# Patient Record
Sex: MALE | Employment: OTHER | URBAN - METROPOLITAN AREA
[De-identification: names, ages, dates, MRNs, and addresses within clinical notes are randomized per-mention and may not be internally consistent; named-entity substitution may affect disease eponyms.]

---

## 2017-01-04 DIAGNOSIS — R31.29 MICROSCOPIC HEMATURIA: ICD-10-CM

## 2017-01-04 DIAGNOSIS — C64.2 RENAL CELL CARCINOMA, LEFT (H): ICD-10-CM

## 2017-01-04 DIAGNOSIS — R53.81 MALAISE: Primary | ICD-10-CM

## 2017-01-04 DIAGNOSIS — Z00.00 ROUTINE GENERAL MEDICAL EXAMINATION AT A HEALTH CARE FACILITY: ICD-10-CM

## 2017-01-04 DIAGNOSIS — M54.50 CHRONIC LOW BACK PAIN WITHOUT SCIATICA, UNSPECIFIED BACK PAIN LATERALITY: ICD-10-CM

## 2017-01-04 DIAGNOSIS — M54.50 CHRONIC LOW BACK PAIN WITHOUT SCIATICA, UNSPECIFIED BACK PAIN LATERALITY: Primary | ICD-10-CM

## 2017-01-04 DIAGNOSIS — G89.29 CHRONIC LOW BACK PAIN WITHOUT SCIATICA, UNSPECIFIED BACK PAIN LATERALITY: ICD-10-CM

## 2017-01-04 DIAGNOSIS — G89.29 CHRONIC LOW BACK PAIN WITHOUT SCIATICA, UNSPECIFIED BACK PAIN LATERALITY: Primary | ICD-10-CM

## 2017-01-04 RX ORDER — HYDROCODONE BITARTRATE AND ACETAMINOPHEN 5; 325 MG/1; MG/1
1 TABLET ORAL EVERY 6 HOURS PRN
Qty: 30 TABLET | Refills: 0 | Status: SHIPPED | OUTPATIENT
Start: 2017-01-04 | End: 2017-02-03

## 2017-01-04 NOTE — TELEPHONE ENCOUNTER
Acetaminophen      Last Written Prescription Date:  12/23/15  Last Fill Quantity: 84,   # refills: 5  Last Office Visit with [facility], [facility] or [facility] prescribing provider: 09/08/16  Future Office visit:       Routing refill request to provider for review/approval because:  Drug not on the [facility], [facility] or [facility] refill protocol or controlled substance      Augmentin      Last Written Prescription Date:  11/23/16  Last Fill Quantity: 40,   # refills: 0  Last Office Visit with [facility], [facility] or [facility] prescribing provider: 09/08/16  Future Office visit:       Routing refill request to provider for review/approval because:  Drug not on the [facility], [facility] or [facility] refill protocol or controlled substance    Multi-Vitamin      Last Written Prescription Date: 12/11/15  Last Fill Quantity: 100,  # refills: 3   Last Office Visit with [facility], [facility] or [facility] prescribing provider: 09/08/16                                               Thank you!  Rosalie Gibbons PhT  [facility]  On behalf of [facility]

## 2017-01-04 NOTE — TELEPHONE ENCOUNTER
Williamco      Last Written Prescription Date:  12/06/16  Last Fill Quantity: 30,   # refills: 0  Last Office Visit with [facility], [facility] or [facility] prescribing provider: 09/08/16  Future Office visit:       Routing refill request to provider for review/approval because:  Drug not on the [facility], [facility] or [facility] refill protocol or controlled substance      Thank you!  Rosalie Gibbons ROSARIO  [facility]  On behalf of [facility]

## 2017-01-05 RX ORDER — AMOXICILLIN AND CLAVULANATE POTASSIUM 500; 125 MG/1; MG/1
1 TABLET, FILM COATED ORAL 2 TIMES DAILY
Qty: 40 TABLET | Refills: 0 | Status: SHIPPED | OUTPATIENT
Start: 2017-01-05 | End: 2017-02-08

## 2017-01-05 RX ORDER — MULTIPLE VITAMINS W/ MINERALS TAB 9MG-400MCG
1 TAB ORAL DAILY
Qty: 100 TABLET | Refills: 3 | Status: SHIPPED | OUTPATIENT
Start: 2017-01-05 | End: 2018-01-15

## 2017-01-05 RX ORDER — ACETAMINOPHEN 500 MG
1000 TABLET ORAL EVERY 6 HOURS PRN
Qty: 84 TABLET | Refills: 5 | Status: SHIPPED | OUTPATIENT
Start: 2017-01-05 | End: 2019-03-04

## 2017-01-05 NOTE — TELEPHONE ENCOUNTER
Augmentin not PSO.  Sent to provider.  Tia Mcconnell RN      Multi-vit and Tylenol sent PSO.  Tia Mcconnell RN

## 2017-01-09 ENCOUNTER — CARE COORDINATION (OUTPATIENT)
Dept: GERIATRIC MEDICINE | Facility: CLINIC | Age: 82
End: 2017-01-09

## 2017-01-09 NOTE — PROGRESS NOTES
[facility] Six-Month Telephone Assessment    6 month telephone assessment completed on 1/7/17.    ER visits: No  Hospitalizations: No  TCU stays: No  Significant health status changes: none  Falls/Injuries: No  ADL/IADL changes: No  Changes in services: No    Caregiver Assessment follow up:  Declined by son    Goals: See POC in chart for goal progress documentation.      Will see client in 6 months for an annual health risk assessment. Encouraged client to call CM with any questions or concerns in the meantime.     MORELIA Blum  [facility]   [phone]

## 2017-02-03 DIAGNOSIS — G89.29 CHRONIC LOW BACK PAIN WITHOUT SCIATICA, UNSPECIFIED BACK PAIN LATERALITY: Primary | ICD-10-CM

## 2017-02-03 DIAGNOSIS — M54.50 CHRONIC LOW BACK PAIN WITHOUT SCIATICA, UNSPECIFIED BACK PAIN LATERALITY: Primary | ICD-10-CM

## 2017-02-03 DIAGNOSIS — C64.2 RENAL CELL CARCINOMA, LEFT (H): ICD-10-CM

## 2017-02-03 NOTE — TELEPHONE ENCOUNTER
NOrco      Last Written Prescription Date: 1-4-17  Last Fill Quantity: 30,  # refills: 0   Last Office Visit with [facility], [facility] or [facility] prescribing provider: 9-8-16    Anjel [facility]  Pharmacy [facility]  On Behalf of [facility]

## 2017-02-06 RX ORDER — HYDROCODONE BITARTRATE AND ACETAMINOPHEN 5; 325 MG/1; MG/1
1 TABLET ORAL EVERY 6 HOURS PRN
Qty: 30 TABLET | Refills: 0 | Status: SHIPPED | OUTPATIENT
Start: 2017-02-06 | End: 2017-03-03

## 2017-02-08 DIAGNOSIS — R53.81 MALAISE: ICD-10-CM

## 2017-02-08 DIAGNOSIS — G89.29 CHRONIC LOW BACK PAIN WITHOUT SCIATICA, UNSPECIFIED BACK PAIN LATERALITY: ICD-10-CM

## 2017-02-08 DIAGNOSIS — M54.50 CHRONIC LOW BACK PAIN WITHOUT SCIATICA, UNSPECIFIED BACK PAIN LATERALITY: ICD-10-CM

## 2017-02-08 DIAGNOSIS — R31.29 MICROSCOPIC HEMATURIA: Primary | ICD-10-CM

## 2017-02-08 DIAGNOSIS — C64.2 RENAL CELL CARCINOMA, LEFT (H): ICD-10-CM

## 2017-02-08 NOTE — TELEPHONE ENCOUNTER
Last Written Prescription Date: 1/5/17  Last Fill Quantity: 40,  # refills: 0   Last Office Visit with G, P or [facility] prescribing provider: 9/8/16    Jen Garcia, Pharmacy [facility]

## 2017-02-09 RX ORDER — AMOXICILLIN AND CLAVULANATE POTASSIUM 500; 125 MG/1; MG/1
1 TABLET, FILM COATED ORAL 2 TIMES DAILY
Qty: 40 TABLET | Refills: 0 | Status: SHIPPED | OUTPATIENT
Start: 2017-02-09 | End: 2017-03-03

## 2017-03-03 DIAGNOSIS — G89.29 CHRONIC MIDLINE LOW BACK PAIN WITHOUT SCIATICA: ICD-10-CM

## 2017-03-03 DIAGNOSIS — M54.50 CHRONIC LOW BACK PAIN WITHOUT SCIATICA, UNSPECIFIED BACK PAIN LATERALITY: ICD-10-CM

## 2017-03-03 DIAGNOSIS — M54.50 CHRONIC MIDLINE LOW BACK PAIN WITHOUT SCIATICA: ICD-10-CM

## 2017-03-03 DIAGNOSIS — C64.2 RENAL CELL CARCINOMA, LEFT (H): ICD-10-CM

## 2017-03-03 DIAGNOSIS — G89.29 CHRONIC LOW BACK PAIN WITHOUT SCIATICA, UNSPECIFIED BACK PAIN LATERALITY: ICD-10-CM

## 2017-03-03 RX ORDER — PREGABALIN 150 MG/1
150 CAPSULE ORAL 2 TIMES DAILY
Qty: 60 CAPSULE | Refills: 5 | Status: ON HOLD | OUTPATIENT
Start: 2017-03-03 | End: 2017-04-30

## 2017-03-03 RX ORDER — HYDROCODONE BITARTRATE AND ACETAMINOPHEN 5; 325 MG/1; MG/1
1 TABLET ORAL EVERY 6 HOURS PRN
Qty: 30 TABLET | Refills: 0 | Status: SHIPPED | OUTPATIENT
Start: 2017-03-03 | End: 2017-04-10

## 2017-03-03 NOTE — TELEPHONE ENCOUNTER
Walked 2 prescriptions (Lyrica & [place]) to the [facility] pharmacy.    Jocelyn Navarro/

## 2017-03-03 NOTE — TELEPHONE ENCOUNTER
Controlled Substance Refill Request for Norco, Lyrica  Problem List Complete:  No     PROVIDER TO CONSIDER COMPLETION OF PROBLEM LIST AND OVERVIEW/CONTROLLED SUBSTANCE AGREEMENT    Last Written Prescription Date:  Norco=2/6/17, Lyrica-7/9/16  Last Fill Quantity: Norco=30, Lyrica=60   # refills: Norco=0, Lyrica=5    Last Office Visit with [facility] primary care provider: 9/8/16    Future Office visit:     Controlled substance agreement on file: No.     Processing:  Staff will hand deliver Rx to on-site pharmacy   checked in past 6 months?  No, route to JESSICA Garcia, Pharmacy [facility]

## 2017-04-10 DIAGNOSIS — M54.50 CHRONIC LOW BACK PAIN WITHOUT SCIATICA, UNSPECIFIED BACK PAIN LATERALITY: ICD-10-CM

## 2017-04-10 DIAGNOSIS — G89.29 CHRONIC LOW BACK PAIN WITHOUT SCIATICA, UNSPECIFIED BACK PAIN LATERALITY: ICD-10-CM

## 2017-04-10 DIAGNOSIS — C64.2 RENAL CELL CARCINOMA, LEFT (H): ICD-10-CM

## 2017-04-10 NOTE — TELEPHONE ENCOUNTER
Controlled Substance Refill Request for Norco  Problem List Complete:  No     PROVIDER TO CONSIDER COMPLETION OF PROBLEM LIST AND OVERVIEW/CONTROLLED SUBSTANCE AGREEMENT    Last Written Prescription Date:  3/3/17  Last Fill Quantity: 30,   # refills: 0    Last Office Visit with [facility] primary care provider: 9/8/16    Future Office visit:     Controlled substance agreement on file: No.     Processing:  Staff will hand deliver Rx to on-site pharmacy   checked in past 6 months?  Yes 3/3/17     Jen Garcia, Pharmacy [facility]

## 2017-04-11 RX ORDER — HYDROCODONE BITARTRATE AND ACETAMINOPHEN 5; 325 MG/1; MG/1
1 TABLET ORAL EVERY 6 HOURS PRN
Qty: 30 TABLET | Refills: 0 | Status: SHIPPED | OUTPATIENT
Start: 2017-04-11 | End: 2017-05-08

## 2017-04-26 ENCOUNTER — APPOINTMENT (OUTPATIENT)
Dept: GENERAL RADIOLOGY | Facility: CLINIC | Age: 82
DRG: 291 | End: 2017-04-26
Attending: EMERGENCY MEDICINE
Payer: COMMERCIAL

## 2017-04-26 ENCOUNTER — HOSPITAL ENCOUNTER (INPATIENT)
Facility: CLINIC | Age: 82
LOS: 4 days | Discharge: HOME | DRG: 291 | End: 2017-04-30
Attending: EMERGENCY MEDICINE | Admitting: INTERNAL MEDICINE
Payer: COMMERCIAL

## 2017-04-26 ENCOUNTER — APPOINTMENT (OUTPATIENT)
Dept: CT IMAGING | Facility: CLINIC | Age: 82
DRG: 291 | End: 2017-04-26
Attending: EMERGENCY MEDICINE
Payer: COMMERCIAL

## 2017-04-26 ENCOUNTER — APPOINTMENT (OUTPATIENT)
Dept: CARDIOLOGY | Facility: CLINIC | Age: 82
DRG: 291 | End: 2017-04-26
Attending: PHYSICIAN ASSISTANT
Payer: COMMERCIAL

## 2017-04-26 ENCOUNTER — CARE COORDINATION (OUTPATIENT)
Dept: GERIATRIC MEDICINE | Facility: CLINIC | Age: 82
End: 2017-04-26

## 2017-04-26 DIAGNOSIS — M62.81 GENERALIZED MUSCLE WEAKNESS: ICD-10-CM

## 2017-04-26 DIAGNOSIS — M54.50 CHRONIC MIDLINE LOW BACK PAIN WITHOUT SCIATICA: ICD-10-CM

## 2017-04-26 DIAGNOSIS — G89.29 CHRONIC MIDLINE LOW BACK PAIN WITHOUT SCIATICA: ICD-10-CM

## 2017-04-26 DIAGNOSIS — D72.829 LEUKOCYTOSIS, UNSPECIFIED TYPE: ICD-10-CM

## 2017-04-26 DIAGNOSIS — R09.02 HYPOXIA: ICD-10-CM

## 2017-04-26 DIAGNOSIS — N28.9 RENAL INSUFFICIENCY: ICD-10-CM

## 2017-04-26 DIAGNOSIS — J81.0 ACUTE PULMONARY EDEMA (H): ICD-10-CM

## 2017-04-26 DIAGNOSIS — K21.9 GASTROESOPHAGEAL REFLUX DISEASE WITHOUT ESOPHAGITIS: Primary | ICD-10-CM

## 2017-04-26 PROBLEM — J18.9 CAP (COMMUNITY ACQUIRED PNEUMONIA): Status: ACTIVE | Noted: 2017-04-26

## 2017-04-26 LAB
ABO + RH BLD: NORMAL
ABO + RH BLD: NORMAL
ALBUMIN SERPL-MCNC: 3.4 G/DL (ref 3.4–5)
ALBUMIN UR-MCNC: NEGATIVE MG/DL
ALP SERPL-CCNC: 89 U/L (ref 40–150)
ALT SERPL W P-5'-P-CCNC: 33 U/L (ref 0–70)
ANION GAP SERPL CALCULATED.3IONS-SCNC: 8 MMOL/L (ref 3–14)
APPEARANCE UR: ABNORMAL
AST SERPL W P-5'-P-CCNC: 28 U/L (ref 0–45)
BACTERIA #/AREA URNS HPF: ABNORMAL /HPF
BASOPHILS # BLD AUTO: 0 10E9/L (ref 0–0.2)
BASOPHILS NFR BLD AUTO: 0.2 %
BILIRUB DIRECT SERPL-MCNC: 0.2 MG/DL (ref 0–0.2)
BILIRUB SERPL-MCNC: 1.3 MG/DL (ref 0.2–1.3)
BILIRUB UR QL STRIP: NEGATIVE
BLD GP AB SCN SERPL QL: NORMAL
BLOOD BANK CMNT PATIENT-IMP: NORMAL
BUN SERPL-MCNC: 26 MG/DL (ref 7–30)
CALCIUM SERPL-MCNC: 9.2 MG/DL (ref 8.5–10.1)
CHLORIDE SERPL-SCNC: 104 MMOL/L (ref 94–109)
CO2 BLDCOV-SCNC: 28 MMOL/L (ref 21–28)
CO2 SERPL-SCNC: 28 MMOL/L (ref 20–32)
COLOR UR AUTO: ABNORMAL
CREAT SERPL-MCNC: 1.82 MG/DL (ref 0.66–1.25)
DIFFERENTIAL METHOD BLD: ABNORMAL
EOSINOPHIL # BLD AUTO: 0.2 10E9/L (ref 0–0.7)
EOSINOPHIL NFR BLD AUTO: 1 %
ERYTHROCYTE [DISTWIDTH] IN BLOOD BY AUTOMATED COUNT: 14.1 % (ref 10–15)
FLUAV+FLUBV AG SPEC QL: NEGATIVE
FLUAV+FLUBV AG SPEC QL: NORMAL
GFR SERPL CREATININE-BSD FRML MDRD: 35 ML/MIN/1.7M2
GLUCOSE SERPL-MCNC: 128 MG/DL (ref 70–99)
GLUCOSE UR STRIP-MCNC: NEGATIVE MG/DL
HCT VFR BLD AUTO: 44.3 % (ref 40–53)
HGB BLD-MCNC: 14.3 G/DL (ref 13.3–17.7)
HGB UR QL STRIP: NEGATIVE
IMM GRANULOCYTES # BLD: 0 10E9/L (ref 0–0.4)
IMM GRANULOCYTES NFR BLD: 0.3 %
INTERPRETATION ECG - MUSE: NORMAL
KETONES UR STRIP-MCNC: NEGATIVE MG/DL
LACTATE BLD-SCNC: 1.1 MMOL/L (ref 0.7–2.1)
LACTATE BLD-SCNC: 2.5 MMOL/L (ref 0.7–2.1)
LEUKOCYTE ESTERASE UR QL STRIP: NEGATIVE
LIPASE SERPL-CCNC: 470 U/L (ref 73–393)
LYMPHOCYTES # BLD AUTO: 1.6 10E9/L (ref 0.8–5.3)
LYMPHOCYTES NFR BLD AUTO: 10.5 %
MCH RBC QN AUTO: 30.3 PG (ref 26.5–33)
MCHC RBC AUTO-ENTMCNC: 32.3 G/DL (ref 31.5–36.5)
MCV RBC AUTO: 94 FL (ref 78–100)
MONOCYTES # BLD AUTO: 1.2 10E9/L (ref 0–1.3)
MONOCYTES NFR BLD AUTO: 7.9 %
MUCOUS THREADS #/AREA URNS LPF: PRESENT /LPF
NEUTROPHILS # BLD AUTO: 12.3 10E9/L (ref 1.6–8.3)
NEUTROPHILS NFR BLD AUTO: 80.1 %
NITRATE UR QL: NEGATIVE
NRBC # BLD AUTO: 0 10*3/UL
NRBC BLD AUTO-RTO: 0 /100
NT-PROBNP SERPL-MCNC: 4539 PG/ML (ref 0–1800)
PCO2 BLDV: 50 MM HG (ref 40–50)
PH BLDV: 7.35 PH (ref 7.32–7.43)
PH UR STRIP: 5 PH (ref 5–7)
PLATELET # BLD AUTO: 163 10E9/L (ref 150–450)
PO2 BLDV: 30 MM HG (ref 25–47)
POTASSIUM SERPL-SCNC: 4.4 MMOL/L (ref 3.4–5.3)
PROCALCITONIN SERPL-MCNC: 0.16 NG/ML
PROT SERPL-MCNC: 7 G/DL (ref 6.8–8.8)
RBC # BLD AUTO: 4.72 10E12/L (ref 4.4–5.9)
RBC #/AREA URNS AUTO: 2 /HPF (ref 0–2)
SAO2 % BLDV FROM PO2: 53 %
SODIUM SERPL-SCNC: 140 MMOL/L (ref 133–144)
SP GR UR STRIP: 1.02 (ref 1–1.03)
SPECIMEN EXP DATE BLD: NORMAL
SPECIMEN SOURCE: NORMAL
TROPONIN I BLD-MCNC: 0.05 UG/L (ref 0–0.1)
TROPONIN I SERPL-MCNC: 0.04 UG/L (ref 0–0.04)
TROPONIN I SERPL-MCNC: 0.04 UG/L (ref 0–0.04)
TROPONIN I SERPL-MCNC: 0.05 UG/L (ref 0–0.04)
URN SPEC COLLECT METH UR: ABNORMAL
UROBILINOGEN UR STRIP-MCNC: 0 MG/DL (ref 0–2)
WBC # BLD AUTO: 15.4 10E9/L (ref 4–11)
WBC #/AREA URNS AUTO: 2 /HPF (ref 0–2)

## 2017-04-26 PROCEDURE — 84484 ASSAY OF TROPONIN QUANT: CPT | Performed by: PHYSICIAN ASSISTANT

## 2017-04-26 PROCEDURE — 81001 URINALYSIS AUTO W/SCOPE: CPT | Performed by: EMERGENCY MEDICINE

## 2017-04-26 PROCEDURE — 99285 EMERGENCY DEPT VISIT HI MDM: CPT | Mod: 25

## 2017-04-26 PROCEDURE — 87077 CULTURE AEROBIC IDENTIFY: CPT | Performed by: EMERGENCY MEDICINE

## 2017-04-26 PROCEDURE — 93306 TTE W/DOPPLER COMPLETE: CPT | Mod: 26 | Performed by: INTERNAL MEDICINE

## 2017-04-26 PROCEDURE — 87040 BLOOD CULTURE FOR BACTERIA: CPT | Performed by: EMERGENCY MEDICINE

## 2017-04-26 PROCEDURE — 93005 ELECTROCARDIOGRAM TRACING: CPT

## 2017-04-26 PROCEDURE — 36415 COLL VENOUS BLD VENIPUNCTURE: CPT

## 2017-04-26 PROCEDURE — 36415 COLL VENOUS BLD VENIPUNCTURE: CPT | Performed by: EMERGENCY MEDICINE

## 2017-04-26 PROCEDURE — 36415 COLL VENOUS BLD VENIPUNCTURE: CPT | Performed by: PHYSICIAN ASSISTANT

## 2017-04-26 PROCEDURE — 87088 URINE BACTERIA CULTURE: CPT | Performed by: EMERGENCY MEDICINE

## 2017-04-26 PROCEDURE — 86900 BLOOD TYPING SEROLOGIC ABO: CPT | Performed by: EMERGENCY MEDICINE

## 2017-04-26 PROCEDURE — 25000128 H RX IP 250 OP 636

## 2017-04-26 PROCEDURE — 83690 ASSAY OF LIPASE: CPT | Performed by: EMERGENCY MEDICINE

## 2017-04-26 PROCEDURE — 84484 ASSAY OF TROPONIN QUANT: CPT

## 2017-04-26 PROCEDURE — 87086 URINE CULTURE/COLONY COUNT: CPT | Performed by: EMERGENCY MEDICINE

## 2017-04-26 PROCEDURE — 96365 THER/PROPH/DIAG IV INF INIT: CPT

## 2017-04-26 PROCEDURE — 83605 ASSAY OF LACTIC ACID: CPT

## 2017-04-26 PROCEDURE — 71010 XR CHEST PORT 1 VW: CPT

## 2017-04-26 PROCEDURE — 84484 ASSAY OF TROPONIN QUANT: CPT | Performed by: EMERGENCY MEDICINE

## 2017-04-26 PROCEDURE — 25000128 H RX IP 250 OP 636: Performed by: PHYSICIAN ASSISTANT

## 2017-04-26 PROCEDURE — 12000007 ZZH R&B INTERMEDIATE

## 2017-04-26 PROCEDURE — 96361 HYDRATE IV INFUSION ADD-ON: CPT

## 2017-04-26 PROCEDURE — 80048 BASIC METABOLIC PNL TOTAL CA: CPT | Performed by: EMERGENCY MEDICINE

## 2017-04-26 PROCEDURE — 86901 BLOOD TYPING SEROLOGIC RH(D): CPT | Performed by: EMERGENCY MEDICINE

## 2017-04-26 PROCEDURE — 87804 INFLUENZA ASSAY W/OPTIC: CPT | Performed by: PHYSICIAN ASSISTANT

## 2017-04-26 PROCEDURE — 86850 RBC ANTIBODY SCREEN: CPT | Performed by: EMERGENCY MEDICINE

## 2017-04-26 PROCEDURE — 40000264 ECHO COMPLETE WITH LUMASON

## 2017-04-26 PROCEDURE — 83880 ASSAY OF NATRIURETIC PEPTIDE: CPT | Performed by: EMERGENCY MEDICINE

## 2017-04-26 PROCEDURE — 74176 CT ABD & PELVIS W/O CONTRAST: CPT

## 2017-04-26 PROCEDURE — 82803 BLOOD GASES ANY COMBINATION: CPT

## 2017-04-26 PROCEDURE — 25000128 H RX IP 250 OP 636: Performed by: EMERGENCY MEDICINE

## 2017-04-26 PROCEDURE — 85025 COMPLETE CBC W/AUTO DIFF WBC: CPT | Performed by: EMERGENCY MEDICINE

## 2017-04-26 PROCEDURE — 25500064 ZZH RX 255 OP 636: Performed by: INTERNAL MEDICINE

## 2017-04-26 PROCEDURE — 80076 HEPATIC FUNCTION PANEL: CPT | Performed by: EMERGENCY MEDICINE

## 2017-04-26 PROCEDURE — 25000132 ZZH RX MED GY IP 250 OP 250 PS 637: Performed by: PHYSICIAN ASSISTANT

## 2017-04-26 PROCEDURE — 99222 1ST HOSP IP/OBS MODERATE 55: CPT | Mod: AI | Performed by: INTERNAL MEDICINE

## 2017-04-26 PROCEDURE — 83605 ASSAY OF LACTIC ACID: CPT | Performed by: PHYSICIAN ASSISTANT

## 2017-04-26 PROCEDURE — 87186 SC STD MICRODIL/AGAR DIL: CPT | Performed by: EMERGENCY MEDICINE

## 2017-04-26 PROCEDURE — 84145 PROCALCITONIN (PCT): CPT | Performed by: EMERGENCY MEDICINE

## 2017-04-26 PROCEDURE — 87800 DETECT AGNT MULT DNA DIREC: CPT | Performed by: EMERGENCY MEDICINE

## 2017-04-26 RX ORDER — CEFTRIAXONE 1 G/1
1 INJECTION, POWDER, FOR SOLUTION INTRAMUSCULAR; INTRAVENOUS ONCE
Status: COMPLETED | OUTPATIENT
Start: 2017-04-26 | End: 2017-04-26

## 2017-04-26 RX ORDER — CEFTRIAXONE 2 G/1
2 INJECTION, POWDER, FOR SOLUTION INTRAMUSCULAR; INTRAVENOUS EVERY 24 HOURS
Status: DISCONTINUED | OUTPATIENT
Start: 2017-04-27 | End: 2017-04-30

## 2017-04-26 RX ORDER — AZITHROMYCIN 250 MG/1
250 TABLET, FILM COATED ORAL EVERY 24 HOURS
Status: COMPLETED | OUTPATIENT
Start: 2017-04-27 | End: 2017-04-30

## 2017-04-26 RX ORDER — MULTIPLE VITAMINS W/ MINERALS TAB 9MG-400MCG
1 TAB ORAL DAILY
Status: DISCONTINUED | OUTPATIENT
Start: 2017-04-27 | End: 2017-04-30 | Stop reason: HOSPADM

## 2017-04-26 RX ORDER — MAGNESIUM OXIDE 400 MG/1
400 TABLET ORAL DAILY
Status: DISCONTINUED | OUTPATIENT
Start: 2017-04-27 | End: 2017-04-30 | Stop reason: HOSPADM

## 2017-04-26 RX ORDER — PREGABALIN 75 MG/1
150 CAPSULE ORAL 2 TIMES DAILY
Status: DISCONTINUED | OUTPATIENT
Start: 2017-04-26 | End: 2017-04-26

## 2017-04-26 RX ORDER — AMOXICILLIN 250 MG
1-2 CAPSULE ORAL 2 TIMES DAILY PRN
Status: DISCONTINUED | OUTPATIENT
Start: 2017-04-26 | End: 2017-04-30 | Stop reason: HOSPADM

## 2017-04-26 RX ORDER — ONDANSETRON 4 MG/1
4 TABLET, ORALLY DISINTEGRATING ORAL EVERY 6 HOURS PRN
Status: DISCONTINUED | OUTPATIENT
Start: 2017-04-26 | End: 2017-04-30 | Stop reason: HOSPADM

## 2017-04-26 RX ORDER — HYDROCODONE BITARTRATE AND ACETAMINOPHEN 5; 325 MG/1; MG/1
1 TABLET ORAL EVERY 6 HOURS PRN
Status: DISCONTINUED | OUTPATIENT
Start: 2017-04-26 | End: 2017-04-30 | Stop reason: HOSPADM

## 2017-04-26 RX ORDER — ACETAMINOPHEN 500 MG
1000 TABLET ORAL EVERY 6 HOURS PRN
Status: DISCONTINUED | OUTPATIENT
Start: 2017-04-26 | End: 2017-04-30 | Stop reason: HOSPADM

## 2017-04-26 RX ORDER — ONDANSETRON 2 MG/ML
4 INJECTION INTRAMUSCULAR; INTRAVENOUS EVERY 6 HOURS PRN
Status: DISCONTINUED | OUTPATIENT
Start: 2017-04-26 | End: 2017-04-30 | Stop reason: HOSPADM

## 2017-04-26 RX ORDER — LISINOPRIL 10 MG/1
10 TABLET ORAL DAILY
Status: DISCONTINUED | OUTPATIENT
Start: 2017-04-27 | End: 2017-04-27

## 2017-04-26 RX ORDER — CARVEDILOL 12.5 MG/1
12.5 TABLET ORAL 2 TIMES DAILY WITH MEALS
Status: DISCONTINUED | OUTPATIENT
Start: 2017-04-26 | End: 2017-04-26

## 2017-04-26 RX ORDER — CARVEDILOL 12.5 MG/1
12.5 TABLET ORAL 2 TIMES DAILY WITH MEALS
Status: DISCONTINUED | OUTPATIENT
Start: 2017-04-27 | End: 2017-04-30 | Stop reason: HOSPADM

## 2017-04-26 RX ORDER — NALOXONE HYDROCHLORIDE 0.4 MG/ML
.1-.4 INJECTION, SOLUTION INTRAMUSCULAR; INTRAVENOUS; SUBCUTANEOUS
Status: DISCONTINUED | OUTPATIENT
Start: 2017-04-26 | End: 2017-04-30 | Stop reason: HOSPADM

## 2017-04-26 RX ADMIN — SODIUM CHLORIDE 500 ML: 9 INJECTION, SOLUTION INTRAVENOUS at 10:33

## 2017-04-26 RX ADMIN — AZITHROMYCIN MONOHYDRATE 500 MG: 500 INJECTION, POWDER, LYOPHILIZED, FOR SOLUTION INTRAVENOUS at 17:45

## 2017-04-26 RX ADMIN — CEFTRIAXONE SODIUM 1 G: 1 INJECTION, POWDER, FOR SOLUTION INTRAMUSCULAR; INTRAVENOUS at 12:45

## 2017-04-26 RX ADMIN — HYDROCODONE BITARTRATE AND ACETAMINOPHEN 1 TABLET: 5; 325 TABLET ORAL at 21:26

## 2017-04-26 RX ADMIN — CEFTRIAXONE SODIUM 1 G: 1 INJECTION, POWDER, FOR SOLUTION INTRAMUSCULAR; INTRAVENOUS at 14:19

## 2017-04-26 RX ADMIN — SULFUR HEXAFLUORIDE 5 ML: KIT at 16:12

## 2017-04-26 ASSESSMENT — ENCOUNTER SYMPTOMS
COUGH: 0
FEVER: 0
ABDOMINAL PAIN: 1
VOMITING: 0
NAUSEA: 0
FATIGUE: 1
DIARRHEA: 0

## 2017-04-26 ASSESSMENT — ACTIVITIES OF DAILY LIVING (ADL)
BATHING: 0-->INDEPENDENT
RETIRED_COMMUNICATION: 0-->UNDERSTANDS/COMMUNICATES WITHOUT DIFFICULTY
RETIRED_EATING: 0-->INDEPENDENT
TOILETING: 1-->ASSISTIVE EQUIPMENT
DRESS: 0-->INDEPENDENT
WHICH_OF_THE_ABOVE_FUNCTIONAL_RISKS_HAD_A_RECENT_ONSET_OR_CHANGE?: AMBULATION;TRANSFERRING;TOILETING;BATHING;DRESSING
COGNITION: 1 - ATTENTION OR MEMORY DEFICITS
TRANSFERRING: 1-->ASSISTIVE EQUIPMENT
AMBULATION: 1-->ASSISTIVE EQUIPMENT

## 2017-04-26 NOTE — PHARMACY-ADMISSION MEDICATION HISTORY
Admission medication history interview status for this patient is complete. See [facility] admission navigator for allergy information, prior to admission medications and immunization status.     Medication history interview source(s):Family: patient's son who sets up medications for hime  Medication history resources (including written lists, pill bottles, clinic record):None  Primary pharmacy:[facility]    Changes made to PTA medication list:  Added: none  Deleted: Augmentin, Clotriamazole, Lidocaine, Triamcinolone  Changed: done    Actions taken by pharmacist (provider contacted, etc):None     Additional medication history information:None    Medication reconciliation/reorder completed by provider prior to medication history? No    For patients on insulin therapy: No     Prior to Admission medications    Medication Sig Last Dose Taking? Auth Provider   HYDROcodone-acetaminophen (NORCO) 5-325 MG per tablet Take 1 tablet by mouth every 6 hours as needed for moderate to severe pain 4/26/2017 at Unknown time Yes Yonatan Hurtado MD   pregabalin (LYRICA) 150 MG capsule Take 1 capsule (150 mg) by mouth 2 times daily 4/26/2017 at Unknown time Yes Yonatan Hurtado MD   lisinopril (PRINIVIL/ZESTRIL) 10 MG tablet Take 1 tablet (10 mg) by mouth daily 4/26/2017 at Unknown time Yes Yonatan Hurtado MD   magnesium oxide (MAGNESIUM-OXIDE) 400 (241.3 MG) MG tablet Take 1 tablet (400 mg) by mouth daily 1 tab QD 4/26/2017 at Unknown time Yes Yonatan Hurtado MD   acetaminophen (TYLENOL) 500 MG tablet Take 2 tablets (1,000 mg) by mouth every 6 hours as needed for mild pain Past Month at Unknown time Yes Yonatan Hurtado MD   multivitamin, therapeutic with minerals (MULTI-VITAMIN) TABS tablet Take 1 tablet by mouth daily 4/26/2017 at Unknown time Yes Yonatan Hurtado MD   carvedilol (COREG) 12.5 MG tablet Take 1 tablet (12.5 mg) by mouth 2 times daily (with meals) 4/26/2017 at Unknown time Yes Yonatan Hurtado MD   order for DME Equipment  being ordered: adelso with slides   Yonatan Hurtado MD

## 2017-04-26 NOTE — ED NOTES
Pt with increased generalized weakness, crackles to bases per EMS. ABC's intact, alert and oriented X3.

## 2017-04-26 NOTE — ED NOTES
Bed: ED27  Expected date: 4/26/17  Expected time: 9:57 AM  Means of arrival: Ambulance  Comments:  CIELO

## 2017-04-26 NOTE — LETTER
Transition Communication Hand-off for Care Transitions to Next Level of Care Provider    Name: Barrie Woods  MRN #: [id number]  Primary Care Provider: Yonatan Hurtado  Primary Care MD Name: Yonatan Hurtado  [facility] [id number] [address]  Primary Care Clinic Name: [facility]  Reason for Hospitalization:  Acute pulmonary edema (H) [J81.0]  Renal insufficiency [N28.9]  Hypoxia [R09.02]  Generalized muscle weakness [M62.81]  Leukocytosis, unspecified type [D72.829]  Admit Date/Time: 4/26/2017 10:05 AM  Discharge Date: 4/30/17  Payor Source: Payor: MEDICA / Plan: MEDICA [employer] [facility] / Product Type: HMO /     Readmission Assessment Measure (CRISTO) Risk Score/category: Average    Plan of Care Goals/Milestone Events:     Medical Goals   Short-term Hospital follow-up appt scheduled for  Mon 5/8/17 at 10:30 am w/Dr. Yonatan Hurtado.   Long-term: Report any symptoms that are worse  than usual to PCP.          Reason for Communication Hand-off Referral: Ongoing coordination of care    Discharge Plan:        Concern for non-adherence with plan of care:   Y/N No  Discharge Needs Assessment:      Already enrolled in Tele-monitoring program and name of program:  n/a  Follow-up specialty is recommended: Yes    Follow-up plan:  Future Appointments  Date Time Provider Department Center   5/1/2017 9:30 AM [place] [facility]   5/8/2017 10:30 AM Yonatan Hurtado MD [facility]       Any outstanding tests or procedures:  The following labs/tests are recommended: bmp, lfts, and lipase            Key Recommendations:  See medical goals above.     Cici Genao, RN, BSN, PHN, CTS  Care Transitions Specialist  [facility]      AVS/Discharge Summary is the source of truth; this is a helpful guide for improved communication of patient story

## 2017-04-26 NOTE — H&P
[facility]  History and Physical      Patient Name: Barrie Woods MRN# [id number]   Age: 87 year old YOB: 1930     Date of Admission:4/26/2017    Primary care provider: Yonatan Hurtado  Date of Service: 4/26/2017         Assessment and Plan:   Barrie Woods is a 87 year old male with a history of Diverticulosis, Chronic Back Pain, Renal Cell Carcinoma, Latent TB, GERD, BPH, Alcohol Abuse, Anemia, Atrial Fibrillation, CHF, CVA, Dementia who presents to the ED today 2/2 shortness of breath and fatigue    Community Acquired Pneumonia - 2 days of shortness of breath, fatigue and generalized weakness.  WBC 15.4, lactic acid 2.5, Oxygen saturation 90% on room air.  CXR revealed bilateral interstitial edema and small bilateral pleural effusions.  CT abd/pelvis revealed new patchy airspace disease in the RLL c/w developing pneumonia with a small right pleural effusion.    - IV Ceftriaxone and Azithromycin  - serial lactic acid levels  - follow up blood cultures      CHF - known systolic and diastolic CHF.  Last echocardiogram 6/2014 with EF 50-55% with inferior wall hypokinesis with dilated LA and mod MR.  Now with shortness of breath, BNP elevation and CXR with bilateral interstitial edema and small bilateral pleural effusions.  Mild peripheral edema on exam.  - continue home Lisinopril and Coreg  - will hold off on diuresis for now  - trend troponin levels and monitor on telemetry  - echocardiogram    Chronic Atrial Fibrillation - rate controlled on Coreg.  Not currently on anticoagulation.     CKD - creatinine 1.82 on admission down from 2.4 9/2016, but prior to that patient's baseline was ~1.1.  Does have a known hx of untreated RCC.  UA negative.    - monitor BMP daily     Renal Cell Carcinoma - known RCC history.  CT abd/pelvis on admission showed a left renal mass.  Per chart review, surgery has been declined in the past.    Chronic Back Pain - continue home Lyrica, Tylenol  and prn Norco    CODE: Full  Diet/IVF: regular  GI ppx:  omeprazole  DVT ppx: SCD    Patient discussed with Dr. Ashish Kraus MS PA-C  Physician Assistant   Hospitalist Service  Pager: [phone]           Chief Complaint:   Fatigue         HPI:   87 year old male with a history of Diverticulosis, Chronic Back Pain, Renal Cell Carcinoma, Latent TB, GERD, BPH, Alcohol Abuse, Anemia, Atrial Fibrillation, CHF, CVA, Dementia who presents to the ED today 2/2 shortness of breath and fatigue    Patient is Pashto speaking with underlying Dementia.  His son is at the bedside and interprets for him and declines a professional interpretor at this time.  Son states patient developed generalized weakness, fatigue with shortness of breath yesterday.  He has had a mild intermittent cough which is non productive and complained of left sided chest pain and body aches with abdominal pain today prompting them to bring him to the ER.  Currently, patient is oriented to person, not place or time.  He lives with his family in a single family home who provide cares for him.  Patient is currently without complaints.      ED work up revealed patient BP 90-110s/50s, HR 62, 90% on room air, Afebrile.  Laboratory work up revealed creatinine 1.82, lactic acid 2.5, lipase 470, BNP 4539, troponin 0.037, wbc 15.4 with otherwise normal CMP, CBC and UA.  CXR revealed bilateral interstitial edema and small bilateral pleural effusions.  CT abd/pelvis revealed new patchy airspace disease in the RLL c/w developing pneumonia with a small right pleural effusion, left renal mass, non obstructing left kidney stone and enlarged prostate with no acute abnormality visualized in the abdomen.   Patient received IVF and IV Ceftriaxone and admitted for further management.         Past Medical History:     Past Medical History:   Diagnosis Date     Acute, but ill-defined, cerebrovascular disease 1/04    left middle cerebral artery infarct     Alcohol  abuse      Anemia 7/14/2014     Atrial fibrillation (H)     CVA occurred off anticoagulation     Cardiomyopathy (H)     stress tests 6/04 and 10/04 negative for ischemia, EF 36-40%     CHF (congestive heart failure) (H) 12/24/2009     Chronic pain 10/12/2015     Dementia, without behavioral disturbance 6/29/2015     Depressive disorder      Diaphragmatic hernia without mention of obstruction or gangrene     left sided chest pain, hiatal hernia/gastritis     Diverticulosis of colon (without mention of hemorrhage)     sigmoid     Dysphagia 7/27/2015     Elevated blood sugar 1/25/2016     Esophageal reflux      Hearing loss      Hematuria     negative cystoscopy 3/04     Hereditary and idiopathic peripheral neuropathy 2006    EMG-peripheral polyneuropathy, B12 and VitD deficient, ? alcohol related     Hx of congestive heart failure 1/12/2016     Hypertrophy of prostate 6/17/2015     Nausea with vomiting 12/8/2014     PSA elevation 5/30/2014     Recurrent falls 7/28/2015     Renal cell carcinoma (H) 8/25/2014     Sepsis (H) 1/7/2015     TB lung, latent 5/6/2014     Urinary incontinence 7/28/2015          Past Surgical History:   History reviewed. No pertinent surgical history.       Social History:     Social History     Social History     Marital status:      Spouse name: Eliceo Wright     Number of children: 6     Years of education: N/A     Occupational History     Retired      Social History Main Topics     Smoking status: Current Every Day Smoker     Types: Dip, chew, snus or snuff     Smokeless tobacco: Current User     Types: Chew     Last attempt to quit: 1/9/2004      Comment: pt chews tobacco     Alcohol use No     Drug use: No     Sexual activity: Not Currently     Other Topics Concern     Parent/Sibling W/ Cabg, Mi Or Angioplasty Before 65f 55m? No     Social History Narrative          Family History:     Family History   Problem Relation Age of Onset     C.A.D. No family hx of      DIABETES No  family hx of           Allergies:      Allergies   Allergen Reactions     Nkda [No Known Drug Allergies]           Medications:     Prior to Admission medications    Medication Sig Last Dose Taking? Auth Provider   HYDROcodone-acetaminophen (NORCO) 5-325 MG per tablet Take 1 tablet by mouth every 6 hours as needed for moderate to severe pain   Yonatan Hurtado MD   amoxicillin-clavulanate (AUGMENTIN) 500-125 MG per tablet TAKE ONE TABLET BY MOUTH TWICE A DAY   Yonatan Hurtado MD   pregabalin (LYRICA) 150 MG capsule Take 1 capsule (150 mg) by mouth 2 times daily   Yonatan Hurtado MD   lisinopril (PRINIVIL/ZESTRIL) 10 MG tablet Take 1 tablet (10 mg) by mouth daily   Yonatan Hurtado MD   magnesium oxide (MAGNESIUM-OXIDE) 400 (241.3 MG) MG tablet Take 1 tablet (400 mg) by mouth daily 1 tab QD   Yonatan Hurtado MD   acetaminophen (TYLENOL) 500 MG tablet Take 2 tablets (1,000 mg) by mouth every 6 hours as needed for mild pain   Yonatan Hurtado MD   multivitamin, therapeutic with minerals (MULTI-VITAMIN) TABS tablet Take 1 tablet by mouth daily   Yonatan Hurtado MD   lidocaine (XYLOCAINE) 5 % ointment Apply topically 2 times daily To painful area   Yonatan Hurtado MD   carvedilol (COREG) 12.5 MG tablet Take 1 tablet (12.5 mg) by mouth 2 times daily (with meals)   Yonatan Hurtado MD   Clotrimazole POWD Apply   Yonatan Hurtado MD   triamcinolone (KENALOG) 0.1 % cream Apply topically 2 times daily   Yonatan Hurtado MD   order for DME Equipment being ordered: walker with slides   Yonatan Hurtado MD   fish oil-omega-3 fatty acids (OMEGA 3) 1000 MG capsule Take 1 capsule (1 g) by mouth daily   Keila Harrison MD          Review of Systems:   A complete ROS was performed and is negative other than what is stated in the HPI.       Physical Exam:   Blood pressure 97/51, pulse 62, temperature 98.8  F (37.1  C), temperature source Oral, resp. rate 24, weight 78 kg (172 lb), SpO2 96 %.  General: Alert, interactive, NAD, lying  in bed, pleasant, son at the bedside  HEENT: AT/NC, sclera anicteric, PERRL, EOMI  Neck: Supple, no JVD   Chest/Resp: decreased at the bases with few coarse breath sounds, no wheezing.  Heart/CV: irregular rate and rhythm, no murmur  Abdomen/GI: Soft, nontender, nondistended. +BS.  No rebound or guarding.  Extremities/MSK: Trace bilateral ankle edema  Skin: Warm and dry, no jaundice or rash  Neuro: Alert & oriented to person, not place or time.  Moves all extremities equally, no focal deficits.         Labs:   ROUTINE ICU LABS (Last four results)  CMP  Recent Labs  Lab 04/26/17  1018      POTASSIUM 4.4   CHLORIDE 104   CO2 28   ANIONGAP 8   *   BUN 26   CR 1.82*   GFRESTIMATED 35*   GFRESTBLACK 43*   JOSE 9.2   PROTTOTAL 7.0   ALBUMIN 3.4   BILITOTAL 1.3   ALKPHOS 89   AST 28   ALT 33     CBC  Recent Labs  Lab 04/26/17  1018   WBC 15.4*   RBC 4.72   HGB 14.3   HCT 44.3   MCV 94   MCH 30.3   MCHC 32.3   RDW 14.1        INRNo lab results found in last 7 days.  Arterial Blood GasNo lab results found in last 7 days.       Imaging/Procedures:     Results for orders placed or performed during the hospital encounter of 04/26/17   Chest  XR, 1 view PORTABLE    Narrative    XR CHEST PORT 1 VW 4/26/2017 10:46 AM    HISTORY: Low oxygen saturation.    COMPARISON: 7/27/2015    FINDINGS: Basilar interstitial edema and small bilateral pleural  effusions. No pneumothorax. Stable heart size.      Impression    IMPRESSION: Edema.    SHUBHAM GARCIA MD

## 2017-04-26 NOTE — PLAN OF CARE
Problem: Goal Outcome Summary  Goal: Goal Outcome Summary  Outcome: No Change  ROOM #308     Living Situation (if not independent, order SW consult): Lives with Jose, son. See facesheet for all contact information.  :Jose Woods     Activity level at baseline: Usually up by 7a.m., walks in house without walker.  Declined functional ability w/illness.  Activity level on admit: Reposition q2hr, Bedrest. May be lift patient.      Upon arrival, sleepy. Arouses to voice and talked w/son a little bit. Lungs base fine crackles, edema bilateral feet slightly pitting. Skin intact. BP soft 90's systolic. Abdomen round, firm - unable to determine when pt had last bowel movement. Tele controlled afib. IVAB started. Bolus of 500 cc given in ER. Son does not think the pt will be able to use the urinal/may be incontinent. He was straight cathed in the ER for UA. Troponin 0.051, MD hernandez paged.

## 2017-04-26 NOTE — PLAN OF CARE
"[id number]: MD paged \"Having some pauses. Longest pause at 2.4. Still very somnolent. Opens eyes to chest rub. Not verbally responding. VSS.\"      "

## 2017-04-26 NOTE — IP AVS SNAPSHOT
MRN:[id number]                      After Visit Summary   4/26/2017    Barrie Woods    MRN: [id number]           Thank you!     Thank you for choosing [facility] for your care. Our goal is always to provide you with excellent care. Hearing back from our patients is one way we can continue to improve our services. Please take a few minutes to complete the written survey that you may receive in the mail after you visit. If you would like to speak to someone directly about your visit please contact Patient Relations at [phone]. Thank you!          Patient Information     Date Of Birth          1/25/1930        Designated Caregiver       Most Recent Value    Caregiver    Will someone help with your care after discharge? yes    Name of designated caregiver Jose (Son)    Phone number of caregiver [phone]    Caregiver address Same as pt      About your hospital stay     You were admitted on:  April 26, 2017 You last received care in the:  [facility]    You were discharged on:  April 30, 2017        Reason for your hospital stay       You were hospitalized for pneumonia which is much improved following your course of antibiotics.  I suspect your abdominal pain may be due to gastritis or inflammation in the stomach and should improve slowly with a daily acid suppressant called omeprazole.  Your leg pain is likely sciatica which is pain origination from a pinched nerve in your back.  Because you were very sleepy we have decreased the dose of your Lyrica to treat this problem.                  Who to Call     For medical emergencies, please call 911.  For non-urgent questions about your medical care, please call your primary care provider or clinic, [phone]          Attending Provider     Provider Specialty    Elijah Zuñiga MD Emergency Medicine    [facility]       Primary Care Provider Office Phone # Fax #    [place]  JEIMY Hurtado [phone]       [address] [address]        After Care Instructions     Activity       Your activity upon discharge: activity as tolerated            Diet       Follow this diet upon discharge: Orders Placed This Encounter      Room Service      Combination Diet Mechanical/Dental Soft Diet; 2 gm NA Diet                  Follow-up Appointments     Follow-up and recommended labs and tests        Follow up with primary care provider, Yonatan Hurtado, within 3 to 5 days to evaluate medication change.  The following labs/tests are recommended: bmp, lfts, and lipase                  Your next 10 appointments already scheduled     May 08, 2017 10:30 AM CDT   [facility]    [address] [place] [id number] [phone]           Bring a current list of meds and any records pertaining to this visit.  For Physicals, please bring immunization records and any forms needing to be filled out.  Please arrive 10 minutes early to complete paperwork.              Pending Results     Date and Time Order Name Status Description    4/26/2017 1027 Blood culture ONE site Preliminary     4/26/2017 1026 Urine Culture Aerobic Bacterial Preliminary             Statement of Approval     Ordered          04/30/17 1249  I have reviewed and agree with all the recommendations and orders detailed in this document.  EFFECTIVE NOW     Approved and electronically signed by:  Santosh Poon MD             Admission Information     Date & Time Provider Department Dept. Phone    4/26/2017 Rahul Hinojosa MD [place] [address] Medical Surgical [phone]      Your Vitals Were     Blood Pressure Pulse Temperature Respirations Weight Pulse Oximetry    134/72 64 99.4  F (37.4  C) (Oral) 18 81.2 kg (179 lb) 95%    BMI (Body Mass Index)                   28.89 kg/m2  "          [facility] Information     [employer] lets you send messages to your doctor, view your test results, renew your prescriptions, schedule appointments and more. To sign up, go to [email or web address] . Click on \"Log in\" on the left side of the screen, which will take you to the Welcome page. Then click on \"Sign up Now\" on the right side of the page.     You will be asked to enter the access code listed below, as well as some personal information. Please follow the directions to create your username and password.     Your access code is: [email or web address]  Expires: 2017  2:51 PM     Your access code will  in 90 days. If you need help or a new code, please call your [place] clinic or [phone].        Care EveryWhere ID     This is your Care EveryWhere ID. This could be used by other organizations to access your [place] medical records  [id number]           Review of your medicines      START taking        Dose / Directions    bumetanide 1 MG tablet   Commonly known as:  BUMEX   Used for:  Acute pulmonary edema (H)        Dose:  1 mg   Take 1 tablet (1 mg) by mouth daily   Quantity:  30 tablet   Refills:  0       omeprazole 40 MG capsule   Commonly known as:  priLOSEC   Used for:  Gastroesophageal reflux disease without esophagitis        Dose:  40 mg   Take 1 capsule (40 mg) by mouth every morning (before breakfast)   Quantity:  30 capsule   Refills:  0         CONTINUE these medicines which may have CHANGED, or have new prescriptions. If we are uncertain of the size of tablets/capsules you have at home, strength may be listed as something that might have changed.        Dose / Directions    pregabalin 50 MG capsule   Commonly known as:  LYRICA   This may have changed:    - medication strength  - how much to take   Used for:  Chronic midline low back pain without sciatica        Dose:  50 mg   Take 1 capsule (50 mg) by mouth 2 times daily   Quantity:  60 capsule   Refills:  0         CONTINUE these " medicines which have NOT CHANGED        Dose / Directions    acetaminophen 500 MG tablet   Commonly known as:  TYLENOL   Used for:  Malaise        Dose:  1000 mg   Take 2 tablets (1,000 mg) by mouth every 6 hours as needed for mild pain   Quantity:  84 tablet   Refills:  5       carvedilol 12.5 MG tablet   Commonly known as:  COREG   Used for:  Chronic combined systolic and diastolic congestive heart failure (H)        Dose:  12.5 mg   Take 1 tablet (12.5 mg) by mouth 2 times daily (with meals)   Quantity:  60 tablet   Refills:  5       HYDROcodone-acetaminophen 5-325 MG per tablet   Commonly known as:  NORCO   Used for:  Chronic low back pain without sciatica, unspecified back pain laterality, Renal cell carcinoma, left (H)        Dose:  1 tablet   Take 1 tablet by mouth every 6 hours as needed for moderate to severe pain   Quantity:  30 tablet   Refills:  0       magnesium oxide 400 (241.3 MG) MG tablet   Commonly known as:  MAGNESIUM-OXIDE   Used for:  Routine general medical examination at a health care facility        Dose:  400 mg   Take 1 tablet (400 mg) by mouth daily 1 tab QD   Quantity:  30 tablet   Refills:  5       multivitamin, therapeutic with minerals Tabs tablet   Used for:  Routine general medical examination at a health care facility        Dose:  1 tablet   Take 1 tablet by mouth daily   Quantity:  100 tablet   Refills:  3       order for DME   Used for:  Recurrent falls        Equipment being ordered: walker with slides   Quantity:  1 Device   Refills:  0         STOP taking     lisinopril 10 MG tablet   Commonly known as:  PRINIVIL/ZESTRIL                Where to get your medicines      These medications were sent to [facility] [address]  [id number] [place] [address]     Phone:  [phone]     bumetanide 1 MG tablet    omeprazole 40 MG capsule         Some of these will need a paper prescription and others can be bought over the counter.  Ask your nurse if you have questions.     Bring a paper prescription for each of these medications     pregabalin 50 MG capsule                Protect others around you: Learn how to safely use, store and throw away your medicines at www.disposemymeds.org.             Medication List: This is a list of all your medications and when to take them. Check marks below indicate your daily home schedule. Keep this list as a reference.      Medications           Morning Afternoon Evening Bedtime As Needed    acetaminophen 500 MG tablet   Commonly known as:  TYLENOL   Take 2 tablets (1,000 mg) by mouth every 6 hours as needed for mild pain   Last time this was given:  1,000 mg on 4/29/2017  4:17 PM                                   bumetanide 1 MG tablet   Commonly known as:  BUMEX   Take 1 tablet (1 mg) by mouth daily   Last time this was given:  1 mg on 4/30/2017  9:04 AM                                   carvedilol 12.5 MG tablet   Commonly known as:  COREG   Take 1 tablet (12.5 mg) by mouth 2 times daily (with meals)   Last time this was given:  12.5 mg on 4/30/2017  9:04 AM                                      HYDROcodone-acetaminophen 5-325 MG per tablet   Commonly known as:  NORCO   Take 1 tablet by mouth every 6 hours as needed for moderate to severe pain   Last time this was given:  1 tablet on 4/28/2017 11:46 PM                                   magnesium oxide 400 (241.3 MG) MG tablet   Commonly known as:  MAGNESIUM-OXIDE   Take 1 tablet (400 mg) by mouth daily 1 tab QD   Last time this was given:  400 mg on 4/30/2017  9:05 AM                                   multivitamin, therapeutic with minerals Tabs tablet   Take 1 tablet by mouth daily   Last time this was given:  1 tablet on 4/30/2017  9:05 AM                                   omeprazole 40 MG capsule   Commonly known as:  priLOSEC   Take 1 capsule (40 mg) by mouth every morning (before breakfast)   Last time this was given:  20 mg on 4/30/2017  9:05 AM                                    order for DME   Equipment being ordered: walker with slides                                pregabalin 50 MG capsule   Commonly known as:  LYRICA   Take 1 capsule (50 mg) by mouth 2 times daily   Last time this was given:  50 mg on 4/30/2017  9:05 AM                                                More Information        Patient Education    Bumetanida, Tableta oral    Bumetanida, Solución para inyección  Bumetanida, Tableta oral    Qué es kana medicamento?  La BUMETANIDA es un diurético. Ayuda a incrementar el volumen de orina, lo que provoca que el cuerpo pierda sal y agua que están en exceso. Kana medicamento se utiliza para tratar la pineda presión sanguínea, edema o la hinchazón causada por enfermedades cardíacas, hepáticas o renales.  Kana medicamento puede ser utilizado para otros usos; si tiene alguna pregunta consulte con maldonado proveedor de atención médica o con maldonado farmacéutico.   Qué le mata informar a mi profesional de la fanny antes de herminia kana medicamento?  Necesita saber si usted presenta alguno de los siguientes problemas o situaciones:    nivel anormal de electrólitos en la jese    diarrea o vómito    gota    enfermedad cardiaca    enfermedad renal, bajo volumen de orina o dificultad para orinar    enfermedad hepática    flavia reacción alérgica o inusual a la bumetanida, sulfonamidas, otros medicamentos, alimentos, colorantes o conservantes    si está embarazada o buscando quedar embarazada    si está amamantando a un bebé   Cómo mata utilizar kana medicamento?  [place] kana medicamento por vía oral con un vaso de agua. Siga las instrucciones de la etiqueta del medicamento. Puede herminia kana medicamento con o sin alimentos. Si le produce malestar estomacal, tómelo con alimentos o con leche. No tome maldonado medicamento con flavia frecuencia mayor que la indicada. Recuerde que necesitará orinar con frecuencia después de herminia kana medicamento. No tome david dosis en horarios que le causen  problemas. No lo tome a la hora de acostarse.  Hable con maldonado pediatra para informarse acerca del uso de kana medicamento en niños. Puede requerir atención especial.  Sobredosis: Póngase en contacto inmediatamente con un centro toxicológico o flavia ronnie de urgencia si usted tabitha que haya tomado demasiado medicamento.  ATENCIÓN: Kana medicamento es solo para usted. No comparta kana medicamento con nadie.   Qué sucede si me olvido de flavia dosis?  Si olvida flavia dosis, tómela lo antes posible. Si es jas la hora de la próxima dosis, tome sólo kierra dosis. No tome dosis adicionales o dobles.   Qué puede interactuar con kana medicamento?    alcohol    ciertos antibióticos administrados por inyección    diuréticos    medicamentos para el corazón, ajay digoxina y dofetilida    hormonas, ajay la cortisona, fludrocortisona o hidrocortisona    litio    medicamentos para la diabetes    medicamentos para la pineda presión sanguínea    medicamentos antiinflamatorios ajay la indometacina    suplementos de venta gavino, tales ajay efedra y ginseng  Puede ser que esta lista no menciona todas las posibles interacciones. Informe a maldonado profesional de la fanny de todos los productos a base de hierbas, medicamentos de venta gavino o suplementos nutritivos que esté tomando. Si usted fuma, consume bebidas alcohólicas o si utiliza drogas ilegales, indíqueselo también a maldonado profesional de la fanny. Algunas sustancias pueden interactuar con maldonado medicamento.   A qué mata estar atento al usar kana medicamento?  Visite a maldonado médico o a maldonado profesional de la fanny para chequear maldonado evolución periódicamente. Controle maldonado presión sanguínea regularmente. Pregúnte a maldonado médico o a maldonado profesional de la fanny cuál debe ser maldonado presión sanguínea y cuándo deberá comunicarse con él/jonathan. Si es diabético, controle maldonado nivel de azúcar en la jese ajay le haya indicado.  Puede necesitar seguir flavia dieta especial mientras esté tomando kana medicamento. Consulte a maldonado médico  acerca de esto. También, pregunte a maldonado médico cuánto líquido debe beber por día. No debe deshidratarse.  Puede experimentar somnolencia o mareos. No conduzca ni utilice maquinaria, ni leydi nada que le exija permanecer en estado de alerta hasta que sepa cómo le afecta kana medicamento. No se ponga de pie ni se siente rápidamente, especialmente si es flavia persona de edad avanzada. [place] reduce el riesgo de mareos o desmayos. El alcohol puede aumentar los mareos y la somnolencia. Evite consumir bebidas alcohólicas.   Qué efectos secundarios puedo tener al utilizar kana medicamento?  Efectos secundarios que debe informar a maldonado médico o a maldonado profesional de la fanny tan pronto ajay sea posible:    reacciones alérgicas ajay erupción cutánea, picazón o urticarias, hinchazón de la michelle, labios o lengua    jese en la orina o en david heces    visión borrosa    boca seca    fiebre o escalofríos    pérdida de la audición, zumbido en los oídos    latidos cardiacos irregulares    calambre, dolor o debilidad muscular    cansancio o debilidad inusual    diarrea o vómito  Efectos secundarios que, por lo general, no requieren atención médica (debe informarlos a maldonado médico o a maldonado profesional de la fanny si persisten o si son molestos):    dolor de philip    pérdida del apetito    sangrado o magulladuras inusuales  Puede ser que esta lista no menciona todos los posibles efectos secundarios. Comuníquese a maldonado médico por asesoramiento médico sobre los efectos secundarios. Usted puede informar los efectos secundarios a la FDA por teléfono al 1-800-FDA-1088.   Dónde mata guardar mi medicina?  [place]ala fuera del alcance de los niños.  Guárdela a temperatura ambiente, entre 15 y 30 grados C (59 y 86 grados F). Deseche los medicamentos que no haya utilizado, después de la fecha de vencimiento.    [id number] [employer]. [address] [place] [address], [place] [address]. Todos los derechos reservados. Esta información no pretende sustituir  la atención médica profesional. Sólo maldonado médico puede diagnosticar y tratar un problema de fanny.                Patient Education    Omeprazol, Tableta gastrorresistente    Magnesio de Omeprazol, Tableta oral gastrorresistante    Magnesio de Omeprazol, Cápsula oral, gastrorresistante    Magnesio de Omeprazol, Suspensión oral    Omeprazol Cápsula oral, bolitas gastrorresistentes  Omeprazol Cápsula oral, bolitas gastrorresistentes   Qué es doris medicamento?  El OMEPRAZOL previene la producción de ácido en el estómago. Doris medicamento se utiliza para tratar la enfermedad del reflujo gastroesofágico (ERGE), úlceras, ciertas bacterias en el estómago, inflamación del esófago y el síndrome de Zollinger-Manriquez. También es útil en el luis de enfermedades que producen un exceso de ácido en el estómago.  Doris medicamento puede ser utilizado para otros usos; si tiene alguna pregunta consulte con maldonado proveedor de atención médica o con maldonado farmacéutico.   Qué le mata informar a mi profesional de la fanny antes de herminia doris medicamento?  Necesita saber si usted presenta alguno de los siguientes problemas o situaciones:    enfermedad hepática    niveles bajos de magnesio en la jese    flavia reacción alérgica o inusual al omeprazol, a otros medicamentos, alimentos, colorantes o conservantes    si está embarazada o buscando quedar embarazada    si está amamantando a un bebé   Cómo mata utilizar doris medicamento?  [employer] doris medicamento por vía oral con un vaso de agua. Siga las instrucciones de la etiqueta del medicamento. No triture, rompa ni mastique las cápsulas. Puede abrirlas y espolvorear maldonado contenido en puré de manzanas o yogur, se puede tomor con jugos de fruta o también puede tomarlas rápidamente con agua. Doris medicamento actúa mejor si se lo bruce con el estómago vacío entre 30 y 60 minutos antes del desayuno. [employer] david dosis a intervalos regulares. No tome maldonado medicamento con flavia frecuencia mayor a la indicada.  Hable con maldonado  pediatra para informarse acerca del uso de kana medicamento en niños. Puede requerir atención especial.  Sobredosis: Póngase en contacto inmediatamente con un centro toxicológico o flavia ronnie de urgencia si usted tabitha que haya tomado demasiado medicamento.  ATENCIÓN: Kana medicamento es solo para usted. No comparta kana medicamento con nadie.   Qué sucede si me olvido de flavia dosis?  Si olvida flavia dosis, tómela lo antes posible. Si es jas la hora de la próxima dosis, tome sólo kierra dosis. No tome dosis adicionales o dobles.   Qué puede interactuar con kana medicamento?  No tome esta medicina con ninguno de los siguientes medicamentos:    atazanavir    clopidogrel    nelfinavir  Esta medicina también puede interactuar con los siguientes medicamentos:    ampicilina    ciertos medicamentos para la ansiedad o para conciliar el sueño    ciertos medicamentos que tratan o previenen coágulos sanguíneos ajay warfarina    ciclosporina    diazepam    digoxina    disulfiram    diuréticos    sales de quincy    fenitoína    medicamentos recetados para infecciones micóticas o por levadura tales ajay itraconazol, quetoconazol, voriconazol    saquinavir    tacrolimo  Puede ser que esta lista no menciona todas las posibles interacciones. Informe a maldonado profesional de la fanny de todos los productos a base de hierbas, medicamentos de venta gavino o suplementos nutritivos que esté tomando. Si usted fuma, consume bebidas alcohólicas o si utiliza drogas ilegales, indíqueselo también a maldonado profesional de la fanny. Algunas sustancias pueden interactuar con maldonado medicamento.   A qué mata estar atento al usar kana medicamento?  Pueden transcurrir varios días antes de que mejore maldonado dolor de estómago. Consulte a maldonado médico o a maldonado profesional de la fanny si maldonado problema no comienza a mejorar o si empeora.  Puede que necesite someterse a análisis de jese mientras esté tomando kana medicamento.   Qué efectos secundarios puedo tener al utilizar kana  medicamento?  Efectos secundarios que debe informar a maldonado médico o a maldonado profesional de la fanny tan pronto ajay sea posible:    reacciones alérgicas ajay erupción cutánea, picazón o urticarias, hinchazón de la michelle, labios o lengua    dolor de hueso, músculo o articulaciones    problemas respiratorios    dolor u opresión en el pecho    orina de color amarillo oscuro o marrón    mareos    pulso cardiaco rápido, irregular    sensación de mareo o aturdimiento    fiebre o dolor de garganta    palpitaciones    enrojecimiento, formación de ampollas, descamación o distensión de la piel, inclusive dentro de la boca    convulsiones    temblores    sangrado o magulladuras inusuales    cansancio o debilidad inusual    color amarillento de los ojos o la piel  Efectos secundarios que, por lo general, no requieren atención médica (debe informarlos a maldonado médico o a maldonado profesional de la fanny si persisten o si son molestos):    estreñimiento    diarrea    boca seca    dolor de philip    nauseas  Puede ser que esta lista no menciona todos los posibles efectos secundarios. Comuníquese a maldonado médico por asesoramiento médico sobre los efectos secundarios. Usted puede informar los efectos secundarios a la FDA por teléfono al 1-649-FDA-7177.   Dónde mata guardar mi medicina?  Manténgala fuera del alcance de los niños.  Guárdela a temperatura ambiente de entre 15 y 30 grados C (59 y 86 grados F). [address] y la humedad. Deseche todo el medicamento que no haya utilizado, después de la fecha de vencimiento.    [id number] [employer]. [address] [place], [place] [id number]. Todos los derechos reservados. Esta información no pretende sustituir la atención médica profesional. Sólo maldonado médico puede diagnosticar y tratar un problema de fanny.

## 2017-04-26 NOTE — ED PROVIDER NOTES
History     Chief Complaint:  Generalized Weakness      HPI History obtained through the patient's daughter, present at bedside.     Barrie Woods is a 87 year old male with a history of CHF, HTN, HLD, TB, sepsis, and renal cell carcinoma who presents via EMS for evaluation of generalized weakness. The daughter mentions that the patient began acting below baseline yesterday with some difficulty breathing and mild fatigue. He was able to walk and get up with some difficulty at that time, however, today he is unable to get up at all, prompting his daughter to call EMS. En route, EMS noticed the patient had low oxygen saturation and placed him on oxygen. Here, the daughter mentions that her father has additionally been experiencing discomfort across his entire abdomen. Daughter denies any cough, fever, known nausea, known vomiting, diarrhea, or known black or bloody stool.     Cardiac Risk Factors:  CAD:    Pos  Hypertension:   Pos  Hyperlipidemia:  Pos  Diabetes:   Neg  Tobacco use:   Pos  Gender:   M  Age:    87  Familial Hx of CAD:  Pos     Allergies:  The patient has no known drug allergies.     Medications:    Hydrocodone-acetaminophen (norco) 5-325 mg per tablet  Amoxicillin-clavulanate (augmentin) 500-125 mg per tablet  Pregabalin (lyrica) 150 mg capsule  Lisinopril (prinivil/zestril) 10 mg tablet  Magnesium oxide (magnesium-oxide) 400 (241.3 mg) mg tablet  Acetaminophen (tylenol) 500 mg tablet  Multivitamin, therapeutic with minerals (multi-vitamin) tabs tablet  Lidocaine (xylocaine) 5 % ointment  Carvedilol (coreg) 12.5 mg tablet  Clotrimazole powd  Triamcinolone (kenalog) 0.1 % cream  Fish oil-omega-3 fatty acids (omega 3) 1000 mg capsule     Past Medical History:    Acute, but ill-defined, cerebrovascular disease   Alcohol abuse   Anemia   Atrial fibrillation (H)   Cardiomyopathy (H)   CHF (congestive heart failure) (H)   Chronic pain   Dementia, without behavioral disturbance   Depressive  disorder   Diaphragmatic hernia without mention of obstruction or gangrene   Diverticulosis of colon (without mention of hemorrhage)   Dysphagia   Elevated blood sugar   Esophageal reflux   Hearing loss   Hematuria   Hereditary and idiopathic peripheral neuropathy   HLD  HTN  Hypertrophy of prostate   PSA elevation   Recurrent falls   Renal cell carcinoma (H)   Sepsis (H)   TB lung, latent Urinary incontinence     Past Surgical History:    History reviewed.  No significant past surgical history.      Family History:    CAD  Diabetes    Social History:  Relationship status:   Tobacco use: Pos  Alcohol use: Neg  The patient presents with his daughter.       Review of Systems   Constitutional: Positive for fatigue. Negative for fever.   Respiratory: Negative for cough.         Positive for difficulty breathing.    Gastrointestinal: Positive for abdominal pain. Negative for diarrhea, nausea and vomiting.   All other systems reviewed and are negative.    Physical Exam   First Vitals:  BP: 91/48  Pulse: 62  Heart Rate: 62  Temp: 98.8  F (37.1  C)  Resp: 24  Weight: 78 kg (172 lb)  SpO2: 90 %    Physical Exam  Vital signs and nursing notes reviewed.     Constitutional: laying on gurney appears comfortable  HENT: Oropharynx is clear and dry  Eyes: Conjunctivae are normal bilaterally. Pupils equal 2 mm bilaterally  Neck: normal range of motion, no JVD  Cardiovascular: bradycardia, irregularly irregular, normal heart sounds.   Pulmonary/Chest: Effort slightly increased diminished with crackles at bases.  Abdominal: Soft. Bowel sounds are normal. Vague tenderness to palpation in abdomen but no obvious peritoneal signs. No rebound or guarding.   Musculoskeletal: No joint swelling or edema.   Neurological: altered mental state.  Follows simple commands.  No focal weakness.  Generalized weakness.  Skin: Skin is warm and dry. No rash noted.       Emergency Department Course   ECG (10:13:46):  Indication: Generalized  weakness   Rate 64 bpm. ND interval *. QRS duration 100. QT/QTc 416/429. P-R-T axes -38.   Interpretation: Atrial fibrillation, left axis deviation, nonspecific ST abnormality, abnormal ECG.  Agree with computer interpretation.   No significant change compared to EKG dated 1/22/16.   Interpreted at 1026 by Dr. Zuñiga.      Imaging:  Chest XR, per radiology:  Edema    CT Abdomen and Pelvis, without contrast, per radiology:   Pending    Radiographic findings were communicated with the patient who voiced understanding of the findings.    Laboratory:  Blood Type: O, Rh(D) pos, antibody neg.    CBC: WBC 15.4 (H), HGB 14.3,    BMP: Glucose 129 (H), Creatinine 1.82 (H), GFR 35 (L), o/w WNL   Hepatic Panel: WNL   Lipase: 470 (H)   1018: Troponin I: 0.037   1027: Troponin POCT: 0.05   BNP: 4539 (H)   Lactate: 1.1    1025: ISTAT gases lactate lesley POCT: pH 7.35, pCO2 50, pO2 30, HCO3 28, Lactate 2.5 (H)     Blood culture 1: Pending  Blood culture 2: Pending   Procalcitonin: Pending  Troponin I: Pending     Influenza A/B antigen: A:Pending    UA: Pauline, slightly coudy urine: Bacteria Few (A), Mucous Present (A), o/w WNL  Urine Culture: Pending     Interventions:   1033: Normal Saline, 500 mL, IV   1306: Rocephin, 1 g, IV    Emergency Department Course:  Nursing notes and vitals reviewed.  I performed an exam of the patient as documented above.  The above workup was undertaken.  1025: Rectal exam performed.  1220: I discussed the patient with Dr. Sahu of the hospitalist service who agrees to admit the patient to the hospital.   1230: I rechecked the patient and discussed results.    Findings and plan explained to the Patient and daughter who consents to admission. Discussed the patient with Dr. Kraus, who will admit the patient to a telemetry bed for further monitoring, evaluation, and treatment.     Impression & Plan      CMS Diagnoses:   The patient has signs of Severe Sepsis as evidenced by:    1. 2 SIRS  criteria, AND  2. Suspected infection, AND   3. Organ dysfunction: Lactic Acid >2    Time severe sepsis diagnosis confirmed = 1041 as this was the time when Lactate resulted, and the level was >2      3 Hour Severe Sepsis Bundle Completion:  1. Initial Lactic Acid Result:   Recent Labs   Lab Test  04/26/17   1025  07/10/15   0109  01/06/15   2035   LACT  2.5*  1.4  1.8     2. Blood Cultures before Antibiotics: Yes  3. Broad Spectrum Antibiotics Administered: Yes     Anti-infectives (Future)    Start     Dose/Rate Route Frequency Ordered Stop    04/27/17 0000  azithromycin (ZITHROMAX) tablet 250 mg      250 mg Oral EVERY 24 HOURS 04/26/17 1329 04/30/17 2359    04/26/17 1330  cefTRIAXone (ROCEPHIN) 2 g vial to attach to  ml bag for ADULTS or NS 50 ml bag for PEDS      2 g  over 30 Minutes Intravenous EVERY 24 HOURS 04/26/17 1329      04/26/17 1330  azithromycin (ZITHROMAX) 500 mg in NaCl 0.9 % 250 mL intermittent infusion      500 mg  over 1 Hours Intravenous EVERY 24 HOURS 04/26/17 1329 04/27/17 1329        4. N/A    SEVERE SEPSIS and SEPTIC SHOCK EARLY MANAGEMENT BUNDLE    SEVERE SEPSIS:     Barrie Woods meets criteria for severe sepsis as evidenced by:     1. 2 SIRS criteria, AND    2. Suspected infection, AND     3.  ACUTE Organ dysfunction:   Lactic Acid >2,      Time severe sepsis present = 1041      3 Hour Bundle Completion (Severe Sepsis and Septic Shock):  1. Initial Lactic Acid Result: 2.5 (H)  2. Blood Cultures before Antibiotics: Yes  3. Broad Spectrum Antibiotics Administered: Yes       Anti-infectives (Future)    Start     Dose/Rate Route Frequency Ordered Stop    04/27/17 0000  azithromycin (ZITHROMAX) tablet 250 mg      250 mg Oral EVERY 24 HOURS 04/26/17 1329 04/30/17 2359    04/26/17 1330  cefTRIAXone (ROCEPHIN) 2 g vial to attach to  ml bag for ADULTS or NS 50 ml bag for PEDS      2 g  over 30 Minutes Intravenous EVERY 24 HOURS 04/26/17 1329      04/26/17 1330  azithromycin  (ZITHROMAX) 500 mg in NaCl 0.9 % 250 mL intermittent infusion      500 mg  over 1 Hours Intravenous EVERY 24 HOURS 04/26/17 1329 04/27/17 1329        4. N/A       Medical Decision Making:  Barrie Woods is a 87 year old male who presents from home due to reported generalized weakness today. It was also noted by EMS that his oxygen level was low and he was placed on oxygen therapy. On arrival, the patient had generalized weakness appearance, but had no focal neurologic findings to suggest an acute CVA. On examination, he had some bibasilar crackles and mild increased work of breathing, but otherwise had no definable abnormal physical findings. His abdominal exam shows intermittent vague discomfort, but no localizing pain that I could assess. Lab tests were obtained which showed leukocytosis and chest xray showed findings that could represent pulmonary edema findings. I cannot rule out that there may be an associated infectious process at this time based on his elevated lactic acid, therefore he was given IV fluids and a dose of Rocephin. This very well could still be related to more of a cardiogenic cause of symptoms of pulmonary edema with associated weakness. He has renal insufficiency as well. Patient is a poor historian, so it was hard to get definitive symptomatic complains from the patient at this time. I assessed this case with the hospitalist service who agrees to accept patient and will admit him to the telemetry floor. I did also discuss with the hospitalist to about possible early sepsis and he would meet criteria for septic shock if in case he has infectious etiology. Cultures were obtained and he was transferred to the floor. CT scan was pending prior to transfer to the hospital.       Diagnosis:    ICD-10-CM   1. Hypoxia R09.02   2. Generalized muscle weakness M62.81   3. Renal insufficiency N28.9   4. Acute pulmonary edema (H) J81.0   5. Leukocytosis, unspecified type D72.829        Disposition:  Admitted to a [facility] bed under the care of Dr. Hinojosa of the hospitalist service.     I, Aaliyah Whittington, am serving as a scribe on 4/26/2017 at 10:13 AM to personally document services performed by Elijah Zuñiga MD, based on my observations and the provider's statements to me.    [facility] EMERGENCY DEPARTMENT       Elijah Zuñiga MD  04/26/17 2916

## 2017-04-26 NOTE — IP AVS SNAPSHOT
[place] [address] [facility]    [address]    [facility] [address]    Phone:  [phone]    Fax:  [phone]                                       After Visit Summary   4/26/2017    Barrie Woods    MRN: [id number]           After Visit Summary Signature Page     I have received my discharge instructions, and my questions have been answered. I have discussed any challenges I see with this plan with the nurse or doctor.    ..........................................................................................................................................  Patient/Patient Representative Signature      ..........................................................................................................................................  Patient Representative Print Name and Relationship to Patient    ..................................................               ................................................  Date                                            Time    ..........................................................................................................................................  Reviewed by Signature/Title    ...................................................              ..............................................  Date                                                            Time

## 2017-04-26 NOTE — PROGRESS NOTES
CM Notified client admitted to [facility] 4/26/17 with pneumonia.  Client is enrolled in [email or web address] and his care plan summary can be found in [facility]. Client has dementia and has been cared for at home by his family.  CM will follow for d/c planning. Trans log in chart.     MORELIA Blum  [email or web address]   [phone]

## 2017-04-27 ENCOUNTER — APPOINTMENT (OUTPATIENT)
Dept: GENERAL RADIOLOGY | Facility: CLINIC | Age: 82
DRG: 291 | End: 2017-04-27
Attending: INTERNAL MEDICINE
Payer: COMMERCIAL

## 2017-04-27 LAB
ANION GAP SERPL CALCULATED.3IONS-SCNC: 5 MMOL/L (ref 3–14)
BUN SERPL-MCNC: 36 MG/DL (ref 7–30)
CALCIUM SERPL-MCNC: 8.6 MG/DL (ref 8.5–10.1)
CHLORIDE SERPL-SCNC: 108 MMOL/L (ref 94–109)
CO2 SERPL-SCNC: 29 MMOL/L (ref 20–32)
CREAT SERPL-MCNC: 2.39 MG/DL (ref 0.66–1.25)
CREAT UR-MCNC: 116 MG/DL
ERYTHROCYTE [DISTWIDTH] IN BLOOD BY AUTOMATED COUNT: 14.4 % (ref 10–15)
GFR SERPL CREATININE-BSD FRML MDRD: 26 ML/MIN/1.7M2
GLUCOSE SERPL-MCNC: 111 MG/DL (ref 70–99)
HCT VFR BLD AUTO: 40.9 % (ref 40–53)
HGB BLD-MCNC: 13.2 G/DL (ref 13.3–17.7)
MAGNESIUM SERPL-MCNC: 2.5 MG/DL (ref 1.6–2.3)
MCH RBC QN AUTO: 30.9 PG (ref 26.5–33)
MCHC RBC AUTO-ENTMCNC: 32.3 G/DL (ref 31.5–36.5)
MCV RBC AUTO: 96 FL (ref 78–100)
PLATELET # BLD AUTO: 138 10E9/L (ref 150–450)
POTASSIUM SERPL-SCNC: 4.5 MMOL/L (ref 3.4–5.3)
RBC # BLD AUTO: 4.27 10E12/L (ref 4.4–5.9)
SODIUM SERPL-SCNC: 142 MMOL/L (ref 133–144)
SODIUM UR-SCNC: 74 MMOL/L
WBC # BLD AUTO: 10.6 10E9/L (ref 4–11)

## 2017-04-27 PROCEDURE — 99233 SBSQ HOSP IP/OBS HIGH 50: CPT | Performed by: INTERNAL MEDICINE

## 2017-04-27 PROCEDURE — 82570 ASSAY OF URINE CREATININE: CPT | Performed by: INTERNAL MEDICINE

## 2017-04-27 PROCEDURE — 71020 XR CHEST 2 VW: CPT

## 2017-04-27 PROCEDURE — 83735 ASSAY OF MAGNESIUM: CPT | Performed by: PHYSICIAN ASSISTANT

## 2017-04-27 PROCEDURE — 40000274 ZZH STATISTIC RCP CONSULT EA 30 MIN

## 2017-04-27 PROCEDURE — 40000915 ZZH STATISTIC SITTER, EVENING HOURS

## 2017-04-27 PROCEDURE — 85027 COMPLETE CBC AUTOMATED: CPT | Performed by: PHYSICIAN ASSISTANT

## 2017-04-27 PROCEDURE — 25000132 ZZH RX MED GY IP 250 OP 250 PS 637: Performed by: PHYSICIAN ASSISTANT

## 2017-04-27 PROCEDURE — 36415 COLL VENOUS BLD VENIPUNCTURE: CPT | Performed by: PHYSICIAN ASSISTANT

## 2017-04-27 PROCEDURE — 12000007 ZZH R&B INTERMEDIATE

## 2017-04-27 PROCEDURE — 25000128 H RX IP 250 OP 636: Performed by: PHYSICIAN ASSISTANT

## 2017-04-27 PROCEDURE — 84300 ASSAY OF URINE SODIUM: CPT | Performed by: INTERNAL MEDICINE

## 2017-04-27 PROCEDURE — 80048 BASIC METABOLIC PNL TOTAL CA: CPT | Performed by: PHYSICIAN ASSISTANT

## 2017-04-27 PROCEDURE — 25000132 ZZH RX MED GY IP 250 OP 250 PS 637: Performed by: INTERNAL MEDICINE

## 2017-04-27 PROCEDURE — 40000914 ZZH STATISTIC SITTER, DAY HOURS

## 2017-04-27 RX ORDER — PREGABALIN 25 MG/1
50 CAPSULE ORAL 2 TIMES DAILY
Status: DISCONTINUED | OUTPATIENT
Start: 2017-04-27 | End: 2017-04-27

## 2017-04-27 RX ORDER — IPRATROPIUM BROMIDE AND ALBUTEROL SULFATE 2.5; .5 MG/3ML; MG/3ML
3 SOLUTION RESPIRATORY (INHALATION)
Status: DISCONTINUED | OUTPATIENT
Start: 2017-04-27 | End: 2017-04-27

## 2017-04-27 RX ORDER — SODIUM CHLORIDE 9 MG/ML
INJECTION, SOLUTION INTRAVENOUS CONTINUOUS
Status: DISCONTINUED | OUTPATIENT
Start: 2017-04-27 | End: 2017-04-28

## 2017-04-27 RX ORDER — PREGABALIN 20 MG/ML
50 SOLUTION ORAL 2 TIMES DAILY
Status: DISCONTINUED | OUTPATIENT
Start: 2017-04-28 | End: 2017-04-28 | Stop reason: RX

## 2017-04-27 RX ORDER — IPRATROPIUM BROMIDE AND ALBUTEROL SULFATE 2.5; .5 MG/3ML; MG/3ML
3 SOLUTION RESPIRATORY (INHALATION) EVERY 4 HOURS PRN
Status: DISCONTINUED | OUTPATIENT
Start: 2017-04-27 | End: 2017-04-30 | Stop reason: HOSPADM

## 2017-04-27 RX ADMIN — PREGABALIN 50 MG: 25 CAPSULE ORAL at 20:18

## 2017-04-27 RX ADMIN — MAGNESIUM OXIDE TAB 400 MG (241.3 MG ELEMENTAL MG) 400 MG: 400 (241.3 MG) TAB at 08:49

## 2017-04-27 RX ADMIN — SENNOSIDES AND DOCUSATE SODIUM 2 TABLET: 8.6; 5 TABLET ORAL at 18:27

## 2017-04-27 RX ADMIN — Medication 12.5 MG: at 23:08

## 2017-04-27 RX ADMIN — AZITHROMYCIN 250 MG: 250 TABLET, FILM COATED ORAL at 08:51

## 2017-04-27 RX ADMIN — CARVEDILOL 12.5 MG: 12.5 TABLET, FILM COATED ORAL at 08:49

## 2017-04-27 RX ADMIN — CARVEDILOL 12.5 MG: 12.5 TABLET, FILM COATED ORAL at 18:27

## 2017-04-27 RX ADMIN — MULTIPLE VITAMINS W/ MINERALS TAB 1 TABLET: TAB at 08:49

## 2017-04-27 RX ADMIN — CEFTRIAXONE 2 G: 2 INJECTION, POWDER, FOR SOLUTION INTRAMUSCULAR; INTRAVENOUS at 13:17

## 2017-04-27 RX ADMIN — OMEPRAZOLE 20 MG: 20 CAPSULE, DELAYED RELEASE ORAL at 08:49

## 2017-04-27 NOTE — PLAN OF CARE
Problem: Goal Outcome Summary  Goal: Goal Outcome Summary  Outcome: No Change  Pt. Yoruba speaking only. Pt. Somnolent and non-engaged with staff at start of shift, progressively got better and more alert. Used  phone expressing needs. Denied having to void, bladder scanned for 200cc. Pt. Confused, only alert to self; unaware in hospital, looking for son. Denied pain. Nonproductive cough, sputum sample still needs collecting. Abx- Zithromax and Rocephin for PNA. Takes medications crushed. Neuros intact.  service scheduled for today.     0600- Pt. Up to side of bed, pulled tele off, gown off, O2 off, and pulled IV out. Pt. Confused. With use of  phone, pt. Stated he thinks that we have his stuff that he came here with, and he's looking for it. Also looking for son. Pt. Increasingly becoming more upset, denying pain. Pt. Was ambulated to restroom with assist of x1 and voided without difficulty, incontinent pad dry. Placed back to bed with bed alarms active.

## 2017-04-27 NOTE — PLAN OF CARE
"Problem: Goal Outcome Summary  Goal: Goal Outcome Summary  Outcome: Improving  VSS. On 1 L of O2 NC. Tele A-fib in 50's with occasional pauses. Patient very somnolent all shift. Arouses to gentle shaking. Opens eyes spontaneously at times. Does not answer questions appropriately most of the time. Unable to assess orientation. Able to answer yes or no to some questions. Grimaces in pain at times. Pt was able to tell  that he's experiencing generalized aches. Unable to rate pain. PRN Norco given X1. Unable to void all shift. Denies the urge to void. Bladder scan shows 14 ml. MD notified. No new orders. Patient has not ambulates all shift due to drowsiness. Turned and repositioned q2h.     1639: MD paged \"Having some pauses. Longest pause at 2.4. Still very somnolent. Opens eyes to chest rub. Not verbally responding. VSS.\"       2133: MD paged \"Patient hasn't voided all shift. Bladder scanned for 14 ml. Poor appetite. Likely dehydrated. Can we get IV fluids started?      2226: MD paged \"Trop is 0.042. Trending down.\"  "

## 2017-04-27 NOTE — PROGRESS NOTES
[facility]    Hospitalist Progress Note    Date of Service (when I saw the patient): 04/27/2017    Assessment & Plan   Barrie Woods is a 87 year old male with history of dementia, a.fib, CHF, renal cell cancer, CKD, Latent TB, CVA who presented to hospital with SOB, weakness, found to have pneumonia.     # Community Acquired Pneumonia - 2 days of shortness of breath, fatigue and generalized weakness. WBC 15.4, lactic acid 2.5, Oxygen saturation 90% on room air on presentation. CXR revealed bilateral interstitial edema and small bilateral pleural effusions. CT abd/pelvis revealed new patchy airspace disease in the RLL c/w developing pneumonia with a small right pleural effusion.   - IV Ceftriaxone and Azithromycin  - follow up blood cultures      # Chronic CHF - known systolic and diastolic CHF. Last echocardiogram EF low normal 50-55%. Now with shortness of breath, BNP elevation and CXR with bilateral interstitial edema and small bilateral pleural effusions. Mild peripheral edema on exam.  - continue Coreg  - ECHO repeated, no significant change from prior study   - His BNP is mildly elevated at 4500 although we do not have a previous baseline to compare to. With his worsening renal failure, and no significant hypoxia or resp distress, will hold on diuresis     # Acute on chronic kidney failure - creatinine 1.82 on admission, previously high at 2.4 in 9/2016, Does have a known hx of untreated RCC. UA negative.   -- Cr increased to 2.4 today, poor oral intake. Some urine output but not measured. FeNA is 1,1, elevated urine sodium, not typical for pre-renal etiology, will do cautious trial of IVF at 75 cc, if further worsens then we will obtain nephrology consult    # Delirium on top of underlying dementia - exacerbated by his advanced age and language barrier, he was very sleepy and tired yesterday, more agitated and confused overnight, pulled out his IV,   -- Re-direct as able to, bedside attendant  for safety   -- low dose seroquel at bed time     Positive blood culture - growing staph epi, likely contaminant     Chronic Atrial Fibrillation - rate controlled on Coreg. Not currently on anticoagulation.       Renal Cell Carcinoma - known RCC history. CT abd/pelvis on admission showed a left renal mass. Per chart review, surgery has been declined in the past.     Chronic Back Pain - continue home Lyrica at decreased dose, Tylenol and prn Norco    DVT Prophylaxis: Mechanical    Code Status: Full Code    Disposition: Expected discharge in > 3 days    Attempted to call son to update, did not answer, VM left with call back number.     [facility]    Interval History   Chart reviewed and patient seen with professional .     He is very confused, talking about his dad buying him a horse, thinks he is el-mimi, reports some back pain, no major cough, denies any chest pain or SOB, no vomiting, passing some urine, output not known.     -Data reviewed today: I reviewed all new labs and imaging results over the last 24 hours. I personally reviewed no images or EKG's today.    Physical Exam   Temp: 98.2  F (36.8  C) Temp src: Oral BP: 140/66   Heart Rate: 83 Resp: 20 SpO2: 94 % O2 Device: None (Room air) Oxygen Delivery: 2 LPM  Vitals:    04/26/17 1006 04/26/17 1332 04/27/17 0535   Weight: 78 kg (172 lb) 73.2 kg (161 lb 6.4 oz) 74.4 kg (164 lb)     Vital Signs with Ranges  Temp:  [98.1  F (36.7  C)-99.2  F (37.3  C)] 98.2  F (36.8  C)  Heart Rate:  [58-83] 83  Resp:  [16-20] 20  BP: (104-140)/(48-66) 140/66  SpO2:  [94 %-100 %] 94 %  I/O last 3 completed shifts:  In: 765 [P.O.:665; I.V.:100]  Out: 350 [Urine:350]      Constitutional: Awake today, alert, does not speak a lot, confused,   Respiratory: Coarse sounds at lung bases, no crackles or wheezing noted, symmetric chest rise bilaterally   Cardiovascular: Regular rate and rhythm, normal S1 and S2, no loud murmur   Abdomen: Bowel sounds present,  soft, non-distended, non-tender, no rebound or guarding is noted   Skin: No exanthems noted on exposed areas, no cyanosis, dry to touch   Neuro: Awake, alert, not oriented to place or time, very confused, moving all extremities   Extremities: Trace edema,  skin is warm to touch with signs of adequate peripheral perfusion    Psychiatric: Confused, agitated at times             Medications     NaCl Stopped (04/27/17 1115)       pregabalin  50 mg Oral BID     multivitamin, therapeutic with minerals  1 tablet Oral Daily     magnesium oxide  400 mg Oral Daily     cefTRIAXone  2 g Intravenous Q24H     azithromycin  250 mg Oral Q24H     omeprazole  20 mg Oral QAM AC     influenza Vac Split High-Dose  0.5 mL Intramuscular Prior to discharge     carvedilol  12.5 mg Oral BID w/meals       Data   All new lab data and imaging results from today have been reviewed       Recent Labs  Lab 04/27/17  0630 04/26/17 2005 04/26/17  1358 04/26/17  1027 04/26/17  1018   WBC 10.6  --   --   --  15.4*   HGB 13.2*  --   --   --  14.3   MCV 96  --   --   --  94   *  --   --   --  163     --   --   --  140   POTASSIUM 4.5  --   --   --  4.4   CHLORIDE 108  --   --   --  104   CO2 29  --   --   --  28   BUN 36*  --   --   --  26   CR 2.39*  --   --   --  1.82*   ANIONGAP 5  --   --   --  8   JOSE 8.6  --   --   --  9.2   *  --   --   --  128*   ALBUMIN  --   --   --   --  3.4   PROTTOTAL  --   --   --   --  7.0   BILITOTAL  --   --   --   --  1.3   ALKPHOS  --   --   --   --  89   ALT  --   --   --   --  33   AST  --   --   --   --  28   LIPASE  --   --   --   --  470*   TROPI  --  0.042 0.051*  --  0.037   TROPONIN  --   --   --  0.05  --        No results found for this or any previous visit (from the past 24 hour(s)).

## 2017-04-27 NOTE — PLAN OF CARE
Problem: Goal Outcome Summary  Goal: Goal Outcome Summary  Outcome: Improving  Alert but disoriented to all but his name -verified through interpretors. Pt removed IV line this morning by self.  Creat increased - need to encourage fluids w/IV line out. Tele controlled afib (when he wears it). Daughter in law present over lunch hour to assist - another IV inserted & rocephin infused. Uncooperative most of the shift, ambulated several times in griggs w/staff, sat in griggs in w/c. Will require 1:1 at change of shift.     Addendum: 1430 Lab called w/+ B.C. gram + cocci clusters. MD notified. Pt. Now requires 1:1 sitter for safety, non-english speaking. Family contacted x3 this shift by RN writer to see if they could come sit w/pt. but Ana could only visit for 30 minutes over her lunch hour at 1300 today.

## 2017-04-28 LAB
ANION GAP SERPL CALCULATED.3IONS-SCNC: 5 MMOL/L (ref 3–14)
BUN SERPL-MCNC: 31 MG/DL (ref 7–30)
CALCIUM SERPL-MCNC: 9.2 MG/DL (ref 8.5–10.1)
CHLORIDE SERPL-SCNC: 107 MMOL/L (ref 94–109)
CO2 SERPL-SCNC: 29 MMOL/L (ref 20–32)
CREAT SERPL-MCNC: 1.55 MG/DL (ref 0.66–1.25)
ERYTHROCYTE [DISTWIDTH] IN BLOOD BY AUTOMATED COUNT: 13.8 % (ref 10–15)
GFR SERPL CREATININE-BSD FRML MDRD: 43 ML/MIN/1.7M2
GLUCOSE SERPL-MCNC: 99 MG/DL (ref 70–99)
HCT VFR BLD AUTO: 42.4 % (ref 40–53)
HGB BLD-MCNC: 14 G/DL (ref 13.3–17.7)
MAGNESIUM SERPL-MCNC: 2.2 MG/DL (ref 1.6–2.3)
MCH RBC QN AUTO: 30.9 PG (ref 26.5–33)
MCHC RBC AUTO-ENTMCNC: 33 G/DL (ref 31.5–36.5)
MCV RBC AUTO: 94 FL (ref 78–100)
NT-PROBNP SERPL-MCNC: 3003 PG/ML (ref 0–1800)
PLATELET # BLD AUTO: 151 10E9/L (ref 150–450)
POTASSIUM SERPL-SCNC: 4.2 MMOL/L (ref 3.4–5.3)
RBC # BLD AUTO: 4.53 10E12/L (ref 4.4–5.9)
SODIUM SERPL-SCNC: 141 MMOL/L (ref 133–144)
WBC # BLD AUTO: 10.9 10E9/L (ref 4–11)

## 2017-04-28 PROCEDURE — 40000914 ZZH STATISTIC SITTER, DAY HOURS

## 2017-04-28 PROCEDURE — 40000915 ZZH STATISTIC SITTER, EVENING HOURS

## 2017-04-28 PROCEDURE — 25000128 H RX IP 250 OP 636: Performed by: PHYSICIAN ASSISTANT

## 2017-04-28 PROCEDURE — 83735 ASSAY OF MAGNESIUM: CPT | Performed by: INTERNAL MEDICINE

## 2017-04-28 PROCEDURE — 85027 COMPLETE CBC AUTOMATED: CPT | Performed by: INTERNAL MEDICINE

## 2017-04-28 PROCEDURE — 25000132 ZZH RX MED GY IP 250 OP 250 PS 637: Performed by: PHYSICIAN ASSISTANT

## 2017-04-28 PROCEDURE — 25000128 H RX IP 250 OP 636: Performed by: INTERNAL MEDICINE

## 2017-04-28 PROCEDURE — 25000132 ZZH RX MED GY IP 250 OP 250 PS 637: Performed by: INTERNAL MEDICINE

## 2017-04-28 PROCEDURE — 12000007 ZZH R&B INTERMEDIATE

## 2017-04-28 PROCEDURE — 80048 BASIC METABOLIC PNL TOTAL CA: CPT | Performed by: INTERNAL MEDICINE

## 2017-04-28 PROCEDURE — 40000916 ZZH STATISTIC SITTER, NIGHT HOURS

## 2017-04-28 PROCEDURE — 83880 ASSAY OF NATRIURETIC PEPTIDE: CPT | Performed by: INTERNAL MEDICINE

## 2017-04-28 PROCEDURE — 36415 COLL VENOUS BLD VENIPUNCTURE: CPT | Performed by: INTERNAL MEDICINE

## 2017-04-28 PROCEDURE — 99232 SBSQ HOSP IP/OBS MODERATE 35: CPT | Performed by: INTERNAL MEDICINE

## 2017-04-28 RX ORDER — PREGABALIN 25 MG/1
50 CAPSULE ORAL 2 TIMES DAILY
Status: DISCONTINUED | OUTPATIENT
Start: 2017-04-28 | End: 2017-04-30 | Stop reason: HOSPADM

## 2017-04-28 RX ORDER — BUMETANIDE 0.5 MG/1
1 TABLET ORAL DAILY
Status: DISCONTINUED | OUTPATIENT
Start: 2017-04-28 | End: 2017-04-30 | Stop reason: HOSPADM

## 2017-04-28 RX ORDER — HALOPERIDOL 5 MG/ML
2 INJECTION INTRAMUSCULAR EVERY 4 HOURS PRN
Status: DISCONTINUED | OUTPATIENT
Start: 2017-04-28 | End: 2017-04-30 | Stop reason: HOSPADM

## 2017-04-28 RX ADMIN — HALOPERIDOL LACTATE 2 MG: 5 INJECTION, SOLUTION INTRAMUSCULAR at 21:37

## 2017-04-28 RX ADMIN — CARVEDILOL 12.5 MG: 12.5 TABLET, FILM COATED ORAL at 17:11

## 2017-04-28 RX ADMIN — HYDROCODONE BITARTRATE AND ACETAMINOPHEN 1 TABLET: 5; 325 TABLET ORAL at 23:46

## 2017-04-28 RX ADMIN — OMEPRAZOLE 20 MG: 20 CAPSULE, DELAYED RELEASE ORAL at 09:50

## 2017-04-28 RX ADMIN — BUMETANIDE 1 MG: 0.5 TABLET ORAL at 13:33

## 2017-04-28 RX ADMIN — MULTIPLE VITAMINS W/ MINERALS TAB 1 TABLET: TAB at 09:51

## 2017-04-28 RX ADMIN — HYDROCODONE BITARTRATE AND ACETAMINOPHEN 1 TABLET: 5; 325 TABLET ORAL at 02:44

## 2017-04-28 RX ADMIN — MAGNESIUM OXIDE TAB 400 MG (241.3 MG ELEMENTAL MG) 400 MG: 400 (241.3 MG) TAB at 09:50

## 2017-04-28 RX ADMIN — CARVEDILOL 12.5 MG: 12.5 TABLET, FILM COATED ORAL at 09:47

## 2017-04-28 RX ADMIN — HYDROCODONE BITARTRATE AND ACETAMINOPHEN 1 TABLET: 5; 325 TABLET ORAL at 13:48

## 2017-04-28 RX ADMIN — AZITHROMYCIN 250 MG: 250 TABLET, FILM COATED ORAL at 09:51

## 2017-04-28 RX ADMIN — PREGABALIN 50 MG: 25 CAPSULE ORAL at 09:51

## 2017-04-28 RX ADMIN — CEFTRIAXONE 2 G: 2 INJECTION, POWDER, FOR SOLUTION INTRAMUSCULAR; INTRAVENOUS at 13:30

## 2017-04-28 NOTE — PROGRESS NOTES
[facility]    Hospitalist Progress Note    Date of Service (when I saw the patient): 04/28/2017    Assessment & Plan   Barrie Woods is a 87 year old male with history of dementia, a.fib, CHF, renal cell cancer, CKD, Latent TB, CVA who presented to hospital with SOB, weakness, found to have pneumonia. 2 days of shortness of breath, fatigue and generalized weakness. WBC 15.4, lactic acid 2.5, Oxygen saturation 90% on room air on presentation.     # Community Acquired Pneumonia - CXR revealed bilateral interstitial edema and small bilateral pleural effusions. CT abd/pelvis revealed new patchy airspace disease in the RLL c/w developing pneumonia with a small right pleural effusion.   - IV Ceftriaxone and Azithromycin  - follow up blood cultures      # Chronic CHF with concern for exacerbation - present on admission - known systolic and diastolic CHF. Last echocardiogram EF low normal 50-55%. Now with shortness of breath, BNP elevation and CXR with bilateral interstitial edema and small bilateral pleural effusions. Mild peripheral edema on exam. His BNP is mildly elevated at 4500 although we do not have a previous baseline to compare to.   - continue Coreg  - ECHO repeated, no significant change from prior study   -- Overall clinical presentation does suggest a component of volume overload, With his worsening renal failure, he was not diuresed at time of admission. However, his Cr is improved today, will start on PO Bumex 1 mg daily and watch closely     # Acute on chronic kidney failure - creatinine 1.82 on admission, previously high at 2.4 in 9/2016, Does have a known hx of untreated RCC. UA negative.   -- Cr increased to 2.4 yesterday, improved today without any specific intervention, FeNA is 1.1, elevated urine sodium, transient outlet obstruction? Watch closely     # Delirium on top of underlying dementia - exacerbated by his advanced age and language barrier, he was very sleepy and tired at time of  admission then became more agitated and confused.   -- Re-direct as able to, bedside attendant for safety   -- low dose seroquel at bed time  -- More cooperative today, still confused, on talking with son it sounds like he is having worsening of dementia    Positive blood culture - growing staph epi, likely contaminant     Chronic Atrial Fibrillation - rate controlled on Coreg. Not currently on anticoagulation.       Renal Cell Carcinoma - known RCC history. CT abd/pelvis on admission showed a left renal mass. Per chart review, surgery has been declined in the past. On discussion with son today, he reaffirms that they dont want any surgery. No significant change in size per CT report.      Chronic Back Pain - continue home Lyrica at decreased dose due to renal failure, Tylenol and prn Norco    DVT Prophylaxis: Mechanical    Code Status: Full Code    Disposition: Expected discharge in 2 days    Discussed with mary Garcia at bedside     [facility]    Interval History   Chart reviewed and patient seen with professional .     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     -Data reviewed today: I reviewed all new labs and imaging results over the last 24 hours. I personally reviewed no images or EKG's today.    Physical Exam   Temp: 97.8  F (36.6  C) Temp src: Oral BP: 152/79   Heart Rate: 93 Resp: 20 SpO2: 97 % O2 Device: None (Room air)    Vitals:    04/26/17 1332 04/27/17 0535 04/28/17 0523   Weight: 73.2 kg (161 lb 6.4 oz) 74.4 kg (164 lb) 81.2 kg (179 lb)     Vital Signs with Ranges  Temp:  [97.8  F (36.6  C)-98.1  F (36.7  C)] 97.8  F (36.6  C)  Heart Rate:  [80-94] 93  Resp:  [18-20] 20  BP: (136-176)/() 152/79  SpO2:  [96 %-98 %] 97 %  I/O last 3 completed shifts:  In: 1110 [P.O.:1110]  Out: -       Constitutional:  Awake , alert, does not speak a lot, confused,   Respiratory: Coarse sounds at lung bases, some crackles at left lung base, symmetric chest rise bilaterally   Cardiovascular: Regular rate and rhythm, normal S1 and S2, no loud murmur   Abdomen: Bowel sounds present, soft, non-distended, non-tender, no rebound or guarding is noted   Skin: No exanthems noted on exposed areas, no cyanosis, dry to touch   Neuro: Awake, alert, not oriented to place or time, very confused, moving all extremities   Extremities: Trace edema,  skin is warm to touch with signs of adequate peripheral perfusion    Psychiatric: Confused, not agitated today              Medications        pregabalin  50 mg Oral BID     bumetanide  1 mg Oral Daily     QUEtiapine  12.5 mg Oral At Bedtime     multivitamin, therapeutic with minerals  1 tablet Oral Daily     magnesium oxide  400 mg Oral Daily     cefTRIAXone  2 g Intravenous Q24H     azithromycin  250 mg Oral Q24H     omeprazole  20 mg Oral QAM AC     influenza Vac Split High-Dose  0.5 mL Intramuscular Prior to discharge     carvedilol  12.5 mg Oral BID w/meals       Data   All new lab data and imaging results from today have been reviewed       Recent Labs  Lab 04/28/17  0700 04/27/17  0630 04/26/17 2005 04/26/17  1358 04/26/17  1027 04/26/17  1018   WBC 10.9 10.6  --   --   --  15.4*   HGB 14.0 13.2*  --   --   --  14.3   MCV 94 96  --   --   --  94    138*  --   --   --  163    142  --   --   --  140   POTASSIUM 4.2 4.5  --   --   --  4.4   CHLORIDE 107 108  --   --   --  104   CO2 29 29  --   --   --  28   BUN 31* 36*  --   --   --  26   CR 1.55* 2.39*  --   --   --  1.82*   ANIONGAP 5 5  --   --   --  8   JOSE 9.2 8.6  --   --   --  9.2   GLC 99 111*  --   --   --  128*   ALBUMIN  --   --   --   --   --  3.4   PROTTOTAL  --   --   --   --   --  7.0   BILITOTAL  --   --   --   --   --  1.3   ALKPHOS  --   --   --   --   --  89   ALT  --   --   --   --   --  33   AST  --   --   --   --    --  28   LIPASE  --   --   --   --   --  470*   TROPI  --   --  0.042 0.051*  --  0.037   TROPONIN  --   --   --   --  0.05  --        Recent Results (from the past 24 hour(s))   XR Chest 2 Views    Narrative    XR CHEST 2 VW 4/27/2017 9:35 PM    HISTORY: Pneumonia.    COMPARISON: 4/26/2017, 7/27/2015    FINDINGS: Previously seen basilar predominant interstitial edema has  largely cleared. There is no discrete consolidation, pleural effusion  or pneumothorax. Chronic cardiomegaly. Multiple old right-sided rib  fractures.      Impression    IMPRESSION: Resolving edema.    SHUBHAM GARCIA MD

## 2017-04-28 NOTE — PROGRESS NOTES
Mid-shift PSC huddle assessment completed in collaboration w/benjamin RN & LPN determined PSC continues to be needed for pt safety. Pt has been awake, walking halls of PCU >75% of shift w/assistance of PSC, gait belt, or being wheeled around PCU in W/C. Pt is not able to easily be redirected, Has swung arms out at staff, non-English speaking, oriented to self only, & a HFR.   Michelle Cha, RN, BSN (Shift Charge RN)  Medical/Telemetry - 3

## 2017-04-28 NOTE — PLAN OF CARE
Problem: Goal Outcome Summary  Goal: Goal Outcome Summary  Outcome: No Change  Pt. Alert to self, Syriac speaking only. VSS, afebrile. Transfers with assist of x1, gait belt. Awake entire night, difficulty redirecting d/t language barrier and confusion. PRN norco given x1 for facial grimacing and guarding lower back. Neuros intact. PSC with patient throughout night, ambulating with pt. And wheeling thru halls in w/c. PSC reports pt. To have swung at her multiple times while attempting to assist him with [employer] x2 urine, x1 BM. Tele- Afib CVR.

## 2017-04-28 NOTE — PLAN OF CARE
Problem: Pneumonia (Adult)  Goal: Signs and Symptoms of Listed Potential Problems Will be Absent or Manageable (Pneumonia)  Signs and symptoms of listed potential problems will be absent or manageable by discharge/transition of care (reference Pneumonia (Adult) CPG).   Outcome: Improving  VSS, afeb., denies pain. LS are dim, 98% on RA.  Cont on IV Rocephin and po zithromax.  MD notified that pt was found to be Methicillin Resistant Staph Epidermimidus.Pt son and daughter in law were at pt bedside, POC reviewed. Pt up ambulating in room and halls, assisted by pt .  CXR done this shift, nebs ordered, and IS instructed to pt for increasinf sob. Tele is  w PAC's.

## 2017-04-28 NOTE — PROGRESS NOTES
"[facility] RCAT     Date:4/27/2017  Admission Dx:PNA  Pulmonary History:None  Home Nebulizer/MDI Use:None  Home Oxygen:None  Acuity Level (RCAT flow sheet):4  Aerosol Therapy initiated:Douneb Q4 prn      Pulmonary Hygiene initiated:NA      Volume Expansion initiated:IS      Current Oxygen Requirements:RA  Current SpO2:98%    Re-evaluation date:4/30/2017    Patient Education:Education performed with patient in regards to indications/benefits of bronchodilators. Will continue to educate with patient as needed.         See \"RT Assessments\" flow sheet for patient assessment scoring and Acuity Level Details.             "

## 2017-04-28 NOTE — PLAN OF CARE
Problem: Goal Outcome Summary  Goal: Goal Outcome Summary  Outcome: Improving     BP slightly elevated before AM meds, improved. All other VSS, 97% on RA, afebrile. Pt rec'd PRN Norco for pain x1, meds crushed in applesauce. Started on PO Bumex. UP with SBA. Good appetite, on IV Rocephin, possible d/c tomorrow.

## 2017-04-28 NOTE — PROGRESS NOTES
Care Transition Initial Assessment - RN        Met with pt's son Jose at the bedside--verified demographics as correct-pt lives w/son Jose in their [place].  Jose states pt is independent at home--can care for himself as far as ADL's--ambulates w/o assistive devices but does have a walker and w/c if ever needed.  Pt has no out of home services.  He has hx of CHF, CVA, Etoh abuse, Renal Cancer, Latent TB--has not required O2 at home.  He is admitted w/CHF and PNA--due to his language barrier, he is at times difficult to redirect and somewhat combative at times w/staff.  His PCP is Dr. Yonatan Hurtado w/[facility].  Handoff will be completed to this clinic at the time of d/c.  Jose states he will transport pt home at the time of d/c.    DATA   Active Problems:    CAP (community acquired pneumonia)       Cognitive Status: Agitated and combative at times.  Primary Care Clinic Name: [facility]  Primary Care MD Name: Yonatan Hurtado  Contact information and PCP information verified: Yes      Insurance concerns: No    PLAN    Family anticipates pt to d/c back home w/them .   Patient anticipates needs for home equipment: No  Appointments: Hospital follow-up appt scheduled for Mon 5/8/17 at 10:30 am w/Dr. Yonatan Hurtado.    Care  (CTS) will continue to follow as needed.

## 2017-04-29 LAB
ANION GAP SERPL CALCULATED.3IONS-SCNC: 6 MMOL/L (ref 3–14)
BACTERIA SPEC CULT: ABNORMAL
BUN SERPL-MCNC: 32 MG/DL (ref 7–30)
CALCIUM SERPL-MCNC: 8.6 MG/DL (ref 8.5–10.1)
CHLORIDE SERPL-SCNC: 107 MMOL/L (ref 94–109)
CO2 SERPL-SCNC: 29 MMOL/L (ref 20–32)
CREAT SERPL-MCNC: 1.43 MG/DL (ref 0.66–1.25)
GFR SERPL CREATININE-BSD FRML MDRD: 47 ML/MIN/1.7M2
GLUCOSE SERPL-MCNC: 94 MG/DL (ref 70–99)
Lab: ABNORMAL
MICRO REPORT STATUS: ABNORMAL
MICROORGANISM SPEC CULT: ABNORMAL
POTASSIUM SERPL-SCNC: 3.8 MMOL/L (ref 3.4–5.3)
SODIUM SERPL-SCNC: 142 MMOL/L (ref 133–144)
SPECIMEN SOURCE: ABNORMAL

## 2017-04-29 PROCEDURE — 80048 BASIC METABOLIC PNL TOTAL CA: CPT | Performed by: INTERNAL MEDICINE

## 2017-04-29 PROCEDURE — 25000128 H RX IP 250 OP 636: Performed by: PHYSICIAN ASSISTANT

## 2017-04-29 PROCEDURE — 36415 COLL VENOUS BLD VENIPUNCTURE: CPT | Performed by: INTERNAL MEDICINE

## 2017-04-29 PROCEDURE — 40000914 ZZH STATISTIC SITTER, DAY HOURS

## 2017-04-29 PROCEDURE — 99233 SBSQ HOSP IP/OBS HIGH 50: CPT | Performed by: INTERNAL MEDICINE

## 2017-04-29 PROCEDURE — 40000915 ZZH STATISTIC SITTER, EVENING HOURS

## 2017-04-29 PROCEDURE — 25000132 ZZH RX MED GY IP 250 OP 250 PS 637: Performed by: INTERNAL MEDICINE

## 2017-04-29 PROCEDURE — 12000007 ZZH R&B INTERMEDIATE

## 2017-04-29 PROCEDURE — 40000916 ZZH STATISTIC SITTER, NIGHT HOURS

## 2017-04-29 PROCEDURE — 25000132 ZZH RX MED GY IP 250 OP 250 PS 637: Performed by: PHYSICIAN ASSISTANT

## 2017-04-29 RX ORDER — POLYETHYLENE GLYCOL 3350 17 G/17G
17 POWDER, FOR SOLUTION ORAL DAILY
Status: DISCONTINUED | OUTPATIENT
Start: 2017-04-29 | End: 2017-04-30 | Stop reason: HOSPADM

## 2017-04-29 RX ORDER — POLYETHYLENE GLYCOL 3350 17 G/17G
17 POWDER, FOR SOLUTION ORAL DAILY PRN
Status: DISCONTINUED | OUTPATIENT
Start: 2017-04-29 | End: 2017-04-29

## 2017-04-29 RX ADMIN — AZITHROMYCIN 250 MG: 250 TABLET, FILM COATED ORAL at 10:02

## 2017-04-29 RX ADMIN — BUMETANIDE 1 MG: 0.5 TABLET ORAL at 09:57

## 2017-04-29 RX ADMIN — SALINE NASAL SPRAY 2 SPRAY: 1.5 SOLUTION NASAL at 16:21

## 2017-04-29 RX ADMIN — ACETAMINOPHEN 1000 MG: 500 TABLET, FILM COATED ORAL at 16:17

## 2017-04-29 RX ADMIN — PREGABALIN 50 MG: 25 CAPSULE ORAL at 23:52

## 2017-04-29 RX ADMIN — Medication 12.5 MG: at 23:49

## 2017-04-29 RX ADMIN — MAGNESIUM OXIDE TAB 400 MG (241.3 MG ELEMENTAL MG) 400 MG: 400 (241.3 MG) TAB at 09:56

## 2017-04-29 RX ADMIN — MULTIPLE VITAMINS W/ MINERALS TAB 1 TABLET: TAB at 09:57

## 2017-04-29 RX ADMIN — OMEPRAZOLE 20 MG: 20 CAPSULE, DELAYED RELEASE ORAL at 09:56

## 2017-04-29 RX ADMIN — CARVEDILOL 12.5 MG: 12.5 TABLET, FILM COATED ORAL at 09:56

## 2017-04-29 RX ADMIN — CEFTRIAXONE 2 G: 2 INJECTION, POWDER, FOR SOLUTION INTRAMUSCULAR; INTRAVENOUS at 12:42

## 2017-04-29 RX ADMIN — PREGABALIN 50 MG: 25 CAPSULE ORAL at 09:56

## 2017-04-29 RX ADMIN — POLYETHYLENE GLYCOL 3350 17 G: 17 POWDER, FOR SOLUTION ORAL at 12:42

## 2017-04-29 RX ADMIN — CARVEDILOL 12.5 MG: 12.5 TABLET, FILM COATED ORAL at 17:38

## 2017-04-29 ASSESSMENT — ACTIVITIES OF DAILY LIVING (ADL)
FALL_HISTORY_WITHIN_LAST_SIX_MONTHS: NO
SWALLOWING: 0-->SWALLOWS FOODS/LIQUIDS WITHOUT DIFFICULTY

## 2017-04-29 NOTE — PLAN OF CARE
Problem: Goal Outcome Summary  Goal: Goal Outcome Summary  Outcome: No Change  Tele A-fib CVR. BP elevated with systolic at 162. Refusing to take oral meds. Disioriented to time, place, and situation. Very anxious all shift. Became combative for short period of time. Urinated on the floor. Easily redirectable. Refuses to take oral meds. IV Haldol given X1 Lung sounds diminished. 1+ edema to bilateral LE's. Ambulates with standby assist. Incontinent of urine at times.

## 2017-04-29 NOTE — PROGRESS NOTES
[facility]  Hospitalist Progress Note  Santosh Poon MD 04/29/17    Reason for Stay (Diagnosis): pneumonia          Assessment and Plan:      Summary of Stay:  Barrie Woods is a 87 year old male with history of dementia, a.fib, CHF, renal cell cancer, CKD, Latent TB, CVA who admitted on 4/26/17 with sob and fatigue due to pneumonia. WBC 15.4, lactic acid 2.5, Oxygen saturation 90% on room air on presentation.  Xray with likely infiltrate and pulmonary edema.  Symptoms improving with ceftriaxone and azithromycin.  Also on bumex with resolution of opacities on repeat chest xray.  Some intermittent confusion and agitation likely delirium in setting of dementia and infection.    Problem List/Assessment and Plan:   Community Acquired Pneumonia - CXR revealed bilateral interstitial edema and small bilateral pleural effusions. CT abd/pelvis revealed new patchy airspace disease in the RLL c/w developing pneumonia with a small right pleural effusion.  Also with leukocytosis.  Does not appear septic.  - IV Ceftriaxone and Azithromycin, switch to orals tomorrow to complete 5 day course      Likely acute on chronic systolic and diastolic CHF - known systolic and diastolic CHF. Last echocardiogram EF low normal 50-55%. Presenting with shortness of breath, BNP elevation and CXR with bilateral interstitial edema and small bilateral pleural effusions.  Trace to 1+ peripheral edema on exam. His BNP is mildly elevated at 4500 although we do not have a previous baseline to compare to.   - continue Coreg  - ECHO repeated, no significant change from prior study   - improving with 1mg daily po bumex     MALGORZATA on CKD - creatinine 1.82 on admission, previously as high as 2.4 in 9/2016, Does have a known hx of untreated RCC. UA negative.  Possibly due to CAP vs CHF.  -continue to monitor with diuresis, currently improving     Delirium on top of underlying dementia - confusion due to possible infectious encephalopathy.  Has  required some IV haldol.  Better today, but sleepy.  -- Re-direct as able to, bedside attendant for safety   -- low dose seroquel at bed time  -- prn haldol for severe agitation     Positive blood culture - growing staph epi, suspect contaminant      Chronic Atrial Fibrillation - rate controlled on Coreg. Not currently on anticoagulation.       Renal Cell Carcinoma - known RCC history. CT abd/pelvis on admission showed a left renal mass. Per chart review, surgery has been declined in the past. On discussion with son, he reaffirms that they dont want any surgery. No significant change in size per CT report.       Chronic Back Pain - continue home Lyrica at decreased dose due to renal failure, Tylenol.  Also has Rx for nocro    Abdominal pain: per patient this is chronic.  He does not want to eat due to the pain.  Differential broad.  Lipase was a little elevated on admission.  -repeat cmp and lipase tomorrow  -continue ppi  -try GI cocktail    DVT Prophylaxis: Pneumatic Compression Devices  Code Status: Full Code  FEN: regular diet  Discharge Dispo: home  Estimated Disch Date / # of Days until Disch: likely discharge tomorrow if confusion better and no fevers        Interval History (Subjective):      Agitated overnight requiring haldol.  Slept very little and was somnolent this morning.  More alert this afternoon.  Will answer some questions through .  Does endorse cough.  No fevers or chills.  Has abdominal pain, but says this is chronic.      Entire encounter completed with assistance of professional live .    Updated son Jose via phone                  Physical Exam:      Last Vital Signs:  /76  Pulse 64  Temp 97.7  F (36.5  C) (Oral)  Resp 18  Wt 81.2 kg (179 lb)  SpO2 96%  BMI 28.89 kg/m2      Intake/Output Summary (Last 24 hours) at 04/29/17 1517  Last data filed at 04/29/17 1302   Gross per 24 hour   Intake              720 ml   Output                0 ml   Net               720 ml       Constitutional: Awake, NAD   Eyes: sclera white   HEENT: atraumatic, MMM  Respiratory: no respiratory distress, lungs cta bilaterally, no crackles or wheeze  Cardiovascular: irregularly, irregular rhythm.  Regular rate.    GI: soft, mild epigastric tenderness, BS present  Skin: no rash   Musculoskeletal/extremities: trace to 1+ bilateral edema  Neurologic: Alert later in day.  Moves all extremities  Psychiatric: calm this morning, cooperative         Medications:      All current medications were reviewed with changes reflected in problem list.         Data:      All new lab and imaging data was reviewed.   Labs:    Recent Labs  Lab 04/29/17  0555 04/28/17  0700 04/27/17  0630    141 142   POTASSIUM 3.8 4.2 4.5   CHLORIDE 107 107 108   CO2 29 29 29   ANIONGAP 6 5 5   GLC 94 99 111*   BUN 32* 31* 36*   CR 1.43* 1.55* 2.39*   GFRESTIMATED 47* 43* 26*   GFRESTBLACK 57* 52* 31*   JOSE 8.6 9.2 8.6      Imaging:   None today      Santosh Poon MD

## 2017-04-29 NOTE — PLAN OF CARE
Problem: Goal Outcome Summary  Goal: Goal Outcome Summary  Outcome: No Change  Pt did not get much sleep overnight. BP hypertensive. Disoriented to time, place, and situation. Easy to redirect.  Attempted to use  phone with no success. Pt received one norco for pain, crushed in applesauce. Pt incontinent. Transfers with ax1. Tele A-fib CVR. Has sitter at the bedside. Will continue to monitor.

## 2017-04-29 NOTE — PLAN OF CARE
Problem: Goal Outcome Summary  Goal: Goal Outcome Summary  Outcome: No Change  Mexican speaking. Disoriented x4. Communication through  and Italian speaking staff.  phone with no success. Initially combative this AM and swinging at staff. Rested in bed for a little bit and more pleasant and communicative. PSC at bedside for safety. On zithromax and rocephin.

## 2017-04-30 VITALS
HEART RATE: 64 BPM | BODY MASS INDEX: 28.89 KG/M2 | OXYGEN SATURATION: 95 % | TEMPERATURE: 99.4 F | SYSTOLIC BLOOD PRESSURE: 134 MMHG | WEIGHT: 179 LBS | RESPIRATION RATE: 18 BRPM | DIASTOLIC BLOOD PRESSURE: 72 MMHG

## 2017-04-30 PROCEDURE — 25000128 H RX IP 250 OP 636: Performed by: INTERNAL MEDICINE

## 2017-04-30 PROCEDURE — 25000132 ZZH RX MED GY IP 250 OP 250 PS 637: Performed by: INTERNAL MEDICINE

## 2017-04-30 PROCEDURE — 25000132 ZZH RX MED GY IP 250 OP 250 PS 637: Performed by: PHYSICIAN ASSISTANT

## 2017-04-30 PROCEDURE — 99239 HOSP IP/OBS DSCHRG MGMT >30: CPT | Performed by: INTERNAL MEDICINE

## 2017-04-30 PROCEDURE — 40000914 ZZH STATISTIC SITTER, DAY HOURS

## 2017-04-30 RX ORDER — OMEPRAZOLE 40 MG/1
40 CAPSULE, DELAYED RELEASE ORAL
Qty: 30 CAPSULE | Refills: 0 | Status: SHIPPED | OUTPATIENT
Start: 2017-04-30 | End: 2017-06-01

## 2017-04-30 RX ORDER — CEFTRIAXONE 2 G/1
2 INJECTION, POWDER, FOR SOLUTION INTRAMUSCULAR; INTRAVENOUS EVERY 24 HOURS
Status: COMPLETED | OUTPATIENT
Start: 2017-04-30 | End: 2017-04-30

## 2017-04-30 RX ORDER — BUMETANIDE 1 MG/1
1 TABLET ORAL DAILY
Qty: 30 TABLET | Refills: 0 | Status: SHIPPED | OUTPATIENT
Start: 2017-04-30 | End: 2017-06-01

## 2017-04-30 RX ORDER — PREGABALIN 50 MG/1
50 CAPSULE ORAL 2 TIMES DAILY
Qty: 60 CAPSULE | Refills: 0 | Status: SHIPPED | OUTPATIENT
Start: 2017-04-30 | End: 2017-06-01

## 2017-04-30 RX ADMIN — AZITHROMYCIN 250 MG: 250 TABLET, FILM COATED ORAL at 09:06

## 2017-04-30 RX ADMIN — MAGNESIUM OXIDE TAB 400 MG (241.3 MG ELEMENTAL MG) 400 MG: 400 (241.3 MG) TAB at 09:05

## 2017-04-30 RX ADMIN — CEFTRIAXONE 2 G: 2 INJECTION, POWDER, FOR SOLUTION INTRAMUSCULAR; INTRAVENOUS at 11:33

## 2017-04-30 RX ADMIN — SALINE NASAL SPRAY 2 SPRAY: 1.5 SOLUTION NASAL at 09:25

## 2017-04-30 RX ADMIN — DEXTRAN 70, AND HYPROMELLOSE 2910 2 DROP: 1; 3 SOLUTION/ DROPS OPHTHALMIC at 11:36

## 2017-04-30 RX ADMIN — BUMETANIDE 1 MG: 0.5 TABLET ORAL at 09:04

## 2017-04-30 RX ADMIN — OMEPRAZOLE 20 MG: 20 CAPSULE, DELAYED RELEASE ORAL at 09:05

## 2017-04-30 RX ADMIN — CARVEDILOL 12.5 MG: 12.5 TABLET, FILM COATED ORAL at 09:04

## 2017-04-30 RX ADMIN — PREGABALIN 50 MG: 25 CAPSULE ORAL at 09:05

## 2017-04-30 RX ADMIN — MULTIPLE VITAMINS W/ MINERALS TAB 1 TABLET: TAB at 09:05

## 2017-04-30 RX ADMIN — POLYETHYLENE GLYCOL 3350 17 G: 17 POWDER, FOR SOLUTION ORAL at 09:04

## 2017-04-30 NOTE — PROGRESS NOTES
All DC instructions reviewed with pt and Son, Jose, and questions answered, verbalized understanding. Script for new dose of lyrica sent with and reviewed. All belongings sent with pt at DC.

## 2017-04-30 NOTE — DISCHARGE SUMMARY
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  He was also somnolent and confused.  Initial presentation included WBC 15.4, lactic acid 2.5, Oxygen saturation 90% on room air on presentation. Xray with likely infiltrate and pulmonary edema. Dyspnea and malaise improved with ceftriaxone and azithromycin and he completed a 5 day course of abx. Also started on bumex with resolution of opacities on repeat chest xray. Some intermittent confusion and agitation likely delirium in setting of dementia and infection, but this has improved.  His lyrica dose was decreased and there was less somnolence.  Ambulating on room air.  Ongoing mild abdominal pain, suspicious for gastritis vs gerd vs PUD.  Have initiated PPI.  Holding lisinopril on d/c due to MALGORZATA.  Discussed plan of care with son Jose.  Patient seen discharge day with assistance of live .     Problem List/Assessment and Plan:   Community Acquired Pneumonia - CXR revealed bilateral interstitial edema and small bilateral pleural effusions. CT abd/pelvis revealed new patchy airspace disease in the RLL c/w developing pneumonia with a small right pleural effusion. Also with leukocytosis. Does not appear septic.  Completed 5 day course of Ceftriaxone and  Azithromycin.      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 Last echocardiogram EF low normal 50-55%. Presenting with shortness of breath, BNP elevation and CXR with bilateral interstitial edema and small bilateral pleural effusions. Trace to 1+ peripheral edema on exam. His BNP is mildly elevated at 4500 although we do not have a previous baseline to compare to.  ECHO repeated, no significant change from prior study. Continued Coreg.  Lisinopril held for MALGORZATA and hold on d/c.  Continue 1mg daily po bumex on discharge.      MALGORZATA on CKD - creatinine 1.82 on admission, previously as high as 2.4 in 9/2016, Does have a known hx of untreated RCC. UA negative. Possibly due to CAP vs CHF.  Improved with diuretic actually and down to 1.4 on discharge.  F/u bmp later this week with pcp.     Delirium on top of underlying dementia - confusion due to possible infectious encephalopathy. Did require some IV haldol. Better today, but sleepy.  Pta lyrica dose decreased due to initial somnolence.      Positive blood culture - growing staph epi, suspect contaminant       Chronic Atrial Fibrillation - rate controlled on Coreg. Not currently on anticoagulation, nor plans for this in the future per family.       Renal Cell Carcinoma - known RCC history. CT abd/pelvis on admission showed a left renal mass. Per chart review, surgery has been declined in the past. On discussion with son, he reaffirms that they dont want any surgery. No significant change in size per CT report.       Chronic Back Pain with sciatica - continue home Lyrica at decreased dose due to renal failure and somnolence.  Continue tylenol and pta norco.  Filled lower dose 50mg bid lyrica on discharge, but may be able to increase in the future if MALGORZATA better and ongoing sciatica pain.    Abdominal pain: per patient this is chronic. Appears to be epigastric.  Son confirms this is not new.  Differential broad. Lipase was a little  elevated on admission, but would not allow labs later.  His abdominal exam is benign.  His CT scan this admisison did not show gallstones, pancreatitis or alternative etiology.  Suspicious for PUD vs gerd vs gastritis.  Would not be a very good candidated for EGD right now given his dementia and agitation.  Recommend daily PPI initiation and monitoring for improvement which was prescribed.  Repeat cmp and lipase in outpatient setting.       Discharge Disposition:  Discharged to home     Allergies:  Allergies   Allergen Reactions     Nkda [No Known Drug Allergies]         Discharge Medications:   Current Discharge Medication List      START taking these medications    Details   bumetanide (BUMEX) 1 MG tablet Take 1 tablet (1 mg) by mouth daily  Qty: 30 tablet, Refills: 0    Associated Diagnoses: Acute pulmonary edema (H)      omeprazole (PRILOSEC) 40 MG capsule Take 1 capsule (40 mg) by mouth every morning (before breakfast)  Qty: 30 capsule, Refills: 0    Associated Diagnoses: Gastroesophageal reflux disease without esophagitis         CONTINUE these medications which have CHANGED    Details   pregabalin (LYRICA) 50 MG capsule Take 1 capsule (50 mg) by mouth 2 times daily  Qty: 60 capsule, Refills: 0    Associated Diagnoses: Chronic midline low back pain without sciatica         CONTINUE these medications which have NOT CHANGED    Details   HYDROcodone-acetaminophen (NORCO) 5-325 MG per tablet Take 1 tablet by mouth every 6 hours as needed for moderate to severe pain  Qty: 30 tablet, Refills: 0    Comments: Please walk signed prescription to pharmacy.  Thanks.  Associated Diagnoses: Chronic low back pain without sciatica, unspecified back pain laterality; Renal cell carcinoma, left (H)      magnesium oxide (MAGNESIUM-OXIDE) 400 (241.3 MG) MG tablet Take 1 tablet (400 mg) by mouth daily 1 tab QD  Qty: 30 tablet, Refills: 5    Associated Diagnoses: Routine general medical examination at a health care facility       acetaminophen (TYLENOL) 500 MG tablet Take 2 tablets (1,000 mg) by mouth every 6 hours as needed for mild pain  Qty: 84 tablet, Refills: 5    Associated Diagnoses: Malaise      multivitamin, therapeutic with minerals (MULTI-VITAMIN) TABS tablet Take 1 tablet by mouth daily  Qty: 100 tablet, Refills: 3    Associated Diagnoses: Routine general medical examination at a health care facility      carvedilol (COREG) 12.5 MG tablet Take 1 tablet (12.5 mg) by mouth 2 times daily (with meals)  Qty: 60 tablet, Refills: 5    Associated Diagnoses: Chronic combined systolic and diastolic congestive heart failure (H)      order for DME Equipment being ordered: walker with slides  Qty: 1 Device, Refills: 0    Associated Diagnoses: Recurrent falls         STOP taking these medications       lisinopril (PRINIVIL/ZESTRIL) 10 MG tablet Comments:   Reason for Stopping:                Condition on Discharge:  Discharge condition: Stable   Discharge vitals: Blood pressure 134/72, pulse 64, temperature 99.4  F (37.4  C), temperature source Oral, resp. rate 18, weight 81.2 kg (179 lb), SpO2 95 %.   Code status on discharge: Full Code     History of Illness:  See detailed admission note for full details.    Physical Exam:  Blood pressure 134/72, pulse 64, temperature 99.4  F (37.4  C), temperature source Oral, resp. rate 18, weight 81.2 kg (179 lb), SpO2 95 %.  Wt Readings from Last 1 Encounters:   04/28/17 81.2 kg (179 lb)     Constitutional: Awake, NAD  Eyes: sclera white   HEENT: atraumatic, MMM  Respiratory: no respiratory distress, lungs cta bilaterally, no crackles or wheeze  Cardiovascular: irregularly irregular rhythm, regular rate.  No murmur   GI: soft, mild epigastric tenderness to deep palpation without guarding, not distended, bowel sounds present  Skin: no rash or lesions, acyanotic  Musculoskeletal/extremities: trace peripheral edema  Neurologic: Alert, not oriented to place or time.  Psychiatric: calm     Procedures other  than Imaging:  none     Imaging:  Results for orders placed or performed during the hospital encounter of 04/26/17   Chest  XR, 1 view PORTABLE    Narrative    XR CHEST PORT 1 VW 4/26/2017 10:46 AM    HISTORY: Low oxygen saturation.    COMPARISON: 7/27/2015    FINDINGS: Basilar interstitial edema and small bilateral pleural  effusions. No pneumothorax. Stable heart size.      Impression    IMPRESSION: Edema.    SHUBHAM GARCIA MD   CT Abdomen Pelvis w/o Contrast    Narrative    CT ABDOMEN AND PELVIS WITHOUT CONTRAST   4/26/2017 12:34 PM     HISTORY: Abdominal pain, history of right renal mass.    TECHNIQUE: Noncontrast CT abdomen and pelvis was performed. Radiation  dose for this scan was reduced using automated exposure control,  adjustment of the mA and/or kV according to patient size, or iterative  reconstruction technique.    COMPARISON: CT abdomen and pelvis 9/9/2016.    FINDINGS: Patchy new opacities at the right lower lobe base around  series 2 image 6. Trace right pleural fluid. Normal appendix. No bowel  obstruction, or acute inflammatory change of the bowel identified.  Distal colonic diverticula. Enlarged prostate that is asymmetric  towards the right posteriorly is stable in size measuring 5.5 cm  series 2 image 90. Prostate calcifications appear stable. The liver,  gallbladder, adrenals, spleen, pancreas, and right kidney show no  acute abnormalities. There is masslike enlargement of the left kidney  again noted that is difficult to measure. It is approximately 5.4 x  4.1 cm, previously of similar size when remeasuring the prior exam at  a similar level image 36. There is a nonobstructing stone that is  stable at the posterior left kidney measuring 0.4 cm image 36. There  is no hydronephrosis identified on either side. Vascular  calcifications. No enlarged lymph nodes. Calcifications of the  pancreas again noted appearing slightly increased at the pancreatic  head.      Impression    IMPRESSION:  1. New  patchy airspace disease at the right lower lobe could represent  a developing pneumonia. Small right pleural effusion.  2. No acute abnormality is seen within the abdomen or pelvis.  3. Left-sided indeterminate renal mass is not convincingly changed.  This could represent a neoplasm and further workup is suggested.  4. Stable nonobstructing stone at the left kidney.  5. Enlarged prostate is stable. Asymmetric enlargement of the right  prostate is again noted and is indeterminate.    BECKY DEL ANGEL MD   XR Chest 2 Views    Narrative    XR CHEST 2 VW 4/27/2017 9:35 PM    HISTORY: Pneumonia.    COMPARISON: 4/26/2017, 7/27/2015    FINDINGS: Previously seen basilar predominant interstitial edema has  largely cleared. There is no discrete consolidation, pleural effusion  or pneumothorax. Chronic cardiomegaly. Multiple old right-sided rib  fractures.      Impression    IMPRESSION: Resolving edema.    SHUBHAM GARCIA MD        Consultations:  No consultations were requested during this admission.       Recent Lab Results:    Recent Labs  Lab 04/28/17  0700 04/27/17  0630 04/26/17  1018   WBC 10.9 10.6 15.4*   HGB 14.0 13.2* 14.3   HCT 42.4 40.9 44.3   MCV 94 96 94    138* 163       Recent Labs  Lab 04/26/17  1255 04/26/17  1050 04/26/17  1018   CULT <10,000 colonies/mL Coagulase negative Staphylococcus* No growth after 4 days Cultured on the 1st day of incubation: Staphylococcus epidermidis This isolate DOES NOT demonstrate inducible clindamycin resistance in vitro. Clindamycin is susceptible and could be used when indicated, however, erythromycin is resistant and should not be used.Critical Value/Significant Value, preliminary result only, called to and read back by Crys Pina RN at 1435 on 4/27/17 by TISH.(Note)POSITIVE for STAPHYLOCOCCUS EPIDERMIDIS and POSITIVE for the mecAgene (resistant to methicillin) by One Block Off the Grid (1BOG) multiplex nucleic acidtest. Final identification and antimicrobial susceptibility testingwill be  verified by standard methods.Specimen tested with Verigene multiplex, gram-positive blood culturenucleic acid test for the following targets: Staph aureus, Staphepidermidis, Staph lugdunensis, other Staph species, Enterococcusfaecalis, Enterococcus faecium, Streptococcus species, S. agalactiae,S. anginosus grp., S. pneumoniae, S. pyogenes, Listeria sp., mecA(methi cillin resistance) and Melvin/B (vancomycin resistance).Critical Value/Significant Value called to and read back by Ramón casper @1742 4/27/17. ct*       Recent Labs  Lab 04/29/17  0555 04/28/17  0700 04/27/17  0630 04/26/17  1018    141 142 140   POTASSIUM 3.8 4.2 4.5 4.4   CHLORIDE 107 107 108 104   CO2 29 29 29 28   ANIONGAP 6 5 5 8   GLC 94 99 111* 128*   BUN 32* 31* 36* 26   CR 1.43* 1.55* 2.39* 1.82*   GFRESTIMATED 47* 43* 26* 35*   GFRESTBLACK 57* 52* 31* 43*   JOSE 8.6 9.2 8.6 9.2   MAG  --  2.2 2.5*  --    PROTTOTAL  --   --   --  7.0   ALBUMIN  --   --   --  3.4   BILITOTAL  --   --   --  1.3   ALKPHOS  --   --   --  89   AST  --   --   --  28   ALT  --   --   --  33       Recent Labs  Lab 04/26/17  1358 04/26/17  1025   LACT 1.1 2.5*       Recent Labs  Lab 04/26/17 2005 04/26/17  1358 04/26/17  1027 04/26/17  1018   TROPONIN  --   --  0.05  --    TROPI 0.042 0.051*  --  0.037       Recent Labs  Lab 04/26/17  1255   COLOR Pauline   APPEARANCE Slightly Cloudy   URINEGLC Negative   URINEBILI Negative   URINEKETONE Negative   SG 1.023   UBLD Negative   URINEPH 5.0   PROTEIN Negative   NITRITE Negative   LEUKEST Negative   RBCU 2   WBCU 2          Pending Results:    Unresulted Labs Ordered in the Past 30 Days of this Admission     Date and Time Order Name Status Description    4/26/2017 1027 Blood culture ONE site Preliminary     4/26/2017 1026 Urine Culture Aerobic Bacterial Preliminary          These results will be followed up by patient's primary care provider.    Discharge Instructions and Follow-Up:     Discharge Procedure  Orders  Follow-up and recommended labs and tests    Order Comments: Follow up with primary care provider, Yonatan Hurtado, within 3 to 5 days to evaluate medication change.  The following labs/tests are recommended: bmp, lfts, and lipase     Reason for your hospital stay   Order Comments: You were hospitalized for pneumonia which is much improved following your course of antibiotics.  I suspect your abdominal pain may be due to gastritis or inflammation in the stomach and should improve slowly with a daily acid suppressant called omeprazole.  Your leg pain is likely sciatica which is pain origination from a pinched nerve in your back.  Because you were very sleepy we have decreased the dose of your Lyrica to treat this problem.     Activity   Order Comments: Your activity upon discharge: activity as tolerated   Order Specific Question Answer Comments   Is discharge order? Yes      Full Code     Diet   Order Comments: Follow this diet upon discharge: Orders Placed This Encounter     Room Service     Combination Diet Mechanical/Dental Soft Diet; 2 gm NA Diet   Order Specific Question Answer Comments   Is discharge order? Yes          Recommendations:  -daily 1mg bumex  -stop lisinopril for now for MALGORZATA  -decreased lyrica to 50mg bid due to initial somnolence and MALGORZATA  -f/u with pcp later this week.  Repeat cmp at that time    ISantosh, personally saw the patient today and spent greater than 30 minutes discharging this patient.    Santosh Poon MD

## 2017-04-30 NOTE — PROGRESS NOTES
Transition Communication Hand-off for Care Transitions to Next Level of Care Provider    Name: Barrie Woods  MRN #: [id number]  Primary Care Provider: Yonatan Hurtado  Primary Care MD Name: Yonatan Hurtado  [facility] [address] [address]  Primary Care Clinic Name: [facility]  Reason for Hospitalization:  Acute pulmonary edema (H) [J81.0]  Renal insufficiency [N28.9]  Hypoxia [R09.02]  Generalized muscle weakness [M62.81]  Leukocytosis, unspecified type [D72.829]  Admit Date/Time: 4/26/2017 10:05 AM  Discharge Date: 4/30/17  Payor Source: Payor: MEDICA / Plan: [employer] [facility] / Product Type: HMO /     Readmission Assessment Measure (CRISTO) Risk Score/category: Average    Plan of Care Goals/Milestone Events:     Medical Goals   Short-term Hospital follow-up appt scheduled for  Mon 5/8/17 at 10:30 am w/Dr. Yonatan Hurtado.   Long-term: Report any symptoms that are worse  than usual to PCP.          Reason for Communication Hand-off Referral: Ongoing coordination of care    Discharge Plan:        Concern for non-adherence with plan of care:   Y/N No  Discharge Needs Assessment:      Already enrolled in Tele-monitoring program and name of program:  n/a  Follow-up specialty is recommended: Yes    Follow-up plan:  Future Appointments  Date Time Provider Department Center   5/1/2017 9:30 AM [place] [facility]   5/8/2017 10:30 AM Yonatan Hurtado MD [facility]       Any outstanding tests or procedures:  The following labs/tests are recommended: bmp, lfts, and lipase            Key Recommendations:  See medical goals above.     Cici Genao, RN, BSN, PHN, CTS  Care Transitions Specialist  [facility]      AVS/Discharge Summary is the source of truth; this is a helpful guide for improved communication of patient story

## 2017-04-30 NOTE — PLAN OF CARE
Problem: Goal Outcome Summary  Goal: Goal Outcome Summary  Outcome: Improving  Pt refusing vital signs, however temporal temp 99.1. Restless in beginning of shift, cooperative and communicative. Following pm medications slept throughout night. LS clear. Up SBA and walker. Possible d/c today. Cont with POC.

## 2017-05-01 ENCOUNTER — TELEPHONE (OUTPATIENT)
Dept: FAMILY MEDICINE | Facility: CLINIC | Age: 82
End: 2017-05-01

## 2017-05-01 ENCOUNTER — CARE COORDINATION (OUTPATIENT)
Dept: GERIATRIC MEDICINE | Facility: CLINIC | Age: 82
End: 2017-05-01

## 2017-05-01 LAB
BACTERIA SPEC CULT: ABNORMAL
Lab: ABNORMAL
MICRO REPORT STATUS: ABNORMAL
MICROORGANISM SPEC CULT: ABNORMAL
SPECIMEN SOURCE: ABNORMAL

## 2017-05-01 NOTE — PROGRESS NOTES
Notified client d/c home on 4/30/17.  CM called and spoke with clients son, Jose, to follow up after d/c.  Jose states client is doing well at home and declines need for additional help at this time. Client has hospital f/u appt with PCP arranged for 5/8/17 - Jose will try to reschedule for Friday this week instead due to his work schedule.    Shelly Alanis, MORELIA   [facility]   [phone]

## 2017-05-01 NOTE — TELEPHONE ENCOUNTER
Please call patient for Inpatient Discharge f/u 04/30/17. Gastroesophageal Reflux Disease Without Esophagitis, Hypoxia. Ruth Behrens.

## 2017-05-02 LAB
BACTERIA SPEC CULT: NO GROWTH
Lab: NORMAL
MICRO REPORT STATUS: NORMAL
SPECIMEN SOURCE: NORMAL

## 2017-05-08 ENCOUNTER — OFFICE VISIT (OUTPATIENT)
Dept: FAMILY MEDICINE | Facility: CLINIC | Age: 82
End: 2017-05-08
Payer: COMMERCIAL

## 2017-05-08 VITALS
WEIGHT: 176.4 LBS | OXYGEN SATURATION: 100 % | RESPIRATION RATE: 14 BRPM | SYSTOLIC BLOOD PRESSURE: 176 MMHG | HEART RATE: 84 BPM | TEMPERATURE: 98.1 F | BODY MASS INDEX: 28.35 KG/M2 | DIASTOLIC BLOOD PRESSURE: 66 MMHG | HEIGHT: 66 IN

## 2017-05-08 DIAGNOSIS — C64.2 RENAL CELL CARCINOMA, LEFT (H): ICD-10-CM

## 2017-05-08 DIAGNOSIS — R53.81 MALAISE: ICD-10-CM

## 2017-05-08 DIAGNOSIS — J18.9 CAP (COMMUNITY ACQUIRED PNEUMONIA): Primary | ICD-10-CM

## 2017-05-08 DIAGNOSIS — F03.90 DEMENTIA WITHOUT BEHAVIORAL DISTURBANCE, UNSPECIFIED DEMENTIA TYPE: ICD-10-CM

## 2017-05-08 DIAGNOSIS — G89.29 CHRONIC LOW BACK PAIN WITHOUT SCIATICA, UNSPECIFIED BACK PAIN LATERALITY: ICD-10-CM

## 2017-05-08 DIAGNOSIS — N17.9 AKI (ACUTE KIDNEY INJURY) (H): ICD-10-CM

## 2017-05-08 DIAGNOSIS — M54.50 CHRONIC LOW BACK PAIN WITHOUT SCIATICA, UNSPECIFIED BACK PAIN LATERALITY: ICD-10-CM

## 2017-05-08 DIAGNOSIS — Z00.00 ROUTINE GENERAL MEDICAL EXAMINATION AT A HEALTH CARE FACILITY: ICD-10-CM

## 2017-05-08 PROCEDURE — 99495 TRANSJ CARE MGMT MOD F2F 14D: CPT | Performed by: FAMILY MEDICINE

## 2017-05-08 PROCEDURE — 36415 COLL VENOUS BLD VENIPUNCTURE: CPT | Performed by: FAMILY MEDICINE

## 2017-05-08 PROCEDURE — 80048 BASIC METABOLIC PNL TOTAL CA: CPT | Performed by: FAMILY MEDICINE

## 2017-05-08 RX ORDER — AMOXICILLIN AND CLAVULANATE POTASSIUM 500; 125 MG/1; MG/1
1 TABLET, FILM COATED ORAL 2 TIMES DAILY
Qty: 40 TABLET | Refills: 0 | Status: SHIPPED | OUTPATIENT
Start: 2017-05-08 | End: 2017-05-22

## 2017-05-08 RX ORDER — HYDROCODONE BITARTRATE AND ACETAMINOPHEN 5; 325 MG/1; MG/1
1 TABLET ORAL EVERY 6 HOURS PRN
Qty: 30 TABLET | Refills: 0 | Status: SHIPPED | OUTPATIENT
Start: 2017-05-08 | End: 2017-05-22

## 2017-05-08 NOTE — MR AVS SNAPSHOT
After Visit Summary   5/8/2017    Barrie Woods    MRN: [id number]           Patient Information     Date Of Birth          1/25/1930        Visit Information        Provider Department      5/8/2017 6:30 PM Yonatan Hurtado MD; [employer] [facility]        Today's Diagnoses     CAP (community acquired pneumonia)    -  1    MALGORZATA (acute kidney injury) (H)        Renal cell carcinoma, left (H)        Chronic low back pain without sciatica, unspecified back pain laterality        Routine general medical examination at a health care facility        Malaise        Dementia without behavioral disturbance, unspecified dementia type           Follow-ups after your visit        Your next 10 appointments already scheduled     Jun 05, 2017  6:30 PM CDT   SHORT with Yonatan Hurtado MD   [facility] ([facility])    [address] [place] [id number] [phone]              Who to contact     If you have questions or need follow up information about today's clinic visit or your schedule please contact [facility] directly at [phone].  Normal or non-critical lab and imaging results will be communicated to you by [facility], letter or phone within 4 business days after the clinic has received the results. If you do not hear from us within 7 days, please contact the clinic through [facility] or phone. If you have a critical or abnormal lab result, we will notify you by phone as soon as possible.  Submit refill requests through [employer] or call your pharmacy and they will forward the refill request to us. Please allow 3 business days for your refill to be completed.          Additional Information About Your Visit        MyChart Information     [employer] lets you send messages to your doctor, view your test results, renew your prescriptions, schedule appointments and more. To sign up, go to  [email or web address] . Click on \"Log in\" on the left side of the screen, which will take you to the Welcome page. Then click on \"Sign up Now\" on the right side of the page.     You will be asked to enter the access code listed below, as well as some personal information. Please follow the directions to create your username and password.     Your access code is: [email or web address]  Expires: 2017  2:51 PM     Your access code will  in 90 days. If you need help or a new code, please call your [place] clinic or [phone].        Care EveryWhere ID     This is your Care EveryWhere ID. This could be used by other organizations to access your [place] medical records  [id number]        Your Vitals Were     Pulse Temperature Respirations Height Pulse Oximetry BMI (Body Mass Index)    84 98.1  F (36.7  C) (Oral) 14 5' 6\" (1.676 m) 100% 28.47 kg/m2       Blood Pressure from Last 3 Encounters:   17 176/66   17 134/72   16 (!) 90/36    Weight from Last 3 Encounters:   17 176 lb 6.4 oz (80 kg)   17 179 lb (81.2 kg)   16 175 lb (79.4 kg)              We Performed the Following     Basic metabolic panel  (Ca, Cl, CO2, Creat, Gluc, K, Na, BUN)          Today's Medication Changes          These changes are accurate as of: 17 11:59 PM.  If you have any questions, ask your nurse or doctor.               Start taking these medicines.        Dose/Directions    amoxicillin-clavulanate 500-125 MG per tablet   Commonly known as:  AUGMENTIN   Used for:  Malaise   Started by:  Yonatan Hurtado MD        Dose:  1 tablet   Take 1 tablet by mouth 2 times daily   Quantity:  40 tablet   Refills:  0            Where to get your medicines      These medications were sent to [facility] [address] [id number] [facility] [address]     Phone:  [phone]     amoxicillin-clavulanate 500-125 MG per tablet         Some of these will need a paper " prescription and others can be bought over the counter.  Ask your nurse if you have questions.     Bring a paper prescription for each of these medications     HYDROcodone-acetaminophen 5-325 MG per tablet                Primary Care Provider Office Phone # Fax #    Yonatan Hurtado [phone]       [facility] [address]  [facility] [address]        Thank you!     Thank you for choosing [facility]  for your care. Our goal is always to provide you with excellent care. Hearing back from our patients is one way we can continue to improve our services. Please take a few minutes to complete the written survey that you may receive in the mail after your visit with us. Thank you!             Your Updated Medication List - Protect others around you: Learn how to safely use, store and throw away your medicines at www.disposemymeds.org.          This list is accurate as of: 5/8/17 11:59 PM.  Always use your most recent med list.                   Brand Name Dispense Instructions for use    acetaminophen 500 MG tablet    TYLENOL    84 tablet    Take 2 tablets (1,000 mg) by mouth every 6 hours as needed for mild pain       amoxicillin-clavulanate 500-125 MG per tablet    AUGMENTIN    40 tablet    Take 1 tablet by mouth 2 times daily       bumetanide 1 MG tablet    BUMEX    30 tablet    Take 1 tablet (1 mg) by mouth daily       carvedilol 12.5 MG tablet    COREG    60 tablet    Take 1 tablet (12.5 mg) by mouth 2 times daily (with meals)       HYDROcodone-acetaminophen 5-325 MG per tablet    NORCO    30 tablet    Take 1 tablet by mouth every 6 hours as needed for moderate to severe pain       magnesium oxide 400 (241.3 MG) MG tablet    MAGNESIUM-OXIDE    30 tablet    Take 1 tablet (400 mg) by mouth daily 1 tab QD       multivitamin, therapeutic with minerals Tabs tablet     100 tablet    Take 1 tablet by mouth daily       omeprazole 40 MG capsule    priLOSEC    30  capsule    Take 1 capsule (40 mg) by mouth every morning (before breakfast)       order for DME     1 Device    Equipment being ordered: walker with slides       pregabalin 50 MG capsule    LYRICA    60 capsule    Take 1 capsule (50 mg) by mouth 2 times daily

## 2017-05-08 NOTE — PROGRESS NOTES
SUBJECTIVE:                                                    Barrie Woods is a 87 year old male who presents to clinic today for the following health issues:    Hospital Follow-up Visit:    Hospital/Nursing Home/IP Rehab Facility: [facility]  Date of Admission: 4/26/17  Date of Discharge: 4/30/17  Reason(s) for Admission: Fatigue, SOB             Problems taking medications regularly:  None       Medication changes since discharge: None       Problems adhering to non-medication therapy:  None    Summary of hospitalization:  [facility] discharge summary reviewed    Diagnostic Tests/Treatments reviewed.  Follow up needed: bmp  Other Healthcare Providers Involved in Patient s Care:         None  Update since discharge: stable.     Post Discharge Medication Reconciliation:  completed  Plan of care communicated with patient and family     Coding guidelines for this visit:  Type of Medical   Decision Making Face-to-Face Visit       within 7 Days of discharge Face-to-Face Visit        within 14 days of discharge   Moderate Complexity [id number]   High Complexity 27695 [id number]              CAP (community acquired pneumonia)  (primary encounter diagnosis) by CT only  MALGORZATA (acute kidney injury) (H)   GFR Estimate   Date Value Ref Range Status   04/29/2017 47 (L) >60 mL/min/1.7m2 Final     Comment:     Non  GFR Calc   04/28/2017 43 (L) >60 mL/min/1.7m2 Final     Comment:     Non  GFR Calc   04/27/2017 26 (L) >60 mL/min/1.7m2 Final     Comment:     Non  GFR Calc     GFR Estimate If Black   Date Value Ref Range Status   04/29/2017 57 (L) >60 mL/min/1.7m2 Final     Comment:      GFR Calc   04/28/2017 52 (L) >60 mL/min/1.7m2 Final     Comment:      GFR Calc   04/27/2017 31 (L) >60 mL/min/1.7m2 Final     Comment:      GFR Calc       Renal cell carcinoma, left (H) stable in size. No interventions desired  Chronic  low back pain without sciatica, unspecified back pain laterality stable    Malaise pt/family request Augmentin Rx (used for non medical indication)  Dementia without behavioral disturbance, unspecified dementia type pt cared for by family    Past Medical History:   Diagnosis Date     Acute, but ill-defined, cerebrovascular disease 1/04    left middle cerebral artery infarct     Alcohol abuse      Anemia 7/14/2014     Atrial fibrillation (H)     CVA occurred off anticoagulation     Cardiomyopathy (H)     stress tests 6/04 and 10/04 negative for ischemia, EF 36-40%     CHF (congestive heart failure) (H) 12/24/2009     Chronic pain 10/12/2015     Dementia without behavioral disturbance, unspecified dementia type 5/9/2017     Dementia, without behavioral disturbance 6/29/2015     Depressive disorder      Diaphragmatic hernia without mention of obstruction or gangrene     left sided chest pain, hiatal hernia/gastritis     Diverticulosis of colon (without mention of hemorrhage)     sigmoid     Dysphagia 7/27/2015     Elevated blood sugar 1/25/2016     Esophageal reflux      Hearing loss      Hematuria     negative cystoscopy 3/04     Hereditary and idiopathic peripheral neuropathy 2006    EMG-peripheral polyneuropathy, B12 and VitD deficient, ? alcohol related     Hx of congestive heart failure 1/12/2016     Hypertrophy of prostate 6/17/2015     Nausea with vomiting 12/8/2014     PSA elevation 5/30/2014     Recurrent falls 7/28/2015     Renal cell carcinoma (H) 8/25/2014     Sepsis (H) 1/7/2015     TB lung, latent 5/6/2014     Urinary incontinence 7/28/2015       History reviewed. No pertinent surgical history.    Family History   Problem Relation Age of Onset     C.A.D. No family hx of      DIABETES No family hx of        Social History   Substance Use Topics     Smoking status: Current Every Day Smoker     Types: Dip, chew, snus or snuff     Smokeless tobacco: Current User     Types: Chew     Last attempt to quit:  "1/9/2004      Comment: pt chews tobacco     Alcohol use No         ROS: The patient notes that his SOB has not improved. No CP fevers    This document serves as a record of the services and decisions personally performed and made by Bryant Tam MD. It was created on his behalf by Leonardo Longo a trained medical scribe. The creation of this document is based the provider's statements to the medical scribe. Leonardo Longo May 8, 2017 6:52 PM  OBJECTIVE:                                                    /66 (BP Location: Left arm, Patient Position: Chair, Cuff Size: Adult Regular)  Pulse 84  Temp 98.1  F (36.7  C) (Oral)  Resp 14  Ht 5' 6\" (1.676 m)  Wt 176 lb 6.4 oz (80 kg)  SpO2 100%  BMI 28.47 kg/m2  Body mass index is 28.47 kg/(m^2).  GEN: Affect flat  CHEST: Clear to auscultation, respirations unlabored  HEART: S1S2 RRR no murmur nor apical displacement  MS: no edema  ANtalgic gait     ASSESSMENT/PLAN:                                                    ASSESSMENT / PLAN:  (J18.9) CAP (community acquired pneumonia)  (primary encounter diagnosis)  Comment: S/p Rx  Plan: family was under impression that this was a UTI    (N17.9) MALGORZATA (acute kidney injury) (H)  Comment: rapidly reversed  Plan: Basic metabolic panel  (Ca, Cl, CO2, Creat,         Gluc, K, Na, BUN)        ACE held. If improved, resume ACE to Rx HTN    (C64.2) Renal cell carcinoma, left (H)  Comment: *no Rx  Plan: no further study    (M54.5,  G89.29) Chronic low back pain without sciatica, unspecified back pain laterality  Comment: refill  Plan: HYDROcodone-acetaminophen (NORCO) 5-325 MG per         tablet            (Z00.00) Routine general medical examination at a health care facility  Comment: vitamins discussed  Plan:     (R53.81) Malaise  Comment: Rx as previously  Plan: amoxicillin-clavulanate (AUGMENTIN) 500-125 MG         per tablet            (F03.90) Dementia without behavioral disturbance, unspecified dementia type  Comment: " well cared for  Plan:       RTC 2- 6 weeks, depending. CXR 6 weeks    Dyllan Morrow MD              RCK depending on lab results, otherwise 6 weeks for chest x-ray    The information in this document, created by a scribe for me, accurately reflects the services I personally performed and the decisions made by me. I have reviewed and approved this document for accuracy. 6:57 PM May 8, 2017    DYLLAN MORROW MD  [facility]

## 2017-05-08 NOTE — NURSING NOTE
"Chief Complaint   Patient presents with     Hospital F/U     4/26/17-4/30/17        Initial /66 (BP Location: Left arm, Patient Position: Chair, Cuff Size: Adult Regular)  Pulse 84  Temp 98.1  F (36.7  C) (Oral)  Resp 14  Ht 5' 6\" (1.676 m)  Wt 176 lb 6.4 oz (80 kg)  SpO2 100%  BMI 28.47 kg/m2 Estimated body mass index is 28.47 kg/(m^2) as calculated from the following:    Height as of this encounter: 5' 6\" (1.676 m).    Weight as of this encounter: 176 lb 6.4 oz (80 kg).  Medication Reconciliation: complete left arm Deidre Duvall MA      "

## 2017-05-09 ENCOUNTER — TELEPHONE (OUTPATIENT)
Dept: FAMILY MEDICINE | Facility: CLINIC | Age: 82
End: 2017-05-09

## 2017-05-09 PROBLEM — F03.90 DEMENTIA WITHOUT BEHAVIORAL DISTURBANCE, UNSPECIFIED DEMENTIA TYPE: Status: ACTIVE | Noted: 2017-05-09

## 2017-05-09 LAB
ANION GAP SERPL CALCULATED.3IONS-SCNC: 10 MMOL/L (ref 3–14)
BUN SERPL-MCNC: 24 MG/DL (ref 7–30)
CALCIUM SERPL-MCNC: 9.4 MG/DL (ref 8.5–10.1)
CHLORIDE SERPL-SCNC: 104 MMOL/L (ref 94–109)
CO2 SERPL-SCNC: 27 MMOL/L (ref 20–32)
CREAT SERPL-MCNC: 1.43 MG/DL (ref 0.66–1.25)
GFR SERPL CREATININE-BSD FRML MDRD: 47 ML/MIN/1.7M2
GLUCOSE SERPL-MCNC: 102 MG/DL (ref 70–99)
POTASSIUM SERPL-SCNC: 4.8 MMOL/L (ref 3.4–5.3)
SODIUM SERPL-SCNC: 141 MMOL/L (ref 133–144)

## 2017-05-22 ENCOUNTER — OFFICE VISIT (OUTPATIENT)
Dept: FAMILY MEDICINE | Facility: CLINIC | Age: 82
End: 2017-05-22
Payer: COMMERCIAL

## 2017-05-22 VITALS
HEIGHT: 66 IN | DIASTOLIC BLOOD PRESSURE: 52 MMHG | OXYGEN SATURATION: 96 % | WEIGHT: 172.8 LBS | TEMPERATURE: 0.3 F | HEART RATE: 66 BPM | SYSTOLIC BLOOD PRESSURE: 111 MMHG | BODY MASS INDEX: 27.77 KG/M2 | RESPIRATION RATE: 14 BRPM

## 2017-05-22 DIAGNOSIS — I50.42 CHRONIC COMBINED SYSTOLIC AND DIASTOLIC CONGESTIVE HEART FAILURE (H): ICD-10-CM

## 2017-05-22 DIAGNOSIS — I10 HTN, GOAL BELOW 140/90: ICD-10-CM

## 2017-05-22 DIAGNOSIS — M54.50 MIDLINE LOW BACK PAIN WITHOUT SCIATICA, UNSPECIFIED CHRONICITY: Primary | ICD-10-CM

## 2017-05-22 DIAGNOSIS — J18.9 CAP (COMMUNITY ACQUIRED PNEUMONIA): ICD-10-CM

## 2017-05-22 PROCEDURE — 80048 BASIC METABOLIC PNL TOTAL CA: CPT | Performed by: FAMILY MEDICINE

## 2017-05-22 PROCEDURE — 36415 COLL VENOUS BLD VENIPUNCTURE: CPT | Performed by: FAMILY MEDICINE

## 2017-05-22 PROCEDURE — 99214 OFFICE O/P EST MOD 30 MIN: CPT | Performed by: FAMILY MEDICINE

## 2017-05-22 RX ORDER — HYDROCODONE BITARTRATE AND ACETAMINOPHEN 5; 325 MG/1; MG/1
1 TABLET ORAL EVERY 6 HOURS PRN
Qty: 30 TABLET | Refills: 0 | Status: SHIPPED | OUTPATIENT
Start: 2017-05-22 | End: 2017-06-30

## 2017-05-22 RX ORDER — VENLAFAXINE HYDROCHLORIDE 37.5 MG/1
37.5 CAPSULE, EXTENDED RELEASE ORAL DAILY
Qty: 30 CAPSULE | Refills: 1 | Status: SHIPPED | OUTPATIENT
Start: 2017-05-22 | End: 2017-06-19

## 2017-05-22 RX ORDER — LISINOPRIL 10 MG/1
10 TABLET ORAL DAILY
Qty: 30 TABLET | Refills: 5 | Status: SHIPPED | OUTPATIENT
Start: 2017-05-22 | End: 2017-11-06

## 2017-05-22 RX ORDER — DULOXETIN HYDROCHLORIDE 20 MG/1
20 CAPSULE, DELAYED RELEASE ORAL DAILY
Qty: 30 CAPSULE | Refills: 1 | Status: SHIPPED | OUTPATIENT
Start: 2017-05-22 | End: 2017-05-22

## 2017-05-22 NOTE — NURSING NOTE
"Chief Complaint   Patient presents with     RECHECK     Pneumonia        Initial There were no vitals taken for this visit. Estimated body mass index is 28.47 kg/(m^2) as calculated from the following:    Height as of 5/8/17: 5' 6\" (1.676 m).    Weight as of 5/8/17: 176 lb 6.4 oz (80 kg).  Medication Reconciliation: complete rt arm Diedre Duvall MA      "

## 2017-05-22 NOTE — PROGRESS NOTES
SUBJECTIVE:                                                    Barrie Woods is a 87 year old male who presents to clinic today for the following health issues:      Follow up from Pneumonia         Problem list and histories reviewed & adjusted, as indicated.  Additional history: none        Reviewed and updated as needed this visit by clinical staff  Tobacco  Allergies  Meds  Med Hx  Surg Hx  Fam Hx  Soc Hx      Reviewed and updated as needed this visit by Provider         Midline low back pain without sciatica, unspecified chronicity  (primary encounter diagnosis) patient complains of pain from head to toe  Chronic combined systolic and diastolic congestive heart failure (H) ACE has been resumed.  CAP (community acquired pneumonia) by CT only. No follow-up radiology will be needed  HTN, goal below 140/90   BP Readings from Last 1 Encounters:   05/22/17 111/52     Past Medical History:   Diagnosis Date     Acute, but ill-defined, cerebrovascular disease 1/04    left middle cerebral artery infarct     Alcohol abuse      Anemia 7/14/2014     Atrial fibrillation (H)     CVA occurred off anticoagulation     Cardiomyopathy (H)     stress tests 6/04 and 10/04 negative for ischemia, EF 36-40%     CHF (congestive heart failure) (H) 12/24/2009     Chronic pain 10/12/2015     Dementia without behavioral disturbance, unspecified dementia type 5/9/2017     Dementia, without behavioral disturbance 6/29/2015     Depressive disorder      Diaphragmatic hernia without mention of obstruction or gangrene     left sided chest pain, hiatal hernia/gastritis     Diverticulosis of colon (without mention of hemorrhage)     sigmoid     Dysphagia 7/27/2015     Elevated blood sugar 1/25/2016     Esophageal reflux      Hearing loss      Hematuria     negative cystoscopy 3/04     Hereditary and idiopathic peripheral neuropathy 2006    EMG-peripheral polyneuropathy, B12 and VitD deficient, ? alcohol related     Hx of congestive heart  "failure 1/12/2016     Hypertrophy of prostate 6/17/2015     Nausea with vomiting 12/8/2014     PSA elevation 5/30/2014     Recurrent falls 7/28/2015     Renal cell carcinoma (H) 8/25/2014     Sepsis (H) 1/7/2015     TB lung, latent 5/6/2014     Urinary incontinence 7/28/2015       History reviewed. No pertinent surgical history.    Family History   Problem Relation Age of Onset     C.A.D. No family hx of      DIABETES No family hx of        Social History   Substance Use Topics     Smoking status: Current Every Day Smoker     Types: Dip, chew, snus or snuff     Smokeless tobacco: Current User     Types: Chew     Last attempt to quit: 1/9/2004      Comment: pt chews tobacco     Alcohol use No     ROS no cough no fever Patient questions \" how many days until I die?\"     /52 (BP Location: Right arm, Patient Position: Chair, Cuff Size: Adult Regular)  Pulse 66  Temp (!) 0.3  F (-17.6  C) (Oral)  Resp 14  Ht 5' 6\" (1.676 m)  Wt 172 lb 12.8 oz (78.4 kg)  SpO2 96%  BMI 27.89 kg/m2  CHEST: Clear to auscultation, respirations unlabored  PHQ- 9does not reflect his complaints    No edema    ASSESSMENT / PLAN:  (M54.5) Midline low back pain without sciatica, unspecified chronicity  (primary encounter diagnosis)  Comment: He is on a number of agents. Duloxetine made him dizzy in the past  Plan: HYDROcodone-acetaminophen (NORCO) 5-325 MG per         tablet, venlafaxine (EFFEXOR-XR) 37.5 MG 24 hr         capsule, DISCONTINUED:     (I50.42) Chronic combined systolic and diastolic congestive heart failure (H)  Comment: Son reports this is been resumed  Plan: lisinopril (PRINIVIL/ZESTRIL) 10 MG tablet        BMP    (J18.9) CAP (community acquired pneumonia)  Comment: Per patient request  Plan: penicillin G benzathine (BICILLIN L-A) [id number]         UNIT/2ML injection       This is not for medical indications    (I10) HTN, goal below 140/90  Comment: Tolerating Ace  Plan: Basic metabolic panel  (Ca, Cl, CO2, Creat,         " Gluc, K, Na, BUN)              RTC one month    Yonatan Hurtado MD

## 2017-05-22 NOTE — LETTER
[facility]  [address] [place], [address]  [phone]      May 23, 2017    Barrie Woods  [address] [facility] [address]          Dear Barrie,    The results of your recent tests are back, Mas agua is necessario. Dorina!  Enclosed is a copy of the results.  It was a pleasure to see you at your last appointment.    Results for orders placed or performed in visit on 05/22/17   Basic metabolic panel  (Ca, Cl, CO2, Creat, Gluc, K, Na, BUN)   Result Value Ref Range    Sodium 141 133 - 144 mmol/L    Potassium 4.6 3.4 - 5.3 mmol/L    Chloride 105 94 - 109 mmol/L    Carbon Dioxide 27 20 - 32 mmol/L    Anion Gap 9 3 - 14 mmol/L    Glucose 111 (H) 70 - 99 mg/dL    Urea Nitrogen 39 (H) 7 - 30 mg/dL    Creatinine 2.02 (H) 0.66 - 1.25 mg/dL    GFR Estimate 31 (L) >60 mL/min/1.7m2    GFR Estimate If Black 38 (L) >60 mL/min/1.7m2    Calcium 9.2 8.5 - 10.1 mg/dL         If you have any questions or concerns, please call myself or my nurse at [phone].          Sincerely,    Yonatan Hurtado MD/brittany

## 2017-05-22 NOTE — MR AVS SNAPSHOT
"              After Visit Summary   5/22/2017    Barrie Woods    MRN: [id number]           Patient Information     Date Of Birth          1/25/1930        Visit Information        Provider Department      5/22/2017 5:30 PM Yonatan Hurtado MD; [employer] [facility] [facility]        Today's Diagnoses     Midline low back pain without sciatica, unspecified chronicity    -  1    CAP (community acquired pneumonia)        HTN, goal below 140/90        Chronic low back pain without sciatica, unspecified back pain laterality           Follow-ups after your visit        Your next 10 appointments already scheduled     Jun 05, 2017  6:30 PM CDT   SHORT with Yonatan Hurtado MD   [facility]    [address] [place] [address] [phone]              Who to contact     If you have questions or need follow up information about today's clinic visit or your schedule please contact [facility] directly at [phone].  Normal or non-critical lab and imaging results will be communicated to you by RedMicahart, letter or phone within 4 business days after the clinic has received the results. If you do not hear from us within 7 days, please contact the clinic through RedMicahart or phone. If you have a critical or abnormal lab result, we will notify you by phone as soon as possible.  Submit refill requests through gamigo or call your pharmacy and they will forward the refill request to us. Please allow 3 business days for your refill to be completed.          Additional Information About Your Visit        RedMicahart Information     gamigo lets you send messages to your doctor, view your test results, renew your prescriptions, schedule appointments and more. To sign up, go to [email or web address] . Click on \"Log in\" on the left side of the screen, which will take you to the Welcome page. Then click on \"Sign up Now\" on the " "right side of the page.     You will be asked to enter the access code listed below, as well as some personal information. Please follow the directions to create your username and password.     Your access code is: [email or web address]  Expires: 2017  2:51 PM     Your access code will  in 90 days. If you need help or a new code, please call your [facility] clinic or [phone].        Care EveryWhere ID     This is your Care EveryWhere ID. This could be used by other organizations to access your [facility] medical records  [id number]        Your Vitals Were     Pulse Temperature Respirations Height Pulse Oximetry BMI (Body Mass Index)    66 0.3  F (-17.6  C) (Oral) 14 5' 6\" (1.676 m) 96% 27.89 kg/m2       Blood Pressure from Last 3 Encounters:   17 111/52   17 176/66   17 134/72    Weight from Last 3 Encounters:   17 172 lb 12.8 oz (78.4 kg)   17 176 lb 6.4 oz (80 kg)   17 179 lb (81.2 kg)              We Performed the Following     Basic metabolic panel  (Ca, Cl, CO2, Creat, Gluc, K, Na, BUN)          Today's Medication Changes          These changes are accurate as of: 17  6:04 PM.  If you have any questions, ask your nurse or doctor.               Start taking these medicines.        Dose/Directions    DULoxetine 20 MG EC capsule   Commonly known as:  CYMBALTA   Used for:  Midline low back pain without sciatica, unspecified chronicity   Started by:  Yonatan Hurtado MD        Dose:  20 mg   Take 1 capsule (20 mg) by mouth daily   Quantity:  30 capsule   Refills:  1         Stop taking these medicines if you haven't already. Please contact your care team if you have questions.     amoxicillin-clavulanate 500-125 MG per tablet   Commonly known as:  AUGMENTIN   Stopped by:  Yonatan Hurtado MD                Where to get your medicines      These medications were sent to [facility] [address]  [address] [facility] [address] "     Phone:  [phone]     DULoxetine 20 MG EC capsule         Some of these will need a paper prescription and others can be bought over the counter.  Ask your nurse if you have questions.     Bring a paper prescription for each of these medications     HYDROcodone-acetaminophen 5-325 MG per tablet                Primary Care Provider Office Phone # Fax #    Yonatan Hurtado [phone]       [address] [address]        Thank you!     Thank you for choosing [facility]  for your care. Our goal is always to provide you with excellent care. Hearing back from our patients is one way we can continue to improve our services. Please take a few minutes to complete the written survey that you may receive in the mail after your visit with us. Thank you!             Your Updated Medication List - Protect others around you: Learn how to safely use, store and throw away your medicines at www.disposemymeds.org.          This list is accurate as of: 5/22/17  6:04 PM.  Always use your most recent med list.                   Brand Name Dispense Instructions for use    acetaminophen 500 MG tablet    TYLENOL    84 tablet    Take 2 tablets (1,000 mg) by mouth every 6 hours as needed for mild pain       bumetanide 1 MG tablet    BUMEX    30 tablet    Take 1 tablet (1 mg) by mouth daily       carvedilol 12.5 MG tablet    COREG    60 tablet    Take 1 tablet (12.5 mg) by mouth 2 times daily (with meals)       DULoxetine 20 MG EC capsule    CYMBALTA    30 capsule    Take 1 capsule (20 mg) by mouth daily       HYDROcodone-acetaminophen 5-325 MG per tablet    NORCO    30 tablet    Take 1 tablet by mouth every 6 hours as needed for moderate to severe pain       magnesium oxide 400 (241.3 MG) MG tablet    MAGNESIUM-OXIDE    30 tablet    Take 1 tablet (400 mg) by mouth daily 1 tab QD       multivitamin, therapeutic with minerals Tabs tablet     100 tablet     Take 1 tablet by mouth daily       omeprazole 40 MG capsule    priLOSEC    30 capsule    Take 1 capsule (40 mg) by mouth every morning (before breakfast)       order for DME     1 Device    Equipment being ordered: walker with slides       pregabalin 50 MG capsule    LYRICA    60 capsule    Take 1 capsule (50 mg) by mouth 2 times daily

## 2017-05-23 LAB
ANION GAP SERPL CALCULATED.3IONS-SCNC: 9 MMOL/L (ref 3–14)
BUN SERPL-MCNC: 39 MG/DL (ref 7–30)
CALCIUM SERPL-MCNC: 9.2 MG/DL (ref 8.5–10.1)
CHLORIDE SERPL-SCNC: 105 MMOL/L (ref 94–109)
CO2 SERPL-SCNC: 27 MMOL/L (ref 20–32)
CREAT SERPL-MCNC: 2.02 MG/DL (ref 0.66–1.25)
GFR SERPL CREATININE-BSD FRML MDRD: 31 ML/MIN/1.7M2
GLUCOSE SERPL-MCNC: 111 MG/DL (ref 70–99)
POTASSIUM SERPL-SCNC: 4.6 MMOL/L (ref 3.4–5.3)
SODIUM SERPL-SCNC: 141 MMOL/L (ref 133–144)

## 2017-05-23 ASSESSMENT — ANXIETY QUESTIONNAIRES
3. WORRYING TOO MUCH ABOUT DIFFERENT THINGS: NOT AT ALL
1. FEELING NERVOUS, ANXIOUS, OR ON EDGE: NOT AT ALL
5. BEING SO RESTLESS THAT IT IS HARD TO SIT STILL: NOT AT ALL
2. NOT BEING ABLE TO STOP OR CONTROL WORRYING: NOT AT ALL
7. FEELING AFRAID AS IF SOMETHING AWFUL MIGHT HAPPEN: NOT AT ALL
GAD7 TOTAL SCORE: 1
6. BECOMING EASILY ANNOYED OR IRRITABLE: SEVERAL DAYS

## 2017-05-23 ASSESSMENT — PATIENT HEALTH QUESTIONNAIRE - PHQ9: 5. POOR APPETITE OR OVEREATING: NOT AT ALL

## 2017-05-24 ASSESSMENT — PATIENT HEALTH QUESTIONNAIRE - PHQ9: SUM OF ALL RESPONSES TO PHQ QUESTIONS 1-9: 1

## 2017-05-24 ASSESSMENT — ANXIETY QUESTIONNAIRES: GAD7 TOTAL SCORE: 1

## 2017-06-01 DIAGNOSIS — G89.4 CHRONIC PAIN SYNDROME: ICD-10-CM

## 2017-06-01 DIAGNOSIS — G89.29 CHRONIC MIDLINE LOW BACK PAIN WITHOUT SCIATICA: ICD-10-CM

## 2017-06-01 DIAGNOSIS — K21.9 GASTROESOPHAGEAL REFLUX DISEASE WITHOUT ESOPHAGITIS: ICD-10-CM

## 2017-06-01 DIAGNOSIS — M54.50 CHRONIC MIDLINE LOW BACK PAIN WITHOUT SCIATICA: ICD-10-CM

## 2017-06-01 DIAGNOSIS — J81.0 ACUTE PULMONARY EDEMA (H): ICD-10-CM

## 2017-06-01 RX ORDER — OMEPRAZOLE 40 MG/1
40 CAPSULE, DELAYED RELEASE ORAL
Qty: 30 CAPSULE | Refills: 11 | Status: SHIPPED | OUTPATIENT
Start: 2017-06-01 | End: 2017-09-25

## 2017-06-01 RX ORDER — PREGABALIN 50 MG/1
50 CAPSULE ORAL 2 TIMES DAILY
Qty: 60 CAPSULE | Refills: 5 | Status: SHIPPED | OUTPATIENT
Start: 2017-06-01 | End: 2017-11-06

## 2017-06-01 RX ORDER — BUMETANIDE 1 MG/1
1 TABLET ORAL DAILY
Qty: 30 TABLET | Refills: 3 | Status: SHIPPED | OUTPATIENT
Start: 2017-06-01 | End: 2017-09-25

## 2017-06-01 NOTE — TELEPHONE ENCOUNTER
bumentanide 1mg      Last Written Prescription Date: 04/30/2017  Last Fill Quantity: 30, # refills: 0  Last Office Visit with [facility], [facility] or [facility] prescribing provider: 5/22/2017  Next 5 appointments (look out 90 days)     Jun 19, 2017  6:15 PM CDT   SHORT with Yonatan Hurtado MD   [facility]    [address] [place] [address] [phone]                   Potassium   Date Value Ref Range Status   05/22/2017 4.6 3.4 - 5.3 mmol/L Final     Creatinine   Date Value Ref Range Status   05/22/2017 2.02 (H) 0.66 - 1.25 mg/dL Final     BP Readings from Last 3 Encounters:   05/22/17 111/52   05/08/17 176/66   04/30/17 134/72     Last filled by hospitalist.    Thanks,  Jennie Nicole, Marsha  [facility]  [phone]

## 2017-06-01 NOTE — TELEPHONE ENCOUNTER
Controlled Substance Refill Request for lyrica 50mg  Problem List Complete:  No     PROVIDER TO CONSIDER COMPLETION OF PROBLEM LIST AND OVERVIEW/CONTROLLED SUBSTANCE AGREEMENT    Last Written Prescription Date:  04/30/2017  Last Fill Quantity: 60,   # refills: 0    Last Office Visit with [facility] primary care provider: 5/22/2017    Future Office visit:   Next 5 appointments (look out 90 days)     Jun 19, 2017  6:15 PM CDT   SHORT with Yonatan Hurtado MD   [facility] ([facility]    [id number] [place] [address] [phone]                  Controlled substance agreement on file: No.     Processing:  Staff will hand deliver Rx to on-site pharmacy   checked in past 6 months?  No, route to RN - could not find in chart    Thanks,  Jennie Nicole CPhT  [facility]  [phone]

## 2017-06-01 NOTE — TELEPHONE ENCOUNTER
Omeprazole 40mg      Last Written Prescription Date: 04/30/2017  Last Fill Quantity: 30,  # refills: 0   Last Office Visit with [facility], [facility] or [facility] prescribing provider: 5/22/2017                                         Next 5 appointments (look out 90 days)     Jun 19, 2017  6:15 PM CDT   SHORT with Yonatan Hurtado MD   [facility]    [address] [place] [address] [phone]                  Thanks,  Jennie Nicole CPhT  [facility]  [phone]

## 2017-06-16 DIAGNOSIS — R53.81 MALAISE: ICD-10-CM

## 2017-06-17 NOTE — TELEPHONE ENCOUNTER
amoxicillin-clavulanate (AUGMENTIN) 500-125 MG per tablet--NOT ON MED LIST  Last Written Prescription Date:   Last Fill Quantity: 40,5/8/2017  # refills:    Last Office Visit with [facility], P or [facility] prescribing provider: 05/22/2017-Dr Hurtado                                         Next 5 appointments (look out 90 days)     Jun 19, 2017  6:00 PM CDT   SHORT with Yonatan Hurtado MD, [employer]   [facility]    [address] [place] [address] [phone]

## 2017-06-19 ENCOUNTER — OFFICE VISIT (OUTPATIENT)
Dept: FAMILY MEDICINE | Facility: CLINIC | Age: 82
End: 2017-06-19
Payer: COMMERCIAL

## 2017-06-19 VITALS
TEMPERATURE: 98.8 F | OXYGEN SATURATION: 98 % | HEIGHT: 66 IN | HEART RATE: 73 BPM | RESPIRATION RATE: 16 BRPM | DIASTOLIC BLOOD PRESSURE: 52 MMHG | SYSTOLIC BLOOD PRESSURE: 128 MMHG | WEIGHT: 172.4 LBS | BODY MASS INDEX: 27.71 KG/M2

## 2017-06-19 DIAGNOSIS — Z86.79 HX OF CONGESTIVE HEART FAILURE: ICD-10-CM

## 2017-06-19 DIAGNOSIS — C64.9 RENAL CELL CARCINOMA, UNSPECIFIED LATERALITY (H): ICD-10-CM

## 2017-06-19 DIAGNOSIS — I10 HTN, GOAL BELOW 140/90: Primary | ICD-10-CM

## 2017-06-19 DIAGNOSIS — J18.9 CAP (COMMUNITY ACQUIRED PNEUMONIA): ICD-10-CM

## 2017-06-19 DIAGNOSIS — M54.50 MIDLINE LOW BACK PAIN WITHOUT SCIATICA, UNSPECIFIED CHRONICITY: ICD-10-CM

## 2017-06-19 PROCEDURE — 96372 THER/PROPH/DIAG INJ SC/IM: CPT | Performed by: FAMILY MEDICINE

## 2017-06-19 PROCEDURE — 99214 OFFICE O/P EST MOD 30 MIN: CPT | Mod: 25 | Performed by: FAMILY MEDICINE

## 2017-06-19 RX ORDER — AMOXICILLIN AND CLAVULANATE POTASSIUM 500; 125 MG/1; MG/1
TABLET, FILM COATED ORAL
Qty: 40 TABLET | Refills: 0 | Status: SHIPPED | OUTPATIENT
Start: 2017-06-19 | End: 2017-06-30

## 2017-06-19 RX ORDER — VENLAFAXINE HYDROCHLORIDE 75 MG/1
75 CAPSULE, EXTENDED RELEASE ORAL DAILY
Qty: 30 CAPSULE | Refills: 1 | Status: SHIPPED | OUTPATIENT
Start: 2017-06-19 | End: 2017-07-17

## 2017-06-19 NOTE — TELEPHONE ENCOUNTER
Not PSO med.  Last filled at 5/8/17.  5/22/17 visit advised 1 month recheck.  Has appointment today at 6 pm.  Sent to provider.  Tia Mcconnell RN    Associated Diagnoses      Malaise [R53.81]         5/22/17  ASSESSMENT / PLAN:  (M54.5) Midline low back pain without sciatica, unspecified chronicity  (primary encounter diagnosis)  Comment: He is on a number of agents. Duloxetine made him dizzy in the past  Plan: HYDROcodone-acetaminophen (NORCO) 5-325 MG per         tablet, venlafaxine (EFFEXOR-XR) 37.5 MG 24 hr         capsule, DISCONTINUED:      (I50.42) Chronic combined systolic and diastolic congestive heart failure (H)  Comment: Son reports this is been resumed  Plan: lisinopril (PRINIVIL/ZESTRIL) 10 MG tablet        BMP     (J18.9) CAP (community acquired pneumonia)  Comment: Per patient request  Plan: penicillin G benzathine (BICILLIN L-A) [id number]         UNIT/2ML injection       This is not for medical indications     (I10) HTN, goal below 140/90  Comment: Tolerating Ace  Plan: Basic metabolic panel  (Ca, Cl, CO2, Creat,         Gluc, K, Na, BUN)            RTC one month     Yonatan Hurtado MD           5/8/17  ASSESSMENT/PLAN:      ASSESSMENT / PLAN:  (J18.9) CAP (community acquired pneumonia)  (primary encounter diagnosis)  Comment: S/p Rx  Plan: family was under impression that this was a UTI     (N17.9) MALGORZATA (acute kidney injury) (H)  Comment: rapidly reversed  Plan: Basic metabolic panel  (Ca, Cl, CO2, Creat,         Gluc, K, Na, BUN)        ACE held. If improved, resume ACE to Rx HTN     (C64.2) Renal cell carcinoma, left (H)  Comment: *no Rx  Plan: no further study     (M54.5,  G89.29) Chronic low back pain without sciatica, unspecified back pain laterality  Comment: refill  Plan: HYDROcodone-acetaminophen (NORCO) 5-325 MG per         tablet         (Z00.00) Routine general medical examination at a health care facility  Comment: vitamins discussed  Plan:      (R53.81) Malaise  Comment: Rx as  previously  Plan: amoxicillin-clavulanate (AUGMENTIN) 500-125 MG         per tablet         (F03.90) Dementia without behavioral disturbance, unspecified dementia type  Comment: well cared for  Plan:         RTC 2- 6 weeks, depending. CXR 6 weeks     Yonatan Hurtado MD

## 2017-06-19 NOTE — MR AVS SNAPSHOT
After Visit Summary   6/19/2017    Barrie Woods    MRN: [id number]           Patient Information     Date Of Birth          1/25/1930        Visit Information        Provider Department      6/19/2017 6:00 PM Yonatan Hurtado MD; [employer] [facility]        Today's Diagnoses     HTN, goal below 140/90    -  1    Midline low back pain without sciatica, unspecified chronicity        Renal cell carcinoma, unspecified laterality (H)        Hx of congestive heart failure        CAP (community acquired pneumonia)           Follow-ups after your visit        Your next 10 appointments already scheduled     Jul 17, 2017  6:00 PM CDT   Office Visit with Yonatan Hurtado MD   [facility] ([facility]    [address] [place] [address] [phone]           Bring a current list of meds and any records pertaining to this visit.  For Physicals, please bring immunization records and any forms needing to be filled out.  Please arrive 10 minutes early to complete paperwork.              Who to contact     If you have questions or need follow up information about today's clinic visit or your schedule please contact [facility] directly at [phone].  Normal or non-critical lab and imaging results will be communicated to you by MyChart, letter or phone within 4 business days after the clinic has received the results. If you do not hear from us within 7 days, please contact the clinic through BrightContexthart or phone. If you have a critical or abnormal lab result, we will notify you by phone as soon as possible.  Submit refill requests through Novi or call your pharmacy and they will forward the refill request to us. Please allow 3 business days for your refill to be completed.          Additional Information About Your Visit        BrightContexthart Information     Novi lets you send messages to your doctor, view  "your test results, renew your prescriptions, schedule appointments and more. To sign up, go to [email or web address] . Click on \"Log in\" on the left side of the screen, which will take you to the Welcome page. Then click on \"Sign up Now\" on the right side of the page.     You will be asked to enter the access code listed below, as well as some personal information. Please follow the directions to create your username and password.     Your access code is: [email or web address]  Expires: 2017  2:51 PM     Your access code will  in 90 days. If you need help or a new code, please call your [facility] clinic or [phone].        Care EveryWhere ID     This is your Care EveryWhere ID. This could be used by other organizations to access your [facility] medical records  [id number]        Your Vitals Were     Pulse Temperature Respirations Height Pulse Oximetry BMI (Body Mass Index)    73 98.8  F (37.1  C) (Oral) 16 5' 6\" (1.676 m) 98% 27.83 kg/m2       Blood Pressure from Last 3 Encounters:   17 128/52   17 111/52   17 176/66    Weight from Last 3 Encounters:   17 172 lb 6.4 oz (78.2 kg)   17 172 lb 12.8 oz (78.4 kg)   17 176 lb 6.4 oz (80 kg)              Today, you had the following     No orders found for display         Today's Medication Changes          These changes are accurate as of: 17 11:59 PM.  If you have any questions, ask your nurse or doctor.               Start taking these medicines.        Dose/Directions    penicillin G benzathine [id number] UNIT/2ML injection   Commonly known as:  BICILLIN L-A   Used for:  CAP (community acquired pneumonia)   Started by:  Yonatan Hurtado MD        Dose:  2 mL   Inject 2 mLs (1.2 Million Units) into the muscle once for 1 dose   Quantity:  2 mL   Refills:  0         These medicines have changed or have updated prescriptions.        Dose/Directions    venlafaxine 75 MG 24 hr capsule   Commonly known as:  EFFEXOR-XR   This may " have changed:    - medication strength  - how much to take   Used for:  Midline low back pain without sciatica, unspecified chronicity   Changed by:  Yonatan Hurtado MD        Dose:  75 mg   Take 1 capsule (75 mg) by mouth daily   Quantity:  30 capsule   Refills:  1            Where to get your medicines      These medications were sent to [facility] [id number] [place] [address]  [address] [place] [address]     Phone:  [phone]     penicillin G benzathine [id number] UNIT/2ML injection    venlafaxine 75 MG 24 hr capsule                Primary Care Provider Office Phone # Fax #    Yonatan Hurtado [phone]       [facility] [address] [place] [address]        Thank you!     Thank you for choosing [facility]  for your care. Our goal is always to provide you with excellent care. Hearing back from our patients is one way we can continue to improve our services. Please take a few minutes to complete the written survey that you may receive in the mail after your visit with us. Thank you!             Your Updated Medication List - Protect others around you: Learn how to safely use, store and throw away your medicines at www.disposemymeds.org.          This list is accurate as of: 6/19/17 11:59 PM.  Always use your most recent med list.                   Brand Name Dispense Instructions for use    acetaminophen 500 MG tablet    TYLENOL    84 tablet    Take 2 tablets (1,000 mg) by mouth every 6 hours as needed for mild pain       amoxicillin-clavulanate 500-125 MG per tablet    AUGMENTIN    40 tablet    TAKE ONE TABLET BY MOUTH TWICE A DAY       bumetanide 1 MG tablet    BUMEX    30 tablet    Take 1 tablet (1 mg) by mouth daily       carvedilol 12.5 MG tablet    COREG    60 tablet    Take 1 tablet (12.5 mg) by mouth 2 times daily (with meals)       HYDROcodone-acetaminophen 5-325 MG per tablet    NORCO    30 tablet    Take  1 tablet by mouth every 6 hours as needed for moderate to severe pain       lisinopril 10 MG tablet    PRINIVIL/ZESTRIL    30 tablet    Take 1 tablet (10 mg) by mouth daily       magnesium oxide 400 (241.3 MG) MG tablet    MAGNESIUM-OXIDE    30 tablet    Take 1 tablet (400 mg) by mouth daily 1 tab QD       multivitamin, therapeutic with minerals Tabs tablet     100 tablet    Take 1 tablet by mouth daily       omeprazole 40 MG capsule    priLOSEC    30 capsule    Take 1 capsule (40 mg) by mouth every morning (before breakfast)       order for DME     1 Device    Equipment being ordered: walker with slides       penicillin G benzathine [id number] UNIT/2ML injection    BICILLIN L-A    2 mL    Inject 2 mLs (1.2 Million Units) into the muscle once for 1 dose       pregabalin 50 MG capsule    LYRICA    60 capsule    Take 1 capsule (50 mg) by mouth 2 times daily       venlafaxine 75 MG 24 hr capsule    EFFEXOR-XR    30 capsule    Take 1 capsule (75 mg) by mouth daily

## 2017-06-19 NOTE — PROGRESS NOTES
"  SUBJECTIVE:                                                    Barrie Woods is a 87 year old male who presents to clinic today for the following health issues:    HTN, goal below 140/90  (primary encounter diagnosis)   BP Readings from Last 1 Encounters:   06/19/17 128/52   on ACE again    Midline low back pain without sciatica, unspecified chronicity chronic  Renal cell carcinoma, unspecified laterality (H) untreated, growing at last assessment. No therapy desired  Hx of congestive heart failure no edemaon ACE again  CAP (community acquired pneumonia)  by CT only. No follow-up radiology will be needed       Recheck on Pneumonia- SOB and fatigue, patients son states his breathing and fatigue is \"normal- there is no complications.\" He feels better.     HTN, goal below 140/90  (primary encounter diagnosis):  BP Readings from Last 6 Encounters:   06/19/17 128/52   05/22/17 111/52   05/08/17 176/66   04/30/17 134/72   09/08/16 (!) 90/36   08/12/16 112/52       Problem list and histories reviewed & adjusted, as indicated.  Additional history: as documented    Patient Active Problem List   Diagnosis     Acute, but ill-defined, cerebrovascular disease     Tobacco use disorder     Cardiomyopathy in other diseases classified elsewhere     Esophageal reflux     Advanced directives, counseling/discussion     Health Care Home     HTN, goal below 140/90     Hyperlipidemia LDL goal <100     Microscopic hematuria     Low back pain     Dysuria     TB lung, latent     PSA elevation     Anemia     Renal cell carcinoma (H)     Nausea with vomiting     Dizziness     Atypical chest pain     Hypertrophy of prostate     Right rib fracture     Dysphagia     Urinary incontinence     Recurrent falls     Chronic pain     Hx of congestive heart failure     Atrial fibrillation, unspecified     Elevated blood sugar     CAP (community acquired pneumonia)     Dementia without behavioral disturbance, unspecified dementia type     History " reviewed. No pertinent surgical history.    Social History   Substance Use Topics     Smoking status: Current Every Day Smoker     Types: Dip, chew, snus or snuff     Smokeless tobacco: Current User     Types: Chew     Last attempt to quit: 1/9/2004      Comment: pt chews tobacco     Alcohol use No     Family History   Problem Relation Age of Onset     C.A.D. No family hx of      DIABETES No family hx of          Current Outpatient Prescriptions   Medication Sig Dispense Refill     amoxicillin-clavulanate (AUGMENTIN) 500-125 MG per tablet TAKE ONE TABLET BY MOUTH TWICE A DAY 40 tablet 0     pregabalin (LYRICA) 50 MG capsule Take 1 capsule (50 mg) by mouth 2 times daily 60 capsule 5     bumetanide (BUMEX) 1 MG tablet Take 1 tablet (1 mg) by mouth daily 30 tablet 3     omeprazole (PRILOSEC) 40 MG capsule Take 1 capsule (40 mg) by mouth every morning (before breakfast) 30 capsule 11     HYDROcodone-acetaminophen (NORCO) 5-325 MG per tablet Take 1 tablet by mouth every 6 hours as needed for moderate to severe pain 30 tablet 0     lisinopril (PRINIVIL/ZESTRIL) 10 MG tablet Take 1 tablet (10 mg) by mouth daily 30 tablet 5     venlafaxine (EFFEXOR-XR) 37.5 MG 24 hr capsule Take 1 capsule (37.5 mg) by mouth daily 30 capsule 1     magnesium oxide (MAGNESIUM-OXIDE) 400 (241.3 MG) MG tablet Take 1 tablet (400 mg) by mouth daily 1 tab QD 30 tablet 5     acetaminophen (TYLENOL) 500 MG tablet Take 2 tablets (1,000 mg) by mouth every 6 hours as needed for mild pain 84 tablet 5     multivitamin, therapeutic with minerals (MULTI-VITAMIN) TABS tablet Take 1 tablet by mouth daily 100 tablet 3     carvedilol (COREG) 12.5 MG tablet Take 1 tablet (12.5 mg) by mouth 2 times daily (with meals) 60 tablet 5     order for DME Equipment being ordered: walker with slides 1 Device 0     Allergies   Allergen Reactions     Nkda [No Known Drug Allergies]      Recent Labs   Lab Test  05/22/17   1820  05/08/17   1856   04/26/17   1018   09/08/16    1710  07/09/16   1210   03/31/15   0522   02/21/14   0944  04/29/13   0803   11/22/10   1127   A1C   --    --    --    --    --    --   5.7   --    --    --    --    --    --    --    LDL   --    --    --    --    --    --    --    --   42   --   66  54   < >   --    HDL   --    --    --    --    --    --    --    --   35*   --   34*  38*   < >   --    TRIG   --    --    --    --    --    --    --    --   238*   --   259*  154*   < >   --    ALT   --    --    --   33   --   75*  44   < >   --    < >  39   --    < >  17   CR  2.02*  1.43*   < >  1.82*   --   2.40*  1.02   < >   --    < >  1.04  0.95   < >  0.90   GFRESTIMATED  31*  47*   < >  35*   < >  26*  69   < >   --    < >  68  76   < >  81   GFRESTBLACK  38*  57*   < >  43*   < >  31*  84   < >   --    < >  82  >90   < >  >90   POTASSIUM  4.6  4.8   < >  4.4   --   4.8  4.5   < >   --    < >  4.0  4.2   < >  4.0   TSH   --    --    --    --    --    --   1.02   --    --    --    --    --    --   1.29    < > = values in this interval not displayed.      BP Readings from Last 3 Encounters:   06/19/17 128/52   05/22/17 111/52   05/08/17 176/66    Wt Readings from Last 3 Encounters:   06/19/17 78.2 kg (172 lb 6.4 oz)   05/22/17 78.4 kg (172 lb 12.8 oz)   05/08/17 80 kg (176 lb 6.4 oz)            Current Outpatient Prescriptions   Medication     penicillin G benzathine (BICILLIN L-A) [id number] UNIT/2ML injection     amoxicillin-clavulanate (AUGMENTIN) 500-125 MG per tablet     venlafaxine (EFFEXOR-XR) 75 MG 24 hr capsule     pregabalin (LYRICA) 50 MG capsule     bumetanide (BUMEX) 1 MG tablet     omeprazole (PRILOSEC) 40 MG capsule     HYDROcodone-acetaminophen (NORCO) 5-325 MG per tablet     lisinopril (PRINIVIL/ZESTRIL) 10 MG tablet     magnesium oxide (MAGNESIUM-OXIDE) 400 (241.3 MG) MG tablet     acetaminophen (TYLENOL) 500 MG tablet     multivitamin, therapeutic with minerals (MULTI-VITAMIN) TABS tablet     carvedilol (COREG) 12.5 MG tablet     order for DME  "    No current facility-administered medications for this visit.              Labs reviewed in EPIC    Reviewed and updated as needed this visit by clinical staff  Tobacco  Allergies  Med Hx  Surg Hx  Fam Hx  Soc Hx      Reviewed and updated as needed this visit by Provider       ROS: no cough, global pain    EXAM: /52 (BP Location: Left arm, Patient Position: Chair, Cuff Size: Adult Regular)  Pulse 73  Temp 98.8  F (37.1  C) (Oral)  Resp 16  Ht 1.676 m (5' 6\")  Wt 78.2 kg (172 lb 6.4 oz)  SpO2 98%  BMI 27.83 kg/m2  Exam:  CHEST: Clear to auscultation, respirations unlabored  No edema    ASSESSMENT / PLAN:  (I10) HTN, goal below 140/90  (primary encounter diagnosis)  Comment:  controlled  BP Readings from Last 6 Encounters:   06/19/17 128/52   05/22/17 111/52   05/08/17 176/66   04/30/17 134/72   09/08/16 (!) 90/36   08/12/16 112/52     Plan: no change    (M54.5) Midline low back pain without sciatica, unspecified chronicity  Comment: Increase dose from 37.5 MG to 75 MG 24 h cap  Plan: venlafaxine (EFFEXOR-XR) 75 MG 24 hr capsule     Tolerated at low dose       (C64.9) Renal cell carcinoma, unspecified laterality (H)  Comment: slow growing  Plan: no further study     (Z86.79) Hx of congestive heart failure  Comment: ACE Carvedilol Lasix  Plan:     (J18.9) CAP (community acquired pneumonia)  Comment: per patient request   Plan: penicillin G benzathine (BICILLIN L-A) [id number]         UNIT/2ML injection       This is not for medical indications      RTC 1 mo    The information in this document, created by the medical scribe for me, accurately reflects the services I personally performed and the decisions made by me. I have reviewed and approved this document for accuracy prior to leaving the patient care area.  Yonatan Hurtado MD  6:40 PM, 06/19/17    Yonatan Hurtado MD        "

## 2017-06-19 NOTE — NURSING NOTE
"Chief Complaint   Patient presents with     Md Request F/U     for recheck on SOB and fatigue       Initial /52 (BP Location: Left arm, Patient Position: Chair, Cuff Size: Adult Regular)  Pulse 73  Temp 98.8  F (37.1  C) (Oral)  Resp 16  Ht 5' 6\" (1.676 m)  Wt 172 lb 6.4 oz (78.2 kg)  SpO2 98%  BMI 27.83 kg/m2 Estimated body mass index is 27.83 kg/(m^2) as calculated from the following:    Height as of this encounter: 5' 6\" (1.676 m).    Weight as of this encounter: 172 lb 6.4 oz (78.2 kg).  Medication Reconciliation: complete     Leatha Robin CMA      "

## 2017-06-30 DIAGNOSIS — M54.50 MIDLINE LOW BACK PAIN WITHOUT SCIATICA, UNSPECIFIED CHRONICITY: ICD-10-CM

## 2017-06-30 DIAGNOSIS — R53.81 MALAISE: ICD-10-CM

## 2017-06-30 NOTE — TELEPHONE ENCOUNTER
Controlled Substance Refill Request for [place] 5/325  Problem List Complete:  Yes    Patient is followed by Yonatan Hurtado MD for ongoing prescription of pain medication.  All refills should only be approved by this provider, or covering partner.    Medication(s): lyrica 60/ month.  Hydrocodone 30 prn  Maximum quantity per month:   Clinic visit frequency required: Q 6  months     Controlled substance agreement:  Encounter-Level CSA:     There are no encounter-level csa.        Pain Clinic evaluation in the past: no  Pt is frail elderly, palliative care, demented  DIRE Total Score(s):  No flowsheet data found.    Last [facility] website verification:     [email or web address]     checked in past 6 months?  No, route to RN - could not find documentation in chart     Please walk signed prescription to pharmacy.  Thanks!  Jennie Nicole CPhT  [facility]  [phone]

## 2017-07-01 NOTE — TELEPHONE ENCOUNTER
amoxicillin-clavulanate (AUGMENTIN) 500-125 MG per tablet    Last Written Prescription Date: 06/19/2017  Last Fill Quantity: 40,06/19/2017  # refills: 0   Last Office Visit with G, P or [facility] prescribing provider: 06/19/2017-Dr Hurtado                                         Next 5 appointments (look out 90 days)     Jul 17, 2017  6:00 PM CDT   Office Visit with Yonatan H[facility]ado MD   [facility]    [address] [place] [id number] [phone]

## 2017-07-05 NOTE — TELEPHONE ENCOUNTER
Not PSO med.  Last filled 6/19/17 for malaise.  Recheck cough scheduled for 7/17/17.  Please advise.  iTa Mcconnell RN

## 2017-07-05 NOTE — TELEPHONE ENCOUNTER
Not PSO med.   printed and to Dr. Hurtado.  Sent to provider.  No CSA on file.  Tia Mcconnell RN

## 2017-07-06 RX ORDER — HYDROCODONE BITARTRATE AND ACETAMINOPHEN 5; 325 MG/1; MG/1
1 TABLET ORAL EVERY 6 HOURS PRN
Qty: 30 TABLET | Refills: 0 | Status: SHIPPED | OUTPATIENT
Start: 2017-07-06 | End: 2017-07-31

## 2017-07-06 RX ORDER — AMOXICILLIN AND CLAVULANATE POTASSIUM 500; 125 MG/1; MG/1
TABLET, FILM COATED ORAL
Qty: 40 TABLET | Refills: 0 | Status: SHIPPED | OUTPATIENT
Start: 2017-07-06 | End: 2017-09-07

## 2017-07-10 ENCOUNTER — CARE COORDINATION (OUTPATIENT)
Dept: GERIATRIC MEDICINE | Facility: CLINIC | Age: 82
End: 2017-07-10

## 2017-07-10 NOTE — PROGRESS NOTES
KELLY called and left message for clients son, Jose, to schedule annual home visit. Asked Jose to call me to schedule.    MORELIA Blum   [facility]   [phone]

## 2017-07-12 NOTE — PROGRESS NOTES
Left message for clients son, Jose, to schedule annual home visit.  CM asked him to call me back please.    Shelly Alanis, MORELIA   [facility]   [phone]

## 2017-07-17 ENCOUNTER — CARE COORDINATION (OUTPATIENT)
Dept: GERIATRIC MEDICINE | Facility: CLINIC | Age: 82
End: 2017-07-17

## 2017-07-17 ENCOUNTER — OFFICE VISIT (OUTPATIENT)
Dept: FAMILY MEDICINE | Facility: CLINIC | Age: 82
End: 2017-07-17
Payer: COMMERCIAL

## 2017-07-17 VITALS
BODY MASS INDEX: 28.13 KG/M2 | RESPIRATION RATE: 12 BRPM | WEIGHT: 174.3 LBS | OXYGEN SATURATION: 95 % | HEART RATE: 72 BPM | TEMPERATURE: 97.9 F | SYSTOLIC BLOOD PRESSURE: 110 MMHG | DIASTOLIC BLOOD PRESSURE: 60 MMHG

## 2017-07-17 DIAGNOSIS — I10 HTN, GOAL BELOW 140/90: ICD-10-CM

## 2017-07-17 DIAGNOSIS — F03.90 DEMENTIA WITHOUT BEHAVIORAL DISTURBANCE, UNSPECIFIED DEMENTIA TYPE: ICD-10-CM

## 2017-07-17 DIAGNOSIS — R53.81 MALAISE: ICD-10-CM

## 2017-07-17 DIAGNOSIS — M54.50 MIDLINE LOW BACK PAIN WITHOUT SCIATICA, UNSPECIFIED CHRONICITY: Primary | ICD-10-CM

## 2017-07-17 DIAGNOSIS — H91.13 PRESBYCUSIS OF BOTH EARS: ICD-10-CM

## 2017-07-17 PROCEDURE — 99214 OFFICE O/P EST MOD 30 MIN: CPT | Performed by: FAMILY MEDICINE

## 2017-07-17 RX ORDER — VENLAFAXINE HYDROCHLORIDE 75 MG/1
75 CAPSULE, EXTENDED RELEASE ORAL DAILY
Qty: 30 CAPSULE | Refills: 5 | Status: SHIPPED | OUTPATIENT
Start: 2017-07-17 | End: 2017-11-06

## 2017-07-17 NOTE — PROGRESS NOTES
Left another message for son, Jose.  Will ask CMS to send [facility] letter.    MORELIA Blum   [facility]   [phone]

## 2017-07-17 NOTE — PROGRESS NOTES
SUBJECTIVE:                                                    Barrie Woods is a 87 year old male who presents to clinic today for the following health issues:      Medication Followup of All Meds    Taking Medication as prescribed: yes    Side Effects:  None    Medication Helping Symptoms:  yes   2 sons present    Presbycusis of both ears  (primary encounter diagnosis): Patient very [place], 1 son  wondering about getting an audiology referral so he could get some hearing aids. Other reports previous eval was inaccurate due to inability to interpret instructions, does not believe this will be fruitful    Midline low back pain without sciatica, unspecified chronicity: The patient's sons say he has complained about his back while moving around and working in the garage. He also seems, in general, improved on higher effexor dose    Dementia without behavioral disturbance, unspecified dementia type: There was concern about him not understanding what is going on if he were to get hearing aids.   He would like a penicillin injection    Problem list and histories reviewed & adjusted, as indicated.  Additional history: as documented        Reviewed and updated as needed this visit by clinical staff  Tobacco  Allergies  Med Hx  Surg Hx  Fam Hx  Soc Hx       Past Medical History:   Diagnosis Date     Acute, but ill-defined, cerebrovascular disease 1/04    left middle cerebral artery infarct     Alcohol abuse      Anemia 7/14/2014     Atrial fibrillation (H)     CVA occurred off anticoagulation     Cardiomyopathy (H)     stress tests 6/04 and 10/04 negative for ischemia, EF 36-40%     CHF (congestive heart failure) (H) 12/24/2009     Chronic pain 10/12/2015     Dementia without behavioral disturbance, unspecified dementia type 5/9/2017     Dementia, without behavioral disturbance 6/29/2015     Depressive disorder      Diaphragmatic hernia without mention of obstruction or gangrene     left sided chest pain, hiatal  hernia/gastritis     Diverticulosis of colon (without mention of hemorrhage)     sigmoid     Dysphagia 7/27/2015     Elevated blood sugar 1/25/2016     Esophageal reflux      Hearing loss      Hematuria     negative cystoscopy 3/04     Hereditary and idiopathic peripheral neuropathy 2006    EMG-peripheral polyneuropathy, B12 and VitD deficient, ? alcohol related     Hx of congestive heart failure 1/12/2016     Hypertrophy of prostate 6/17/2015     Nausea with vomiting 12/8/2014     PSA elevation 5/30/2014     Recurrent falls 7/28/2015     Renal cell carcinoma (H) 8/25/2014     Sepsis (H) 1/7/2015     TB lung, latent 5/6/2014     Urinary incontinence 7/28/2015       History reviewed. No pertinent surgical history.    Family History   Problem Relation Age of Onset     C.A.D. No family hx of      DIABETES No family hx of        Social History   Substance Use Topics     Smoking status: Current Every Day Smoker     Types: Dip, chew, snus or snuff     Smokeless tobacco: Current User     Types: Chew     Last attempt to quit: 1/9/2004      Comment: pt chews tobacco     Alcohol use No       Reviewed and updated as needed this visit by Provider         ROS: Sleeping well. Mood seems improved    This document serves as a record of the services and decisions personally performed and made by Yonatan Hurtado MD. It was created on his behalf by Mikki Sullivan, a trained medical scribe.  The creation of this document is based on the scribe's personal observations and the provider's statements to the medical scribe.  Mikki Sullivan, July 17, 2017 6:20 PM      OBJECTIVE:     /60 (BP Location: Right arm, Patient Position: Chair, Cuff Size: Adult Regular)  Pulse 72  Temp 97.9  F (36.6  C) (Oral)  Resp 12  Wt 79.1 kg (174 lb 4.8 oz)  SpO2 95%  BMI 28.13 kg/m2  Body mass index is 28.13 kg/(m^2).    Pt exhibits more enlivened affect than last visit, is dressed up as seen previously  Ears free of cerumen  ASSESSMENT/PLAN:      ASSESSMENT / PLAN:  (H91.13) Presbycusis of both ears  (primary encounter diagnosis)  Comment: Sons will discuss options. Audiology can be referred to by phone      (M54.5) Midline low back pain without sciatica, unspecified chronicity  Comment: after discussion, continue at this dose a few months and reassess  Plan: venlafaxine (EFFEXOR-XR) 75 MG 24 hr capsule            (F03.90) Dementia without behavioral disturbance, unspecified dementia type  Comment: Ongoing    RTC:Sept    Penicillin IM for malaise, as previously for cultural indications    The information in this document, created by the medical scribe for me, accurately reflects the services I personally performed and the decisions made by me. I have reviewed and approved this document for accuracy prior to leaving the patient care area.  Yonatan Hurtado MD July 17, 2017 6:20 PM    Yonatan Hurtado MD  [facility]

## 2017-07-17 NOTE — NURSING NOTE
"Chief Complaint   Patient presents with     Recheck Medication       Initial /60 (BP Location: Right arm, Patient Position: Chair, Cuff Size: Adult Regular)  Pulse 72  Temp 97.9  F (36.6  C) (Oral)  Resp 12  Wt 174 lb 4.8 oz (79.1 kg)  SpO2 95%  BMI 28.13 kg/m2 Estimated body mass index is 28.13 kg/(m^2) as calculated from the following:    Height as of 6/19/17: 5' 6\" (1.676 m).    Weight as of this encounter: 174 lb 4.8 oz (79.1 kg).  Medication Reconciliation: complete   SMA Marco A      "

## 2017-07-17 NOTE — MR AVS SNAPSHOT
"              After Visit Summary   7/17/2017    Barrie Woods    MRN: [id number]           Patient Information     Date Of Birth          1/25/1930        Visit Information        Provider Department      7/17/2017 6:00 PM Yonatan Hurtado MD; [employer] [facility]        Today's Diagnoses     Midline low back pain without sciatica, unspecified chronicity    -  1    Presbycusis of both ears        Dementia without behavioral disturbance, unspecified dementia type        HTN, goal below 140/90        Malaise           Follow-ups after your visit        Your next 10 appointments already scheduled     Sep 25, 2017  6:00 PM CDT   SHORT with Yonatan Hurtado MD   [facility] ([facility]    [address] [place] [id number] [phone]              Who to contact     If you have questions or need follow up information about today's clinic visit or your schedule please contact [facility] directly at [phone].  Normal or non-critical lab and imaging results will be communicated to you by [employer], letter or phone within 4 business days after the clinic has received the results. If you do not hear from us within 7 days, please contact the clinic through [employer] or phone. If you have a critical or abnormal lab result, we will notify you by phone as soon as possible.  Submit refill requests through [employer] or call your pharmacy and they will forward the refill request to us. Please allow 3 business days for your refill to be completed.          Additional Information About Your Visit        [employer] Information     [employer] lets you send messages to your doctor, view your test results, renew your prescriptions, schedule appointments and more. To sign up, go to [email or web address] . Click on \"Log in\" on the left side of the screen, which will take you to the Welcome page. Then click on \"Sign up Now\" on the " right side of the page.     You will be asked to enter the access code listed below, as well as some personal information. Please follow the directions to create your username and password.     Your access code is: [email or web address]  Expires: 2017  2:51 PM     Your access code will  in 90 days. If you need help or a new code, please call your [place] clinic or [phone].        Care EveryWhere ID     This is your Care EveryWhere ID. This could be used by other organizations to access your [place] medical records  [id number]        Your Vitals Were     Pulse Temperature Respirations Pulse Oximetry BMI (Body Mass Index)       72 97.9  F (36.6  C) (Oral) 12 95% 28.13 kg/m2        Blood Pressure from Last 3 Encounters:   17 110/60   17 128/52   17 111/52    Weight from Last 3 Encounters:   17 174 lb 4.8 oz (79.1 kg)   17 172 lb 6.4 oz (78.2 kg)   17 172 lb 12.8 oz (78.4 kg)              Today, you had the following     No orders found for display         Today's Medication Changes          These changes are accurate as of: 17 11:59 PM.  If you have any questions, ask your nurse or doctor.               Start taking these medicines.        Dose/Directions    penicillin G benzathine [id number] UNIT/2ML injection   Commonly known as:  BICILLIN L-A   Used for:  Malaise   Started by:  Yonatan Hurtado MD        Dose:  2 mL   Inject 2 mLs (1.2 Million Units) into the muscle once for 1 dose   Quantity:  2 mL   Refills:  0            Where to get your medicines      These medications were sent to [facility] [id number] [place] [address]     Phone:  [phone]     penicillin G benzathine [id number] UNIT/2ML injection    venlafaxine 75 MG 24 hr capsule                Primary Care Provider Office Phone # Fax #    Yonatan Hurtado [phone]       [address]  [facility] [address]        Equal Access to Services     [facility] HospitalJULIANNA : Hadii aad ku hadbiancachery Skyali, waaxda luqadaha, qaybta kaalmada danny, asia srinivasan. So [facility] [phone].    ATENCIÓN: Si habla español, tiene a maldonado disposición servicios gratuitos de asistencia lingüística. Gavino al [phone].    We comply with applicable federal civil rights laws and [place] laws. We do not discriminate on the basis of race, color, national origin, age, disability sex, sexual orientation or gender identity.            Thank you!     Thank you for choosing [facility]  for your care. Our goal is always to provide you with excellent care. Hearing back from our patients is one way we can continue to improve our services. Please take a few minutes to complete the written survey that you may receive in the mail after your visit with us. Thank you!             Your Updated Medication List - Protect others around you: Learn how to safely use, store and throw away your medicines at www.disposemymeds.org.          This list is accurate as of: 7/17/17 11:59 PM.  Always use your most recent med list.                   Brand Name Dispense Instructions for use Diagnosis    acetaminophen 500 MG tablet    TYLENOL    84 tablet    Take 2 tablets (1,000 mg) by mouth every 6 hours as needed for mild pain    Malaise       amoxicillin-clavulanate 500-125 MG per tablet    AUGMENTIN    40 tablet    TAKE ONE TABLET BY MOUTH TWICE A DAY    Malaise       bumetanide 1 MG tablet    BUMEX    30 tablet    Take 1 tablet (1 mg) by mouth daily    Acute pulmonary edema (H)       carvedilol 12.5 MG tablet    COREG    60 tablet    Take 1 tablet (12.5 mg) by mouth 2 times daily (with meals)    Chronic combined systolic and diastolic congestive heart failure (H)       HYDROcodone-acetaminophen 5-325 MG per tablet    NORCO    30 tablet    Take 1 tablet by mouth every 6 hours as needed for moderate to severe pain     Midline low back pain without sciatica, unspecified chronicity       lisinopril 10 MG tablet    PRINIVIL/ZESTRIL    30 tablet    Take 1 tablet (10 mg) by mouth daily    Chronic combined systolic and diastolic congestive heart failure (H)       magnesium oxide 400 (241.3 MG) MG tablet    MAGNESIUM-OXIDE    30 tablet    Take 1 tablet (400 mg) by mouth daily 1 tab QD    Routine general medical examination at a health care facility       multivitamin, therapeutic with minerals Tabs tablet     100 tablet    Take 1 tablet by mouth daily    Routine general medical examination at a health care facility       omeprazole 40 MG capsule    priLOSEC    30 capsule    Take 1 capsule (40 mg) by mouth every morning (before breakfast)    Gastroesophageal reflux disease without esophagitis       order for DME     1 Device    Equipment being ordered: walker with slides    Recurrent falls       penicillin G benzathine [id number] UNIT/2ML injection    BICILLIN L-A    2 mL    Inject 2 mLs (1.2 Million Units) into the muscle once for 1 dose    Malaise       pregabalin 50 MG capsule    LYRICA    60 capsule    Take 1 capsule (50 mg) by mouth 2 times daily    Chronic midline low back pain without sciatica       venlafaxine 75 MG 24 hr capsule    EFFEXOR-XR    30 capsule    Take 1 capsule (75 mg) by mouth daily    Midline low back pain without sciatica, unspecified chronicity

## 2017-07-17 NOTE — PROGRESS NOTES
"Per CC, mailed client an \"Unable to Contact\" letter.      Aditi Arreola  Case Management Specialist  [facility]   [phone]    "

## 2017-07-31 ENCOUNTER — CARE COORDINATION (OUTPATIENT)
Dept: GERIATRIC MEDICINE | Facility: CLINIC | Age: 82
End: 2017-07-31

## 2017-07-31 DIAGNOSIS — Z76.89 HEALTH CARE HOME: ICD-10-CM

## 2017-07-31 DIAGNOSIS — M54.50 MIDLINE LOW BACK PAIN WITHOUT SCIATICA, UNSPECIFIED CHRONICITY: ICD-10-CM

## 2017-07-31 RX ORDER — HYDROCODONE BITARTRATE AND ACETAMINOPHEN 5; 325 MG/1; MG/1
1 TABLET ORAL EVERY 6 HOURS PRN
Qty: 30 TABLET | Refills: 0 | Status: SHIPPED | OUTPATIENT
Start: 2017-07-31 | End: 2017-08-14

## 2017-07-31 NOTE — TELEPHONE ENCOUNTER
Controlled Substance Refill Request for Norco  Problem List Complete:  Yes  Patient is followed by Yonatan Hurtado MD for ongoing prescription of pain medication.  All refills should only be approved by this provider, or covering partner.    Medication(s): lyrica 60/ month.  Hydrocodone 30 prn  Maximum quantity per month:   Clinic visit frequency required: Q 6  months Last Office Visit: 7/17/17    Controlled substance agreement:  Encounter-Level CSA:     There are no encounter-level csa.        Pain Clinic evaluation in the past: no  Pt is frail elderly, palliative care, demented  DIRE Total Score(s):  No flowsheet data found.    Last [facility] website verification:  7/5/17   [email or web address]       checked in past 6 months?  Yes 7/5/17     Last : 7/15/17    Jen Garcia, Pharmacy [facility]

## 2017-07-31 NOTE — PROGRESS NOTES
CM has not heard back from client/family.  CM closed clients EW in MMIS effective 7/31/17. Per Eldon at [facility], client is getting incontinence and nutritional supplies but nothing under EW.    MORELIA Blum  [facility]   [phone]

## 2017-08-12 DIAGNOSIS — Z00.00 ROUTINE GENERAL MEDICAL EXAMINATION AT A HEALTH CARE FACILITY: ICD-10-CM

## 2017-08-14 DIAGNOSIS — M54.50 MIDLINE LOW BACK PAIN WITHOUT SCIATICA, UNSPECIFIED CHRONICITY: ICD-10-CM

## 2017-08-14 RX ORDER — HYDROCODONE BITARTRATE AND ACETAMINOPHEN 5; 325 MG/1; MG/1
1 TABLET ORAL EVERY 6 HOURS PRN
Qty: 30 TABLET | Refills: 0 | Status: SHIPPED | OUTPATIENT
Start: 2017-08-14 | End: 2017-09-07

## 2017-08-14 NOTE — TELEPHONE ENCOUNTER
MAGNESIUM OXIDE 400MG TABS    Last Written Prescription Date: 02-  Last Fill Quantity: 07-,  #30 refills: 5   Last Office Visit with [facility], [facility] or [facility] prescribing provider: 07- Dr. Hurtado.                                         Next 5 appointments (look out 90 days)     Sep 25, 2017  6:00 PM CDT   [facility]ado [facility]    [address] [place] [id number] [phone]

## 2017-08-14 NOTE — TELEPHONE ENCOUNTER
Controlled Substance Refill Request for Norco  Problem List Complete:  Yes  Patient is followed by Yonatan Hurtado MD for ongoing prescription of pain medication.  All refills should only be approved by this provider, or covering partner.    Medication(s): lyrica 60/ month.  Hydrocodone 30 prn  Maximum quantity per month:   Clinic visit frequency required: Q 6  months     Controlled substance agreement:  Encounter-Level CSA:     There are no encounter-level csa.        Pain Clinic evaluation in the past: no  Pt is frail elderly, palliative care, demented  DIRE Total Score(s):  No flowsheet data found.    Last [facility] website verification:  7/5/17   [email or web address]       checked in past 6 months?  Yes 7/5/17     Jen Garcia, Pharmacy [facility]

## 2017-08-16 NOTE — TELEPHONE ENCOUNTER
Prescription approved per [facility] Refill Protocol.  Keila Dasilva RN

## 2017-08-18 ENCOUNTER — TELEPHONE (OUTPATIENT)
Dept: FAMILY MEDICINE | Facility: CLINIC | Age: 82
End: 2017-08-18

## 2017-08-18 NOTE — TELEPHONE ENCOUNTER
2 page fax received from [facility] for Dr Hurtado signature.  Forms are in Dr Hurtado in-basket for review/signature.  Please fax forms back to [phone].    Jocelyn Navarro/

## 2017-09-07 DIAGNOSIS — R53.81 MALAISE: ICD-10-CM

## 2017-09-07 DIAGNOSIS — M54.50 MIDLINE LOW BACK PAIN WITHOUT SCIATICA, UNSPECIFIED CHRONICITY: ICD-10-CM

## 2017-09-07 RX ORDER — AMOXICILLIN AND CLAVULANATE POTASSIUM 500; 125 MG/1; MG/1
TABLET, FILM COATED ORAL
Qty: 40 TABLET | Refills: 0 | Status: SHIPPED | OUTPATIENT
Start: 2017-09-07 | End: 2017-09-25

## 2017-09-07 RX ORDER — HYDROCODONE BITARTRATE AND ACETAMINOPHEN 5; 325 MG/1; MG/1
1 TABLET ORAL EVERY 6 HOURS PRN
Qty: 30 TABLET | Refills: 0 | Status: SHIPPED | OUTPATIENT
Start: 2017-09-07 | End: 2017-09-25

## 2017-09-07 NOTE — TELEPHONE ENCOUNTER
Controlled Substance Refill Request for Norco 5/325      Problem List Complete:  Yes  Patient is followed by Yonatan Hurtado MD for ongoing prescription of pain medication.  All refills should only be approved by this provider, or covering partner.    Medication(s): lyrica 60/ month.  Hydrocodone 30 prn  Maximum quantity per month:   Clinic visit frequency required: Q 6  months     Controlled substance agreement:  Encounter-Level CSA:     There are no encounter-level csa.        Pain Clinic evaluation in the past: no  Pt is frail elderly, palliative care, demented  DIRE Total Score(s):  No flowsheet data found.    Last [facility] website verification:  7/5/17   [email or web address]     checked in past 6 months?  Yes 7/05/2017     Last picked up 8/15 for 7 days supply.    Please walk signed prescription to pharmacy.  Thanks.  Jennie Nicole, Sonu  [facility]  [phone]

## 2017-09-07 NOTE — TELEPHONE ENCOUNTER
amoxicillin-clavulanate (AUGMENTIN) 500-125 MG per tablet    Last Written Prescription Date: 07/06/2017  Last Fill Quantity: 40,07/06/2017  # refills: 0   Last Office Visit with [facility], P or [facility] prescribing provider: 07/17/2017-Dr Hurtado                                         Next 5 appointments (look out 90 days)     Sep 25, 2017  6:00 PM CDT   SHORT with Yonatan Hurtado MD   [facility]    [address] [place] [id number] [phone]

## 2017-09-07 NOTE — TELEPHONE ENCOUNTER
Dr. Law-Due for visit in September.  Has appt scheduled for 9/25/17.  Gets this regularly for malaise.  Got IM PCN at 7/17/17 for malaise, as previously for cultural indications.  Please advise.  Tia Mcconnell RN      Last filled 7/6/17     Associated Diagnoses      Malaise [R53.81]             7/17/17  ASSESSMENT/PLAN:      ASSESSMENT / PLAN:  (H91.13) Presbycusis of both ears  (primary encounter diagnosis)  Comment: Sons will discuss options. Audiology can be referred to by phone        (Z30.5) Midline low back pain without sciatica, unspecified chronicity  Comment: after discussion, continue at this dose a few months and reassess  Plan: venlafaxine (EFFEXOR-XR) 75 MG 24 hr capsule         (F03.90) Dementia without behavioral disturbance, unspecified dementia type  Comment: Ongoing     RTC:Sept     Penicillin IM for malaise, as previously for cultural indications     The information in this document, created by the medical scribe for me, accurately reflects the services I personally performed and the decisions made by me. I have reviewed and approved this document for accuracy prior to leaving the patient care area.  Yonatan Hurtado MD July 17, 2017 6:20 PM     Yonatan Hurtado MD  [facility]

## 2017-09-25 ENCOUNTER — OFFICE VISIT (OUTPATIENT)
Dept: FAMILY MEDICINE | Facility: CLINIC | Age: 82
End: 2017-09-25
Payer: COMMERCIAL

## 2017-09-25 VITALS
TEMPERATURE: 99 F | WEIGHT: 170 LBS | SYSTOLIC BLOOD PRESSURE: 146 MMHG | RESPIRATION RATE: 12 BRPM | DIASTOLIC BLOOD PRESSURE: 69 MMHG | HEART RATE: 73 BPM | HEIGHT: 66 IN | BODY MASS INDEX: 27.32 KG/M2

## 2017-09-25 DIAGNOSIS — G89.4 CHRONIC PAIN SYNDROME: ICD-10-CM

## 2017-09-25 DIAGNOSIS — I10 HTN, GOAL BELOW 140/90: ICD-10-CM

## 2017-09-25 DIAGNOSIS — Z23 NEED FOR PNEUMOCOCCAL VACCINE: ICD-10-CM

## 2017-09-25 DIAGNOSIS — K21.9 GASTROESOPHAGEAL REFLUX DISEASE WITHOUT ESOPHAGITIS: ICD-10-CM

## 2017-09-25 DIAGNOSIS — M54.50 MIDLINE LOW BACK PAIN WITHOUT SCIATICA, UNSPECIFIED CHRONICITY: ICD-10-CM

## 2017-09-25 DIAGNOSIS — B37.42 CANDIDAL BALANITIS: Primary | ICD-10-CM

## 2017-09-25 DIAGNOSIS — R53.81 PHYSICAL DECONDITIONING: ICD-10-CM

## 2017-09-25 DIAGNOSIS — Z23 NEED FOR PROPHYLACTIC VACCINATION AND INOCULATION AGAINST INFLUENZA: ICD-10-CM

## 2017-09-25 DIAGNOSIS — R42 DIZZINESS: ICD-10-CM

## 2017-09-25 DIAGNOSIS — E78.5 HYPERLIPIDEMIA LDL GOAL <100: ICD-10-CM

## 2017-09-25 DIAGNOSIS — Z86.79 HX OF CONGESTIVE HEART FAILURE: ICD-10-CM

## 2017-09-25 PROCEDURE — G0008 ADMIN INFLUENZA VIRUS VAC: HCPCS | Performed by: FAMILY MEDICINE

## 2017-09-25 PROCEDURE — G0009 ADMIN PNEUMOCOCCAL VACCINE: HCPCS | Performed by: FAMILY MEDICINE

## 2017-09-25 PROCEDURE — 99214 OFFICE O/P EST MOD 30 MIN: CPT | Mod: 25 | Performed by: FAMILY MEDICINE

## 2017-09-25 PROCEDURE — 80048 BASIC METABOLIC PNL TOTAL CA: CPT | Performed by: FAMILY MEDICINE

## 2017-09-25 PROCEDURE — 36415 COLL VENOUS BLD VENIPUNCTURE: CPT | Performed by: FAMILY MEDICINE

## 2017-09-25 PROCEDURE — 99000 SPECIMEN HANDLING OFFICE-LAB: CPT | Performed by: FAMILY MEDICINE

## 2017-09-25 PROCEDURE — 80061 LIPID PANEL: CPT | Performed by: FAMILY MEDICINE

## 2017-09-25 PROCEDURE — 80307 DRUG TEST PRSMV CHEM ANLYZR: CPT | Mod: 90 | Performed by: FAMILY MEDICINE

## 2017-09-25 PROCEDURE — 90670 PCV13 VACCINE IM: CPT | Performed by: FAMILY MEDICINE

## 2017-09-25 PROCEDURE — 90662 IIV NO PRSV INCREASED AG IM: CPT | Performed by: FAMILY MEDICINE

## 2017-09-25 RX ORDER — FLUCONAZOLE 150 MG/1
150 TABLET ORAL DAILY
Qty: 3 TABLET | Refills: 0 | Status: SHIPPED | OUTPATIENT
Start: 2017-09-25 | End: 2017-11-06

## 2017-09-25 RX ORDER — HYDROCODONE BITARTRATE AND ACETAMINOPHEN 5; 325 MG/1; MG/1
1 TABLET ORAL EVERY 6 HOURS PRN
Qty: 30 TABLET | Refills: 0 | Status: SHIPPED | OUTPATIENT
Start: 2017-09-25 | End: 2017-10-06

## 2017-09-25 NOTE — PROGRESS NOTES
Injectable Influenza Immunization Documentation    1.  Is the person to be vaccinated sick today?   No    2. Does the person to be vaccinated have an allergy to a component   of the vaccine?   No    3. Has the person to be vaccinated ever had a serious reaction   to influenza vaccine in the past?   No    4. Has the person to be vaccinated ever had Guillain-Barré syndrome?   No    Form completed by Myrna Bustos

## 2017-09-25 NOTE — LETTER
October 3, 2017      Barrie Woods  [address] [place] [address]        Dear ,    We are writing to inform you of your test results.    Tests are all good. Kidneys are much stronger!    Resulted Orders   Drug  Screen Comprehensive, Urine w/o Reported Meds (Pain Care Package)   Result Value Ref Range    Comprehen Drug Analysis UR FINAL       Comment:      (Note)  ====================================================================  COMPREHENSIVE DRUG ANALYSIS,UR  ====================================================================  Test                             Result       Flag       Units        Drug Present   Hydrocodone                    546                     ng/mg creat   Hydromorphone                  95                      ng/mg creat   Dihydrocodeine                 31                      ng/mg creat   Norhydrocodone                 456                     ng/mg creat    Sources of hydrocodone include scheduled prescription    medications. Hydromorphone, dihydrocodeine and norhydrocodone are    expected metabolites of hydrocodone. Hydromorphone and    dihydrocodeine are also available as scheduled prescription    medications.   Pregabalin                     PRESENT                               Venlafaxine                    PRESENT                               Desmethylvenlafaxine           PRESENT                                  Desmethylvenlafaxine is an expected metabolite of venlafaxine.   Acetaminophen                  PRESENT                              ====================================================================  Test                      Result    Flag   Units      Ref Range        Creatinine              166              mg/dL      >=20            ====================================================================  For clinical consultation, please call [phone].  ====================================================================  Analysis  performed by [facility] [place] [address]     Basic metabolic panel  (Ca, Cl, CO2, Creat, Gluc, K, Na, BUN)   Result Value Ref Range    Sodium 141 133 - 144 mmol/L    Potassium 4.4 3.4 - 5.3 mmol/L    Chloride 106 94 - 109 mmol/L    Carbon Dioxide 30 20 - 32 mmol/L    Anion Gap 5 3 - 14 mmol/L    Glucose 115 (H) 70 - 99 mg/dL      Comment:      Non Fasting    Urea Nitrogen 19 7 - 30 mg/dL    Creatinine 1.22 0.66 - 1.25 mg/dL    GFR Estimate 56 (L) >60 mL/min/1.7m2      Comment:      Non  GFR Calc    GFR Estimate If Black 68 >60 mL/min/1.7m2      Comment:       GFR Calc    Calcium 9.4 8.5 - 10.1 mg/dL   Lipid panel reflex to direct LDL   Result Value Ref Range    Cholesterol 156 <200 mg/dL    Triglycerides 162 (H) <150 mg/dL      Comment:      Borderline high:  150-199 mg/dl  High:             200-499 mg/dl  Very high:       >499 mg/dl  Non Fasting      HDL Cholesterol 31 (L) >39 mg/dL    LDL Cholesterol Calculated 93 <100 mg/dL      Comment:      Desirable:       <100 mg/dl    Non HDL Cholesterol 125 <130 mg/dL       If you have any questions or concerns, please call the clinic at the number listed above.       Sincerely,        Yonatan Hurtado MD/lf

## 2017-09-25 NOTE — NURSING NOTE
"Chief Complaint   Patient presents with     RECHECK     BACK PAIN       Initial /69 (BP Location: Left arm, Patient Position: Chair, Cuff Size: Adult Regular)  Pulse 73  Temp 99  F (37.2  C) (Oral)  Resp 12  Ht 5' 6\" (1.676 m)  Wt 170 lb (77.1 kg)  BMI 27.44 kg/m2 Estimated body mass index is 27.44 kg/(m^2) as calculated from the following:    Height as of this encounter: 5' 6\" (1.676 m).    Weight as of this encounter: 170 lb (77.1 kg).  Medication Reconciliation: complete RT ARM Deidre Duvall MA      "

## 2017-09-25 NOTE — MR AVS SNAPSHOT
"              After Visit Summary   9/25/2017    Barrie Woods    MRN: [id number]           Patient Information     Date Of Birth          1/25/1930        Visit Information        Provider Department      9/25/2017 6:00 PM Yonatan Hurtado MD; [facility] [employer] [facility]        Today's Diagnoses     Candidal balanitis    -  1    HTN, goal below 140/90        Midline low back pain without sciatica, unspecified chronicity        Physical deconditioning        Dizziness        Gastroesophageal reflux disease without esophagitis        Hx of congestive heart failure        Hyperlipidemia LDL goal <100        Chronic pain syndrome          Care Instructions    DIFLUCAN     2 DIFFERENT ANTIFUNGALS (monistat derm, tinactin, lotrimin)          Follow-ups after your visit        Who to contact     If you have questions or need follow up information about today's clinic visit or your schedule please contact [facility] directly at [phone].  Normal or non-critical lab and imaging results will be communicated to you by MyChart, letter or phone within 4 business days after the clinic has received the results. If you do not hear from us within 7 days, please contact the clinic through MyChart or phone. If you have a critical or abnormal lab result, we will notify you by phone as soon as possible.  Submit refill requests through [employer] or call your pharmacy and they will forward the refill request to us. Please allow 3 business days for your refill to be completed.          Additional Information About Your Visit        MyChart Information     [employer] lets you send messages to your doctor, view your test results, renew your prescriptions, schedule appointments and more. To sign up, go to [email or web address] . Click on \"Log in\" on the left side of the screen, which will take you to the Welcome page. Then click on \"Sign up Now\" on the right side of the page.     You will " "be asked to enter the access code listed below, as well as some personal information. Please follow the directions to create your username and password.     Your access code is: [email or web address]  Expires: 2017  6:37 PM     Your access code will  in 90 days. If you need help or a new code, please call your [facility] clinic or [phone].        Care EveryWhere ID     This is your Care EveryWhere ID. This could be used by other organizations to access your [facility] medical records  [id number]        Your Vitals Were     Pulse Temperature Respirations Height BMI (Body Mass Index)       73 99  F (37.2  C) (Oral) 12 5' 6\" (1.676 m) 27.44 kg/m2        Blood Pressure from Last 3 Encounters:   17 146/69   17 110/60   17 128/52    Weight from Last 3 Encounters:   17 170 lb (77.1 kg)   17 174 lb 4.8 oz (79.1 kg)   17 172 lb 6.4 oz (78.2 kg)              We Performed the Following     Basic metabolic panel  (Ca, Cl, CO2, Creat, Gluc, K, Na, BUN)     Drug  Screen Comprehensive, Urine w/o Reported Meds (Pain Care Package)     Lipid panel reflex to direct LDL          Today's Medication Changes          These changes are accurate as of: 17  6:38 PM.  If you have any questions, ask your nurse or doctor.               Start taking these medicines.        Dose/Directions    fluconazole 150 MG tablet   Commonly known as:  DIFLUCAN   Used for:  Candidal balanitis   Started by:  Yonatan Hurtado MD        Dose:  150 mg   Take 1 tablet (150 mg) by mouth daily   Quantity:  3 tablet   Refills:  0         Stop taking these medicines if you haven't already. Please contact your care team if you have questions.     amoxicillin-clavulanate 500-125 MG per tablet   Commonly known as:  AUGMENTIN   Stopped by:  Yonatan Hurtado MD           bumetanide 1 MG tablet   Commonly known as:  BUMEX   Stopped by:  Yonatan Hurtado MD           omeprazole 40 MG capsule   Commonly known as:  priLOSEC   Stopped " by:  Yonatan Hurtado MD                Where to get your medicines      These medications were sent to [facility], [place] - [address]  [address] [facility] [address]     Phone:  [phone]     fluconazole 150 MG tablet         Some of these will need a paper prescription and others can be bought over the counter.  Ask your nurse if you have questions.     Bring a paper prescription for each of these medications     HYDROcodone-acetaminophen 5-325 MG per tablet                Primary Care Provider Office Phone # Fax #    Yonatan Hurtado [phone] [address] [facility] [address]        Equal Access to Services     [facility]: Hadii liborio cruz hadasho Sorodger, waaxda luqadaha, qaybta kaalmada adeegyada, asia jeffers . So [facility] [phone].    ATENCIÓN: Si habla español, tiene a maldonado disposición servicios gratuitos de asistencia lingüística. Llame al [phone].    We comply with applicable federal civil rights laws and [place] laws. We do not discriminate on the basis of race, color, national origin, age, disability sex, sexual orientation or gender identity.            Thank you!     Thank you for choosing [facility]  for your care. Our goal is always to provide you with excellent care. Hearing back from our patients is one way we can continue to improve our services. Please take a few minutes to complete the written survey that you may receive in the mail after your visit with us. Thank you!             Your Updated Medication List - Protect others around you: Learn how to safely use, store and throw away your medicines at www.disposemymeds.org.          This list is accurate as of: 9/25/17  6:38 PM.  Always use your most recent med list.                   Brand Name Dispense Instructions for use Diagnosis    acetaminophen 500 MG tablet    TYLENOL    84 tablet    Take 2 tablets (1,000 mg) by  mouth every 6 hours as needed for mild pain    Malaise       carvedilol 12.5 MG tablet    COREG    60 tablet    Take 1 tablet (12.5 mg) by mouth 2 times daily (with meals)    Chronic combined systolic and diastolic congestive heart failure (H)       fluconazole 150 MG tablet    DIFLUCAN    3 tablet    Take 1 tablet (150 mg) by mouth daily    Candidal balanitis       HYDROcodone-acetaminophen 5-325 MG per tablet    NORCO    30 tablet    Take 1 tablet by mouth every 6 hours as needed for moderate to severe pain    Midline low back pain without sciatica, unspecified chronicity       lisinopril 10 MG tablet    PRINIVIL/ZESTRIL    30 tablet    Take 1 tablet (10 mg) by mouth daily    Chronic combined systolic and diastolic congestive heart failure (H)       magnesium oxide 400 (241.3 MG) MG tablet    MAG-OX    30 tablet    TAKE 1 TABLET BY MOUTH ONCE DAILY    Routine general medical examination at a health care facility       multivitamin, therapeutic with minerals Tabs tablet     100 tablet    Take 1 tablet by mouth daily    Routine general medical examination at a health care facility       order for DME     1 Device    Equipment being ordered: walker with slides    Recurrent falls       pregabalin 50 MG capsule    LYRICA    60 capsule    Take 1 capsule (50 mg) by mouth 2 times daily    Chronic midline low back pain without sciatica       venlafaxine 75 MG 24 hr capsule    EFFEXOR-XR    30 capsule    Take 1 capsule (75 mg) by mouth daily    Midline low back pain without sciatica, unspecified chronicity

## 2017-09-25 NOTE — NURSING NOTE
"Chief Complaint   Patient presents with     RECHECK     BACK PAIN       Initial /69 (BP Location: Left arm, Patient Position: Chair, Cuff Size: Adult Regular)  Pulse 73  Temp 99  F (37.2  C) (Oral)  Resp 12  Ht 5' 6\" (1.676 m)  Wt 170 lb (77.1 kg)  BMI 27.44 kg/m2 Estimated body mass index is 27.44 kg/(m^2) as calculated from the following:    Height as of this encounter: 5' 6\" (1.676 m).    Weight as of this encounter: 170 lb (77.1 kg).  Medication Reconciliation: complete left arm Deidre Duvall MA      "

## 2017-09-25 NOTE — PROGRESS NOTES
SUBJECTIVE:   Barrie Woods is a 87 year old male who presents to clinic today for the following health issues:      Follow up on lower back pain        He says the medication for his back doesn't work    Skin around penis red for about a year. Started with lump, hasn't paid attention but now it hurts    HTN, goal below 140/90   BP Readings from Last 6 Encounters:   09/25/17 146/69   07/17/17 110/60   06/19/17 128/52   05/22/17 111/52   05/08/17 176/66   04/30/17 134/72           Dizziness son blamed domeprazole bumex stopped both, resolved. Listed on both bottles  Gastroesophageal reflux disease without esophagitis no symptoms off PPI at moment  Hx of congestive heart failure inactive at moment  Hyperlipidemia LDL goal <100 data in this age group sparse  Chronic pain syndrome pt reports unrelenting pain, son disagrees. Pt also reports penile symptoms are more important  Need for pneumococcal vaccine offered  Need for prophylactic vaccination and inoculation against influenza offered      Problem list and histories reviewed & adjusted, as indicated.  Additional history: as documented        Reviewed and updated as needed this visit by clinical staff  Tobacco  Allergies  Meds  Med Hx  Surg Hx  Fam Hx  Soc Hx       Past Medical History:   Diagnosis Date     Acute, but ill-defined, cerebrovascular disease 1/04    left middle cerebral artery infarct     Alcohol abuse      Anemia 7/14/2014     Atrial fibrillation (H)     CVA occurred off anticoagulation     Cardiomyopathy (H)     stress tests 6/04 and 10/04 negative for ischemia, EF 36-40%     CHF (congestive heart failure) (H) 12/24/2009     Chronic pain 10/12/2015     Dementia without behavioral disturbance, unspecified dementia type 5/9/2017     Dementia, without behavioral disturbance 6/29/2015     Depressive disorder      Diaphragmatic hernia without mention of obstruction or gangrene     left sided chest pain, hiatal hernia/gastritis     Diverticulosis of  "colon (without mention of hemorrhage)     sigmoid     Dysphagia 7/27/2015     Elevated blood sugar 1/25/2016     Esophageal reflux      Hearing loss      Hematuria     negative cystoscopy 3/04     Hereditary and idiopathic peripheral neuropathy 2006    EMG-peripheral polyneuropathy, B12 and VitD deficient, ? alcohol related     Hx of congestive heart failure 1/12/2016     Hypertrophy of prostate 6/17/2015     Nausea with vomiting 12/8/2014     Presbycusis of both ears 7/17/2017     PSA elevation 5/30/2014     Recurrent falls 7/28/2015     Renal cell carcinoma (H) 8/25/2014     Sepsis (H) 1/7/2015     TB lung, latent 5/6/2014     Urinary incontinence 7/28/2015       History reviewed. No pertinent surgical history.    Family History   Problem Relation Age of Onset     C.A.D. No family hx of      DIABETES No family hx of        Social History   Substance Use Topics     Smoking status: Current Every Day Smoker     Types: Dip, chew, snus or snuff     Smokeless tobacco: Current User     Types: Chew     Last attempt to quit: 1/9/2004      Comment: pt chews tobacco     Alcohol use No       Reviewed and updated as needed this visit by Provider         ROS:  No dysuria, fever Does not sleep well, some nights where he cannot sleep.    This document serves as a record of the services and decisions personally performed and made by Yonatan Hurtado MD. It was created on his behalf by Mikki Sullivan, a trained medical scribe.  The creation of this document is based on the scribe's personal observations and the provider's statements to the medical scribe.  Mikki Sullivan, September 25, 2017 6:13 PM    OBJECTIVE:     /69 (BP Location: Left arm, Patient Position: Chair, Cuff Size: Adult Regular)  Pulse 73  Temp 99  F (37.2  C) (Oral)  Resp 12  Ht 5' 6\" (1.676 m)  Wt 170 lb (77.1 kg)  BMI 27.44 kg/m2  Body mass index is 27.44 kg/(m^2).    Ponderous arising from chair, both hands needed  No edema  Uncircumcised, glans red, " copious white smegma, discrete margin    ASSESSMENT/PLAN:       ASSESSMENT / PLAN:  (B37.42) Candidal balanitis presumed (primary encounter diagnosis)  Comment: Rx  Plan: fluconazole (DIFLUCAN) 150 MG tablet        follow with OTC antifungals, local hygiene    (I10) HTN, goal below 140/90  Comment: adequate  BP Readings from Last 6 Encounters:   09/25/17 146/69   07/17/17 110/60   06/19/17 128/52   05/22/17 111/52   05/08/17 176/66   04/30/17 134/72       Plan: Basic metabolic panel  (Ca, Cl, CO2, Creat,         Gluc, K, Na, BUN)        Do not wish to engender hypotension    (M54.5) Midline low back pain without sciatica, unspecified chronicity  Comment: refill  Plan: HYDROcodone-acetaminophen (NORCO) 5-325 MG per         tablet            (R53.81) Physical deconditioning  Comment: cane, walker at home, not used  Plan:     (R42) Dizziness  Comment: unlikely due to meds  Plan: resolved at moment    (K21.9) Gastroesophageal reflux disease without esophagitis  Comment: quiescent  Plan:     (Z86.79) Hx of congestive heart failure  Comment: quiescent  Plan:     (E78.5) Hyperlipidemia LDL goal <100  Comment: measure  Plan: Lipid panel reflex to direct LDL        Not pertinent in this age group    (G89.4) Chronic pain syndrome  Comment: refill measure  Plan: Drug  Screen Comprehensive, Urine w/o Reported         Meds (Pain Care Package),           (Z23) Need for pneumococcal vaccine  Comment: PNEUMOCOCCAL CONJ         VACCINE 13 VALENT IM    Plan:     (Z23) Need for prophylactic vaccination and inoculation against influenza  Comment: Will accept    Plan: FLU VACCINE, INCREASED ANTIGEN, PRESV FREE, AGE        65+ [45943], Vaccine Administration, Initial         [04190]                  Yonatan Hurtado MD                The information in this document, created by the medical scribe for me, accurately reflects the services I personally performed and the decisions made by me. I have reviewed and approved this document for  accuracy prior to leaving the patient care area.  Yonatan Hurtado MD September 25, 2017 6:13 PM    Yonatan Hurtado MD  [facility]

## 2017-09-26 LAB
ANION GAP SERPL CALCULATED.3IONS-SCNC: 5 MMOL/L (ref 3–14)
BUN SERPL-MCNC: 19 MG/DL (ref 7–30)
CALCIUM SERPL-MCNC: 9.4 MG/DL (ref 8.5–10.1)
CHLORIDE SERPL-SCNC: 106 MMOL/L (ref 94–109)
CHOLEST SERPL-MCNC: 156 MG/DL
CO2 SERPL-SCNC: 30 MMOL/L (ref 20–32)
CREAT SERPL-MCNC: 1.22 MG/DL (ref 0.66–1.25)
GFR SERPL CREATININE-BSD FRML MDRD: 56 ML/MIN/1.7M2
GLUCOSE SERPL-MCNC: 115 MG/DL (ref 70–99)
HDLC SERPL-MCNC: 31 MG/DL
LDLC SERPL CALC-MCNC: 93 MG/DL
NONHDLC SERPL-MCNC: 125 MG/DL
POTASSIUM SERPL-SCNC: 4.4 MMOL/L (ref 3.4–5.3)
SODIUM SERPL-SCNC: 141 MMOL/L (ref 133–144)
TRIGL SERPL-MCNC: 162 MG/DL

## 2017-10-02 LAB — COMPREHEN DRUG ANALYSIS UR: NORMAL

## 2017-10-06 DIAGNOSIS — M54.50 MIDLINE LOW BACK PAIN WITHOUT SCIATICA, UNSPECIFIED CHRONICITY: ICD-10-CM

## 2017-10-06 DIAGNOSIS — R53.81 MALAISE: ICD-10-CM

## 2017-10-06 NOTE — TELEPHONE ENCOUNTER
amoxicillin-clavulanate (AUGMENTIN) 500-125 MG per tablet    Last Written Prescription Date: DISCONTINUED  Last Fill Quantity: 40,09/07/2017  # refills:    Last Office Visit with G, [facility] or [facility] prescribing provider: 09/25/2017-Dr Hurtado                                         Next 5 appointments (look out 90 days)     Oct 23, 2017  6:00 PM CDT   SHORT with Yonatan Hurtado MD   [facility] ([facility])    [address] [place] [id number] [phone]

## 2017-10-06 NOTE — TELEPHONE ENCOUNTER
Controlled Substance Refill Request for [place] 5/325  Problem List Complete:  Yes  Patient is followed by Yonatan Hurtado MD for ongoing prescription of pain medication.  All refills should only be approved by this provider, or covering partner.    Medication(s): lyrica 60/ month.  Hydrocodone 30 prn  Maximum quantity per month:   Clinic visit frequency required: Q 6  months     Controlled substance agreement:  Encounter-Level CSA:     There are no encounter-level csa.        Pain Clinic evaluation in the past: no  Pt is frail elderly, palliative care, demented  DIRE Total Score(s):  No flowsheet data found.    Last [facility] website verification:  7/5/17   [email or web address]   checked in past 6 months?  Yes 7/5/17     Please walk signed prescription to pharmacy.  Thanks.  Jennie Nicole, [facility] [place] [facility]  [phone]

## 2017-10-09 RX ORDER — HYDROCODONE BITARTRATE AND ACETAMINOPHEN 5; 325 MG/1; MG/1
1 TABLET ORAL EVERY 6 HOURS PRN
Qty: 30 TABLET | Refills: 0 | Status: SHIPPED | OUTPATIENT
Start: 2017-10-09 | End: 2017-11-06

## 2017-10-09 RX ORDER — AMOXICILLIN AND CLAVULANATE POTASSIUM 500; 125 MG/1; MG/1
TABLET, FILM COATED ORAL
Qty: 40 TABLET | Refills: 0 | Status: SHIPPED | OUTPATIENT
Start: 2017-10-09 | End: 2017-11-13

## 2017-10-09 NOTE — TELEPHONE ENCOUNTER
Routing refill request to provider for review/approval because:  Drug not on the [facility] refill protocol     Corin Prakash RN, BS  Clinical Nurse Triage.

## 2017-11-06 ENCOUNTER — OFFICE VISIT (OUTPATIENT)
Dept: FAMILY MEDICINE | Facility: CLINIC | Age: 82
End: 2017-11-06
Payer: COMMERCIAL

## 2017-11-06 VITALS
DIASTOLIC BLOOD PRESSURE: 83 MMHG | TEMPERATURE: 97.5 F | BODY MASS INDEX: 26.68 KG/M2 | SYSTOLIC BLOOD PRESSURE: 166 MMHG | WEIGHT: 166 LBS | HEIGHT: 66 IN | HEART RATE: 77 BPM | RESPIRATION RATE: 12 BRPM

## 2017-11-06 DIAGNOSIS — N48.1 BALANITIS: Primary | ICD-10-CM

## 2017-11-06 DIAGNOSIS — M54.50 MIDLINE LOW BACK PAIN WITHOUT SCIATICA, UNSPECIFIED CHRONICITY: ICD-10-CM

## 2017-11-06 DIAGNOSIS — I10 HTN, GOAL BELOW 140/90: ICD-10-CM

## 2017-11-06 DIAGNOSIS — R53.81 MALAISE: ICD-10-CM

## 2017-11-06 DIAGNOSIS — I50.42 CHRONIC COMBINED SYSTOLIC AND DIASTOLIC CONGESTIVE HEART FAILURE (H): ICD-10-CM

## 2017-11-06 PROCEDURE — 99214 OFFICE O/P EST MOD 30 MIN: CPT | Mod: 25 | Performed by: FAMILY MEDICINE

## 2017-11-06 PROCEDURE — 96372 THER/PROPH/DIAG INJ SC/IM: CPT | Performed by: FAMILY MEDICINE

## 2017-11-06 RX ORDER — LISINOPRIL 10 MG/1
10 TABLET ORAL DAILY
Qty: 30 TABLET | Refills: 5 | Status: SHIPPED | OUTPATIENT
Start: 2017-11-06 | End: 2018-05-07

## 2017-11-06 RX ORDER — PREGABALIN 100 MG/1
100 CAPSULE ORAL 2 TIMES DAILY
Qty: 60 CAPSULE | Refills: 5 | Status: SHIPPED | OUTPATIENT
Start: 2017-11-06 | End: 2018-05-07

## 2017-11-06 RX ORDER — VENLAFAXINE HYDROCHLORIDE 75 MG/1
75 CAPSULE, EXTENDED RELEASE ORAL DAILY
Qty: 30 CAPSULE | Refills: 5 | Status: SHIPPED | OUTPATIENT
Start: 2017-11-06 | End: 2018-05-07

## 2017-11-06 RX ORDER — HYDROCODONE BITARTRATE AND ACETAMINOPHEN 5; 325 MG/1; MG/1
1 TABLET ORAL EVERY 6 HOURS PRN
Qty: 30 TABLET | Refills: 0 | Status: SHIPPED | OUTPATIENT
Start: 2017-11-06 | End: 2017-12-13

## 2017-11-06 RX ORDER — CARVEDILOL 12.5 MG/1
12.5 TABLET ORAL 2 TIMES DAILY WITH MEALS
Qty: 60 TABLET | Refills: 5 | Status: SHIPPED | OUTPATIENT
Start: 2017-11-06 | End: 2018-05-07

## 2017-11-06 NOTE — MR AVS SNAPSHOT
"              After Visit Summary   11/6/2017    Barrie Woods    MRN: [id number]           Patient Information     Date Of Birth          1/25/1930        Visit Information        Provider Department      11/6/2017 6:00 PM Yonatan Hurtado MD; [employer] [facility]        Today's Diagnoses     Balanitis    -  1    Midline low back pain without sciatica, unspecified chronicity        Chronic combined systolic and diastolic congestive heart failure (H)        Malaise        HTN, goal below 140/90           Follow-ups after your visit        Your next 10 appointments already scheduled     Dec 18, 2017  6:00 PM CST   SHORT with Yonatan Hurtado MD   [facility]    [address] [place] [id number] [phone]              Who to contact     If you have questions or need follow up information about today's clinic visit or your schedule please contact [facility] directly at [phone].  Normal or non-critical lab and imaging results will be communicated to you by [employer], letter or phone within 4 business days after the clinic has received the results. If you do not hear from us within 7 days, please contact the clinic through [employer] or phone. If you have a critical or abnormal lab result, we will notify you by phone as soon as possible.  Submit refill requests through [employer] or call your pharmacy and they will forward the refill request to us. Please allow 3 business days for your refill to be completed.          Additional Information About Your Visit        [employer] Information     [employer]iona lets you send messages to your doctor, view your test results, renew your prescriptions, schedule appointments and more. To sign up, go to [email or web address] . Click on \"Log in\" on the left side of the screen, which will take you to the Welcome page. Then click on \"Sign up Now\" on the right side of " "the page.     You will be asked to enter the access code listed below, as well as some personal information. Please follow the directions to create your username and password.     Your access code is: [email or web address]  Expires: 2017  5:37 PM     Your access code will  in 90 days. If you need help or a new code, please call your [place] clinic or [phone].        Care EveryWhere ID     This is your Care EveryWhere ID. This could be used by other organizations to access your [place] medical records  [id number]        Your Vitals Were     Pulse Temperature Respirations Height BMI (Body Mass Index)       77 97.5  F (36.4  C) (Oral) 12 5' 6\" (1.676 m) 26.79 kg/m2        Blood Pressure from Last 3 Encounters:   17 166/83   17 146/69   17 110/60    Weight from Last 3 Encounters:   17 166 lb (75.3 kg)   17 170 lb (77.1 kg)   17 174 lb 4.8 oz (79.1 kg)              We Performed the Following     C INJECTION, PENICILLIN G BENZATHINE ,000 UNITS     INJECTION INTRAMUSCULAR OR SUB-Q          Today's Medication Changes          These changes are accurate as of: 17 11:59 PM.  If you have any questions, ask your nurse or doctor.               Start taking these medicines.        Dose/Directions    penicillin G benzathine [id number] UNIT/2ML injection   Commonly known as:  BICILLIN L-A   Used for:  Malaise   Started by:  Yonatan Hurtado MD        Dose:  2 mL   Inject 2 mLs (1.2 Million Units) into the muscle once for 1 dose   Quantity:  2 mL   Refills:  0         These medicines have changed or have updated prescriptions.        Dose/Directions    pregabalin 100 MG capsule   Commonly known as:  LYRICA   This may have changed:    - medication strength  - how much to take   Used for:  Midline low back pain without sciatica, unspecified chronicity   Changed by:  Yonatan Hurtado MD        Dose:  100 mg   Take 1 capsule (100 mg) by mouth 2 times daily   Quantity:  60 capsule "   Refills:  5         Stop taking these medicines if you haven't already. Please contact your care team if you have questions.     fluconazole 150 MG tablet   Commonly known as:  DIFLUCAN   Stopped by:  Yonatan Hurtado MD                Where to get your medicines      These medications were sent to [facility] [address]  [address] [facility] [address]     Phone:  [phone]     carvedilol 12.5 MG tablet    lisinopril 10 MG tablet    penicillin G benzathine [id number] UNIT/2ML injection    venlafaxine 75 MG 24 hr capsule         Some of these will need a paper prescription and others can be bought over the counter.  Ask your nurse if you have questions.     Bring a paper prescription for each of these medications     HYDROcodone-acetaminophen 5-325 MG per tablet    pregabalin 100 MG capsule                Primary Care Provider Office Phone # Fax #    Yonatan Hurtado [phone] [address] [facility] [address]        Equal Access to Services     [facility]: Hadii aad ku hadasho Soomaali, waaxda luqadaha, qaybta kaalmada adeegyada, waxay idiin hayaan henrik jeffers . So [facility] [phone].    ATENCIÓN: Si habla español, tiene a maldonado disposición servicios gratuitos de asistencia lingüística. [facility] [phone].    We comply with applicable federal civil rights laws and [place] laws. We do not discriminate on the basis of race, color, national origin, age, disability, sex, sexual orientation, or gender identity.            Thank you!     Thank you for choosing [facility]  for your care. Our goal is always to provide you with excellent care. Hearing back from our patients is one way we can continue to improve our services. Please take a few minutes to complete the written survey that you may receive in the mail after your visit with us. Thank you!             Your Updated Medication List - Protect others around  you: Learn how to safely use, store and throw away your medicines at www.disposemymeds.org.          This list is accurate as of: 11/6/17 11:59 PM.  Always use your most recent med list.                   Brand Name Dispense Instructions for use Diagnosis    acetaminophen 500 MG tablet    TYLENOL    84 tablet    Take 2 tablets (1,000 mg) by mouth every 6 hours as needed for mild pain    Malaise       amoxicillin-clavulanate 500-125 MG per tablet    AUGMENTIN    40 tablet    TAKE ONE TABLET BY MOUTH TWICE A DAY    Malaise       carvedilol 12.5 MG tablet    COREG    60 tablet    Take 1 tablet (12.5 mg) by mouth 2 times daily (with meals)    Chronic combined systolic and diastolic congestive heart failure (H), HTN, goal below 140/90       HYDROcodone-acetaminophen 5-325 MG per tablet    NORCO    30 tablet    Take 1 tablet by mouth every 6 hours as needed for moderate to severe pain    Midline low back pain without sciatica, unspecified chronicity       lisinopril 10 MG tablet    PRINIVIL/ZESTRIL    30 tablet    Take 1 tablet (10 mg) by mouth daily    Chronic combined systolic and diastolic congestive heart failure (H), HTN, goal below 140/90       magnesium oxide 400 (241.3 MG) MG tablet    MAG-OX    30 tablet    TAKE 1 TABLET BY MOUTH ONCE DAILY    Routine general medical examination at a health care facility       multivitamin, therapeutic with minerals Tabs tablet     100 tablet    Take 1 tablet by mouth daily    Routine general medical examination at a health care facility       order for DME     1 Device    Equipment being ordered: walker with slides    Recurrent falls       penicillin G benzathine [id number] UNIT/2ML injection    BICILLIN L-A    2 mL    Inject 2 mLs (1.2 Million Units) into the muscle once for 1 dose    Malaise       pregabalin 100 MG capsule    LYRICA    60 capsule    Take 1 capsule (100 mg) by mouth 2 times daily    Midline low back pain without sciatica, unspecified chronicity       venlafaxine  75 MG 24 hr capsule    EFFEXOR-XR    30 capsule    Take 1 capsule (75 mg) by mouth daily    Midline low back pain without sciatica, unspecified chronicity

## 2017-11-07 NOTE — PROGRESS NOTES
SUBJECTIVE:   Barrie Woods is a 87 year old male who presents to clinic today for the following health issues:    Balanitis  (primary encounter diagnosis) p treports resolution  Midline low back pain without sciatica, unspecified chronicity stable.Pt complains bitterly, son reports stability  Chronic combined systolic and diastolic congestive heart failure (H) no symptoms. Needs refills  Malaise requests penicillin injection, as is his wont.     Problem list and histories reviewed & adjusted, as indicated.  Additional history: none    Reviewed and updated as needed this visit by clinical staff  Tobacco  Allergies  Meds  Med Hx  Surg Hx  Fam Hx  Soc Hx       Past Medical History:   Diagnosis Date     Acute, but ill-defined, cerebrovascular disease 1/04    left middle cerebral artery infarct     Alcohol abuse      Anemia 7/14/2014     Atrial fibrillation (H)     CVA occurred off anticoagulation     Cardiomyopathy (H)     stress tests 6/04 and 10/04 negative for ischemia, EF 36-40%     CHF (congestive heart failure) (H) 12/24/2009     Chronic pain 10/12/2015     Dementia without behavioral disturbance, unspecified dementia type 5/9/2017     Dementia, without behavioral disturbance 6/29/2015     Depressive disorder      Diaphragmatic hernia without mention of obstruction or gangrene     left sided chest pain, hiatal hernia/gastritis     Diverticulosis of colon (without mention of hemorrhage)     sigmoid     Dysphagia 7/27/2015     Elevated blood sugar 1/25/2016     Esophageal reflux      Hearing loss      Hematuria     negative cystoscopy 3/04     Hereditary and idiopathic peripheral neuropathy 2006    EMG-peripheral polyneuropathy, B12 and VitD deficient, ? alcohol related     Hx of congestive heart failure 1/12/2016     Hypertrophy of prostate 6/17/2015     Nausea with vomiting 12/8/2014     Presbycusis of both ears 7/17/2017     PSA elevation 5/30/2014     Recurrent falls 7/28/2015     Renal cell carcinoma  "(H) 8/25/2014     Sepsis (H) 1/7/2015     TB lung, latent 5/6/2014     Urinary incontinence 7/28/2015       History reviewed. No pertinent surgical history.    Family History   Problem Relation Age of Onset     C.A.D. No family hx of      DIABETES No family hx of        Social History   Substance Use Topics     Smoking status: Current Every Day Smoker     Types: Dip, chew, snus or snuff     Smokeless tobacco: Current User     Types: Chew     Last attempt to quit: 1/9/2004      Comment: pt chews tobacco     Alcohol use No       Reviewed and updated as needed this visit by Provider         ROS:  Patient's son denies falls , fevers    This document serves as a record of the services and decisions personally performed and made by Yonatan Hurtado MD. It was created on his behalf by Mikki Sullivan, a trained medical scribe.  The creation of this document is based on the scribe's personal observations and the provider's statements to the medical scribe.  Mikki Sullivan, November 6, 2017 6:12 PM    OBJECTIVE:     /83 (BP Location: Right arm, Patient Position: Chair, Cuff Size: Adult Regular)  Pulse 77  Temp 97.5  F (36.4  C) (Oral)  Resp 12  Ht 1.676 m (5' 6\")  Wt 75.3 kg (166 lb)  BMI 26.79 kg/m2  Body mass index is 26.79 kg/(m^2).  Well dressed today  BP Readings from Last 6 Encounters:   11/06/17 166/83   09/25/17 146/69   07/17/17 110/60   06/19/17 128/52   05/22/17 111/52   05/08/17 176/66       No edema    ASSESSMENT/PLAN:   ASSESSMENT / PLAN:  (N48.1) Balanitis  (primary encounter diagnosis)  Comment: Improved. Discussed need for episodic care          (M54.5) Midline low back pain without sciatica, unspecified chronicity  Comment:appeasr stable ,recurrent complaoint  Plan: pregabalin (LYRICA) 100 MG capsule,         HYDROcodone-acetaminophen (NORCO) 5-325 MG per         tablet, venlafaxine (EFFEXOR-XR) 75 MG 24 hr         capsule        Discussed with son. Increase Lyrica dose    (I50.42) Chronic combined " systolic and diastolic congestive heart failure (H)  Comment: clinically quiescent  Plan: lisinopril (PRINIVIL/ZESTRIL) 10 MG tablet,         carvedilol (COREG) 12.5 MG tablet            (R53.81) Malaise  Comment: cultural acceptance  Plan: penicillin G benzathine (BICILLIN L-A) [id number]         UNIT/2ML injection, C INJECTION, PENICILLIN G         BENZATHINE ,000 UNITS, INJECTION         INTRAMUSCULAR OR SUB-Q        Not infectious indication    (I10) HTN, goal below 140/90  Comment: was lower. If elevation persistent may need med adjustment  Plan: lisinopril (PRINIVIL/ZESTRIL) 10 MG tablet,         carvedilol (COREG) 12.5 MG tablet        Cautious monitoring in this frail elderly pt          Yonatan Hurtado MD      RTC 6 weeks     The information in this document, created by the medical scribe for me, accurately reflects the services I personally performed and the decisions made by me. I have reviewed and approved this document for accuracy prior to leaving the patient care area.  Yonatan Hurtado MD November 6, 2017 6:12 PM    Yonatan Hurtado MD  [facility]

## 2017-11-07 NOTE — NURSING NOTE
"Chief Complaint   Patient presents with     RECHECK     FU on back and side pain      Recheck Medication     refills on all medications        Initial /83 (BP Location: Right arm, Patient Position: Chair, Cuff Size: Adult Regular)  Pulse 77  Temp 97.5  F (36.4  C) (Oral)  Resp 12  Ht 5' 6\" (1.676 m)  Wt 166 lb (75.3 kg)  BMI 26.79 kg/m2 Estimated body mass index is 26.79 kg/(m^2) as calculated from the following:    Height as of this encounter: 5' 6\" (1.676 m).    Weight as of this encounter: 166 lb (75.3 kg).  Medication Reconciliation: complete rt arm Deidre Duvall MA      "

## 2017-11-13 DIAGNOSIS — J81.0 ACUTE PULMONARY EDEMA (H): ICD-10-CM

## 2017-11-13 DIAGNOSIS — R53.81 MALAISE: ICD-10-CM

## 2017-11-14 RX ORDER — BUMETANIDE 1 MG/1
TABLET ORAL
Qty: 30 TABLET | Refills: 3 | Status: SHIPPED | OUTPATIENT
Start: 2017-11-14 | End: 2018-03-13

## 2017-11-14 RX ORDER — AMOXICILLIN AND CLAVULANATE POTASSIUM 500; 125 MG/1; MG/1
TABLET, FILM COATED ORAL
Qty: 40 TABLET | Refills: 0 | Status: SHIPPED | OUTPATIENT
Start: 2017-11-14 | End: 2017-12-11

## 2017-12-11 DIAGNOSIS — R53.81 MALAISE: ICD-10-CM

## 2017-12-12 RX ORDER — AMOXICILLIN AND CLAVULANATE POTASSIUM 500; 125 MG/1; MG/1
TABLET, FILM COATED ORAL
Qty: 40 TABLET | Refills: 0 | Status: SHIPPED | OUTPATIENT
Start: 2017-12-12 | End: 2018-01-15

## 2017-12-12 NOTE — TELEPHONE ENCOUNTER
Not PSO med.  Sent to provider.  Tia Mcconnell RN    Associated Diagnoses      Malaise [R53.81]

## 2017-12-13 DIAGNOSIS — M54.50 MIDLINE LOW BACK PAIN WITHOUT SCIATICA, UNSPECIFIED CHRONICITY: ICD-10-CM

## 2017-12-13 NOTE — TELEPHONE ENCOUNTER
Controlled Substance Refill Request for Norco  Problem List Complete:  Yes  Patient is followed by Yonatan Hurtado MD for ongoing prescription of pain medication.  All refills should only be approved by this provider, or covering partner.    Medication(s): lyrica 60/ month.  Hydrocodone 30 prn  Maximum quantity per month:   Clinic visit frequency required: Q 6  months     Controlled substance agreement:  Encounter-Level CSA:     There are no encounter-level csa.        Pain Clinic evaluation in the past: no  Pt is frail elderly, palliative care, demented  DIRE Total Score(s):  No flowsheet data found.    Last [facility] website verification:  7/5/17   [email or web address]       checked in past 6 months?  Yes 7/5/17     Jen Garcia, Pharmacy [facility]

## 2017-12-14 RX ORDER — HYDROCODONE BITARTRATE AND ACETAMINOPHEN 5; 325 MG/1; MG/1
1 TABLET ORAL EVERY 6 HOURS PRN
Qty: 30 TABLET | Refills: 0 | Status: SHIPPED | OUTPATIENT
Start: 2017-12-14 | End: 2018-01-15

## 2018-01-08 ENCOUNTER — TELEPHONE (OUTPATIENT)
Dept: FAMILY MEDICINE | Facility: CLINIC | Age: 83
End: 2018-01-08

## 2018-01-08 ENCOUNTER — CARE COORDINATION (OUTPATIENT)
Dept: GERIATRIC MEDICINE | Facility: CLINIC | Age: 83
End: 2018-01-08

## 2018-01-08 NOTE — TELEPHONE ENCOUNTER
Received two fax from [employer] [employer] One  3 page fax for Cachexia, and one two page fax for Ensure - Vanilla. Fax x2 to Dr. Hurtado. Ruth Behrens.

## 2018-01-08 NOTE — PROGRESS NOTES
KELLY received notification from Adalgisa at [facility] that clients son, Jose, called wanting his nutritional supplement re-ordered.  KELLY updated Adalgisa client is not open to EW so PCP would need to order and client would need to qualify through his [employer] [facility].  CM called and left message for Jose asking him to call me so we can talk about how client is doing.    Shelly Alanis, [facility]   [phone]

## 2018-01-09 PROBLEM — R64 CACHEXIA (H): Status: ACTIVE | Noted: 2018-01-09

## 2018-01-09 NOTE — TELEPHONE ENCOUNTER
Forms for food thickener NOT completed  I see no study nor recommendation, although it is reported as ordered by some professional OT? Home Care? Not at hospitalization  Yonatan Hurtado MD

## 2018-01-15 DIAGNOSIS — R53.81 MALAISE: ICD-10-CM

## 2018-01-15 DIAGNOSIS — Z00.00 ROUTINE GENERAL MEDICAL EXAMINATION AT A HEALTH CARE FACILITY: ICD-10-CM

## 2018-01-15 DIAGNOSIS — M54.50 MIDLINE LOW BACK PAIN WITHOUT SCIATICA, UNSPECIFIED CHRONICITY: ICD-10-CM

## 2018-01-15 RX ORDER — HYDROCODONE BITARTRATE AND ACETAMINOPHEN 5; 325 MG/1; MG/1
1 TABLET ORAL EVERY 6 HOURS PRN
Qty: 30 TABLET | Refills: 0 | Status: SHIPPED | OUTPATIENT
Start: 2018-01-15 | End: 2018-02-13

## 2018-01-15 NOTE — TELEPHONE ENCOUNTER
"Requested Prescriptions   Pending Prescriptions Disp Refills     Multiple Vitamins-Minerals (MULTILEX) TABS [Pharmacy Med Name: MULTILEX  TABS] 100 tablet 3     Sig: TAKE ONE TABLET BY MOUTH ONCE DAILY    Vitamin Supplements (Adult) Protocol Passed    1/15/2018 11:52 AM       Passed - High dose Vitamin D not ordered       Passed - Recent or future visit with authorizing provider's specialty    Patient had office visit in the last year or has a visit in the next 30 days with authorizing provider.  See \"Patient Info\" tab in inbasket, or \"Choose Columns\" in Meds & Orders section of the refill encounter.               amoxicillin-clavulanate (AUGMENTIN) 500-125 MG per tablet [Pharmacy Med Name: AMOXICILLIN-POT CLAVUL 500-125 TABS] 40 tablet 0     Sig: TAKE ONE TABLET BY MOUTH TWICE A DAY    There is no refill protocol information for this order        .  "

## 2018-01-15 NOTE — PROGRESS NOTES
KELLY called and left another message for Jose asking him to call me.    Shelly Alanis, [facility]   [phone]

## 2018-01-15 NOTE — TELEPHONE ENCOUNTER
Controlled Substance Refill Request for Norco  Problem List Complete:  Yes  Patient is followed by Yonatan Hurtado MD for ongoing prescription of pain medication.  All refills should only be approved by this provider, or covering partner.    Medication(s): lyrica 60/ month.  Hydrocodone 30 prn  Maximum quantity per month:   Clinic visit frequency required: Q 6  months     Controlled substance agreement:  Encounter-Level CSA:     There are no encounter-level csa.        Pain Clinic evaluation in the past: no  Pt is frail elderly, palliative care, demented  DIRE Total Score(s):  No flowsheet data found.    Last [facility] website verification:  7/5/17   [email or web address]     checked in past 6 months?  Yes 7/5/17     Jen Garcia, Pharmacy [facility]

## 2018-01-16 ENCOUNTER — CARE COORDINATION (OUTPATIENT)
Dept: GERIATRIC MEDICINE | Facility: CLINIC | Age: 83
End: 2018-01-16

## 2018-01-16 NOTE — PROGRESS NOTES
CM called and left another message for client/son to please call me. CM will have CMS send [facility] letter.    [facility]ELIA Blum   [facility]   [phone]

## 2018-01-16 NOTE — PROGRESS NOTES
"Per CC, mailed client an \"Unable to Contact\" letter.    Aditi Arreola  Case Management Specialist  [facility]   [phone]      "

## 2018-01-18 RX ORDER — AMOXICILLIN AND CLAVULANATE POTASSIUM 500; 125 MG/1; MG/1
TABLET, FILM COATED ORAL
Qty: 40 TABLET | Refills: 0 | Status: SHIPPED | OUTPATIENT
Start: 2018-01-18 | End: 2018-02-13

## 2018-01-18 RX ORDER — MULTIVIT-MIN/FERROUS SULFATE 15 MG
TABLET ORAL
Qty: 100 TABLET | Refills: 3 | Status: SHIPPED | OUTPATIENT
Start: 2018-01-18

## 2018-01-18 NOTE — TELEPHONE ENCOUNTER
No showed 12/18/17 appt.  No upcoming appt.  Augmentin not PSO med/diagnosis.  Sent to provider.  Please advise.  Multi vit sent PSO.  Tia Mcconnell RN    11/6/17  ASSESSMENT/PLAN:   ASSESSMENT / PLAN:  (N48.1) Balanitis  (primary encounter diagnosis)  Comment: Improved. Discussed need for episodic care      (M54.5) Midline low back pain without sciatica, unspecified chronicity  Comment:appeasr stable ,recurrent complaoint  Plan: pregabalin (LYRICA) 100 MG capsule,         HYDROcodone-acetaminophen (NORCO) 5-325 MG per         tablet, venlafaxine (EFFEXOR-XR) 75 MG 24 hr         capsule        Discussed with son. Increase Lyrica dose     (I50.42) Chronic combined systolic and diastolic congestive heart failure (H)  Comment: clinically quiescent  Plan: lisinopril (PRINIVIL/ZESTRIL) 10 MG tablet,         carvedilol (COREG) 12.5 MG tablet         (R53.81) Malaise  Comment: cultural acceptance  Plan: penicillin G benzathine (BICILLIN L-A) [id number]         UNIT/2ML injection, C INJECTION, PENICILLIN G         BENZATHINE ,000 UNITS, INJECTION         INTRAMUSCULAR OR SUB-Q        Not infectious indication     (I10) HTN, goal below 140/90  Comment: was lower. If elevation persistent may need med adjustment  Plan: lisinopril (PRINIVIL/ZESTRIL) 10 MG tablet,         carvedilol (COREG) 12.5 MG tablet        Cautious monitoring in this frail elderly pt     Yonatan Hurtado MD     RTC 6 weeks

## 2018-01-24 ENCOUNTER — TELEPHONE (OUTPATIENT)
Dept: FAMILY MEDICINE | Facility: CLINIC | Age: 83
End: 2018-01-24

## 2018-02-13 DIAGNOSIS — R53.81 MALAISE: ICD-10-CM

## 2018-02-13 DIAGNOSIS — Z00.00 ROUTINE GENERAL MEDICAL EXAMINATION AT A HEALTH CARE FACILITY: ICD-10-CM

## 2018-02-13 DIAGNOSIS — M54.50 MIDLINE LOW BACK PAIN WITHOUT SCIATICA, UNSPECIFIED CHRONICITY: ICD-10-CM

## 2018-02-13 RX ORDER — AMOXICILLIN AND CLAVULANATE POTASSIUM 500; 125 MG/1; MG/1
1 TABLET, FILM COATED ORAL 2 TIMES DAILY
Qty: 40 TABLET | Refills: 0 | Status: SHIPPED | OUTPATIENT
Start: 2018-02-13 | End: 2018-03-13

## 2018-02-13 RX ORDER — HYDROCODONE BITARTRATE AND ACETAMINOPHEN 5; 325 MG/1; MG/1
1 TABLET ORAL EVERY 6 HOURS PRN
Qty: 30 TABLET | Refills: 0 | Status: SHIPPED | OUTPATIENT
Start: 2018-02-13 | End: 2018-03-13

## 2018-02-13 NOTE — TELEPHONE ENCOUNTER
Also Augmentin not on protocol; forwarding to provider for refill.      Controlled Substance Refill Request for Norco  Problem List Complete:  Yes    Patient is followed by Yonatan Hurtado MD for ongoing prescription of pain medication.  All refills should only be approved by this provider, or covering partner.    Medication(s): lyrica 60/ month.  Hydrocodone 30 prn  Maximum quantity per month:   Clinic visit frequency required: Q 6  months     Controlled substance agreement:  Encounter-Level CSA:     There are no encounter-level csa.        Pain Clinic evaluation in the past: no  Pt is frail elderly, palliative care, demented  DIRE Total Score(s):  No flowsheet data found.    Last [facility] website verification:  7/5/17   [email or web address]       checked in past 6 months?  Yes 7/5/17     Jen Garcia, Pharmacy [facility]

## 2018-02-14 ENCOUNTER — TELEPHONE (OUTPATIENT)
Dept: FAMILY MEDICINE | Facility: CLINIC | Age: 83
End: 2018-02-14

## 2018-02-14 RX ORDER — AMOXICILLIN AND CLAVULANATE POTASSIUM 500; 125 MG/1; MG/1
TABLET, FILM COATED ORAL
Status: SHIPPED
Start: 2018-02-14 | End: 2018-05-07

## 2018-02-14 NOTE — TELEPHONE ENCOUNTER
Received a 2 page fax from [employer] Physician Order request for Food Thickener. Fax to Dr. Hurtado. Ruth Behrens.

## 2018-02-15 ENCOUNTER — TELEPHONE (OUTPATIENT)
Dept: FAMILY MEDICINE | Facility: CLINIC | Age: 83
End: 2018-02-15

## 2018-02-15 NOTE — TELEPHONE ENCOUNTER
METRIC FAILED: chronic pain  RECOMMENDATIONS: NEEDS CSA    FYI TO BW, please update CSA at next appointment, will need  updated at next appointment or refill   Karla Rodriguez, RN, BSN  Message handled by Nurse Triage.

## 2018-02-19 NOTE — TELEPHONE ENCOUNTER
Pt is oncology patient, palliative care  I believe that excludes him from metrics  Yonatan Hurtado

## 2018-03-13 DIAGNOSIS — R53.81 MALAISE: ICD-10-CM

## 2018-03-13 DIAGNOSIS — M54.50 MIDLINE LOW BACK PAIN WITHOUT SCIATICA, UNSPECIFIED CHRONICITY: ICD-10-CM

## 2018-03-13 DIAGNOSIS — J81.0 ACUTE PULMONARY EDEMA (H): ICD-10-CM

## 2018-03-13 RX ORDER — HYDROCODONE BITARTRATE AND ACETAMINOPHEN 5; 325 MG/1; MG/1
1 TABLET ORAL EVERY 6 HOURS PRN
Qty: 30 TABLET | Refills: 0 | Status: SHIPPED | OUTPATIENT
Start: 2018-03-13 | End: 2018-04-16

## 2018-03-13 NOTE — TELEPHONE ENCOUNTER
Controlled Substance Refill Request for norco  Problem List Complete:  Yes    Patient is followed by Yonatan Hurtado MD for ongoing prescription of pain medication.  All refills should only be approved by this provider, or covering partner.    Medication(s): lyrica 60/ month.  Hydrocodone 30 prn  Maximum quantity per month:   Clinic visit frequency required: Q 6  months     Controlled substance agreement:  Encounter-Level CSA:     There are no encounter-level csa.        Pain Clinic evaluation in the past: no  Pt is frail elderly, palliative care, demented  DIRE Total Score(s):  No flowsheet data found.    Last [facility] website verification:  7/5/17   [email or web address]   checked in past 6 months?  Yes 7/15/17     Jen Garcia, Pharmacy [facility]

## 2018-03-14 NOTE — TELEPHONE ENCOUNTER
"amoxicillin-clavulanate (AUGMENTIN) 500-125 MG per tablet      Last Written Prescription Date:  2/13/18  Last Fill Quantity: 40,   # refills: 0  Last Office Visit with [facility], [facility] or [facility] prescribing provider: 11/6/2017       Routing refill request to provider for review/approval because:  Drug not on the [facility], [facility] or [facility] refill protocol or controlled substance    ________________________________________________________________________________    Requested Prescriptions   Pending Prescriptions Disp Refills     bumetanide (BUMEX) 1 MG tablet [Pharmacy Med Name: BUMETANIDE 1MG TABS] 30 tablet 3    Last Written Prescription Date:  11/14/17  Last Fill Quantity: 30,  # refills: 3   Last Office Visit: 11/6/2017   Future Office Visit:      Sig: TAKE ONE TABLET BY MOUTH EVERY DAY    Diuretics (Including Combos) Protocol Failed    3/13/2018  4:14 PM       Failed - Blood pressure under 140/90 in past 12 months    BP Readings from Last 3 Encounters:   11/06/17 166/83   09/25/17 146/69   07/17/17 110/60                Passed - Recent (12 mo) or future (30 days) visit within the authorizing provider's specialty    Patient had office visit in the last 12 months or has a visit in the next 30 days with authorizing provider or within the authorizing provider's specialty.  See \"Patient Info\" tab in inbasket, or \"Choose Columns\" in Meds & Orders section of the refill encounter.           Passed - Patient is age 18 or older       Passed - Normal serum creatinine on file in past 12 months    Recent Labs   Lab Test  09/25/17 1847   CR  1.22             Passed - Normal serum potassium on file in past 12 months    Recent Labs   Lab Test  09/25/17 1847   POTASSIUM  4.4                   Passed - Normal serum sodium on file in past 12 months    Recent Labs   Lab Test  09/25/17 1847   NA  141           "

## 2018-03-15 RX ORDER — AMOXICILLIN AND CLAVULANATE POTASSIUM 500; 125 MG/1; MG/1
TABLET, FILM COATED ORAL
Qty: 40 TABLET | Refills: 0 | Status: SHIPPED | OUTPATIENT
Start: 2018-03-15 | End: 2018-04-14

## 2018-03-15 RX ORDER — BUMETANIDE 1 MG/1
TABLET ORAL
Qty: 30 TABLET | Refills: 5 | Status: ON HOLD | OUTPATIENT
Start: 2018-03-15 | End: 2019-02-01

## 2018-03-15 NOTE — TELEPHONE ENCOUNTER
Augmentin-last refilled 2/13/18.  Bumex-last 2 BP's elevated.  No showed recheck appt.  Sent to provider.  Please advise.  Tia Mcconnell RN    Associated Diagnoses      Malaise [R53.81]               BP Readings from Last 6 Encounters:   11/06/17 166/83   09/25/17 146/69   07/17/17 110/60   06/19/17 128/52   05/22/17 111/52   05/08/17 176/66     Component      Latest Ref Rng & Units 9/25/2017   Sodium      133 - 144 mmol/L 141   Potassium      3.4 - 5.3 mmol/L 4.4   Chloride      94 - 109 mmol/L 106   Carbon Dioxide      20 - 32 mmol/L 30   Anion Gap      3 - 14 mmol/L 5   Glucose      70 - 99 mg/dL 115 (H)   Urea Nitrogen      7 - 30 mg/dL 19   Creatinine      0.66 - 1.25 mg/dL 1.22   GFR Estimate      >60 mL/min/1.7m2 56 (L)   GFR Estimate If Black      >60 mL/min/1.7m2 68   Calcium      8.5 - 10.1 mg/dL 9.4     11/6/17  ASSESSMENT/PLAN:   ASSESSMENT / PLAN:  (N48.1) Balanitis  (primary encounter diagnosis)  Comment: Improved. Discussed need for episodic care      (M54.5) Midline low back pain without sciatica, unspecified chronicity  Comment:appeasr stable ,recurrent complaoint  Plan: pregabalin (LYRICA) 100 MG capsule,         HYDROcodone-acetaminophen (NORCO) 5-325 MG per         tablet, venlafaxine (EFFEXOR-XR) 75 MG 24 hr         capsule        Discussed with son. Increase Lyrica dose     (I50.42) Chronic combined systolic and diastolic congestive heart failure (H)  Comment: clinically quiescent  Plan: lisinopril (PRINIVIL/ZESTRIL) 10 MG tablet,         carvedilol (COREG) 12.5 MG tablet         (R53.81) Malaise  Comment: cultural acceptance  Plan: penicillin G benzathine (BICILLIN L-A) [id number]         UNIT/2ML injection, C INJECTION, PENICILLIN G         BENZATHINE ,000 UNITS, INJECTION         INTRAMUSCULAR OR SUB-Q        Not infectious indication     (I10) HTN, goal below 140/90  Comment: was lower. If elevation persistent may need med adjustment  Plan: lisinopril (PRINIVIL/ZESTRIL) 10 MG tablet,          carvedilol (COREG) 12.5 MG tablet        Cautious monitoring in this frail elderly pt        Yonatan Hurtado MD         RTC 6 weeks      The information in this document, created by the medical scribe for me, accurately reflects the services I personally performed and the decisions made by me. I have reviewed and approved this document for accuracy prior to leaving the patient care area.  Yonatan Hurtado MD November 6, 2017 6:12 PM     Yonatan Hurtado MD  [facility]

## 2018-04-14 DIAGNOSIS — R53.81 MALAISE: ICD-10-CM

## 2018-04-16 DIAGNOSIS — M54.50 MIDLINE LOW BACK PAIN WITHOUT SCIATICA, UNSPECIFIED CHRONICITY: ICD-10-CM

## 2018-04-16 RX ORDER — AMOXICILLIN AND CLAVULANATE POTASSIUM 500; 125 MG/1; MG/1
TABLET, FILM COATED ORAL
Qty: 40 TABLET | Refills: 0 | Status: SHIPPED | OUTPATIENT
Start: 2018-04-16 | End: 2018-05-07

## 2018-04-16 NOTE — TELEPHONE ENCOUNTER
Controlled Substance Refill Request for Norco  Problem List Complete:  Yes    Patient is followed by Yonatan Hurtado MD for ongoing prescription of pain medication.  All refills should only be approved by this provider, or covering partner.    Medication(s): lyrica 60/ month.  Hydrocodone 30 prn  Maximum quantity per month:   Clinic visit frequency required: Q 6  Months Last Office Visit: 11/6/17    Controlled substance agreement:  Encounter-Level CSA:     There are no encounter-level csa.        Pain Clinic evaluation in the past: no  Pt is frail elderly, palliative care, demented  DIRE Total Score(s):  No flowsheet data found.    Last [facility] website verification:  7/5/17   [email or web address]     checked in past 6 months?  Yes no     Jen Garcia, Pharmacy [facility]

## 2018-04-16 NOTE — LETTER
[facility]  [address] [place], [address]  [phone]        April 17, 2018    Barrie Woods                                                                                                                                 [address]  [facility] [address]            We recently received a call from your pharmacy requesting a refill of Hydrocodone/Acet.     A review of your chart indicates that an appointment is required with your provider for 6 month med check-due 5/6/18. Please call the clinic at [phone] to schedule your appointment.     We have authorized one refill of your medication to allow time for you to schedule your appointment.      Taking care of your health is important to us and ongoing visits with your provider are vital to your care.  We look forward to seeing you in the near future.     Sincerely,     [facility]

## 2018-04-17 RX ORDER — HYDROCODONE BITARTRATE AND ACETAMINOPHEN 5; 325 MG/1; MG/1
1 TABLET ORAL EVERY 6 HOURS PRN
Qty: 30 TABLET | Refills: 0 | Status: SHIPPED | OUTPATIENT
Start: 2018-04-17 | End: 2018-05-14

## 2018-04-17 NOTE — TELEPHONE ENCOUNTER
updated.  No concerns noted.  Last RF 3/14/18 #30.  6 month med check due 5/6/18.  Letter sent.  Not PSO med.  Sent to provider.  Tia Mcconnell RN    11/6/17      ASSESSMENT/PLAN:   ASSESSMENT / PLAN:  (N48.1) Balanitis  (primary encounter diagnosis)  Comment: Improved. Discussed need for episodic care      (M54.5) Midline low back pain without sciatica, unspecified chronicity  Comment:appeasr stable ,recurrent complaoint  Plan: pregabalin (LYRICA) 100 MG capsule,         HYDROcodone-acetaminophen (NORCO) 5-325 MG per         tablet, venlafaxine (EFFEXOR-XR) 75 MG 24 hr         capsule        Discussed with son. Increase Lyrica dose     (I50.42) Chronic combined systolic and diastolic congestive heart failure (H)  Comment: clinically quiescent  Plan: lisinopril (PRINIVIL/ZESTRIL) 10 MG tablet,         carvedilol (COREG) 12.5 MG tablet         (R53.81) Malaise  Comment: cultural acceptance  Plan: penicillin G benzathine (BICILLIN L-A) [id number]         UNIT/2ML injection, C INJECTION, PENICILLIN G         BENZATHINE ,000 UNITS, INJECTION         INTRAMUSCULAR OR SUB-Q        Not infectious indication     (I10) HTN, goal below 140/90  Comment: was lower. If elevation persistent may need med adjustment  Plan: lisinopril (PRINIVIL/ZESTRIL) 10 MG tablet,         carvedilol (COREG) 12.5 MG tablet        Cautious monitoring in this frail elderly pt      RTC 6 weeks      The information in this document, created by the medical scribe for me, accurately reflects the services I personally performed and the decisions made by me. I have reviewed and approved this document for accuracy prior to leaving the patient care area.  Yonatan Hurtado MD November 6, 2017 6:12 PM     Yonatan Hurtado MD

## 2018-05-07 ENCOUNTER — OFFICE VISIT (OUTPATIENT)
Dept: FAMILY MEDICINE | Facility: CLINIC | Age: 83
End: 2018-05-07
Payer: COMMERCIAL

## 2018-05-07 VITALS
WEIGHT: 172 LBS | HEART RATE: 81 BPM | TEMPERATURE: 98.1 F | OXYGEN SATURATION: 96 % | BODY MASS INDEX: 27.76 KG/M2 | SYSTOLIC BLOOD PRESSURE: 118 MMHG | DIASTOLIC BLOOD PRESSURE: 62 MMHG

## 2018-05-07 DIAGNOSIS — K92.1 HEMATOCHEZIA: Primary | ICD-10-CM

## 2018-05-07 DIAGNOSIS — I50.42 CHRONIC COMBINED SYSTOLIC AND DIASTOLIC CONGESTIVE HEART FAILURE (H): ICD-10-CM

## 2018-05-07 DIAGNOSIS — M25.562 LEFT KNEE PAIN, UNSPECIFIED CHRONICITY: ICD-10-CM

## 2018-05-07 DIAGNOSIS — I10 HTN, GOAL BELOW 140/90: ICD-10-CM

## 2018-05-07 DIAGNOSIS — M25.561 RIGHT KNEE PAIN, UNSPECIFIED CHRONICITY: ICD-10-CM

## 2018-05-07 DIAGNOSIS — M54.50 MIDLINE LOW BACK PAIN WITHOUT SCIATICA, UNSPECIFIED CHRONICITY: ICD-10-CM

## 2018-05-07 DIAGNOSIS — R97.20 PSA ELEVATION: ICD-10-CM

## 2018-05-07 DIAGNOSIS — C64.9 RENAL CELL CARCINOMA, UNSPECIFIED LATERALITY (H): ICD-10-CM

## 2018-05-07 LAB
BASOPHILS # BLD AUTO: 0 10E9/L (ref 0–0.2)
BASOPHILS NFR BLD AUTO: 0.3 %
DIFFERENTIAL METHOD BLD: ABNORMAL
EOSINOPHIL # BLD AUTO: 1.1 10E9/L (ref 0–0.7)
EOSINOPHIL NFR BLD AUTO: 9.4 %
ERYTHROCYTE [DISTWIDTH] IN BLOOD BY AUTOMATED COUNT: 14.2 % (ref 10–15)
HCT VFR BLD AUTO: 44 % (ref 40–53)
HGB BLD-MCNC: 14.7 G/DL (ref 13.3–17.7)
LYMPHOCYTES # BLD AUTO: 2.2 10E9/L (ref 0.8–5.3)
LYMPHOCYTES NFR BLD AUTO: 19.3 %
MCH RBC QN AUTO: 31.5 PG (ref 26.5–33)
MCHC RBC AUTO-ENTMCNC: 33.4 G/DL (ref 31.5–36.5)
MCV RBC AUTO: 94 FL (ref 78–100)
MONOCYTES # BLD AUTO: 1.2 10E9/L (ref 0–1.3)
MONOCYTES NFR BLD AUTO: 10.3 %
NEUTROPHILS # BLD AUTO: 6.8 10E9/L (ref 1.6–8.3)
NEUTROPHILS NFR BLD AUTO: 60.7 %
PLATELET # BLD AUTO: 225 10E9/L (ref 150–450)
RBC # BLD AUTO: 4.67 10E12/L (ref 4.4–5.9)
WBC # BLD AUTO: 11.2 10E9/L (ref 4–11)

## 2018-05-07 PROCEDURE — 99214 OFFICE O/P EST MOD 30 MIN: CPT | Performed by: FAMILY MEDICINE

## 2018-05-07 PROCEDURE — 80053 COMPREHEN METABOLIC PANEL: CPT | Performed by: FAMILY MEDICINE

## 2018-05-07 PROCEDURE — 36415 COLL VENOUS BLD VENIPUNCTURE: CPT | Performed by: FAMILY MEDICINE

## 2018-05-07 PROCEDURE — 85025 COMPLETE CBC W/AUTO DIFF WBC: CPT | Performed by: FAMILY MEDICINE

## 2018-05-07 PROCEDURE — 84153 ASSAY OF PSA TOTAL: CPT | Performed by: FAMILY MEDICINE

## 2018-05-07 RX ORDER — PREGABALIN 100 MG/1
100 CAPSULE ORAL 2 TIMES DAILY
Qty: 60 CAPSULE | Refills: 5 | Status: SHIPPED | OUTPATIENT
Start: 2018-05-07 | End: 2019-01-27

## 2018-05-07 RX ORDER — VENLAFAXINE HYDROCHLORIDE 75 MG/1
75 CAPSULE, EXTENDED RELEASE ORAL DAILY
Qty: 30 CAPSULE | Refills: 5 | Status: SHIPPED | OUTPATIENT
Start: 2018-05-07 | End: 2018-11-26

## 2018-05-07 RX ORDER — LISINOPRIL 10 MG/1
10 TABLET ORAL DAILY
Qty: 30 TABLET | Refills: 5 | Status: SHIPPED | OUTPATIENT
Start: 2018-05-07 | End: 2018-11-23

## 2018-05-07 RX ORDER — CARVEDILOL 12.5 MG/1
12.5 TABLET ORAL 2 TIMES DAILY WITH MEALS
Qty: 60 TABLET | Refills: 5 | Status: SHIPPED | OUTPATIENT
Start: 2018-05-07 | End: 2019-03-19

## 2018-05-07 NOTE — PROGRESS NOTES
SUBJECTIVE:   Barrie Woods is a 88 year old male who presents to clinic today for the following health issues:    Hematochezia  (primary encounter diagnosis) Diarrhea with blood, resolved when son stopped  omeprazole and noticed that the diarrhea stopped, but the hematochezia continued. Quant unclear   Midline low back pain without sciatica, unspecified chronicity. Long standing persistent. Pt requests penicillin injection, cultural Rx for illness    Rectal Problem blood in stool     Edema swelling in feet     PSA elevation, in past, no interventions  Chronic combined systolic and diastolic congestive heart failure (H), quiescent  HTN, goal below 140/90   BP Readings from Last 3 Encounters:   05/07/18 118/62   11/06/17 166/83   09/25/17 146/69   Left knee pain, unspecified chronicity, new complaint  Right knee pain, unspecified chronicity, new complaint      Problem list and histories reviewed & adjusted, as indicated.  Additional history: as documented    Past Medical History:   Diagnosis Date     Acute, but ill-defined, cerebrovascular disease 1/04    left middle cerebral artery infarct     Alcohol abuse      Anemia 7/14/2014     Atrial fibrillation (H)     CVA occurred off anticoagulation     Cachexia (H) 1/9/2018     Cardiomyopathy (H)     stress tests 6/04 and 10/04 negative for ischemia, EF 36-40%     CHF (congestive heart failure) (H) 12/24/2009     Chronic pain 10/12/2015     Dementia without behavioral disturbance, unspecified dementia type 5/9/2017     Dementia, without behavioral disturbance 6/29/2015     Depressive disorder      Diaphragmatic hernia without mention of obstruction or gangrene     left sided chest pain, hiatal hernia/gastritis     Diverticulosis of colon (without mention of hemorrhage)     sigmoid     Dysphagia 7/27/2015     Elevated blood sugar 1/25/2016     Esophageal reflux      Hearing loss      Hematuria     negative cystoscopy 3/04     Hereditary and idiopathic peripheral  neuropathy 2006    EMG-peripheral polyneuropathy, B12 and VitD deficient, ? alcohol related     Hx of congestive heart failure 1/12/2016     Hypertrophy of prostate 6/17/2015     Nausea with vomiting 12/8/2014     Presbycusis of both ears 7/17/2017     PSA elevation 5/30/2014     Recurrent falls 7/28/2015     Renal cell carcinoma (H) 8/25/2014     Sepsis (H) 1/7/2015     TB lung, latent 5/6/2014     Urinary incontinence 7/28/2015       No past surgical history on file.    Family History   Problem Relation Age of Onset     C.A.D. No family hx of      DIABETES No family hx of        Social History   Substance Use Topics     Smoking status: Current Every Day Smoker     Types: Dip, chew, snus or snuff     Smokeless tobacco: Current User     Types: Chew     Last attempt to quit: 1/9/2004      Comment: pt chews tobacco     Alcohol use No     Limited interventions    Reviewed and updated as needed this visit by clinical staff  Tobacco  Allergies  Meds       Reviewed and updated as needed this visit by Provider         ROS:  RESP: NEGATIVE for cough and SOB  ABDOMEN: POSITIVE for lower  abdh pain no HB  MS: POSITIVE for right lower back pain and bilateral shoulder pain. POSITIVE for bilateral knee pain and swelling.   GI: POSITIVE for hematochezia       This document serves as a record of the services and decisions personally performed and made by Yonatan Hurtado MD. It was created on his behalf by Kong Escalera, a trained medical scribe.  The creation of this document is based on the scribe's personal observations and the provider's statements to the medical scribe.  Kong Escalera, May 7, 2018 7:03 PM    OBJECTIVE:     /62 (BP Location: Right arm, Patient Position: Chair, Cuff Size: Adult Regular)  Pulse 81  Temp 98.1  F (36.7  C) (Oral)  Wt 78 kg (172 lb)  SpO2 96%  BMI 27.76 kg/m2  Body mass index is 27.76 kg/(m^2).    MS: knees warm, no effusion   GI: No external hemorrhoids n ofissures     ASSESSMENT/PLAN:    ASSESSMENT / PLAN:  (K92.1) Hematochezia  (primary encounter diagnosis)  Comment: discussed differential, exam available  Plan: CBC with platelets and differential 1st. Son not interested in more extensive eval, may accept CT            (M54.5) Midline low back pain without sciatica, unspecified chronicity  Comment: refill   Plan: venlafaxine (EFFEXOR-XR) 75 MG 24 hr capsule,         pregabalin (LYRICA) 100 MG capsule, add diclofenac         sodium 3 % GEL            (R97.20) PSA elevation  Comment: may cause pain, hematochezia may be related to vascular changes. Likely growing  Plan: PSA, tumor marker            (I50.42) Chronic combined systolic and diastolic congestive heart failure (H)  Comment:silent, controlled refilled   Plan: carvedilol (COREG) 12.5 MG tablet, lisinopril         (PRINIVIL/ZESTRIL) 10 MG tablet            (I10) HTN, goal below 140/90  Comment: controlled  Plan: Comprehensive metabolic panel, carvedilol         (COREG) 12.5 MG tablet, lisinopril         (PRINIVIL/ZESTRIL) 10 MG tablet            (M25.562) Left knee pain, unspecified chronicity  Comment: add  Plan: diclofenac sodium 3 % GEL. Son believes pt would not cooperate with cortisone injection            (M25.561) Right knee pain, unspecified chronicity  Comment: add  Plan: diclofenac sodium 3 % GEL. Son believes pt would not cooperate with cortisone injection                RTC 1 month     Yonatan Hurtado MD        RTC 1 month  The information in this document, created by the medical scribe for me, accurately reflects the services I personally performed and the decisions made by me. I have reviewed and approved this document for accuracy prior to leaving the patient care area.  Yonatan Hurtado MD May 7, 2018 7:02 PM

## 2018-05-07 NOTE — MR AVS SNAPSHOT
After Visit Summary   5/7/2018    Barrie Woods    MRN: [id number]           Patient Information     Date Of Birth          1/25/1930        Visit Information        Provider Department      5/7/2018 6:15 PM Yonatan Hurtado MD; [employer] [facility]        Today's Diagnoses     Hematochezia    -  1    Renal cell carcinoma, unspecified laterality (H)        Midline low back pain without sciatica, unspecified chronicity        PSA elevation        Chronic combined systolic and diastolic congestive heart failure (H)        HTN, goal below 140/90        Left knee pain, unspecified chronicity        Right knee pain, unspecified chronicity           Follow-ups after your visit        Follow-up notes from your care team     Return in about 4 weeks (around 6/4/2018).      Your next 10 appointments already scheduled     Jun 04, 2018  5:45 PM CDT   Office Visit with Yonatan Hurtado MD   [facility] ([facility]    [address] [place] [address] [phone]           Bring a current list of meds and any records pertaining to this visit. For Physicals, please bring immunization records and any forms needing to be filled out. Please arrive 10 minutes early to complete paperwork.              Who to contact     If you have questions or need follow up information about today's clinic visit or your schedule please contact [facility] directly at [phone].  Normal or non-critical lab and imaging results will be communicated to you by MyChart, letter or phone within 4 business days after the clinic has received the results. If you do not hear from us within 7 days, please contact the clinic through MyChart or phone. If you have a critical or abnormal lab result, we will notify you by phone as soon as possible.  Submit refill requests through [employer] or call your pharmacy and they will forward the  "refill request to us. Please allow 3 business days for your refill to be completed.          Additional Information About Your Visit        [employer] Information     [employer] lets you send messages to your doctor, view your test results, renew your prescriptions, schedule appointments and more. To sign up, go to [email or web address] . Click on \"Log in\" on the left side of the screen, which will take you to the Welcome page. Then click on \"Sign up Now\" on the right side of the page.     You will be asked to enter the access code listed below, as well as some personal information. Please follow the directions to create your username and password.     Your access code is: [email or web address]  Expires: 2018  7:05 PM     Your access code will  in 90 days. If you need help or a new code, please call your [facility] clinic or [phone].        Care EveryWhere ID     This is your Care EveryWhere ID. This could be used by other organizations to access your [facility] medical records  [id number]        Your Vitals Were     Pulse Temperature Pulse Oximetry BMI (Body Mass Index)          81 98.1  F (36.7  C) (Oral) 96% 27.76 kg/m2         Blood Pressure from Last 3 Encounters:   18 118/62   17 166/83   17 146/69    Weight from Last 3 Encounters:   18 172 lb (78 kg)   17 166 lb (75.3 kg)   17 170 lb (77.1 kg)              We Performed the Following     CBC with platelets and differential     Comprehensive metabolic panel     PSA, tumor marker          Today's Medication Changes          These changes are accurate as of 18 11:59 PM.  If you have any questions, ask your nurse or doctor.               Start taking these medicines.        Dose/Directions    diclofenac sodium 3 % Gel   Used for:  Midline low back pain without sciatica, unspecified chronicity, Left knee pain, unspecified chronicity, Right knee pain, unspecified chronicity   Started by:  Yonatan Hurtado MD        Dose:  4 g "   Place 4 g onto the skin 4 times daily   Quantity:  199 g   Refills:  11       penicillin G benzathine [id number] UNIT/2ML injection   Commonly known as:  BICILLIN L-A   Used for:  Renal cell carcinoma, unspecified laterality (H)   Started by:  Yonatan Hurtado MD        Dose:  2 mL   Inject 2 mLs (1.2 Million Units) into the muscle once for 1 dose   Quantity:  2 mL   Refills:  0         Stop taking these medicines if you haven't already. Please contact your care team if you have questions.     amoxicillin-clavulanate 500-125 MG per tablet   Commonly known as:  AUGMENTIN   Stopped by:  Yonatan Hurtado MD                Where to get your medicines      These medications were sent to [facility] [id number] [place] [address]  [id number] [place] [address]     Phone:  [phone]     carvedilol 12.5 MG tablet    diclofenac sodium 3 % Gel    lisinopril 10 MG tablet    penicillin G benzathine [id number] UNIT/2ML injection    venlafaxine 75 MG 24 hr capsule         Some of these will need a paper prescription and others can be bought over the counter.  Ask your nurse if you have questions.     Bring a paper prescription for each of these medications     pregabalin 100 MG capsule                Primary Care Provider Office Phone # Fax #    Yonatan Hurtado [phone] [address] [place] [address]        Equal Access to Services     [facility] CenterJULIANNA AH: Hadii liborio cruz hadasho Soomaali, waaxda luqadaha, qaybta kaalmada henrikyada, asia jeffers . So [facility] [phone].    ATENCIÓN: Si habla español, tiene a maldonado disposición servicios gratuitos de asistencia lingüística. Gavino al [phone].    We comply with applicable federal civil rights laws and [place] laws. We do not discriminate on the basis of race, color, national origin, age, disability, sex, sexual orientation, or gender identity.            Thank you!     Thank you for choosing [facility]  for your care. Our goal is always to provide you with excellent care. Hearing back from our patients is one way we can continue to improve our services. Please take a few minutes to complete the written survey that you may receive in the mail after your visit with us. Thank you!             Your Updated Medication List - Protect others around you: Learn how to safely use, store and throw away your medicines at www.disposemymeds.org.          This list is accurate as of 5/7/18 11:59 PM.  Always use your most recent med list.                   Brand Name Dispense Instructions for use Diagnosis    acetaminophen 500 MG tablet    TYLENOL    84 tablet    Take 2 tablets (1,000 mg) by mouth every 6 hours as needed for mild pain    Malaise       bumetanide 1 MG tablet    BUMEX    30 tablet    TAKE ONE TABLET BY MOUTH EVERY DAY    Acute pulmonary edema (H)       carvedilol 12.5 MG tablet    COREG    60 tablet    Take 1 tablet (12.5 mg) by mouth 2 times daily (with meals)    Chronic combined systolic and diastolic congestive heart failure (H), HTN, goal below 140/90       diclofenac sodium 3 % Gel     199 g    Place 4 g onto the skin 4 times daily    Midline low back pain without sciatica, unspecified chronicity, Left knee pain, unspecified chronicity, Right knee pain, unspecified chronicity       HYDROcodone-acetaminophen 5-325 MG per tablet    NORCO    30 tablet    Take 1 tablet by mouth every 6 hours as needed for moderate to severe pain    Midline low back pain without sciatica, unspecified chronicity       lisinopril 10 MG tablet    PRINIVIL/ZESTRIL    30 tablet    Take 1 tablet (10 mg) by mouth daily    Chronic combined systolic and diastolic congestive heart failure (H), HTN, goal below 140/90       magnesium oxide 400 (241.3 Mg) MG tablet    MAG-OX    30 tablet    TAKE 1 TABLET BY MOUTH ONCE DAILY    Routine general medical examination at a health care facility       MULTILEX Tabs     100 tablet     TAKE ONE TABLET BY MOUTH ONCE DAILY    Routine general medical examination at a health care facility       order for DME     1 Device    Equipment being ordered: walker with slides    Recurrent falls       penicillin G benzathine [id number] UNIT/2ML injection    BICILLIN L-A    2 mL    Inject 2 mLs (1.2 Million Units) into the muscle once for 1 dose    Renal cell carcinoma, unspecified laterality (H)       pregabalin 100 MG capsule    LYRICA    60 capsule    Take 1 capsule (100 mg) by mouth 2 times daily    Midline low back pain without sciatica, unspecified chronicity       venlafaxine 75 MG 24 hr capsule    EFFEXOR-XR    30 capsule    Take 1 capsule (75 mg) by mouth daily    Midline low back pain without sciatica, unspecified chronicity

## 2018-05-07 NOTE — LETTER
May 10, 2018      Barrie Woods  [address] [facility] [address]        Dear ,    We are writing to inform you of your test results.    Todo es el mismo. Brock!   YONATAN MORROW     Resulted Orders   Comprehensive metabolic panel   Result Value Ref Range    Sodium 142 133 - 144 mmol/L    Potassium 4.5 3.4 - 5.3 mmol/L    Chloride 105 94 - 109 mmol/L    Carbon Dioxide 28 20 - 32 mmol/L    Anion Gap 9 3 - 14 mmol/L    Glucose 122 (H) 70 - 99 mg/dL    Urea Nitrogen 29 7 - 30 mg/dL    Creatinine 1.44 (H) 0.66 - 1.25 mg/dL    GFR Estimate 46 (L) >60 mL/min/1.7m2      Comment:      Non  GFR Calc    GFR Estimate If Black 56 (L) >60 mL/min/1.7m2      Comment:       GFR Calc    Calcium 9.5 8.5 - 10.1 mg/dL    Bilirubin Total 0.6 0.2 - 1.3 mg/dL    Albumin 3.7 3.4 - 5.0 g/dL    Protein Total 7.5 6.8 - 8.8 g/dL    Alkaline Phosphatase 124 40 - 150 U/L    ALT 35 0 - 70 U/L    AST 25 0 - 45 U/L   CBC with platelets and differential   Result Value Ref Range    WBC 11.2 (H) 4.0 - 11.0 10e9/L    RBC Count 4.67 4.4 - 5.9 10e12/L    Hemoglobin 14.7 13.3 - 17.7 g/dL    Hematocrit 44.0 40.0 - 53.0 %    MCV 94 78 - 100 fl    MCH 31.5 26.5 - 33.0 pg    MCHC 33.4 31.5 - 36.5 g/dL    RDW 14.2 10.0 - 15.0 %    Platelet Count 225 150 - 450 10e9/L    Diff Method Automated Method     % Neutrophils 60.7 %    % Lymphocytes 19.3 %    % Monocytes 10.3 %    % Eosinophils 9.4 %    % Basophils 0.3 %    Absolute Neutrophil 6.8 1.6 - 8.3 10e9/L    Absolute Lymphocytes 2.2 0.8 - 5.3 10e9/L    Absolute Monocytes 1.2 0.0 - 1.3 10e9/L    Absolute Eosinophils 1.1 (H) 0.0 - 0.7 10e9/L    Absolute Basophils 0.0 0.0 - 0.2 10e9/L   PSA, tumor marker   Result Value Ref Range    PSA 12.80 (H) 0 - 4 ug/L      Comment:      Assay Method:  Chemiluminescence using Siemens Vista analyzer       If you have any questions or concerns, please call the clinic at the number listed above.       Sincerely,        Yonatan FUCHS  MD Bryant/MM

## 2018-05-08 ENCOUNTER — TELEPHONE (OUTPATIENT)
Dept: FAMILY MEDICINE | Facility: CLINIC | Age: 83
End: 2018-05-08

## 2018-05-08 DIAGNOSIS — M17.0 PRIMARY OSTEOARTHRITIS OF BOTH KNEES: Primary | ICD-10-CM

## 2018-05-08 LAB
ALBUMIN SERPL-MCNC: 3.7 G/DL (ref 3.4–5)
ALP SERPL-CCNC: 124 U/L (ref 40–150)
ALT SERPL W P-5'-P-CCNC: 35 U/L (ref 0–70)
ANION GAP SERPL CALCULATED.3IONS-SCNC: 9 MMOL/L (ref 3–14)
AST SERPL W P-5'-P-CCNC: 25 U/L (ref 0–45)
BILIRUB SERPL-MCNC: 0.6 MG/DL (ref 0.2–1.3)
BUN SERPL-MCNC: 29 MG/DL (ref 7–30)
CALCIUM SERPL-MCNC: 9.5 MG/DL (ref 8.5–10.1)
CHLORIDE SERPL-SCNC: 105 MMOL/L (ref 94–109)
CO2 SERPL-SCNC: 28 MMOL/L (ref 20–32)
CREAT SERPL-MCNC: 1.44 MG/DL (ref 0.66–1.25)
GFR SERPL CREATININE-BSD FRML MDRD: 46 ML/MIN/1.7M2
GLUCOSE SERPL-MCNC: 122 MG/DL (ref 70–99)
POTASSIUM SERPL-SCNC: 4.5 MMOL/L (ref 3.4–5.3)
PROT SERPL-MCNC: 7.5 G/DL (ref 6.8–8.8)
PSA SERPL-MCNC: 12.8 UG/L (ref 0–4)
SODIUM SERPL-SCNC: 142 MMOL/L (ref 133–144)

## 2018-05-08 NOTE — TELEPHONE ENCOUNTER
Pt had a new script for Diclofenac 3% gel prescribed today.  This requires a PA (1% is not covered either); for PA please provide reasoning / documentation and forward to PA team at [phone]    PA NEEDED ON: Diclofenac 3% gel  INS IS: [employer]  Bin: [id number]  PHONE # IS: [phone]  ID # IS: [id number]  Group: [id number]  Pcn: [id number]  PLEASE LET US KNOW WHEN PA IS GRANTED/DENIED.  THANK YOU!  Jen Garcia, Pharmacy [facility]

## 2018-05-09 NOTE — TELEPHONE ENCOUNTER
PA Initiation    Medication: DICLOFENAC 3% GEL  Insurance Company: [employer] - Phone [phone] Fax [phone]  Pharmacy Filling the Rx: [place], [place] - [address]  Filling Pharmacy Phone: [phone]  Filling Pharmacy Fax:    Start Date: 5/9/2018    Request has been manually faxed to insurance.

## 2018-05-10 NOTE — TELEPHONE ENCOUNTER
Insurance is requesting an ICD 10 code for osteoarthritis. Can you please provide one? I don't see one in the patients chart for osteoarthritis. Thank you!

## 2018-05-14 ENCOUNTER — TELEPHONE (OUTPATIENT)
Dept: FAMILY MEDICINE | Facility: CLINIC | Age: 83
End: 2018-05-14

## 2018-05-14 DIAGNOSIS — M54.50 MIDLINE LOW BACK PAIN WITHOUT SCIATICA, UNSPECIFIED CHRONICITY: ICD-10-CM

## 2018-05-14 DIAGNOSIS — Z00.00 ROUTINE GENERAL MEDICAL EXAMINATION AT A HEALTH CARE FACILITY: ICD-10-CM

## 2018-05-14 RX ORDER — HYDROCODONE BITARTRATE AND ACETAMINOPHEN 5; 325 MG/1; MG/1
1 TABLET ORAL EVERY 6 HOURS PRN
Qty: 30 TABLET | Refills: 0 | Status: SHIPPED | OUTPATIENT
Start: 2018-05-14 | End: 2018-07-06

## 2018-05-14 NOTE — TELEPHONE ENCOUNTER
magnesium oxide (MAG-OX) 400 (241.3 Mg) MG tablet      Last Written Prescription Date:  2/14/2018  Last Fill Quantity: 30 tablet,   # refills: 5  Last Office Visit: 5/7/2018, Bryant    Future Office visit:    Next 5 appointments (look out 90 days)     Jun 04, 2018  5:45 PM CDT   Office Visit with [facility]atan [facility]ado MD   [facility]    [address] [place] [address] [phone]                   Routing refill request to provider for review/approval because:  Drug not on the [facility], [facility] or [facility] refill protocol or controlled substance

## 2018-05-14 NOTE — TELEPHONE ENCOUNTER
PRIOR AUTHORIZATION DENIED    Medication: DICLOFENAC 3% GEL - DENIED    Denial Date: 5/11/2018    Denial Rational:     Appeal Information: PLEASE SEE ABOVE

## 2018-05-14 NOTE — TELEPHONE ENCOUNTER
Central Prior Authorization Team   Phone: [phone]      PA Initiation    Medication: PA on Voltaren 1% gel  Insurance Company: [employer] - Phone [phone] Fax [phone]  Pharmacy Filling the Rx: [facility] [address]  Filling Pharmacy Phone: [phone]  Filling Pharmacy Fax: [phone]  Start Date: 5/14/2018    Manually faxed in P/A request.

## 2018-05-14 NOTE — TELEPHONE ENCOUNTER
Changed to approved medication and Diagnosis now need to re-do PA    New Diagnosis M170 Primary osteoarthritis of both knees     PA NEEDED ON: Dicolofenac 1% gel  INS IS: [employer]  Bin: [id number]  PHONE # IS: [phone]  ID # IS: [id number]  Group: [id number]  Pcn: [id number]  PLEASE LET US KNOW WHEN PA IS GRANTED/DENIED.  THANK YOU!  Jen Garcia, Pharmacy [facility]

## 2018-05-14 NOTE — TELEPHONE ENCOUNTER
Controlled Substance Refill Request for Norco  Problem List Complete:  Yes    Patient is followed by Yonatan Hurtado MD for ongoing prescription of pain medication.  All refills should only be approved by this provider, or covering partner.    Medication(s): lyrica 60/ month.  Hydrocodone 30 prn  Maximum quantity per month:   Clinic visit frequency required: Q 6  months     Controlled substance agreement:  Encounter-Level CSA:     There are no encounter-level csa.        Pain Clinic evaluation in the past: no  Pt is frail elderly, palliative care, demented  DIRE Total Score(s):  No flowsheet data found.    Last [facility] website verification:  4/17/18   [email or web address]       checked in past 6 months?  Yes 4/17/18     Jen Garcia, Pharmacy [facility]

## 2018-05-15 NOTE — TELEPHONE ENCOUNTER
Prior Authorization Approval    Authorization Effective Date: 2/14/2018  Authorization Expiration Date: 5/15/2019  Medication: PA on Voltaren 1% gel - Approved   Approved Dose/Quantity:    Reference #:     Insurance Company: [employer] Gomez - Phone [phone] Fax [phone]  Expected CoPay:       CoPay Card Available:      Foundation Assistance Needed:    Which Pharmacy is filling the prescription (Not needed for infusion/clinic administered): [facility] [place], [place] - [address] [facility]  Pharmacy Notified: Yes  Patient Notified: Yes

## 2018-05-15 NOTE — TELEPHONE ENCOUNTER
Routing refill request to provider for review/approval because:  Drug not on the [facility] refill protocol     Aditi Kebede RN -- [facility] Work[facility]

## 2018-05-16 NOTE — TELEPHONE ENCOUNTER
Dr. Stinson- Dr. Hurtado signed Norco 5/14/18.  Unable to locate.  Please sign for patient.  Tia Mcconnell RN

## 2018-05-29 ENCOUNTER — HOSPITAL ENCOUNTER (EMERGENCY)
Facility: CLINIC | Age: 83
Discharge: HOME | End: 2018-05-29
Attending: EMERGENCY MEDICINE | Admitting: EMERGENCY MEDICINE
Payer: COMMERCIAL

## 2018-05-29 VITALS
WEIGHT: 174 LBS | TEMPERATURE: 97.6 F | SYSTOLIC BLOOD PRESSURE: 158 MMHG | OXYGEN SATURATION: 94 % | DIASTOLIC BLOOD PRESSURE: 80 MMHG | BODY MASS INDEX: 28.08 KG/M2 | HEART RATE: 90 BPM | RESPIRATION RATE: 16 BRPM

## 2018-05-29 DIAGNOSIS — R33.9 URINARY RETENTION: ICD-10-CM

## 2018-05-29 DIAGNOSIS — R31.0 GROSS HEMATURIA: ICD-10-CM

## 2018-05-29 LAB
ALBUMIN UR-MCNC: NEGATIVE MG/DL
ANION GAP SERPL CALCULATED.3IONS-SCNC: 8 MMOL/L (ref 3–14)
APPEARANCE UR: CLEAR
BACTERIA #/AREA URNS HPF: ABNORMAL /HPF
BILIRUB UR QL STRIP: NEGATIVE
BUN SERPL-MCNC: 30 MG/DL (ref 7–30)
CALCIUM SERPL-MCNC: 8.9 MG/DL (ref 8.5–10.1)
CHLORIDE SERPL-SCNC: 106 MMOL/L (ref 94–109)
CO2 SERPL-SCNC: 24 MMOL/L (ref 20–32)
COLOR UR AUTO: YELLOW
CREAT SERPL-MCNC: 1.39 MG/DL (ref 0.66–1.25)
GFR SERPL CREATININE-BSD FRML MDRD: 48 ML/MIN/1.7M2
GLUCOSE SERPL-MCNC: 145 MG/DL (ref 70–99)
GLUCOSE UR STRIP-MCNC: NEGATIVE MG/DL
HGB UR QL STRIP: ABNORMAL
HYALINE CASTS #/AREA URNS LPF: 1 /LPF (ref 0–2)
KETONES UR STRIP-MCNC: NEGATIVE MG/DL
LEUKOCYTE ESTERASE UR QL STRIP: NEGATIVE
NITRATE UR QL: NEGATIVE
PH UR STRIP: 6 PH (ref 5–7)
POTASSIUM SERPL-SCNC: 4.1 MMOL/L (ref 3.4–5.3)
RBC #/AREA URNS AUTO: 150 /HPF (ref 0–2)
SODIUM SERPL-SCNC: 138 MMOL/L (ref 133–144)
SOURCE: ABNORMAL
SP GR UR STRIP: 1.01 (ref 1–1.03)
UROBILINOGEN UR STRIP-MCNC: 0 MG/DL (ref 0–2)
WBC #/AREA URNS AUTO: 9 /HPF (ref 0–5)

## 2018-05-29 PROCEDURE — 36415 COLL VENOUS BLD VENIPUNCTURE: CPT | Performed by: EMERGENCY MEDICINE

## 2018-05-29 PROCEDURE — 51702 INSERT TEMP BLADDER CATH: CPT

## 2018-05-29 PROCEDURE — 80048 BASIC METABOLIC PNL TOTAL CA: CPT | Performed by: EMERGENCY MEDICINE

## 2018-05-29 PROCEDURE — 99284 EMERGENCY DEPT VISIT MOD MDM: CPT

## 2018-05-29 PROCEDURE — 51798 US URINE CAPACITY MEASURE: CPT

## 2018-05-29 PROCEDURE — 81001 URINALYSIS AUTO W/SCOPE: CPT | Performed by: EMERGENCY MEDICINE

## 2018-05-29 ASSESSMENT — ENCOUNTER SYMPTOMS
HEMATURIA: 1
FEVER: 0
NAUSEA: 0
VOMITING: 0
DIFFICULTY URINATING: 1

## 2018-05-29 NOTE — ED AVS SNAPSHOT
[facility]    [address] [place] [address]    Phone:  [phone]    Fax:  [phone]                                       Barrie Woods   MRN: [id number]    Department:  [facility]   Date of Visit:  5/29/2018           Patient Information     Date Of Birth          1/25/1930        Your diagnoses for this visit were:     Urinary retention     Gross hematuria        You were seen by Elijah Mera MD.      Follow-up Information     Follow up with Antonio Currie MD.    Specialty:  Urology    Contact information:    [address]  [place] [address] [phone]          Follow up with Yonatan Hurtado MD In 1 week.    Specialty:  Family Practice    Why:  as scheduled     Contact information:    [address]  [facility] [address]  [phone]          Discharge Instructions         Cuidado De La Sonda De Youssef [Youssef Catheter Care]    Alyana sonda de Youssef es un tubo de goma que se inserta en la uretra (el orificio por el que sale la orina) hasta la vejiga. Eso ayuda a eliminar la orina de la vejiga. Hay un pequeño globo en el extremo del tubo que se infla alayna vez que el tubo fue insertado. Eso impide que la sonda se salga de la vejiga.  Alayna sonda de Youssef se usa para tratar la retención urinaria (urinary retention) [cuando la persona no puede orinar]. También se la utiliza cuando hay incontinencia (incontinence) [pérdida del control de la vejiga].  Cuidados En [place]:  1. Termine de herminia el antibiótico (antibiotic) que le hayan recetado incluso aunque se sienta mejor antes de terminarlo.  2. Es importante evitar que las bacterias ingresen en la bolsa colectora. No desconecte la sonda de la bolsa colectora.  3. Emplee alayna merrill en la pierna para sujetar el tubo de vaciado, de modo varun que no tire de la sonda. Cuando la bolsa colectora esté llena, vacíela empleando la boquilla de vaciado que se  encuentra en la parte inferior de la bolsa.  4. No tire de la sonda ni intente quitársela. Se lastimará la uretra si lo hace. Tiene que quitársela un médico o flavia enfermera.  Visita De Control:  Programe flavia visita de control con maldonado médico, o según le hayan indicado, para que le justin otra prueba de orina (urine testing) y para que le cambien o le quiten la sonda.  Busque Prontamente Atención Médica  si algo de lo siguiente ocurre:    Fiebre de 100.4 F (38 C) o más pineda, o ajay le haya indicado maldonado proveedor de atención médica.    Dolor en la vejiga o sensación de que  está llena .    Hinchazón abdominal, náuseas o vómito, o dolor en la espalda.    Lior o pérdida de orina alrededor de la sonda.    Lior en la orina que sale de la sonda (si es un síntoma nuevo).    La sonda se sale.    La sonda lily de drenar jarrett 6 horas.    Debilidad, mareo o desmayo.  Date Last Reviewed: 3/10/2014    [id number] [employer]. [address] [place] [address]. Todos los derechos reservados. Esta información no pretende sustituir la atención médica profesional. Sólo maldonado médico puede diagnosticar y tratar un problema de fanny.          Discharge References/Attachments     URINARY RETENTION, MALE (Kazakh)      Your next 10 appointments already scheduled     Jun 04, 2018  5:45 PM CDT   Office Visit [facility]onatan [facility]ado MD   [facility]    [id number] [place] [address] [phone]           Bring a current list of meds and any records pertaining to this visit. For Physicals, please bring immunization records and any forms needing to be filled out. Please arrive 10 minutes early to complete paperwork.              24 Hour Appointment Hotline       To make an appointment at any [facility], call [phone] ([phone]). If you don't have a family doctor or clinic, we will help you find one. [facility] clinics are conveniently  located to serve the needs of you and your family.             Review of your medicines      Our records show that you are taking the medicines listed below. If these are incorrect, please call your family doctor or clinic.        Dose / Directions Last dose taken    acetaminophen 500 MG tablet   Commonly known as:  TYLENOL   Dose:  1000 mg   Quantity:  84 tablet        Take 2 tablets (1,000 mg) by mouth every 6 hours as needed for mild pain   Refills:  5        bumetanide 1 MG tablet   Commonly known as:  BUMEX   Quantity:  30 tablet        TAKE ONE TABLET BY MOUTH EVERY DAY   Refills:  5        carvedilol 12.5 MG tablet   Commonly known as:  COREG   Dose:  12.5 mg   Quantity:  60 tablet        Take 1 tablet (12.5 mg) by mouth 2 times daily (with meals)   Refills:  5        diclofenac 1 % Gel topical gel   Commonly known as:  VOLTAREN   Quantity:  100 g        Apply 4 gm to knees qid   Refills:  11        HYDROcodone-acetaminophen 5-325 MG per tablet   Commonly known as:  NORCO   Dose:  1 tablet   Quantity:  30 tablet        Take 1 tablet by mouth every 6 hours as needed for moderate to severe pain   Refills:  0        lisinopril 10 MG tablet   Commonly known as:  PRINIVIL/ZESTRIL   Dose:  10 mg   Quantity:  30 tablet        Take 1 tablet (10 mg) by mouth daily   Refills:  5        * magnesium oxide 400 (241.3 Mg) MG tablet   Commonly known as:  MAG-OX   Quantity:  30 tablet        TAKE 1 TABLET BY MOUTH ONCE DAILY   Refills:  5        * magnesium oxide 400 (241.3 Mg) MG tablet   Commonly known as:  MAG-OX   Quantity:  30 tablet        TAKE 1 TABLET BY MOUTH ONCE DAILY   Refills:  5        MULTILEX Tabs   Quantity:  100 tablet        TAKE ONE TABLET BY MOUTH ONCE DAILY   Refills:  3        order for DME   Quantity:  1 Device        Equipment being ordered: walker with slides   Refills:  0        pregabalin 100 MG capsule   Commonly known as:  LYRICA   Dose:  100 mg   Quantity:  60 capsule        Take 1 capsule (100  mg) by mouth 2 times daily   Refills:  5        venlafaxine 75 MG 24 hr capsule   Commonly known as:  EFFEXOR-XR   Dose:  75 mg   Quantity:  30 capsule        Take 1 capsule (75 mg) by mouth daily   Refills:  5        * Notice:  This list has 2 medication(s) that are the same as other medications prescribed for you. Read the directions carefully, and ask your doctor or other care provider to review them with you.            Procedures and tests performed during your visit     Basic metabolic panel    UA with Microscopic reflex to Culture      Orders Needing Specimen Collection     None      Pending Results     No orders found from 5/27/2018 to 5/30/2018.            Pending Culture Results     No orders found from 5/27/2018 to 5/30/2018.            Pending Results Instructions     If you had any lab results that were not finalized at the time of your Discharge, you can call the ED Lab Result RN at [phone]. You will be contacted by this team for any positive Lab results or changes in treatment. The nurses are available 7 days a week from 10A to 6:30P.  You can leave a message 24 hours per day and they will return your call.        Test Results From Your Hospital Stay        5/29/2018  6:28 PM      Component Results     Component Value Ref Range & Units Status    Color Urine Yellow  Final    Appearance Urine Clear  Final    Glucose Urine Negative NEG^Negative mg/dL Final    Bilirubin Urine Negative NEG^Negative Final    Ketones Urine Negative NEG^Negative mg/dL Final    Specific Gravity Urine 1.012 1.003 - 1.035 Final    Blood Urine Large (A) NEG^Negative Final    pH Urine 6.0 5.0 - 7.0 pH Final    Protein Albumin Urine Negative NEG^Negative mg/dL Final    Urobilinogen mg/dL 0.0 0.0 - 2.0 mg/dL Final    Nitrite Urine Negative NEG^Negative Final    Leukocyte Esterase Urine Negative NEG^Negative Final    Source Catheterized Urine  Final    WBC Urine 9 (H) 0 - 5 /HPF Final    RBC Urine 150 (H) 0 - 2 /HPF Final     Bacteria Urine Few (A) NEG^Negative /HPF Final    Hyaline Casts 1 0 - 2 /LPF Final         5/29/2018  7:03 PM      Component Results     Component Value Ref Range & Units Status    Sodium 138 133 - 144 mmol/L Final    Potassium 4.1 3.4 - 5.3 mmol/L Final    Chloride 106 94 - 109 mmol/L Final    Carbon Dioxide 24 20 - 32 mmol/L Final    Anion Gap 8 3 - 14 mmol/L Final    Glucose 145 (H) 70 - 99 mg/dL Final    Urea Nitrogen 30 7 - 30 mg/dL Final    Creatinine 1.39 (H) 0.66 - 1.25 mg/dL Final    GFR Estimate 48 (L) >60 mL/min/1.7m2 Final    Non  GFR Calc    GFR Estimate If Black 58 (L) >60 mL/min/1.7m2 Final    African American GFR Calc    Calcium 8.9 8.5 - 10.1 mg/dL Final                Clinical Quality Measure: Blood Pressure Screening     Your blood pressure was checked while you were in the emergency department today. The last reading we obtained was  BP: 158/80 . Please read the guidelines below about what these numbers mean and what you should do about them.  If your systolic blood pressure (the top number) is less than 120 and your diastolic blood pressure (the bottom number) is less than 80, then your blood pressure is normal. There is nothing more that you need to do about it.  If your systolic blood pressure (the top number) is 120-139 or your diastolic blood pressure (the bottom number) is 80-89, your blood pressure may be higher than it should be. You should have your blood pressure rechecked within a year by a primary care provider.  If your systolic blood pressure (the top number) is 140 or greater or your diastolic blood pressure (the bottom number) is 90 or greater, you may have high blood pressure. High blood pressure is treatable, but if left untreated over time it can put you at risk for heart attack, stroke, or kidney failure. You should have your blood pressure rechecked by a primary care provider within the next 4 weeks.  If your provider in the emergency department today gave you  "specific instructions to follow-up with your doctor or provider even sooner than that, you should follow that instruction and not wait for up to 4 weeks for your follow-up visit.        Thank you for choosing [facility]       Thank you for choosing [facility] for your care. Our goal is always to provide you with excellent care. Hearing back from our patients is one way we can continue to improve our services. Please take a few minutes to complete the written survey that you may receive in the mail after you visit with us. Thank you!        [email or web address] Information     [employer] lets you send messages to your doctor, view your test results, renew your prescriptions, schedule appointments and more. To sign up, go to [email or web address] . Click on \"Log in\" on the left side of the screen, which will take you to the Welcome page. Then click on \"Sign up Now\" on the right side of the page.     You will be asked to enter the access code listed below, as well as some personal information. Please follow the directions to create your username and password.     Your access code is: [email or web address]  Expires: 2018  7:05 PM     Your access code will  in 90 days. If you need help or a new code, please call your [facility] clinic or [phone].        Care EveryWhere ID     This is your Care EveryWhere ID. This could be used by other organizations to access your [facility] medical records  [id number]        Equal Access to Services     IWONA CHARLES : Hadii liborio Valdes, waaxda nirmala, qaybta rosarioalmaasia andrade . So [facility] [phone].    ATENCIÓN: Si habla español, tiene a maldonado disposición servicios gratuitos de asistencia lingüística. Llame al [phone].    We comply with applicable federal civil rights laws and [place] laws. We do not discriminate on the basis of race, color, national origin, age, disability, sex, sexual orientation, or gender identity.            After " Visit Summary       This is your record. Keep this with you and show to your community pharmacist(s) and doctor(s) at your next visit.

## 2018-05-29 NOTE — ED PROVIDER NOTES
History     Chief Complaint:  Groin Pain    The history is provided by a relative. The history is limited by a language barrier. A  was used.      Barrie Woods is a 88 year old male with a history of kidney stones and renal cell carcinoma diagnosis who presents with his son for evaluation of groin pain. The patient reports onset of pain across the base of his penis that began last night, in addition to difficulty urinating and notable hematuria when he is able to urinate that developed earlier today. Patient's son reports that the patient last urinated 1.5 to 2 hours ago, and that he has no history of similar symptoms. On presentation, the patient's bladder is checked and he has 1L of urine in his bladder. Patient denies fever, nausea, vomiting, or other complaint.     Allergies:  No known drug allergies      Medications:    Bumex  Coreg  Voltaren  Norco  Prinivil  Mg oxide  Multivitamin  Lyrica  Effexor    Past Medical History:    Cachexia  Presbycusis, bilateral  Dementia w/o behavioral disturbance  CAP  A fib  CHF  Chronic pain  Urinary incontinence  Recurrent falls  Hypertrophy of prostate  Atypical chest pain  Renal cell carcinoma  Anemia  PSA elevation  Dysuria  TB lung  HTN  HLD  HCH  ACP  GERD  CVD  Tobacco use disorder  Cardiomyopathy  Diverticulosis  Dysphagia  Hearing loss  Sepsis    Past Surgical History:    History reviewed. No pertinent surgical history.     Family History:    CAD  Diabetes    Social History:  Presents with son and daughter   Tobacco use: current every day chewing tobacco  Alcohol use: no  PCP: Yonatan Hurtado    Marital Status:       Review of Systems   Constitutional: Negative for fever.   Gastrointestinal: Negative for nausea and vomiting.   Genitourinary: Positive for difficulty urinating and hematuria.   All other systems reviewed and are negative.    Physical Exam     Patient Vitals for the past 24 hrs:   BP Temp Temp src Pulse Resp SpO2 Weight    05/29/18 1930 158/80 - - - - 94 % -   05/29/18 1915 155/85 - - - - - -   05/29/18 1900 144/77 - - - - 96 % -   05/29/18 1856 147/68 - - - - - -   05/29/18 1849 147/68 - - - - - -   05/29/18 1848 - - - - - 97 % -   05/29/18 1826 - - - - - 98 % -   05/29/18 1813 - - - - - 99 % -   05/29/18 1800 (!) 218/96 - - - - 97 % -   05/29/18 1745 (!) 197/106 - - - - 97 % -   05/29/18 1739 (!) 200/107 97.6  F (36.4  C) Oral 90 16 96 % 78.9 kg (174 lb)        Physical Exam  Nursing note and vitals reviewed.  Constitutional: Appears uncomfortable.   HENT:   Mouth/Throat: Moist mucous membranes.   Eyes: EOMI, nonicteric sclera  Cardiovascular: Normal rate, regular rhythm, no murmurs, rubs, or gallops  Pulmonary/Chest: Effort normal and breath sounds normal. No respiratory distress. No wheezes. No rales.   Abdominal: Soft. TTP suprapubic area with distension, no guarding or rigidity. BS present. After sarmiento catheter placement, pt has benign abd exam with resolution of previous findings.   Musculoskeletal: Normal range of motion.   Neurological: Alert. Moves all extremities spontaneously.   Skin: Skin is warm and dry. No rash noted.   Psychiatric: Normal mood and affect.       Emergency Department Course     Laboratory:  BMP:  (H), Creatinine 1.39 (H), GFR 48 (L) o/w WNL    UA with micro: Blood large, WBC 9 (H),  (H), Bacteria few o/w negative     Emergency Department Course:  Past medical records, nursing notes, and vitals reviewed.  1745: I performed an exam of the patient and obtained history, as documented above. A sarmiento catheter was placed at this time without difficulty.    Above interventions provided.    2002: I rechecked the patient.  Findings and plan explained to the Patient and son and daughter. Patient discharged home with instructions regarding supportive care, medications, and reasons to return. The importance of close follow-up was reviewed.    Impression & Plan      Medical Decision Making:  Barrie MCKEON  Chuck is a 88 year old male who presents for evaluation of abdominal pain and decreased urinary output.  I considered a broad differential including diverticulitis, aneurysm, urinary retention, ureterolithiasis, UTI, pyelonephritis, neurologic causes (MS, cauda equina,etc), colitis, etc.  The history and exam are consistent with acute urinary retention and this is confirmed after sarmiento catheter placement.  A urinalysis was obtained and did contain large amounts of blood. No signs of infection. Will send home with catheter and have patient followup with urology in 5-7 days. Pt has seen Rosey in the past. The cause of the acute urinary retention is very likely due to blood clot in tubing. Also considered that this may be caused by opiate medication, other medications, constipation, prostate issues, bladder or urethral tumor, ureterolithiasis, etc.  Patient is stable for discharge home.       Diagnosis:    ICD-10-CM   1. Urinary retention R33.9   2. Gross hematuria R31.0       Disposition:  Discharged to home with plan as outlined.       I, Osmin Brar, am serving as a scribe at 5:39 PM on 5/29/2018 to document services personally performed by Elijah Mera MD based on my observations and the provider's statements to me.    5/29/2018   [facility] EMERGENCY DEPARTMENT     Elijah Mera MD  05/30/18 0550

## 2018-05-29 NOTE — ED AVS SNAPSHOT
[facility]    [address]    [facility] [address]    Phone:  [phone]    Fax:  [phone]                                       Barrie Woods   MRN: [id number]    Department:  [facility]   Date of Visit:  5/29/2018           After Visit Summary Signature Page     I have received my discharge instructions, and my questions have been answered. I have discussed any challenges I see with this plan with the nurse or doctor.    ..........................................................................................................................................  Patient/Patient Representative Signature      ..........................................................................................................................................  Patient Representative Print Name and Relationship to Patient    ..................................................               ................................................  Date                                            Time    ..........................................................................................................................................  Reviewed by Signature/Title    ...................................................              ..............................................  Date                                                            Time

## 2018-05-29 NOTE — ED TRIAGE NOTES
Patient complaining of pain at the whole base of the penis since last night.  Also has history of kidney stones and has been unable to urinate  And when he does he notes a lot of blood.  Also recently diagnosed with kidney cancer.      ABCs intact.  Alert and oriented x 3.

## 2018-05-30 ENCOUNTER — PATIENT OUTREACH (OUTPATIENT)
Dept: GERIATRIC MEDICINE | Facility: CLINIC | Age: 83
End: 2018-05-30

## 2018-05-30 NOTE — DISCHARGE INSTRUCTIONS
Cuidado De La Sonda De Youssef [Youssef Catheter Care]    Flavia sonda de Youssef es un tubo de goma que se inserta en la uretra (el orificio por el que sale la orina) hasta la vejiga. Eso ayuda a eliminar la orina de la vejiga. Hay un pequeño globo en el extremo del tubo que se infla flavia vez que el tubo fue insertado. Eso impide que la sonda se salga de la vejiga.  Flavia sonda de Youssef se usa para tratar la retención urinaria (urinary retention) [cuando la persona no puede orinar]. También se la utiliza cuando hay incontinencia (incontinence) [pérdida del control de la vejiga].  Cuidados En [place]:  1. Termine de herminia el antibiótico (antibiotic) que le hayan recetado incluso aunque se sienta mejor antes de terminarlo.  2. Es importante evitar que las bacterias ingresen en la bolsa colectora. No desconecte la sonda de la bolsa colectora.  3. Emplee flavia merrill en la pierna para sujetar el tubo de vaciado, de modo varun que no tire de la sonda. Cuando la bolsa colectora esté llena, vacíela empleando la boquilla de vaciado que se encuentra en la parte inferior de la bolsa.  4. No tire de la sonda ni intente quitársela. Se lastimará la uretra si lo hace. Tiene que quitársela un médico o flavia enfermera.  Visita De Control:  Programe flavia visita de control con maldonado médico, o según le hayan indicado, para que le justin otra prueba de orina (urine testing) y para que le cambien o le quiten la sonda.  Busque Prontamente Atención Médica  si algo de lo siguiente ocurre:    Fiebre de 100.4 F (38 C) o más pineda, o ajay le haya indicado maldonado proveedor de atención médica.    Dolor en la vejiga o sensación de que  está llena .    Hinchazón abdominal, náuseas o vómito, o dolor en la espalda.    Lior o pérdida de orina alrededor de la sonda.    Lior en la orina que sale de la sonda (si es un síntoma nuevo).    La sonda se sale.    La sonda lliy de drenar jarrett 6 horas.    Debilidad, mareo o desmayo.  Date Last Reviewed: 3/10/2014    [id number]  [employer]. [address] [place] [address], [place] [address]. Todos los derechos reservados. Esta información no pretende sustituir la atención médica profesional. Sólo maldonado médico puede diagnosticar y tratar un problema de fanny.

## 2018-05-31 NOTE — PROGRESS NOTES
[facility] Care Coordination Contact  CM notified client had ED visit 5/29/18 for urinary retention. Client had catheter placed and was cleared to d/c home and f/u with urology in 5-7 days. CM called and left message for son Jose to see how client was doing and if any assistance needed with urology scheduling.  Client does have appt with PCP on 6/4/18.  Asked Jose to call me back with update.    MORELIA Blum  Replaced by [facility]   [phone]

## 2018-06-02 ENCOUNTER — HOSPITAL ENCOUNTER (EMERGENCY)
Facility: CLINIC | Age: 83
Discharge: HOME | End: 2018-06-02
Attending: EMERGENCY MEDICINE | Admitting: EMERGENCY MEDICINE
Payer: COMMERCIAL

## 2018-06-02 VITALS
RESPIRATION RATE: 18 BRPM | TEMPERATURE: 98.4 F | DIASTOLIC BLOOD PRESSURE: 71 MMHG | HEART RATE: 99 BPM | SYSTOLIC BLOOD PRESSURE: 151 MMHG | OXYGEN SATURATION: 98 %

## 2018-06-02 DIAGNOSIS — N30.01 ACUTE CYSTITIS WITH HEMATURIA: ICD-10-CM

## 2018-06-02 DIAGNOSIS — N48.1 BALANITIS: ICD-10-CM

## 2018-06-02 LAB
ALBUMIN UR-MCNC: 30 MG/DL
ANION GAP SERPL CALCULATED.3IONS-SCNC: 6 MMOL/L (ref 3–14)
APPEARANCE UR: ABNORMAL
BACTERIA #/AREA URNS HPF: ABNORMAL /HPF
BASOPHILS # BLD AUTO: 0 10E9/L (ref 0–0.2)
BASOPHILS NFR BLD AUTO: 0.2 %
BILIRUB UR QL STRIP: NEGATIVE
BUN SERPL-MCNC: 32 MG/DL (ref 7–30)
CALCIUM SERPL-MCNC: 8.8 MG/DL (ref 8.5–10.1)
CHLORIDE SERPL-SCNC: 107 MMOL/L (ref 94–109)
CO2 SERPL-SCNC: 29 MMOL/L (ref 20–32)
COLOR UR AUTO: YELLOW
CREAT SERPL-MCNC: 1.41 MG/DL (ref 0.66–1.25)
DIFFERENTIAL METHOD BLD: ABNORMAL
EOSINOPHIL # BLD AUTO: 0.4 10E9/L (ref 0–0.7)
EOSINOPHIL NFR BLD AUTO: 3 %
ERYTHROCYTE [DISTWIDTH] IN BLOOD BY AUTOMATED COUNT: 14.1 % (ref 10–15)
GFR SERPL CREATININE-BSD FRML MDRD: 47 ML/MIN/1.7M2
GLUCOSE SERPL-MCNC: 120 MG/DL (ref 70–99)
GLUCOSE UR STRIP-MCNC: NEGATIVE MG/DL
HCT VFR BLD AUTO: 38.9 % (ref 40–53)
HGB BLD-MCNC: 12.7 G/DL (ref 13.3–17.7)
HGB UR QL STRIP: ABNORMAL
IMM GRANULOCYTES # BLD: 0.1 10E9/L (ref 0–0.4)
IMM GRANULOCYTES NFR BLD: 0.5 %
KETONES UR STRIP-MCNC: NEGATIVE MG/DL
LEUKOCYTE ESTERASE UR QL STRIP: ABNORMAL
LYMPHOCYTES # BLD AUTO: 1.4 10E9/L (ref 0.8–5.3)
LYMPHOCYTES NFR BLD AUTO: 11.2 %
MCH RBC QN AUTO: 30.5 PG (ref 26.5–33)
MCHC RBC AUTO-ENTMCNC: 32.6 G/DL (ref 31.5–36.5)
MCV RBC AUTO: 94 FL (ref 78–100)
MONOCYTES # BLD AUTO: 1.1 10E9/L (ref 0–1.3)
MONOCYTES NFR BLD AUTO: 8.5 %
MUCOUS THREADS #/AREA URNS LPF: PRESENT /LPF
NEUTROPHILS # BLD AUTO: 9.9 10E9/L (ref 1.6–8.3)
NEUTROPHILS NFR BLD AUTO: 76.6 %
NITRATE UR QL: NEGATIVE
NRBC # BLD AUTO: 0 10*3/UL
NRBC BLD AUTO-RTO: 0 /100
PH UR STRIP: 6 PH (ref 5–7)
PLATELET # BLD AUTO: 214 10E9/L (ref 150–450)
POTASSIUM SERPL-SCNC: 4.5 MMOL/L (ref 3.4–5.3)
RBC # BLD AUTO: 4.16 10E12/L (ref 4.4–5.9)
RBC #/AREA URNS AUTO: 25 /HPF (ref 0–2)
SODIUM SERPL-SCNC: 142 MMOL/L (ref 133–144)
SOURCE: ABNORMAL
SP GR UR STRIP: 1.02 (ref 1–1.03)
SQUAMOUS #/AREA URNS AUTO: <1 /HPF (ref 0–1)
UROBILINOGEN UR STRIP-MCNC: 0 MG/DL (ref 0–2)
WBC # BLD AUTO: 12.9 10E9/L (ref 4–11)
WBC #/AREA URNS AUTO: 14 /HPF (ref 0–5)

## 2018-06-02 PROCEDURE — 87186 SC STD MICRODIL/AGAR DIL: CPT | Performed by: EMERGENCY MEDICINE

## 2018-06-02 PROCEDURE — 81001 URINALYSIS AUTO W/SCOPE: CPT | Performed by: EMERGENCY MEDICINE

## 2018-06-02 PROCEDURE — 87086 URINE CULTURE/COLONY COUNT: CPT | Performed by: EMERGENCY MEDICINE

## 2018-06-02 PROCEDURE — 25000132 ZZH RX MED GY IP 250 OP 250 PS 637: Performed by: EMERGENCY MEDICINE

## 2018-06-02 PROCEDURE — 80048 BASIC METABOLIC PNL TOTAL CA: CPT | Performed by: EMERGENCY MEDICINE

## 2018-06-02 PROCEDURE — 85025 COMPLETE CBC W/AUTO DIFF WBC: CPT | Performed by: EMERGENCY MEDICINE

## 2018-06-02 PROCEDURE — 87088 URINE BACTERIA CULTURE: CPT | Performed by: EMERGENCY MEDICINE

## 2018-06-02 PROCEDURE — 99283 EMERGENCY DEPT VISIT LOW MDM: CPT

## 2018-06-02 RX ORDER — IBUPROFEN 600 MG/1
600 TABLET, FILM COATED ORAL ONCE
Status: COMPLETED | OUTPATIENT
Start: 2018-06-02 | End: 2018-06-02

## 2018-06-02 RX ORDER — CEPHALEXIN 500 MG/1
500 CAPSULE ORAL ONCE
Status: COMPLETED | OUTPATIENT
Start: 2018-06-02 | End: 2018-06-02

## 2018-06-02 RX ORDER — CEPHALEXIN 500 MG/1
500 CAPSULE ORAL 2 TIMES DAILY
Qty: 14 CAPSULE | Refills: 0 | Status: SHIPPED | OUTPATIENT
Start: 2018-06-02 | End: 2019-01-30

## 2018-06-02 RX ORDER — CLOTRIMAZOLE 1 %
CREAM (GRAM) TOPICAL 2 TIMES DAILY
Qty: 45 G | Refills: 0 | Status: SHIPPED | OUTPATIENT
Start: 2018-06-02 | End: 2019-01-30

## 2018-06-02 RX ORDER — CLOTRIMAZOLE 1 %
CREAM (GRAM) TOPICAL ONCE
Status: COMPLETED | OUTPATIENT
Start: 2018-06-02 | End: 2018-06-02

## 2018-06-02 RX ADMIN — CEPHALEXIN 500 MG: 500 CAPSULE ORAL at 22:37

## 2018-06-02 RX ADMIN — CLOTRIMAZOLE: 10 CREAM TOPICAL at 22:37

## 2018-06-02 RX ADMIN — IBUPROFEN 600 MG: 600 TABLET ORAL at 22:08

## 2018-06-02 ASSESSMENT — ENCOUNTER SYMPTOMS
HEMATURIA: 0
FEVER: 0
VOMITING: 0

## 2018-06-02 NOTE — ED AVS SNAPSHOT
[facility] Emergency Department    [address]    [facility] [address]    Phone:  [phone]    Fax:  [phone]                                       Barrie Woods   MRN: [id number]    Department:  [facility] Emergency Department   Date of Visit:  6/2/2018           Patient Information     Date Of Birth          1/25/1930        Your diagnoses for this visit were:     Acute cystitis with hematuria     Balanitis        You were seen by Salomon Burns DO.      Follow-up Information     Follow up with Yonatan Hurtado MD. Call in 2 days.    Specialty:  Family Practice    Why:  As needed    Contact information:    [address]  [facility] [address]  [phone]          Follow up with [facility]. Call in 2 days.    Why:  For further follow up    Contact information:    [address]  [address]  [facility] [id number] [phone]        Follow up with [facility].    Specialty:  EMERGENCY MEDICINE    Why:  If symptoms worsen    Contact information:    [address]  [facility] [address]  [phone]        Discharge Instructions         Balanitis [Balanitis]    La Balanitis es flavia inflamación en la philip del pene (glande) Puede ocurrir a partir de flavia acumulación de gérmenes (bacterias o virus) bajo el prepucio. Con mayor frecuencia se trata de flavia complicación de la diabetes. También puede ocurrir por obesidad o hábitos higiénicos pobres de los genitales.  Si no se trata de inmediato, esto puede conducir a flavia condición llamada Fimosis. Ésta es la incapacidad de tirar hacia atrás (retraer) desde el glande el prepucio.  Los síntomas de la balanitis pueden incluir dolor del pene o sensibilidad al tacto, descarga de líquido, imposibilidad de retraer el prepucio, dificultad para orinar e impotencia.  Cuidado En Casa:  1. Si puede retraer el prepucio:  ? Para los niños,  retraiga el prepucio y lave con agua. Aplique Bacitracin crema o pomada al glande page veces al día.  ? Para los adultos, retraiga el prepucio y lave con agua. A menos que le hayan recetado otro medicamento, aplique clotrimazol crema (Lotrimin, Mycelex) (disponible sin receta) al glande page veces al día.  2. Si usted es diabético, hable con maldonado médico para lograr un buen control de maldonado diabetes.  3. Si tiene sobrepeso, hable con maldonado médico para hacer un plan para adelgazar.  Seguimiento  con maldonado médico o de acuerdo a lo indicado por nuestro personal.  Busque Prontamente Atención Médica  si algo de lo siguiente ocurre:    Incapacidad para regresar el prepucio retraído a la posición original ([place] requiere atención inmediata!)    Empeoramiento de david síntomas    Bloqueo parcial o total del flujo de orina  Date Last Reviewed: 3/10/2014    4510-7251 [email or web address]. [address] [place] [address]. Todos los derechos reservados. Esta información no pretende sustituir la atención médica profesional. Sólo maldonado médico puede diagnosticar y tratar un problema de fanny.          Qué son las infecciones de las vías urinarias (IVU)  La mayoría de las IVU son causadas por bacterias, aunque también pueden ser causadas por virus u hongos. Las bacterias del intestino son la briana más común de infección. Esta puede aparecer por cualquiera de las siguientes razones:    La actividad sexual. Ramsey las relaciones sexuales, los microbios pueden pasar desde el pene, la vagina o el recto hasta la uretra.    Los microbios que están en la piel o el recto pueden pasar al interior de la uretra. [place] es más común en las mujeres, ya que el recto y la uretra están más cerca entre sí que en los hombres. Limpiarse de adelante hacia atrás después de usar el inodoro y mantener el área limpia puede ayudar a evitar que los gérmenes lleguen a la uretra.    Bloqueo de la orina que fluye por las vías urinarias. Si la orina se queda  estancada por demasiado tiempo, los microbios pueden crecer de forma descontrolada.      Partes de las vías urinarias  La infección puede afectar cualquier parte de las vías urinarias.    Los riñones recogen y almacenan la orina.    Los uréteres transportan la orina de los riñones a la vejiga.    La vejiga almacena la orina hasta que el momento de ser expulsada.    Lauretra transporta la orina desde la vejiga hasta el exterior del cuerpo. La uretra es más corta en las mujeres, por lo que las bacterias pueden viajar en kierra michelle más fácilmente. La uretra masculina es más larga, es menos probable que flavia IVU llegue a la vejiga o los riñones de los hombres.  Date Last Reviewed: 9/8/2014 2000-2017 [employer]. [address] [place] [address]. Todos los derechos reservados. Esta información no pretende sustituir la atención médica profesional. Sólo maldonado médico puede diagnosticar y tratar un problema de fanny.          Your next 10 appointments already scheduled     Jun 04, 2018  5:45 PM CDT   [facility] Visit [facility]ado [facility]   [facility]    [address] [place] [address] [phone]           Bring a current list of meds and any records pertaining to this visit. For Physicals, please bring immunization records and any forms needing to be filled out. Please arrive 10 minutes early to complete paperwork.              24 Hour Appointment Hotline       To make an appointment at any [facility], call [phone] ([phone]). If you don't have a family doctor or clinic, we will help you find one. [facility] are conveniently located to serve the needs of you and your family.             Review of your medicines      START taking        Dose / Directions Last dose taken    cephALEXin 500 MG capsule   Commonly known as:  KEFLEX   Dose:  500 mg   Quantity:  14 capsule        Take 1 capsule (500 mg) by mouth 2  times daily for 7 days   Refills:  0        clotrimazole 1 % cream   Commonly known as:  LOTRIMIN   Quantity:  45 g        Apply topically 2 times daily for 15 days (apply to the head of the penis)   Refills:  0          Our records show that you are taking the medicines listed below. If these are incorrect, please call your family doctor or clinic.        Dose / Directions Last dose taken    acetaminophen 500 MG tablet   Commonly known as:  TYLENOL   Dose:  1000 mg   Quantity:  84 tablet        Take 2 tablets (1,000 mg) by mouth every 6 hours as needed for mild pain   Refills:  5        bumetanide 1 MG tablet   Commonly known as:  BUMEX   Quantity:  30 tablet        TAKE ONE TABLET BY MOUTH EVERY DAY   Refills:  5        carvedilol 12.5 MG tablet   Commonly known as:  COREG   Dose:  12.5 mg   Quantity:  60 tablet        Take 1 tablet (12.5 mg) by mouth 2 times daily (with meals)   Refills:  5        diclofenac 1 % Gel topical gel   Commonly known as:  VOLTAREN   Quantity:  100 g        Apply 4 gm to knees qid   Refills:  11        HYDROcodone-acetaminophen 5-325 MG per tablet   Commonly known as:  NORCO   Dose:  1 tablet   Quantity:  30 tablet        Take 1 tablet by mouth every 6 hours as needed for moderate to severe pain   Refills:  0        lisinopril 10 MG tablet   Commonly known as:  PRINIVIL/ZESTRIL   Dose:  10 mg   Quantity:  30 tablet        Take 1 tablet (10 mg) by mouth daily   Refills:  5        * magnesium oxide 400 (241.3 Mg) MG tablet   Commonly known as:  MAG-OX   Quantity:  30 tablet        TAKE 1 TABLET BY MOUTH ONCE DAILY   Refills:  5        * magnesium oxide 400 (241.3 Mg) MG tablet   Commonly known as:  MAG-OX   Quantity:  30 tablet        TAKE 1 TABLET BY MOUTH ONCE DAILY   Refills:  5        MULTILEX Tabs   Quantity:  100 tablet        TAKE ONE TABLET BY MOUTH ONCE DAILY   Refills:  3        order for DME   Quantity:  1 Device        Equipment being ordered: walker with slides   Refills:  0         pregabalin 100 MG capsule   Commonly known as:  LYRICA   Dose:  100 mg   Quantity:  60 capsule        Take 1 capsule (100 mg) by mouth 2 times daily   Refills:  5        venlafaxine 75 MG 24 hr capsule   Commonly known as:  EFFEXOR-XR   Dose:  75 mg   Quantity:  30 capsule        Take 1 capsule (75 mg) by mouth daily   Refills:  5        * Notice:  This list has 2 medication(s) that are the same as other medications prescribed for you. Read the directions carefully, and ask your doctor or other care provider to review them with you.            Prescriptions were sent or printed at these locations (2 Prescriptions)                   Other Prescriptions                Printed at Department/Unit printer (2 of 2)         cephALEXin (KEFLEX) 500 MG capsule               clotrimazole (LOTRIMIN) 1 % cream                Procedures and tests performed during your visit     Basic metabolic panel    CBC with platelets differential    UA with Microscopic    Urine Culture      Orders Needing Specimen Collection     None      Pending Results     Date and Time Order Name Status Description    6/2/2018 1941 Urine Culture In process             Pending Culture Results     Date and Time Order Name Status Description    6/2/2018 1941 Urine Culture In process             Pending Results Instructions     If you had any lab results that were not finalized at the time of your Discharge, you can call the ED Lab Result RN at [phone]. You will be contacted by this team for any positive Lab results or changes in treatment. The nurses are available 7 days a week from 10A to 6:30P.  You can leave a message 24 hours per day and they will return your call.        Test Results From Your Hospital Stay        6/2/2018  8:21 PM      Component Results     Component Value Ref Range & Units Status    WBC 12.9 (H) 4.0 - 11.0 10e9/L Final    RBC Count 4.16 (L) 4.4 - 5.9 10e12/L Final    Hemoglobin 12.7 (L) 13.3 - 17.7 g/dL Final    Hematocrit  38.9 (L) 40.0 - 53.0 % Final    MCV 94 78 - 100 fl Final    MCH 30.5 26.5 - 33.0 pg Final    MCHC 32.6 31.5 - 36.5 g/dL Final    RDW 14.1 10.0 - 15.0 % Final    Platelet Count 214 150 - 450 10e9/L Final    Diff Method Automated Method  Final    % Neutrophils 76.6 % Final    % Lymphocytes 11.2 % Final    % Monocytes 8.5 % Final    % Eosinophils 3.0 % Final    % Basophils 0.2 % Final    % Immature Granulocytes 0.5 % Final    Nucleated RBCs 0 0 /100 Final    Absolute Neutrophil 9.9 (H) 1.6 - 8.3 10e9/L Final    Absolute Lymphocytes 1.4 0.8 - 5.3 10e9/L Final    Absolute Monocytes 1.1 0.0 - 1.3 10e9/L Final    Absolute Eosinophils 0.4 0.0 - 0.7 10e9/L Final    Absolute Basophils 0.0 0.0 - 0.2 10e9/L Final    Abs Immature Granulocytes 0.1 0 - 0.4 10e9/L Final    Absolute Nucleated RBC 0.0  Final         6/2/2018  8:36 PM      Component Results     Component Value Ref Range & Units Status    Sodium 142 133 - 144 mmol/L Final    Potassium 4.5 3.4 - 5.3 mmol/L Final    Chloride 107 94 - 109 mmol/L Final    Carbon Dioxide 29 20 - 32 mmol/L Final    Anion Gap 6 3 - 14 mmol/L Final    Glucose 120 (H) 70 - 99 mg/dL Final    Urea Nitrogen 32 (H) 7 - 30 mg/dL Final    Creatinine 1.41 (H) 0.66 - 1.25 mg/dL Final    GFR Estimate 47 (L) >60 mL/min/1.7m2 Final    Non  GFR Calc    GFR Estimate If Black 57 (L) >60 mL/min/1.7m2 Final    African American GFR Calc    Calcium 8.8 8.5 - 10.1 mg/dL Final         6/2/2018  8:43 PM      Component Results     Component Value Ref Range & Units Status    Color Urine Yellow  Final    Appearance Urine Slightly Cloudy  Final    Glucose Urine Negative NEG^Negative mg/dL Final    Bilirubin Urine Negative NEG^Negative Final    Ketones Urine Negative NEG^Negative mg/dL Final    Specific Gravity Urine 1.021 1.003 - 1.035 Final    Blood Urine Small (A) NEG^Negative Final    pH Urine 6.0 5.0 - 7.0 pH Final    Protein Albumin Urine 30 (A) NEG^Negative mg/dL Final    Urobilinogen mg/dL 0.0  0.0 - 2.0 mg/dL Final    Nitrite Urine Negative NEG^Negative Final    Leukocyte Esterase Urine Small (A) NEG^Negative Final    Source Midstream Urine  Final    MALDONADO BAG    WBC Urine 14 (H) 0 - 5 /HPF Final    RBC Urine 25 (H) 0 - 2 /HPF Final    Bacteria Urine Few (A) NEG^Negative /HPF Final    Squamous Epithelial /HPF Urine <1 0 - 1 /HPF Final    Mucous Urine Present (A) NEG^Negative /LPF Final         6/2/2018  8:19 PM                Clinical Quality Measure: Blood Pressure Screening     Your blood pressure was checked while you were in the emergency department today. The last reading we obtained was  BP: 151/71 . Please read the guidelines below about what these numbers mean and what you should do about them.  If your systolic blood pressure (the top number) is less than 120 and your diastolic blood pressure (the bottom number) is less than 80, then your blood pressure is normal. There is nothing more that you need to do about it.  If your systolic blood pressure (the top number) is 120-139 or your diastolic blood pressure (the bottom number) is 80-89, your blood pressure may be higher than it should be. You should have your blood pressure rechecked within a year by a primary care provider.  If your systolic blood pressure (the top number) is 140 or greater or your diastolic blood pressure (the bottom number) is 90 or greater, you may have high blood pressure. High blood pressure is treatable, but if left untreated over time it can put you at risk for heart attack, stroke, or kidney failure. You should have your blood pressure rechecked by a primary care provider within the next 4 weeks.  If your provider in the emergency department today gave you specific instructions to follow-up with your doctor or provider even sooner than that, you should follow that instruction and not wait for up to 4 weeks for your follow-up visit.        Thank you for choosing [facility]       Thank you for choosing [facility] for your  "care. Our goal is always to provide you with excellent care. Hearing back from our patients is one way we can continue to improve our services. Please take a few minutes to complete the written survey that you may receive in the mail after you visit with us. Thank you!        [employer] Information     [employer] lets you send messages to your doctor, view your test results, renew your prescriptions, schedule appointments and more. To sign up, go to [email or web address] . Click on \"Log in\" on the left side of the screen, which will take you to the Welcome page. Then click on \"Sign up Now\" on the right side of the page.     You will be asked to enter the access code listed below, as well as some personal information. Please follow the directions to create your username and password.     Your access code is: [email or web address]  Expires: 2018  7:05 PM     Your access code will  in 90 days. If you need help or a new code, please call your [facility] clinic or [phone].        Care EveryWhere ID     This is your Care EveryWhere ID. This could be used by other organizations to access your [facility] medical records  [id number]        Equal Access to Services     IWONA CHARLES AH: Fady Valdes, donita silvestre, janeth delgado, asia srinivasan. So [facility] [phone].    ATENCIÓN: Si habla español, tiene a maldonado disposición servicios gratuitos de asistencia lingüística. Erename al [phone].    We comply with applicable federal civil rights laws and [place] laws. We do not discriminate on the basis of race, color, national origin, age, disability, sex, sexual orientation, or gender identity.            After Visit Summary       This is your record. Keep this with you and show to your community pharmacist(s) and doctor(s) at your next visit.                  "

## 2018-06-02 NOTE — ED AVS SNAPSHOT
[facility] Emergency Department    [address]    [facility] [address]    Phone:  [phone]    Fax:  [phone]                                       Barrie Woods   MRN: [id number]    Department:  [facility] Emergency Department   Date of Visit:  6/2/2018           After Visit Summary Signature Page     I have received my discharge instructions, and my questions have been answered. I have discussed any challenges I see with this plan with the nurse or doctor.    ..........................................................................................................................................  Patient/Patient Representative Signature      ..........................................................................................................................................  Patient Representative Print Name and Relationship to Patient    ..................................................               ................................................  Date                                            Time    ..........................................................................................................................................  Reviewed by Signature/Title    ...................................................              ..............................................  Date                                                            Time

## 2018-06-02 NOTE — ED TRIAGE NOTES
Patient had catheter place on Tuesday here in the ER not wanting it removed d/t increase pain, swelling and drainage around site. ABC's intact.

## 2018-06-03 NOTE — ED PROVIDER NOTES
History     Chief Complaint:  Catheter Problem    The history is provided by the patient.      History and information obtained with the help of son and  [id number]:    Barrie Woods is a 88 year old male with a history of kidney stones, hypertension, hyperlipidemia, atrial fibrillation, CHF and renal cell carcinoma who presents to the Emergency Department for evaluation of catheter problem. The patient was seen here on 5/29/18 for evaluation of urinary retention, abdominal and penile pain at which time sarmiento catheter was placed and he was told to follow up with Urology within the next week to have the catheter removed. The patient presents back to the ED today due to worsening penile pain and swelling as well as drainage from the catheter site. Patient also complains of persistent pelvic pain. Son would like the catheter removed today. Son has been monitoring the patients urine output and noted a total of 1600 mL of dark urine over the past 24 hours. Of note, the patient has required a catheter in the past and he was able to urinate without difficulty following the removal. Patient denies any fevers, vomiting, diarrhea, hematuria. No history of abdominal surgeries.    Laboratory 5/29/18:  BMP:  (H), Creatinine 1.39 (H), GFR 48 (L) o/w WNL   UA with micro: Blood large, WBC 9 (H),  (H), Bacteria few o/w negative     Allergies:  No known drug allergies    Medications:    Tylenol  Bumetanide  Coreg  Voltaren  NORCO  Lisinopril  Magnesium oxide  Lyrica  Effexor     Past Medical History:    Acute cerebrovascular disease   Alcohol abuse   Anemia   Atrial fibrillation   Cachexia    Cardiomyopathy   CHF (congestive heart failure)    Chronic pain   Dementia   Depressive disorder   Diaphragmatic hernia   Diverticulosis of colon  Dysphagia   Elevated blood sugar   Esophageal reflux   Hearing loss   Hematuria   Hereditary and idiopathic peripheral  neuropathy   Hyperlipidemia  Hypertension  Hypertrophy of prostate   PSA elevation   Recurrent falls   Renal cell carcinoma   Kidney stones  Sepsis    TB lung  Urinary incontinence    Past Surgical History:    The patient does not have any pertinent past surgical history.    Family History:    No past pertinent family history.    Social History:  Smoking Status: current everyday smoker  Smokeless Tobacco: current user  Alcohol Use: no  Marital Status:   [2]   Presents here with son    Review of Systems   Constitutional: Negative for fever.   Gastrointestinal: Negative for vomiting.   Genitourinary: Positive for penile pain and penile swelling. Negative for hematuria.   All other systems reviewed and are negative.    Physical Exam   First Vitals:  Patient Vitals for the past 24 hrs:   BP Temp Temp src Pulse Heart Rate Resp SpO2   06/02/18 2200 143/68 - - - - - 97 %   06/02/18 2145 133/63 - - - - - 99 %   06/02/18 2130 139/55 - - - - - 99 %   06/02/18 2115 125/65 - - - - - 95 %   06/02/18 2100 132/68 - - - - - 96 %   06/02/18 2045 141/83 - - - - - 97 %   06/02/18 2030 121/75 - - - - - 97 %   06/02/18 2015 124/65 - - - - - 96 %   06/02/18 2010 126/59 98.4  F (36.9  C) Oral - 85 18 95 %   06/02/18 1852 151/74 97.7  F (36.5  C) Temporal 99 - 20 96 %     Physical Exam  Constitutional: Patient appears well-developed and well-nourished. There is mild distress.   Head: No external signs of trauma noted.  Neck: No JVD noted  Eyes: Pupils are equal, round, and reactive to light.   Cardiovascular: Normal rate, regular rhythm and normal heart sounds.  Exam reveals no gallop and no friction rub.  No murmur heard. Equal B/L peripheral pulses.  Pulmonary/Chest: Effort normal and breath sounds normal. No respiratory distress. Patient has no wheezes. Patient has no rales.   Abdominal: Soft. There is mild suprapubic tenderness.   :   Normal appearance of external male genitalia   There is a yellowish discharge noted from the  urethral meatus.   There is mild swelling of the glans   Left testicle appears non-swollen. No left testicular tenderness. No masses palpated   Right testicle appears non-swollen. No right testicular tenderness. No masses palpated.  Extremities: No edema noted  Neurological: Patient is alert. He does have dementia.   Skin: Skin is warm and dry. There is no diaphoresis noted.       Emergency Department Course     Laboratory:  CBC: WBC: 12.9(H), HGB: 12.7(L), PLT: 214  BMP: Glucose 120(H), BUN 32(H), Creatinine 1.41(H), GFR 47(L), o/w WNL.     UA with Microscopic: small urine blood, small leukocyte esterase, protein albumin 30, WBC 14(H), RBC 25(H), few bacteria, mucous present.   Urine culture: in process    Interventions:  2208: Ibuprofen, 600 mg, PO  Pending: Clotrimazole 1% cream   Pending: Keflex 500 mg PO    Emergency Department Course:  Nursing notes and vitals reviewed. I performed an exam of the patient as documented above.     Blood drawn. This was sent to the lab for further testing, results above.    The patient provided a urine sample here in the emergency department. This was sent for laboratory testing, findings above.    2019 I reassessed the patient.     2158 I reassessed the patient.     Findings and plan explained to the Patient and son. Patient discharged home with instructions regarding supportive care, medications, and reasons to return. The importance of close follow-up was reviewed.     Impression & Plan      Medical Decision Making:  This 88-year-old male patient presents to the ED due to discomfort from his urinary catheter and concern for penile drainage.  Please see the HPI and exam for specifics.  The patient's family wished the catheter to be removed and did not want it put back in.  I did discuss that if he has blockage or difficulty urinating this could need to be replaced but family said they would deal with that if the need arose.  There is a notable discharge from the urethral meatus  but also around the glans penis and I wonder if both a urinary tract infection and possible balanitis could be present.      The patient's blood work as noted above and is notable for a slight leukocytosis.  The elevations in BUN and creatinine seem roughly in line with previous labs.  At this time I believe the patient can be discharged.  We will give him the first dose of his medications here and a prescription for home as well.  Family is comfortable taking the patient home and I have encouraged outpatient follow-up.  Anticipatory guidance given prior to discharge.      Diagnosis:    ICD-10-CM    1. Acute cystitis with hematuria N30.01    2. Balanitis N48.1        Disposition:  discharged to home    Medications:  New Prescriptions    CEPHALEXIN (KEFLEX) 500 MG CAPSULE    Take 1 capsule (500 mg) by mouth 2 times daily for 7 days    CLOTRIMAZOLE (LOTRIMIN) 1 % CREAM    Apply topically 2 times daily for 15 days (apply to the head of the penis)         I, Shelly Rodarte, am serving as a scribe on 6/2/2018 at 7:09 PM to personally document services performed by Salomon Burns DO based on my observations and the provider's statements to me.     Shelly Rodarte  6/2/2018   [facility] EMERGENCY DEPARTMENT       Salomon Burns DO  06/02/18 1358

## 2018-06-03 NOTE — DISCHARGE INSTRUCTIONS
Balanitis [Balanitis]    La Balanitis es flavia inflamación en la philip del pene (glande) Puede ocurrir a partir de flavia acumulación de gérmenes (bacterias o virus) bajo el prepucio. Con mayor frecuencia se trata de flavia complicación de la diabetes. También puede ocurrir por obesidad o hábitos higiénicos pobres de los genitales.  Si no se trata de inmediato, esto puede conducir a flavia condición llamada Fimosis. Ésta es la incapacidad de tirar hacia atrás (retraer) desde el glande el prepucio.  Los síntomas de la balanitis pueden incluir dolor del pene o sensibilidad al tacto, descarga de líquido, imposibilidad de retraer el prepucio, dificultad para orinar e impotencia.  Cuidado En Casa:  1. Si puede retraer el prepucio:  ? Para los niños, retraiga el prepucio y lave con agua. Aplique Bacitracin crema o pomada al glande page veces al día.  ? Para los adultos, retraiga el prepucio y lave con agua. A menos que le hayan recetado otro medicamento, aplique clotrimazol crema (Lotrimin, Mycelex) (disponible sin receta) al glande page veces al día.  2. Si usted es diabético, hable con maldonado médico para lograr un buen control de maldonado diabetes.  3. Si tiene sobrepeso, hable con maldonado médico para hacer un plan para adelgazar.  Seguimiento  con maldonado médico o de acuerdo a lo indicado por nuestro personal.  Busque Prontamente Atención Médica  si algo de lo siguiente ocurre:    Incapacidad para regresar el prepucio retraído a la posición original ([place] requiere atención inmediata!)    Empeoramiento de david síntomas    Bloqueo parcial o total del flujo de orina  Date Last Reviewed: 3/10/2014    6070-7341 [employer], [place]. [address] [place] [address], [place] [address]. Todos los derechos reservados. Esta información no pretende sustituir la atención médica profesional. Sólo maldonado médico puede diagnosticar y tratar un problema de fanny.          Qué son las infecciones de las vías urinarias (IVU)  La mayoría de las IVU son causadas por  bacterias, aunque también pueden ser causadas por virus u hongos. Las bacterias del intestino son la briana más común de infección. Esta puede aparecer por cualquiera de las siguientes razones:    La actividad sexual. Ramsey las relaciones sexuales, los microbios pueden pasar desde el pene, la vagina o el recto hasta la uretra.    Los microbios que están en la piel o el recto pueden pasar al interior de la uretra. Chupadero es más común en las mujeres, ya que el recto y la uretra están más cerca entre sí que en los hombres. Limpiarse de adelante hacia atrás después de usar el inodoro y mantener el área limpia puede ayudar a evitar que los gérmenes lleguen a la uretra.    Bloqueo de la orina que fluye por las vías urinarias. Si la orina se queda estancada por demasiado tiempo, los microbios pueden crecer de forma descontrolada.      Partes de las vías urinarias  La infección puede afectar cualquier parte de las vías urinarias.    Los riñones recogen y almacenan la orina.    Los uréteres transportan la orina de los riñones a la vejiga.    La vejiga almacena la orina hasta que el momento de ser expulsada.    Lauretra transporta la orina desde la vejiga hasta el exterior del cuerpo. La uretra es más corta en las mujeres, por lo que las bacterias pueden viajar en kierra michelle más fácilmente. La uretra masculina es más larga, es menos probable que flavia IVU llegue a la vejiga o los riñones de los hombres.  Date Last Reviewed: 9/8/2014 2000-2017 [employer]. [address] [place] [address], [place] [address]. Todos los derechos reservados. Esta información no pretende sustituir la atención médica profesional. Sólo maldonado médico puede diagnosticar y tratar un problema de fanny.

## 2018-06-04 ENCOUNTER — PATIENT OUTREACH (OUTPATIENT)
Dept: GERIATRIC MEDICINE | Facility: CLINIC | Age: 83
End: 2018-06-04

## 2018-06-04 ENCOUNTER — OFFICE VISIT (OUTPATIENT)
Dept: FAMILY MEDICINE | Facility: CLINIC | Age: 83
End: 2018-06-04
Payer: COMMERCIAL

## 2018-06-04 VITALS
HEART RATE: 71 BPM | RESPIRATION RATE: 12 BRPM | TEMPERATURE: 98.3 F | BODY MASS INDEX: 26.95 KG/M2 | WEIGHT: 167 LBS | DIASTOLIC BLOOD PRESSURE: 54 MMHG | SYSTOLIC BLOOD PRESSURE: 120 MMHG

## 2018-06-04 DIAGNOSIS — N40.0 HYPERTROPHY OF PROSTATE: ICD-10-CM

## 2018-06-04 DIAGNOSIS — R97.20 PSA ELEVATION: ICD-10-CM

## 2018-06-04 DIAGNOSIS — N48.1 BALANITIS: ICD-10-CM

## 2018-06-04 DIAGNOSIS — R33.9 URINARY RETENTION: Primary | ICD-10-CM

## 2018-06-04 PROCEDURE — 99214 OFFICE O/P EST MOD 30 MIN: CPT | Performed by: FAMILY MEDICINE

## 2018-06-04 RX ORDER — TAMSULOSIN HYDROCHLORIDE 0.4 MG/1
0.4 CAPSULE ORAL DAILY
Qty: 30 CAPSULE | Refills: 3 | Status: SHIPPED | OUTPATIENT
Start: 2018-06-04 | End: 2018-08-17

## 2018-06-04 NOTE — PROGRESS NOTES
SUBJECTIVE:   Barrie Woods is a 88 year old male who presents to clinic today for the following health issues:      Urinary retention  (primary encounter diagnosis) in ED twice, Youssef placed, removed.  Son reports urination back to normal with current cephalexin.  Urine culture with insufficient colony count  PSA elevation son reports there will be no operations  Hypertrophy of prostate son reports there will be no operations  Balanitis son reports that scrotum became involved but this is all improved and is back to baseline with cephalexin therapy        Problem list and histories reviewed & adjusted, as indicated.  Additional history: as documented    Past Medical History:   Diagnosis Date     Acute, but ill-defined, cerebrovascular disease 1/04    left middle cerebral artery infarct     Alcohol abuse      Anemia 7/14/2014     Atrial fibrillation (H)     CVA occurred off anticoagulation     Cachexia (H) 1/9/2018     Cardiomyopathy (H)     stress tests 6/04 and 10/04 negative for ischemia, EF 36-40%     CHF (congestive heart failure) (H) 12/24/2009     Chronic pain 10/12/2015     Dementia without behavioral disturbance, unspecified dementia type 5/9/2017     Dementia, without behavioral disturbance 6/29/2015     Depressive disorder      Diaphragmatic hernia without mention of obstruction or gangrene     left sided chest pain, hiatal hernia/gastritis     Diverticulosis of colon (without mention of hemorrhage)     sigmoid     Dysphagia 7/27/2015     Elevated blood sugar 1/25/2016     Esophageal reflux      Hearing loss      Hematuria     negative cystoscopy 3/04     Hereditary and idiopathic peripheral neuropathy 2006    EMG-peripheral polyneuropathy, B12 and VitD deficient, ? alcohol related     Hx of congestive heart failure 1/12/2016     Hypertrophy of prostate 6/17/2015     Nausea with vomiting 12/8/2014     Presbycusis of both ears 7/17/2017     PSA elevation 5/30/2014     Recurrent falls 7/28/2015      Renal cell carcinoma (H) 8/25/2014     Sepsis (H) 1/7/2015     TB lung, latent 5/6/2014     Urinary incontinence 7/28/2015       History reviewed. No pertinent surgical history.    Family History   Problem Relation Age of Onset     C.A.D. No family hx of      DIABETES No family hx of        Social History   Substance Use Topics     Smoking status: Current Every Day Smoker     Types: Dip, chew, snus or snuff     Smokeless tobacco: Current User     Types: Chew     Last attempt to quit: 1/9/2004      Comment: pt chews tobacco     Alcohol use No         Reviewed and updated as needed this visit by clinical staff  Tobacco  Allergies  Meds  Med Hx  Surg Hx  Fam Hx  Soc Hx      Reviewed and updated as needed this visit by Provider          ROS:  No systemic symptoms, appetite normal        This document serves as a record of the services and decisions personally performed and made by Yonatan Hurtado MD. It was created on his behalf by Kong Escalera, a trained medical scribe.  The creation of this document is based on the scribe's personal observations and the provider's statements to the medical scribe.  Kong Escalera, June 4, 2018 6:21 PM    OBJECTIVE:     /54 (BP Location: Left arm, Patient Position: Chair, Cuff Size: Adult Regular)  Pulse 71  Temp 98.3  F (36.8  C) (Oral)  Resp 12  Wt 75.8 kg (167 lb)  BMI 26.95 kg/m2  Body mass index is 26.95 kg/(m^2).     Well-hydrated comfortable    ASSESSMENT/PLAN:   ASSESSMENT / PLAN:  (R33.9) Urinary retention  (primary encounter diagnosis)  Comment: Back to baseline.  He was referred to urology, but since interested in no surgeries.  Wonders about medical therapies available  Plan: tamsulosin (FLOMAX) 0.4 MG capsule        This was prescribed in the past and stopped some years ago.  Stopped by son with improvement in ambulation 2016    (R97.20) PSA elevation  Comment: This will not be pursued further  Plan: tamsulosin (FLOMAX) 0.4 MG capsule            (N40.0)  Hypertrophy of prostate  Comment: Treat  Plan: tamsulosin (FLOMAX) 0.4 MG capsule        Postulate dizziness discussed cessation this should occur    (N48.1) Balanitis  Comment: Improved per son's report  Plan: Evaluate when  therapies  complete          RTC 2-3 weeks        Yonatan Hurtado MD        The information in this document, created by the medical scribe for me, accurately reflects the services I personally performed and the decisions made by me. I have reviewed and approved this document for accuracy prior to leaving the patient care area.  Yonatan Hurtado MD June 4, 2018 6:22 PM

## 2018-06-04 NOTE — MR AVS SNAPSHOT
"              After Visit Summary   6/4/2018    Barrie Woods    MRN: [id number]           Patient Information     Date Of Birth          1/25/1930        Visit Information        Provider Department      6/4/2018 5:45 PM Yonatan Hurtado MD; [employer] [facility]        Today's Diagnoses     Urinary retention    -  1    PSA elevation        Hypertrophy of prostate        Balanitis           Follow-ups after your visit        Follow-up notes from your care team     Return in about 3 weeks (around 6/25/2018).      Your next 10 appointments already scheduled     Jul 06, 2018  4:30 PM CDT   SHORT with Yonatan Hurtado MD   [facility] ([facility])    [address] [place] [id number] [phone]              Who to contact     If you have questions or need follow up information about today's clinic visit or your schedule please contact [facility] directly at [phone].  Normal or non-critical lab and imaging results will be communicated to you by MyChart, letter or phone within 4 business days after the clinic has received the results. If you do not hear from us within 7 days, please contact the clinic through [employer] or phone. If you have a critical or abnormal lab result, we will notify you by phone as soon as possible.  Submit refill requests through [employer] or call your pharmacy and they will forward the refill request to us. Please allow 3 business days for your refill to be completed.          Additional Information About Your Visit        MyChart Information     [employer] lets you send messages to your doctor, view your test results, renew your prescriptions, schedule appointments and more. To sign up, go to [email or web address] . Click on \"Log in\" on the left side of the screen, which will take you to the Welcome page. Then click on \"Sign up Now\" on the right side of the page.     You will be " asked to enter the access code listed below, as well as some personal information. Please follow the directions to create your username and password.     Your access code is: [email or web address]  Expires: 2018  7:05 PM     Your access code will  in 90 days. If you need help or a new code, please call your [facility] clinic or [phone].        Care EveryWhere ID     This is your Care EveryWhere ID. This could be used by other organizations to access your [facility] medical records  [id number]        Your Vitals Were     Pulse Temperature Respirations BMI (Body Mass Index)          71 98.3  F (36.8  C) (Oral) 12 26.95 kg/m2         Blood Pressure from Last 3 Encounters:   18 120/54   18 151/71   18 158/80    Weight from Last 3 Encounters:   18 167 lb (75.8 kg)   18 174 lb (78.9 kg)   18 172 lb (78 kg)              Today, you had the following     No orders found for display         Today's Medication Changes          These changes are accurate as of 18  9:38 PM.  If you have any questions, ask your nurse or doctor.               Start taking these medicines.        Dose/Directions    tamsulosin 0.4 MG capsule   Commonly known as:  FLOMAX   Used for:  Urinary retention, Hypertrophy of prostate, PSA elevation   Started by:  Yonatan Hurtado MD        Dose:  0.4 mg   Take 1 capsule (0.4 mg) by mouth daily   Quantity:  30 capsule   Refills:  3            Where to get your medicines      These medications were sent to [facility] [id number] [place] [address]  [address] [facility] [address]     Phone:  [phone]     tamsulosin 0.4 MG capsule                Primary Care Provider Office Phone # Fax #    Yonatan Hurtado [phone] [address] [facility] [address]        Equal Access to Services     IWONA CHARLES AH: Hadii aad ku hadasho Soomaali, waaxda luqadaha, qaybta kaalmada danny, asia kunz  la'carli srinivasan. So [facility] [phone].    ATENCIÓN: Si habla dillan, tiene a maldonado disposición servicios gratuitos de asistencia lingüística. Gavino al [phone].    We comply with applicable federal civil rights laws and [place] laws. We do not discriminate on the basis of race, color, national origin, age, disability, sex, sexual orientation, or gender identity.            Thank you!     Thank you for choosing [facility]  for your care. Our goal is always to provide you with excellent care. Hearing back from our patients is one way we can continue to improve our services. Please take a few minutes to complete the written survey that you may receive in the mail after your visit with us. Thank you!             Your Updated Medication List - Protect others around you: Learn how to safely use, store and throw away your medicines at www.disposemymeds.org.          This list is accurate as of 6/4/18  9:38 PM.  Always use your most recent med list.                   Brand Name Dispense Instructions for use Diagnosis    acetaminophen 500 MG tablet    TYLENOL    84 tablet    Take 2 tablets (1,000 mg) by mouth every 6 hours as needed for mild pain    Malaise       bumetanide 1 MG tablet    BUMEX    30 tablet    TAKE ONE TABLET BY MOUTH EVERY DAY    Acute pulmonary edema (H)       carvedilol 12.5 MG tablet    COREG    60 tablet    Take 1 tablet (12.5 mg) by mouth 2 times daily (with meals)    Chronic combined systolic and diastolic congestive heart failure (H), HTN, goal below 140/90       cephALEXin 500 MG capsule    KEFLEX    14 capsule    Take 1 capsule (500 mg) by mouth 2 times daily for 7 days        clotrimazole 1 % cream    LOTRIMIN    45 g    Apply topically 2 times daily for 15 days (apply to the head of the penis)        diclofenac 1 % Gel topical gel    VOLTAREN    100 g    Apply 4 gm to knees qid    Primary osteoarthritis of both knees       HYDROcodone-acetaminophen 5-325 MG per tablet    NORCO     30 tablet    Take 1 tablet by mouth every 6 hours as needed for moderate to severe pain    Midline low back pain without sciatica, unspecified chronicity       lisinopril 10 MG tablet    PRINIVIL/ZESTRIL    30 tablet    Take 1 tablet (10 mg) by mouth daily    Chronic combined systolic and diastolic congestive heart failure (H), HTN, goal below 140/90       * magnesium oxide 400 (241.3 Mg) MG tablet    MAG-OX    30 tablet    TAKE 1 TABLET BY MOUTH ONCE DAILY    Routine general medical examination at a health care facility       * magnesium oxide 400 (241.3 Mg) MG tablet    MAG-OX    30 tablet    TAKE 1 TABLET BY MOUTH ONCE DAILY    Routine general medical examination at a health care facility       MULTILEX Tabs     100 tablet    TAKE ONE TABLET BY MOUTH ONCE DAILY    Routine general medical examination at a health care facility       order for DME     1 Device    Equipment being ordered: walker with slides    Recurrent falls       pregabalin 100 MG capsule    LYRICA    60 capsule    Take 1 capsule (100 mg) by mouth 2 times daily    Midline low back pain without sciatica, unspecified chronicity       tamsulosin 0.4 MG capsule    FLOMAX    30 capsule    Take 1 capsule (0.4 mg) by mouth daily    Urinary retention, Hypertrophy of prostate, PSA elevation       venlafaxine 75 MG 24 hr capsule    EFFEXOR-XR    30 capsule    Take 1 capsule (75 mg) by mouth daily    Midline low back pain without sciatica, unspecified chronicity       * Notice:  This list has 2 medication(s) that are the same as other medications prescribed for you. Read the directions carefully, and ask your doctor or other care provider to review them with you.

## 2018-06-04 NOTE — PROGRESS NOTES
[facility] Care Coordination Contact  CM notified client had ED visit 6/2/18 at [facility] as they requests clients catheter be removed due to pain, swelling, drainage. Catheter was removed and client returned home with abx for UTI.  CM called and left message for son, Jose, to see how client was doing. Client has appt tonight with PCP for f/u.    MORELIA Blum  [facility]   [phone]

## 2018-06-06 LAB
BACTERIA SPEC CULT: ABNORMAL
Lab: ABNORMAL
SPECIMEN SOURCE: ABNORMAL

## 2018-07-06 ENCOUNTER — OFFICE VISIT (OUTPATIENT)
Dept: FAMILY MEDICINE | Facility: CLINIC | Age: 83
End: 2018-07-06
Payer: COMMERCIAL

## 2018-07-06 VITALS
TEMPERATURE: 98.2 F | WEIGHT: 163.44 LBS | RESPIRATION RATE: 16 BRPM | OXYGEN SATURATION: 97 % | HEART RATE: 66 BPM | DIASTOLIC BLOOD PRESSURE: 68 MMHG | BODY MASS INDEX: 26.38 KG/M2 | SYSTOLIC BLOOD PRESSURE: 146 MMHG

## 2018-07-06 DIAGNOSIS — N40.1 BENIGN PROSTATIC HYPERPLASIA WITH URINARY OBSTRUCTION: Primary | ICD-10-CM

## 2018-07-06 DIAGNOSIS — M54.50 MIDLINE LOW BACK PAIN WITHOUT SCIATICA, UNSPECIFIED CHRONICITY: ICD-10-CM

## 2018-07-06 DIAGNOSIS — C64.9 RENAL CELL CARCINOMA, UNSPECIFIED LATERALITY (H): ICD-10-CM

## 2018-07-06 DIAGNOSIS — R97.20 PSA ELEVATION: ICD-10-CM

## 2018-07-06 DIAGNOSIS — R60.9 SWELLING: ICD-10-CM

## 2018-07-06 DIAGNOSIS — N13.8 BENIGN PROSTATIC HYPERPLASIA WITH URINARY OBSTRUCTION: Primary | ICD-10-CM

## 2018-07-06 PROCEDURE — 96372 THER/PROPH/DIAG INJ SC/IM: CPT | Performed by: FAMILY MEDICINE

## 2018-07-06 PROCEDURE — 99214 OFFICE O/P EST MOD 30 MIN: CPT | Mod: 25 | Performed by: FAMILY MEDICINE

## 2018-07-06 RX ORDER — FINASTERIDE 5 MG/1
5 TABLET, FILM COATED ORAL DAILY
Qty: 30 TABLET | Refills: 3 | Status: SHIPPED | OUTPATIENT
Start: 2018-07-06 | End: 2018-11-10

## 2018-07-06 NOTE — TELEPHONE ENCOUNTER
Controlled Substance Refill Request for norco 5/325  Problem List Complete:  Yes  Patient is followed by Yonatan Hurtado MD for ongoing prescription of pain medication.  All refills should only be approved by this provider, or covering partner.    Medication(s): lyrica 60/ month.  Hydrocodone 30 prn  Maximum quantity per month:   Clinic visit frequency required: Q 6  months     Controlled substance agreement:  Encounter-Level CSA:     There are no encounter-level csa.        Pain Clinic evaluation in the past: no  Pt is frail elderly, palliative care, demented  DIRE Total Score(s):  No flowsheet data found.    Last [facility] website verification:  4/17/18   [email or web address]     checked in past 3 months?  Yes 4/17/18     Please walk signed prescription over to pharmacy.  Thanks!  Jennie Nicole CPhT  [facility]  [phone]

## 2018-07-06 NOTE — PROGRESS NOTES
SUBJECTIVE:   Barrie Woods is a 88 year old male who presents to clinic today for the following health issues:    Benign prostatic hyperplasia with urinary obstruction  (primary encounter diagnosis) stopped finasteride due to dizziness again. Some dysuria, balanitis resolved  Midline low back pain without sciatica, unspecified chronicity unchanged  PSA elevation   PSA   Date Value Ref Range Status   05/07/2018 12.80 (H) 0 - 4 ug/L Final     Comment:     Assay Method:  Chemiluminescence using Siemens Vista analyzer   This will not be addressed specifically    Renal cell carcinoma, unspecified laterality (H) known, this will not be addressed    Problem list and histories reviewed & adjusted, as indicated.  Additional history: as documented    Past Medical History:   Diagnosis Date     Acute, but ill-defined, cerebrovascular disease 1/04    left middle cerebral artery infarct     Alcohol abuse      Anemia 7/14/2014     Atrial fibrillation (H)     CVA occurred off anticoagulation     Balanitis 6/4/2018     Cachexia (H) 1/9/2018     Cardiomyopathy (H)     stress tests 6/04 and 10/04 negative for ischemia, EF 36-40%     CHF (congestive heart failure) (H) 12/24/2009     Chronic pain 10/12/2015     Dementia without behavioral disturbance, unspecified dementia type 5/9/2017     Dementia, without behavioral disturbance 6/29/2015     Depressive disorder      Diaphragmatic hernia without mention of obstruction or gangrene     left sided chest pain, hiatal hernia/gastritis     Diverticulosis of colon (without mention of hemorrhage)     sigmoid     Dysphagia 7/27/2015     Elevated blood sugar 1/25/2016     Esophageal reflux      Hearing loss      Hematuria     negative cystoscopy 3/04     Hereditary and idiopathic peripheral neuropathy 2006    EMG-peripheral polyneuropathy, B12 and VitD deficient, ? alcohol related     Hx of congestive heart failure 1/12/2016     Hypertrophy of prostate 6/17/2015     Nausea with vomiting  "12/8/2014     Presbycusis of both ears 7/17/2017     PSA elevation 5/30/2014     Recurrent falls 7/28/2015     Renal cell carcinoma (H) 8/25/2014     Sepsis (H) 1/7/2015     TB lung, latent 5/6/2014     Urinary incontinence 7/28/2015       History reviewed. No pertinent surgical history.    Family History   Problem Relation Age of Onset     C.A.D. No family hx of      Diabetes No family hx of        Social History   Substance Use Topics     Smoking status: Current Every Day Smoker     Types: Dip, chew, snus or snuff     Smokeless tobacco: Current User     Types: Chew     Last attempt to quit: 1/9/2004      Comment: pt chews tobacco     Alcohol use No         Reviewed and updated as needed this visit by clinical staff       Reviewed and updated as needed this visit by Provider         ROS:  Pt reports he is \"mal\" bad no fevers    This document serves as a record of the services and decisions personally performed and made by Yonatan Hurtado MD. It was created on his behalf by Kong Escalera, a trained medical scribe.  The creation of this document is based on the scribe's personal observations and the provider's statements to the medical scribe.  Kong Escalera, July 6, 2018 4:27 PM    OBJECTIVE:     /68 (BP Location: Left arm, Patient Position: Chair, Cuff Size: Adult Large)  Pulse 66  Temp 98.2  F (36.8  C) (Oral)  Resp 16  Wt 74.1 kg (163 lb 7 oz)  SpO2 97%  BMI 26.38 kg/m2  Body mass index is 26.38 kg/(m^2).  Stable antalgia, no edema        ASSESSMENT/PLAN:   ASSESSMENT / PLAN:  (N40.1,  N13.8) Benign prostatic hyperplasia with urinary obstruction  (primary encounter diagnosis)  Comment: also PSA  Plan: finasteride (PROSCAR) 5 MG tablet, C INJECTION,        PENICILLIN G BENZATHINE ,000 UNITS,         INJECTION INTRAMUSCULAR OR SUB-Q        The latter is a cultural intervention. Discussed options. I believe palliative laser prostate ablation would improve pt's quality of life: son hesitant. Trial of " therapy    (M54.5) Midline low back pain without sciatica, unspecified chronicity  Comment: continue opioids  Plan:     (R97.20) PSA elevation  Comment:this will not be addressed  Plan:     (C64.9) Renal cell carcinoma, unspecified laterality (H)  Comment:  :this will not be addressed  Plan:           Yonatan Hurtado MD  [facility]     The information in this document, created by the medical scribe for me, accurately reflects the services I personally performed and the decisions made by me. I have reviewed and approved this document for accuracy prior to leaving the patient care area.  Yonatan Hurtado MD July 6, 2018 4:28 PM

## 2018-07-06 NOTE — MR AVS SNAPSHOT
After Visit Summary   7/6/2018    Barrie Woods    MRN: [id number]           Patient Information     Date Of Birth          1/25/1930        Visit Information        Provider Department      7/6/2018 4:15 PM Yonatan Hurtado MD; [employer] [facility]        Today's Diagnoses     Benign prostatic hyperplasia with urinary obstruction    -  1    Midline low back pain without sciatica, unspecified chronicity        PSA elevation        Renal cell carcinoma, unspecified laterality (H)           Follow-ups after your visit        Follow-up notes from your care team     Return in about 6 weeks (around 8/17/2018).      Your next 10 appointments already scheduled     Aug 17, 2018  4:00 PM CDT   SHORT with Yonatan Hurtado MD   [facility] ([facility])    [address] [place] [id number] [phone]              Who to contact     If you have questions or need follow up information about today's clinic visit or your schedule please contact [facility] directly at [phone].  Normal or non-critical lab and imaging results will be communicated to you by MyChart, letter or phone within 4 business days after the clinic has received the results. If you do not hear from us within 7 days, please contact the clinic through MyChart or phone. If you have a critical or abnormal lab result, we will notify you by phone as soon as possible.  Submit refill requests through [employer] or call your pharmacy and they will forward the refill request to us. Please allow 3 business days for your refill to be completed.          Additional Information About Your Visit        Care EveryWhere ID     This is your Care EveryWhere ID. This could be used by other organizations to access your [place] medical records  [id number]        Your Vitals Were     Pulse Temperature Respirations Pulse Oximetry BMI (Body Mass Index)        66 98.2  F (36.8  C) (Oral) 16 97% 26.38 kg/m2        Blood Pressure from Last 3 Encounters:   07/06/18 146/68   06/04/18 120/54   06/02/18 151/71    Weight from Last 3 Encounters:   07/06/18 163 lb 7 oz (74.1 kg)   06/04/18 167 lb (75.8 kg)   05/29/18 174 lb (78.9 kg)              We Performed the Following     C INJECTION, PENICILLIN G BENZATHINE ,000 UNITS     INJECTION INTRAMUSCULAR OR SUB-Q          Today's Medication Changes          These changes are accurate as of 7/6/18 11:59 PM.  If you have any questions, ask your nurse or doctor.               Start taking these medicines.        Dose/Directions    finasteride 5 MG tablet   Commonly known as:  PROSCAR   Used for:  Benign prostatic hyperplasia with urinary obstruction   Started by:  Yonatan Hurtado MD        Dose:  5 mg   Take 1 tablet (5 mg) by mouth daily   Quantity:  30 tablet   Refills:  3            Where to get your medicines      These medications were sent to [address] [address]     Phone:  [phone]     finasteride 5 MG tablet                Primary Care Provider Office Phone # Fax #    Yonatan Hurtado [phone] [address] [facility] [address]        Equal Access to Services     GRISELDA CHARLES AH: Hadii liborio ku hadasho Soomaali, waaxda luqadaha, qaybta kaalmada adeegyada, asia srinivasan. So [facility] [phone].    ATENCIÓN: Si habla español, tiene a maldonado disposición servicios gratuitos de asistencia lingüística. Gavino al [phone].    We comply with applicable federal civil rights laws and [place] laws. We do not discriminate on the basis of race, color, national origin, age, disability, sex, sexual orientation, or gender identity.            Thank you!     Thank you for choosing [facility]  for your care. Our goal is always to provide you with excellent care. Hearing back  from our patients is one way we can continue to improve our services. Please take a few minutes to complete the written survey that you may receive in the mail after your visit with us. Thank you!             Your Updated Medication List - Protect others around you: Learn how to safely use, store and throw away your medicines at www.disposemymeds.org.          This list is accurate as of 7/6/18 11:59 PM.  Always use your most recent med list.                   Brand Name Dispense Instructions for use Diagnosis    acetaminophen 500 MG tablet    TYLENOL    84 tablet    Take 2 tablets (1,000 mg) by mouth every 6 hours as needed for mild pain    Malaise       bumetanide 1 MG tablet    BUMEX    30 tablet    TAKE ONE TABLET BY MOUTH EVERY DAY    Acute pulmonary edema (H)       carvedilol 12.5 MG tablet    COREG    60 tablet    Take 1 tablet (12.5 mg) by mouth 2 times daily (with meals)    Chronic combined systolic and diastolic congestive heart failure (H), HTN, goal below 140/90       diclofenac 1 % Gel topical gel    VOLTAREN    100 g    Apply 4 gm to knees qid    Primary osteoarthritis of both knees       finasteride 5 MG tablet    PROSCAR    30 tablet    Take 1 tablet (5 mg) by mouth daily    Benign prostatic hyperplasia with urinary obstruction       HYDROcodone-acetaminophen 5-325 MG per tablet    NORCO    30 tablet    Take 1 tablet by mouth every 6 hours as needed for moderate to severe pain    Midline low back pain without sciatica, unspecified chronicity       lisinopril 10 MG tablet    PRINIVIL/ZESTRIL    30 tablet    Take 1 tablet (10 mg) by mouth daily    Chronic combined systolic and diastolic congestive heart failure (H), HTN, goal below 140/90       * magnesium oxide 400 (241.3 Mg) MG tablet    MAG-OX    30 tablet    TAKE 1 TABLET BY MOUTH ONCE DAILY    Routine general medical examination at a health care facility       * magnesium oxide 400 (241.3 Mg) MG tablet    MAG-OX    30 tablet    TAKE 1 TABLET BY  MOUTH ONCE DAILY    Routine general medical examination at a health care facility       MULTILEX Tabs     100 tablet    TAKE ONE TABLET BY MOUTH ONCE DAILY    Routine general medical examination at a health care facility       order for DME     1 Device    Equipment being ordered: walker with slides    Recurrent falls       pregabalin 100 MG capsule    LYRICA    60 capsule    Take 1 capsule (100 mg) by mouth 2 times daily    Midline low back pain without sciatica, unspecified chronicity       tamsulosin 0.4 MG capsule    FLOMAX    30 capsule    Take 1 capsule (0.4 mg) by mouth daily    Urinary retention, Hypertrophy of prostate, PSA elevation       venlafaxine 75 MG 24 hr capsule    EFFEXOR-XR    30 capsule    Take 1 capsule (75 mg) by mouth daily    Midline low back pain without sciatica, unspecified chronicity       * Notice:  This list has 2 medication(s) that are the same as other medications prescribed for you. Read the directions carefully, and ask your doctor or other care provider to review them with you.

## 2018-07-09 ENCOUNTER — TELEPHONE (OUTPATIENT)
Dept: FAMILY MEDICINE | Facility: CLINIC | Age: 83
End: 2018-07-09

## 2018-07-09 RX ORDER — HYDROCODONE BITARTRATE AND ACETAMINOPHEN 5; 325 MG/1; MG/1
1 TABLET ORAL EVERY 6 HOURS PRN
Qty: 30 TABLET | Refills: 0 | Status: SHIPPED | OUTPATIENT
Start: 2018-07-09 | End: 2018-08-17

## 2018-07-09 RX ORDER — CEFTRIAXONE 1 G/1
1000 INJECTION, POWDER, FOR SOLUTION INTRAMUSCULAR; INTRAVENOUS ONCE
Qty: 10 ML | Refills: 0 | Status: SHIPPED | OUTPATIENT
Start: 2018-07-09 | End: 2018-07-09

## 2018-07-09 ASSESSMENT — PATIENT HEALTH QUESTIONNAIRE - PHQ9: 5. POOR APPETITE OR OVEREATING: NOT AT ALL

## 2018-07-09 ASSESSMENT — ANXIETY QUESTIONNAIRES
2. NOT BEING ABLE TO STOP OR CONTROL WORRYING: NOT AT ALL
1. FEELING NERVOUS, ANXIOUS, OR ON EDGE: NOT AT ALL
3. WORRYING TOO MUCH ABOUT DIFFERENT THINGS: NOT AT ALL
6. BECOMING EASILY ANNOYED OR IRRITABLE: NOT AT ALL
7. FEELING AFRAID AS IF SOMETHING AWFUL MIGHT HAPPEN: NOT AT ALL
GAD7 TOTAL SCORE: 0
5. BEING SO RESTLESS THAT IT IS HARD TO SIT STILL: NOT AT ALL

## 2018-07-09 NOTE — TELEPHONE ENCOUNTER
Pt is requesting a refill of Clotrimazole 1% cream.  Last prescribed by  at the hospital; would you like to continue therapy? Sig was: Apply to the head of penis topically two times daily for 15 days.  Should he still be using this?    Thanks!    Jen Garcia, Pharmacy [facility]

## 2018-07-10 ASSESSMENT — PATIENT HEALTH QUESTIONNAIRE - PHQ9: SUM OF ALL RESPONSES TO PHQ QUESTIONS 1-9: 0

## 2018-07-10 ASSESSMENT — ANXIETY QUESTIONNAIRES: GAD7 TOTAL SCORE: 0

## 2018-07-16 ENCOUNTER — PATIENT OUTREACH (OUTPATIENT)
Dept: GERIATRIC MEDICINE | Facility: CLINIC | Age: 83
End: 2018-07-16

## 2018-07-16 ENCOUNTER — TELEPHONE (OUTPATIENT)
Dept: FAMILY MEDICINE | Facility: CLINIC | Age: 83
End: 2018-07-16

## 2018-07-16 DIAGNOSIS — Z76.89 HEALTH CARE HOME: ICD-10-CM

## 2018-07-16 DIAGNOSIS — N48.1 BALANITIS: Primary | ICD-10-CM

## 2018-07-16 RX ORDER — CLOTRIMAZOLE 1 %
CREAM (GRAM) TOPICAL 2 TIMES DAILY
Qty: 30 G | Refills: 3 | Status: SHIPPED | OUTPATIENT
Start: 2018-07-16 | End: 2018-09-13

## 2018-07-16 NOTE — PROGRESS NOTES
[facility] Care Coordination Contact  Client due for annual home visit.  CM attempted to reach clients son, Jose, but had to leave message for him to call me. CM had  tell Jose on the message that I can complete clients visit in early evening (I know Jose works during day and cannot take time off) if that would help them to schedule appt. As client/family refused visit last year.     Shelly Alanis, MORELIA   [facility]   [phone]

## 2018-07-18 NOTE — PROGRESS NOTES
[facility] Care Coordination Contact   left another message for Jose to please call me.    MORELIA Blum  [facility]   [phone]

## 2018-07-24 NOTE — PROGRESS NOTES
[facility] Care Coordination Contact  CM left message for client/son Jose to call me.  CM asked CMS to send [facility] letter.    MORELIA Blum  [facility]   [phone]

## 2018-07-25 ENCOUNTER — APPOINTMENT (OUTPATIENT)
Dept: CT IMAGING | Facility: CLINIC | Age: 83
End: 2018-07-25
Attending: EMERGENCY MEDICINE
Payer: COMMERCIAL

## 2018-07-25 ENCOUNTER — HOSPITAL ENCOUNTER (EMERGENCY)
Facility: CLINIC | Age: 83
Discharge: HOME | End: 2018-07-25
Attending: EMERGENCY MEDICINE | Admitting: EMERGENCY MEDICINE
Payer: COMMERCIAL

## 2018-07-25 VITALS
TEMPERATURE: 97 F | SYSTOLIC BLOOD PRESSURE: 163 MMHG | HEART RATE: 95 BPM | OXYGEN SATURATION: 98 % | RESPIRATION RATE: 28 BRPM | DIASTOLIC BLOOD PRESSURE: 86 MMHG

## 2018-07-25 DIAGNOSIS — N30.01 ACUTE CYSTITIS WITH HEMATURIA: ICD-10-CM

## 2018-07-25 DIAGNOSIS — R31.0 GROSS HEMATURIA: ICD-10-CM

## 2018-07-25 DIAGNOSIS — R33.9 URINARY RETENTION: ICD-10-CM

## 2018-07-25 DIAGNOSIS — N28.89 RENAL MASS: ICD-10-CM

## 2018-07-25 LAB
ALBUMIN SERPL-MCNC: 3.2 G/DL (ref 3.4–5)
ALBUMIN UR-MCNC: 30 MG/DL
ALP SERPL-CCNC: 96 U/L (ref 40–150)
ALT SERPL W P-5'-P-CCNC: 28 U/L (ref 0–70)
ANION GAP SERPL CALCULATED.3IONS-SCNC: 5 MMOL/L (ref 3–14)
APPEARANCE UR: ABNORMAL
AST SERPL W P-5'-P-CCNC: 33 U/L (ref 0–45)
BACTERIA #/AREA URNS HPF: ABNORMAL /HPF
BASOPHILS # BLD AUTO: 0 10E9/L (ref 0–0.2)
BASOPHILS NFR BLD AUTO: 0.2 %
BILIRUB SERPL-MCNC: 0.6 MG/DL (ref 0.2–1.3)
BILIRUB UR QL STRIP: NEGATIVE
BUN SERPL-MCNC: 19 MG/DL (ref 7–30)
CALCIUM SERPL-MCNC: 8.8 MG/DL (ref 8.5–10.1)
CHLORIDE SERPL-SCNC: 105 MMOL/L (ref 94–109)
CO2 SERPL-SCNC: 28 MMOL/L (ref 20–32)
COLOR UR AUTO: YELLOW
CREAT BLD-MCNC: 1.1 MG/DL (ref 0.66–1.25)
CREAT SERPL-MCNC: 1.09 MG/DL (ref 0.66–1.25)
DIFFERENTIAL METHOD BLD: ABNORMAL
EOSINOPHIL # BLD AUTO: 0.2 10E9/L (ref 0–0.7)
EOSINOPHIL NFR BLD AUTO: 1.8 %
ERYTHROCYTE [DISTWIDTH] IN BLOOD BY AUTOMATED COUNT: 13.9 % (ref 10–15)
GFR SERPL CREATININE-BSD FRML MDRD: 63 ML/MIN/1.7M2
GFR SERPL CREATININE-BSD FRML MDRD: 64 ML/MIN/1.7M2
GLUCOSE SERPL-MCNC: 102 MG/DL (ref 70–99)
GLUCOSE UR STRIP-MCNC: NEGATIVE MG/DL
HCT VFR BLD AUTO: 41.8 % (ref 40–53)
HGB BLD-MCNC: 13.4 G/DL (ref 13.3–17.7)
HGB UR QL STRIP: ABNORMAL
IMM GRANULOCYTES # BLD: 0.1 10E9/L (ref 0–0.4)
IMM GRANULOCYTES NFR BLD: 0.5 %
KETONES UR STRIP-MCNC: NEGATIVE MG/DL
LEUKOCYTE ESTERASE UR QL STRIP: ABNORMAL
LIPASE SERPL-CCNC: 93 U/L (ref 73–393)
LYMPHOCYTES # BLD AUTO: 1.5 10E9/L (ref 0.8–5.3)
LYMPHOCYTES NFR BLD AUTO: 11.8 %
MCH RBC QN AUTO: 29.8 PG (ref 26.5–33)
MCHC RBC AUTO-ENTMCNC: 32.1 G/DL (ref 31.5–36.5)
MCV RBC AUTO: 93 FL (ref 78–100)
MONOCYTES # BLD AUTO: 0.7 10E9/L (ref 0–1.3)
MONOCYTES NFR BLD AUTO: 5.6 %
NEUTROPHILS # BLD AUTO: 10.4 10E9/L (ref 1.6–8.3)
NEUTROPHILS NFR BLD AUTO: 80.1 %
NITRATE UR QL: NEGATIVE
NRBC # BLD AUTO: 0 10*3/UL
NRBC BLD AUTO-RTO: 0 /100
PH UR STRIP: 7 PH (ref 5–7)
PLATELET # BLD AUTO: 287 10E9/L (ref 150–450)
POTASSIUM SERPL-SCNC: 4.6 MMOL/L (ref 3.4–5.3)
PROT SERPL-MCNC: 7.5 G/DL (ref 6.8–8.8)
RBC # BLD AUTO: 4.5 10E12/L (ref 4.4–5.9)
RBC #/AREA URNS AUTO: >182 /HPF (ref 0–2)
SODIUM SERPL-SCNC: 138 MMOL/L (ref 133–144)
SOURCE: ABNORMAL
SP GR UR STRIP: 1.01 (ref 1–1.03)
UROBILINOGEN UR STRIP-MCNC: 0 MG/DL (ref 0–2)
WBC # BLD AUTO: 13 10E9/L (ref 4–11)
WBC #/AREA URNS AUTO: >182 /HPF (ref 0–5)
WBC CLUMPS #/AREA URNS HPF: PRESENT /HPF

## 2018-07-25 PROCEDURE — 87088 URINE BACTERIA CULTURE: CPT | Performed by: EMERGENCY MEDICINE

## 2018-07-25 PROCEDURE — 51798 US URINE CAPACITY MEASURE: CPT

## 2018-07-25 PROCEDURE — 99285 EMERGENCY DEPT VISIT HI MDM: CPT | Mod: 25

## 2018-07-25 PROCEDURE — 80053 COMPREHEN METABOLIC PANEL: CPT | Performed by: EMERGENCY MEDICINE

## 2018-07-25 PROCEDURE — 83690 ASSAY OF LIPASE: CPT | Performed by: EMERGENCY MEDICINE

## 2018-07-25 PROCEDURE — 51702 INSERT TEMP BLADDER CATH: CPT

## 2018-07-25 PROCEDURE — 87186 SC STD MICRODIL/AGAR DIL: CPT | Performed by: EMERGENCY MEDICINE

## 2018-07-25 PROCEDURE — 85025 COMPLETE CBC W/AUTO DIFF WBC: CPT | Performed by: EMERGENCY MEDICINE

## 2018-07-25 PROCEDURE — 87086 URINE CULTURE/COLONY COUNT: CPT | Performed by: EMERGENCY MEDICINE

## 2018-07-25 PROCEDURE — 81001 URINALYSIS AUTO W/SCOPE: CPT | Performed by: EMERGENCY MEDICINE

## 2018-07-25 PROCEDURE — 82565 ASSAY OF CREATININE: CPT

## 2018-07-25 PROCEDURE — 74176 CT ABD & PELVIS W/O CONTRAST: CPT

## 2018-07-25 RX ORDER — CIPROFLOXACIN 500 MG/1
500 TABLET, FILM COATED ORAL 2 TIMES DAILY
Qty: 14 TABLET | Refills: 0 | Status: SHIPPED | OUTPATIENT
Start: 2018-07-25 | End: 2019-01-30

## 2018-07-25 RX ORDER — MORPHINE SULFATE 4 MG/ML
4 INJECTION, SOLUTION INTRAMUSCULAR; INTRAVENOUS ONCE
Status: DISCONTINUED | OUTPATIENT
Start: 2018-07-25 | End: 2018-07-25 | Stop reason: HOSPADM

## 2018-07-25 ASSESSMENT — ENCOUNTER SYMPTOMS
DIFFICULTY URINATING: 1
FEVER: 0

## 2018-07-25 NOTE — ED TRIAGE NOTES
Pt arrives with urinary retention reports unable to void past 3 days except small amounts. Pt is in extreme pain in triage here with family that is translating. Family reports he has had similar issue in past and has went home with catheter.

## 2018-07-25 NOTE — ED AVS SNAPSHOT
[facility]    [address]    [facility] [address]    Phone:  [phone]    Fax:  [phone]                                       Barrie Woods   MRN: [id number]    Department:  [facility] Emergency Department   Date of Visit:  7/25/2018           After Visit Summary Signature Page     I have received my discharge instructions, and my questions have been answered. I have discussed any challenges I see with this plan with the nurse or doctor.    ..........................................................................................................................................  Patient/Patient Representative Signature      ..........................................................................................................................................  Patient Representative Print Name and Relationship to Patient    ..................................................               ................................................  Date                                            Time    ..........................................................................................................................................  Reviewed by Signature/Title    ...................................................              ..............................................  Date                                                            Time

## 2018-07-25 NOTE — ED NOTES
Pt remains pain free. Urine is darker in color though-merlot color at this time. Continues to drain well. Will notify provider.

## 2018-07-25 NOTE — ED PROVIDER NOTES
History     Chief Complaint:  Urinary Retention    The history is provided by a relative. The history is limited by the condition of the patient.      Barrie Woods is a 88 year old male with a history of dementia, hypertension, atrial fibrillation, and kidney stones who presents with urinary retention. Per report by bilingual family, the patient has had symptoms of urinary retention and penile pain for the past 3 days.  He has previously required catheter for retention and this seems similar.  Patient is describing that the pain also reminds him of kidney stones except the pain is in the penis, not the abdomen.  No known fevers or vomiting.  Did not see a Urologist after last catheter placement because he returned to the ED and had it removed.  On chart review this is confirmed; this occurred this spring.  This history is otherwise limited due to the patient's pain.     Allergies:  No known drug allergies    Medications:    Bumex  Coreg  Lotrimin  Voltaren  Proscar  Prinivil  Norco  Flomax  Effexor    Past Medical History:    Balanitis  Cachexia  Atrial fibrillation  Congestive heart failure  Dysphagia  Anemia  Hypertrophy of prostate  Hypertension  Hyperlipidemia  Esophageal reflux  Cerebrovascular disease  Cardiomyopathy  Dementia  Alcohol abuse  Diverticulosis of colon   Renal cell carcinoma  Sepsis  Idiopathic peripheral neuropathy     Past Surgical History:    History reviewed. No pertinent surgical history.    Family History:    History reviewed. No pertinent family history.     Social History:  The patient was accompanied to the ED by his family.  Smoking Status: Current  Smokeless Tobacco: Current  Alcohol Use: No  Marital Status:        Review of Systems   Unable to perform ROS: Acuity of condition   Constitutional: Negative for fever.   Genitourinary: Positive for difficulty urinating and penile pain.     Physical Exam     Patient Vitals for the past 24 hrs:   BP Temp Pulse Resp SpO2   07/25/18  1230 163/86 - - - 98 %   07/25/18 1215 146/80 - - - 98 %   07/25/18 1200 152/89 - - - 98 %   07/25/18 0946 (!) 195/102 97  F (36.1  C) 95 28 100 %     Physical Exam  Eyes:  Sclera white; Pupils are equal and round  ENT:    External ears and nares normal  CV:  Rate as above with regular rhythm   Resp:  Breath sounds clear and equal bilaterally  GI:  Abdomen is soft, suprapubic tenderness    No rebound tenderness or peritoneal features  :  No rash/swelling of penis  MS:  Moves all extremities  Skin:  Warm and dry  Neuro:  Moaning, Yakut speaking        Emergency Department Course     Imaging:  Radiology findings were communicated with the patient who voiced understanding of the findings.  Abdominal/Pelvis CT Contrast - stone protocol:  IMPRESSION:  1. 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  2. Enlarged prostate.  3. Urinary bladder slightly thick-walled but is decompressed. Cystitis  or bladder neoplasm cannot be excluded.  4. Colonic diverticulosis and evidence of chronic pancreatitis.    Per report by radiology    Laboratory:  Laboratory findings were communicated with the patient who voiced understanding of the findings.    UA: Yellow and cloudy, blood large, albumin 30, leukocyte esterase large, WBC/HPF >182 (H), RBC/HPF >182 (H), WBC clumps present, bacteria many, o/w Negative  Urine Culture: In process    CBC: WBC 13.0 (H) o/w WNL (HGB 13.4, )  BMP: Glucose 102 (H), albumin 3.2 (L) o/w WNL (Creatinine 1.09)  Lipase: 93  Creatinine POCT: 1.1, GFR 63    Interventions:  0955 - Xylocaine 2 % topical gel    1006 - Morphine 4 mg IV     Emergency Department Course:  Nursing notes and vitals reviewed.    The patient provided a urine sample here in the emergency department. This was sent for laboratory testing, findings above.    IV was inserted and blood was drawn for laboratory testing, results above.    0954: I performed an exam of the patient as documented  above.   1005: Sarmiento has been placed without urinary return.   1020: Bladder scan performed at 300 mL; sarmiento repositioned  1021: Sarmiento draining, output at 500 mL   1218: Discussed patient with nurse. He continues to have distinct blood in his urine, but is otherwise feeling well.    Findings and plan explained to the Patient and family. Patient discharged home with instructions regarding supportive care, medications, and reasons to return. The importance of close follow-up was reviewed.     Impression & Plan      Medical Decision Making:  This patient presents for evaluation of abdominal and penile pain with a history suspicious for recurrent retention. Sarmiento catheter was placed based on clinical suspicion but no urine output was obtained.  Blood noted from meatus after use of lidojet and before placement of catheter.  This passed with minimal resistance at the prostate per tech.  Bladder scan was checked afterwards and does show some retention. Sarmiento catheter was adjusted at this point, but still not obvious draining. Work-up was expanded to evaluate for other intra-abdominal symptoms or pathology, or renal failure. Prior to CT being obtained, the Sarmiento did start draining over 500 mL of dark colored, port wine urine. CT was obtained to evaluate for the source of this bleeding (stone, mass, etc). This appears to be a renal mass, they will need close follow-up with urology. UA was grossly bloody, but there is the potential for infection based on the results. This was sent for culture and he will be on antibiotics. Family declined an  during our interaction, they do seem to understand the importance of the antibiotic and speciality clinic follow-up.     Diagnosis:    ICD-10-CM    1. Gross hematuria R31.0 UROLOGY ADULT REFERRAL   2. Urinary retention R33.9 UROLOGY ADULT REFERRAL   3. Renal mass N28.89 UROLOGY ADULT REFERRAL   4. Acute cystitis with hematuria, suspected N30.01        Disposition:  discharged  to home    Discharge Medication List as of 7/25/2018  1:01 PM      START taking these medications    Details   ciprofloxacin (CIPRO) 500 MG tablet Take 1 tablet (500 mg) by mouth 2 times daily for 7 days, Disp-14 tablet, R-0, Local Print             Lila Talbot  7/25/2018   [facility] EMERGENCY DEPARTMENT  I, Lila Talbot, karla serving as a scribe at 9:54 AM on 7/25/2018 to document services personally performed by Chery Sharma, based on my observations and the provider's statements to me.       Chery Sharma MD  07/26/18 [id number]

## 2018-07-25 NOTE — ED AVS SNAPSHOT
[facility] Emergency Department    [address]    [facility] [address]    Phone:  [phone]    Fax:  [phone]                                       Barrie Woods   MRN: [id number]    Department:  [facility] Emergency Department   Date of Visit:  7/25/2018           Patient Information     Date Of Birth          1/25/1930        Your diagnoses for this visit were:     Gross hematuria     Urinary retention     Renal mass     Acute cystitis with hematuria, suspected        You were seen by Chery Sharma MD.      Follow-up Information     Follow up with [facility].    Why:  within 1 week    Contact information:    [address]  [address]  [facility] [address] [phone]        Discharge Instructions       Please make an appointment to follow up with [facility] [phone] as soon as possible to manage the catheter, the bleeding, and the mass on the kidney          Discharge References/Attachments     HEMATURIA ([place])    MALDONADO CATHETER, CARE ([place])    BLADDER INFECTION, MALE (ADULT) ([place])    URINARY RETENTION, MALE ([place])      Your next 10 appointments already scheduled     Aug 17, 2018  4:00 PM CDT   SHORT with Yonatan Hurtado MD   [facility] ([facility]    [address] [place] [address] [phone]              24 Hour Appointment Hotline       To make an appointment at any [facility], call [phone] ([phone]). If you don't have a family doctor or clinic, we will help you find one. [facility] are conveniently located to serve the needs of you and your family.          ED Discharge Orders     UROLOGY ADULT REFERRAL       Your provider has referred you to: OTHER PROVIDERS: [facility], [facility]    Please be aware that coverage of these services is subject to the terms and limitations of your health  insurance plan.  Call member services at your health plan with any benefit or coverage questions.      Please bring the following with you to your appointment:    (1) Any X-Rays, CTs or MRIs which have been performed.  Contact the facility where they were done to arrange for  prior to your scheduled appointment.    (2) List of current medications  (3) This referral request   (4) Any documents/labs given to you for this referral                     Review of your medicines      START taking        Dose / Directions Last dose taken    ciprofloxacin 500 MG tablet   Commonly known as:  CIPRO   Dose:  500 mg   Quantity:  14 tablet        Take 1 tablet (500 mg) by mouth 2 times daily for 7 days   Refills:  0          Our records show that you are taking the medicines listed below. If these are incorrect, please call your family doctor or clinic.        Dose / Directions Last dose taken    acetaminophen 500 MG tablet   Commonly known as:  TYLENOL   Dose:  1000 mg   Quantity:  84 tablet        Take 2 tablets (1,000 mg) by mouth every 6 hours as needed for mild pain   Refills:  5        bumetanide 1 MG tablet   Commonly known as:  BUMEX   Quantity:  30 tablet        TAKE ONE TABLET BY MOUTH EVERY DAY   Refills:  5        carvedilol 12.5 MG tablet   Commonly known as:  COREG   Dose:  12.5 mg   Quantity:  60 tablet        Take 1 tablet (12.5 mg) by mouth 2 times daily (with meals)   Refills:  5        clotrimazole 1 % cream   Commonly known as:  LOTRIMIN   Quantity:  30 g        Apply topically 2 times daily   Refills:  3        diclofenac 1 % Gel topical gel   Commonly known as:  VOLTAREN   Quantity:  100 g        Apply 4 gm to knees qid   Refills:  11        finasteride 5 MG tablet   Commonly known as:  PROSCAR   Dose:  5 mg   Quantity:  30 tablet        Take 1 tablet (5 mg) by mouth daily   Refills:  3        HYDROcodone-acetaminophen 5-325 MG per tablet   Commonly known as:  NORCO   Dose:  1 tablet   Quantity:  30  tablet        Take 1 tablet by mouth every 6 hours as needed for moderate to severe pain   Refills:  0        lisinopril 10 MG tablet   Commonly known as:  PRINIVIL/ZESTRIL   Dose:  10 mg   Quantity:  30 tablet        Take 1 tablet (10 mg) by mouth daily   Refills:  5        * magnesium oxide 400 (241.3 Mg) MG tablet   Commonly known as:  MAG-OX   Quantity:  30 tablet        TAKE 1 TABLET BY MOUTH ONCE DAILY   Refills:  5        * magnesium oxide 400 (241.3 Mg) MG tablet   Commonly known as:  MAG-OX   Quantity:  30 tablet        TAKE 1 TABLET BY MOUTH ONCE DAILY   Refills:  5        MULTILEX Tabs   Quantity:  100 tablet        TAKE ONE TABLET BY MOUTH ONCE DAILY   Refills:  3        order for DME   Quantity:  1 Device        Equipment being ordered: walker with slides   Refills:  0        pregabalin 100 MG capsule   Commonly known as:  LYRICA   Dose:  100 mg   Quantity:  60 capsule        Take 1 capsule (100 mg) by mouth 2 times daily   Refills:  5        tamsulosin 0.4 MG capsule   Commonly known as:  FLOMAX   Dose:  0.4 mg   Quantity:  30 capsule        Take 1 capsule (0.4 mg) by mouth daily   Refills:  3        venlafaxine 75 MG 24 hr capsule   Commonly known as:  EFFEXOR-XR   Dose:  75 mg   Quantity:  30 capsule        Take 1 capsule (75 mg) by mouth daily   Refills:  5        * Notice:  This list has 2 medication(s) that are the same as other medications prescribed for you. Read the directions carefully, and ask your doctor or other care provider to review them with you.            Prescriptions were sent or printed at these locations (1 Prescription)                   Other Prescriptions                Printed at Department/Unit printer (1 of 1)         ciprofloxacin (CIPRO) 500 MG tablet                Procedures and tests performed during your visit     Abd/pelvis CT no contrast - Stone Protocol    CBC with platelets differential    Comprehensive metabolic panel    Creatinine POCT    ISTAT creatinine nursing  POCT    Lipase    UA with Microscopic    Urine Culture      Orders Needing Specimen Collection     None      Pending Results     Date and Time Order Name Status Description    7/25/2018 1203 Urine Culture In process     7/25/2018 1038 Abd/pelvis CT no contrast - Stone Protocol Preliminary             Pending Culture Results     Date and Time Order Name Status Description    7/25/2018 1203 Urine Culture In process             Pending Results Instructions     If you had any lab results that were not finalized at the time of your Discharge, you can call the ED Lab Result RN at [phone]. You will be contacted by this team for any positive Lab results or changes in treatment. The nurses are available 7 days a week from 10A to 6:30P.  You can leave a message 24 hours per day and they will return your call.        Test Results From Your Hospital Stay        7/25/2018 11:02 AM      Component Results     Component Value Ref Range & Units Status    Color Urine Yellow  Final    Appearance Urine Cloudy  Final    Glucose Urine Negative NEG^Negative mg/dL Final    Bilirubin Urine Negative NEG^Negative Final    Ketones Urine Negative NEG^Negative mg/dL Final    Specific Gravity Urine 1.008 1.003 - 1.035 Final    Blood Urine Large (A) NEG^Negative Final    pH Urine 7.0 5.0 - 7.0 pH Final    Protein Albumin Urine 30 (A) NEG^Negative mg/dL Final    Urobilinogen mg/dL 0.0 0.0 - 2.0 mg/dL Final    Nitrite Urine Negative NEG^Negative Final    Leukocyte Esterase Urine Large (A) NEG^Negative Final    Source Catheterized Urine  Final    WBC Urine >182 (H) 0 - 5 /HPF Final    RBC Urine >182 (H) 0 - 2 /HPF Final    WBC Clumps Present (A) NEG^Negative /HPF Final    Bacteria Urine Many (A) NEG^Negative /HPF Final         7/25/2018 10:40 AM      Component Results     Component Value Ref Range & Units Status    WBC 13.0 (H) 4.0 - 11.0 10e9/L Final    RBC Count 4.50 4.4 - 5.9 10e12/L Final    Hemoglobin 13.4 13.3 - 17.7 g/dL Final     Hematocrit 41.8 40.0 - 53.0 % Final    MCV 93 78 - 100 fl Final    MCH 29.8 26.5 - 33.0 pg Final    MCHC 32.1 31.5 - 36.5 g/dL Final    RDW 13.9 10.0 - 15.0 % Final    Platelet Count 287 150 - 450 10e9/L Final    Diff Method Automated Method  Final    % Neutrophils 80.1 % Final    % Lymphocytes 11.8 % Final    % Monocytes 5.6 % Final    % Eosinophils 1.8 % Final    % Basophils 0.2 % Final    % Immature Granulocytes 0.5 % Final    Nucleated RBCs 0 0 /100 Final    Absolute Neutrophil 10.4 (H) 1.6 - 8.3 10e9/L Final    Absolute Lymphocytes 1.5 0.8 - 5.3 10e9/L Final    Absolute Monocytes 0.7 0.0 - 1.3 10e9/L Final    Absolute Eosinophils 0.2 0.0 - 0.7 10e9/L Final    Absolute Basophils 0.0 0.0 - 0.2 10e9/L Final    Abs Immature Granulocytes 0.1 0 - 0.4 10e9/L Final    Absolute Nucleated RBC 0.0  Final         7/25/2018 11:03 AM      Component Results     Component Value Ref Range & Units Status    Sodium 138 133 - 144 mmol/L Final    Potassium 4.6 3.4 - 5.3 mmol/L Final    Specimen slightly hemolyzed, potassium may be falsely elevated    Chloride 105 94 - 109 mmol/L Final    Carbon Dioxide 28 20 - 32 mmol/L Final    Anion Gap 5 3 - 14 mmol/L Final    Glucose 102 (H) 70 - 99 mg/dL Final    Urea Nitrogen 19 7 - 30 mg/dL Final    Creatinine 1.09 0.66 - 1.25 mg/dL Final    GFR Estimate 64 >60 mL/min/1.7m2 Final    Non  GFR Calc    GFR Estimate If Black 77 >60 mL/min/1.7m2 Final    African American GFR Calc    Calcium 8.8 8.5 - 10.1 mg/dL Final    Bilirubin Total 0.6 0.2 - 1.3 mg/dL Final    Albumin 3.2 (L) 3.4 - 5.0 g/dL Final    Protein Total 7.5 6.8 - 8.8 g/dL Final    Alkaline Phosphatase 96 40 - 150 U/L Final    ALT 28 0 - 70 U/L Final    AST 33 0 - 45 U/L Final    Specimen is hemolyzed which can falsely elevate AST. Analysis of a   non-hemolyzed specimen may result in a lower value.           7/25/2018 11:03 AM      Component Results     Component Value Ref Range & Units Status    Lipase 93 73 - 393  U/L Final         7/25/2018 10:33 AM      Component Results     Component Value Ref Range & Units Status    Creatinine 1.1 0.66 - 1.25 mg/dL Final    GFR Estimate 63 >60 mL/min/1.7m2 Final    GFR Estimate If Black 76 >60 mL/min/1.7m2 Final         7/25/2018 12:12 PM      Narrative     CT ABDOMEN AND PELVIS WITHOUT CONTRAST  7/25/2018 11:47 AM     INDICATION:  History of kidney stones, hematuria and urinary  retention.      TECHNIQUE: Thin axial images through the abdomen and pelvis without  contrast. Coronal reformatted images. Radiation dose for this scan was  reduced using automated exposure control, adjustment of the mA and/or  kV according to patient size, or iterative reconstruction technique.    COMPARISON: 4/26/2017.    FINDINGS: Tiny hypodensity in the left hepatic lobe is too small to  characterize without contrast. This is better seen but probably  unchanged. Numerous pancreatic calcifications suggesting chronic  pancreatitis. No peripancreatic inflammation.    Gallbladder, spleen, adrenal glands and right kidney demonstrate no  worrisome findings without benefit of contrast. Atrophic left kidney.  Complex, lobulated mass at the left lower pole which appears slightly  larger than on most recent study measuring 6.1 x 8.9 x 6.1 cm. This  has more significantly increased in size when compared to prior  studies dating back to 2014 when it was demonstrated to be an  enhancing lesion.    Enlarged prostate. Slight bladder wall thickening. The bladder is  decompressed by a Youssef catheter. Colonic diverticulosis. No bowel  obstruction or ascites. Mild degenerative and hypertrophic changes in  the visualized spine.        Impression     IMPRESSION:  1. 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  2. Enlarged prostate.  3. Urinary bladder slightly thick-walled but is decompressed. Cystitis  or bladder neoplasm cannot be excluded.  4. Colonic diverticulosis and evidence of  chronic pancreatitis.         7/25/2018 12:12 PM                Clinical Quality Measure: Blood Pressure Screening     Your blood pressure was checked while you were in the emergency department today. The last reading we obtained was  BP: 163/86 . Please read the guidelines below about what these numbers mean and what you should do about them.  If your systolic blood pressure (the top number) is less than 120 and your diastolic blood pressure (the bottom number) is less than 80, then your blood pressure is normal. There is nothing more that you need to do about it.  If your systolic blood pressure (the top number) is 120-139 or your diastolic blood pressure (the bottom number) is 80-89, your blood pressure may be higher than it should be. You should have your blood pressure rechecked within a year by a primary care provider.  If your systolic blood pressure (the top number) is 140 or greater or your diastolic blood pressure (the bottom number) is 90 or greater, you may have high blood pressure. High blood pressure is treatable, but if left untreated over time it can put you at risk for heart attack, stroke, or kidney failure. You should have your blood pressure rechecked by a primary care provider within the next 4 weeks.  If your provider in the emergency department today gave you specific instructions to follow-up with your doctor or provider even sooner than that, you should follow that instruction and not wait for up to 4 weeks for your follow-up visit.        Thank you for choosing [facility]       Thank you for choosing [facility] for your care. Our goal is always to provide you with excellent care. Hearing back from our patients is one way we can continue to improve our services. Please take a few minutes to complete the written survey that you may receive in the mail after you visit with us. Thank you!        Care EveryWhere ID     This is your Care EveryWhere ID. This could be used by other organizations to  access your [facility] medical records  [id number]        Equal Access to Services     IWONA CHARLES : Fady Valdes, donita silvestre, asia clancy. So [facility] [phone].    ATENCIÓN: Si habla español, tiene a maldonado disposición servicios gratuitos de asistencia lingüística. Llame al [phone].    We comply with applicable federal civil rights laws and [place] laws. We do not discriminate on the basis of race, color, national origin, age, disability, sex, sexual orientation, or gender identity.            After Visit Summary       This is your record. Keep this with you and show to your community pharmacist(s) and doctor(s) at your next visit.

## 2018-07-25 NOTE — DISCHARGE INSTRUCTIONS
Please make an appointment to follow up with [facility] [phone] as soon as possible to manage the catheter, the bleeding, and the mass on the kidney

## 2018-07-26 ENCOUNTER — PATIENT OUTREACH (OUTPATIENT)
Dept: GERIATRIC MEDICINE | Facility: CLINIC | Age: 83
End: 2018-07-26

## 2018-07-26 NOTE — PROGRESS NOTES
[facility] Care Coordination Contact  CM notified client had ED visit 7/25/18 for urinary retention and mass.  He was instructed to follow up with urology.  CM attempted to reach client/son Jose without success.  Left another message.  KELLY has been trying to reach them to offer annual home visit.    MORELIA Blum  [facility]   [phone]

## 2018-07-26 NOTE — PROGRESS NOTES
"[facility] Care Coordination Contact  Per CC, mailed client an \"Unable to Contact\" letter.    Aditi Arreola  Case Management Specialist  [facility]   [phone]      "

## 2018-07-26 NOTE — LETTER
July 26, 2018    Important Plan Information    ARMENLINDA ANNA  [address] [place] [address]  I've Tried to Contact You  Dear Barrie,  My name is MORELIA Blum, and I am your Care Coordinator. I have been trying to contact you, but have not been able to reach you.  As a Care Coordinator, I am here to help. My role is to make sure that your health plan is working for you. I am available to:     Review your health care needs with you over the phone or in-person     Provide support for and information about covered services or supplies to help keep you safe and healthy in your home    Answer questions about your insurance     Help you find a provider, such as a doctor or dentist, to meet your unique needs  I can also help you schedule a free physical at your clinic. To schedule an appointment, please call me at [phone] Monday-Friday between 8am-5pm TTY/TDD: 218.    Questions?  Please call me at the phone number listed above. If you d like, a friend or family member may call for you.  For general questions, call [facility] Customer Service at [phone] or [phone] (toll free) from 8 a.m. - 8 p.m. Central, seven days a week. Access to representatives may be limited at times. TTY/TDD: 711.  Sincerely,      MORELIA Blum  [facility]  [phone]    cc: member records              American Indians can continue to use [facility] and [facility] ([facility]) clinics. We will not require prior approval or impose any conditions for you to get services at these clinics. For elders age 65 years and older this includes Elderly Waiver (EW) services accessed through the [place]. If a doctor or other provider in a [facility] or [facility] clinic refers you to a provider in our network, we will not require you to see your primary care provider prior to the referral.    For accessible formats of this publication or assistance with equal or access to our services, visit [employer] [employer], or call  [phone] (toll free) or use your preferred relay service.    Auxiliary Aids and Services.   [facility] provides auxiliary aids and services, like qualified interpreters or information in accessible formats, free of charge and in a timely manner, to ensure an equal opportunity to participate in our health care programs. Contact [facility] Customer Service at [employer]/contactProject Managerid or call [phone] (toll free) or use your preferred relay service.    Language Assistance Services.   [facility] provides translated documents and spoken language interpreting, free of charge and in a timely manner, when language assistance services are necessary to ensure limited English speakers have meaningful access to our information and services. Contact [facility] Customer Service at [employer]/contactmedicaid or call [phone] (toll free) or use your preferred relay service.     Civil Rights Notice  Discrimination is against the law. [facility] does not discriminate on the basis of any of the following:    Race    Color    National Origin    Creed    Episcopal    Age    Public Assistance Status    Receipt of Health Care Services    Disability (including physical or mental impairment)    Sex (including sex stereotypes and gender identity)    Marital Status    Political Beliefs    Medical Condition    Genetic Information    Sexual Orientation    Claims Experience    Medical History    Health Status    Civil Rights Complaints.   You have the right to file a discrimination complaint if you believe you were treated in a discriminatory way by [facility]. You may contact any of the following four agencies directly to file a discrimination complaint.    U.S. Department of Health and Human Services  Office for Civil Rights (OCR)  You have the right to file a complaint with the OCR, a federal agency, if you believe you have been discriminated against because of any of the following:    Race    Disability    Color    Sex (including sex stereotypes  and gender identity)    National Origin    Age    Contact the OCR directly to file a complaint:         Director         U.S. Department of Health and Human Services  Office for Civil Rights         [address] [place] [address]         [address] [address]         [phone] (Voice)         [phone] (TDD)         Complaint Portal - [email or web address]     [place] Department of Human Rights (MDHR)  In [place], you have the right to file a complaint with the MD if you believe you have been discriminated against because of any of the following:      Race    Color    National Origin    Mormonism    Creed    Sex    Sexual Orientation    Marital Status    Public Assistance Status    Contact the [facility] directly to file a complaint:         [place] Department of Human Rights         [facility], [address] [place] [address]         [phone] (voice)          [phone] (toll free)         711 or [phone] ([place] Relay)         [phone] (Fax)         [email or web address] (Email)     [place] Department of Human Services (DHS)  You have the right to file a complaint with [facility] if you believe you have been discriminated against in our health care programs because of any of the following:    Race    Color    National Origin    Creed    Mormonism    Age    Public Assistance Status    Receipt of Health Care Services    Disability (including physical or mental impairment)    Sex (including sex stereotypes and gender identity)    Marital Status    Political Beliefs    Medical Condition    Genetic Information    Sexual Orientation    Claims Experience    Medical History    Health Status    Complaints must be in writing and filed within 180 days of the date you discovered the alleged discrimination. The complaint must contain your name and address and describe the discrimination you are complaining about. After we get your  complaint, we will review it and notify you in writing about whether we have authority to investigate. If we do, we will investigate the complaint.      [facility] will notify you in writing of the investigation s outcome. You have a right to appeal the outcome if you disagree with the decision. To appeal, you must send a written request to have [facility] review the investigation outcome period. Be brief and state why you disagree with the decision. Include additional information you think is important.      If you file a complaint in this way, the people who work for the agency named in the complaint cannot retaliate against you. This means they cannot punish you in any way for filing a complaint. Filing a complaint in this way does not stop you from seeking out other legal or administration actions.     Contact [facility] directly to file a discrimination complaint:   ATTN: Civil Rights Coordinator   [place] Department of Human Services   Equal Opportunity and Access Division   [address] [address]   [place] [address] [phone] (voice) or use your preferred relay service     [facility] Complaint Notice   Contact [facility] directly to file a discrimination complaint:   [facility] Civil Rights Coordinator   Mail Route    [address]   [place] [address] [phone] (voice) or use your preferred relay service   [email or web address]

## 2018-07-28 LAB
BACTERIA SPEC CULT: ABNORMAL
Lab: ABNORMAL
SPECIMEN SOURCE: ABNORMAL

## 2018-07-29 ENCOUNTER — TELEPHONE (OUTPATIENT)
Dept: EMERGENCY MEDICINE | Facility: CLINIC | Age: 83
End: 2018-07-29

## 2018-07-31 NOTE — PROGRESS NOTES
[facility] Care Coordination Contact  Per CC, continues to be unable to contact member for assessment, MMIS entered.    Aditi Arreola  Case Management Specialist  [facility]   [phone]

## 2018-07-31 NOTE — PROGRESS NOTES
[facility] Care Coordination Contact  CM still has not heard back from client/son, Jose.  Client UTR effective 7/31/18.  CM asked CMS to enter MMIS.     MORELIA Blum  [facility]   [phone]

## 2018-08-02 DIAGNOSIS — R31.9 HEMATURIA: Primary | ICD-10-CM

## 2018-08-03 ENCOUNTER — OFFICE VISIT (OUTPATIENT)
Dept: UROLOGY | Facility: CLINIC | Age: 83
End: 2018-08-03
Payer: COMMERCIAL

## 2018-08-03 ENCOUNTER — HOSPITAL ENCOUNTER (OUTPATIENT)
Facility: CLINIC | Age: 83
End: 2018-08-03
Attending: UROLOGY | Admitting: UROLOGY
Payer: COMMERCIAL

## 2018-08-03 VITALS — BODY MASS INDEX: 26.31 KG/M2 | WEIGHT: 163 LBS | DIASTOLIC BLOOD PRESSURE: 50 MMHG | SYSTOLIC BLOOD PRESSURE: 124 MMHG

## 2018-08-03 DIAGNOSIS — R33.9 URINARY RETENTION: Primary | ICD-10-CM

## 2018-08-03 PROCEDURE — 99204 OFFICE O/P NEW MOD 45 MIN: CPT | Performed by: UROLOGY

## 2018-08-03 RX ORDER — CEFAZOLIN SODIUM 1 G
1 VIAL (EA) INJECTION SEE ADMIN INSTRUCTIONS
Status: CANCELLED | OUTPATIENT
Start: 2018-08-03 | End: 2019-08-03

## 2018-08-03 ASSESSMENT — PAIN SCALES - GENERAL: PAINLEVEL: NO PAIN (0)

## 2018-08-03 NOTE — LETTER
8/3/2018       RE: Barrie Woods  [id number]  [facility] [address]     Dear Colleague,    Thank you for referring your patient, Barrie Woods, to the [facility] at [facility]. Please see a copy of my visit note below.    Barrie Woods is an 88-year-old male with a large left renal mass and urinary retention.  His renal mass states back at least 5 years-the family claims nothing was done because of his multiple medical issues. There has been some gross hematuria since the catheter was placed in his bladder. It's been removed at least once and reinserted for recurrent retention.  Other past medical history: Dementia, alcohol abuse, cerebrovascular disease, anemia, atrial fibrillation, cachexia, cardiomyopathy, congestive heart failure, chronic pain, depression, diaphragmatic hernia, diverticulosis, dysphagia, GERD, hearing loss, peripheral neuropathy, presbycusis, PSA elevation, recurrent falls, sepsis, latent TB of lung, urinary incontinence, daily smoker  Family history: No prostate cancer  Daughter and son present with an . Patient says little or nothing  Medications: Tylenol, Bumex, Coreg, finasteride, Norco, lisinopril, magnesium oxide, multivitamin, Lyrica, Flomax, Effexor   Allergies: None  Exam: Very quiet but seems somewhat alert. Urine is clear in leg bag  CT scan, prostatic calculus, left renal calculus, large left renal mass  Assessment: Multiple medical issues, urinary retention, left renal mass, left renal stone, gross hematuria  Long discussion with family through the . Patient does not want anything done with a left renal mass and he is not a candidate for treatment in any case. If he would have bleeding in the future from his left renal mass he would have to have left renal artery embolization. He should have an exam under anesthesia, cystoscopy and urethroscopy as an outpatient. I will  discuss this with Dr. wallis to see if he is a candidate for a short anesthetic. Also discussed chronic indwelling Youssef that would be changed monthly  45 minutes total time-all discussion and review of records and CT scans      Again, thank you for allowing me to participate in the care of your patient.      Sincerely,    Antonio Currie MD

## 2018-08-03 NOTE — PROGRESS NOTES
Barrie Woods is an 88-year-old male with a large left renal mass and urinary retention.  His renal mass states back at least 5 years-the family claims nothing was done because of his multiple medical issues. There has been some gross hematuria since the catheter was placed in his bladder. It's been removed at least once and reinserted for recurrent retention.  Other past medical history: Dementia, alcohol abuse, cerebrovascular disease, anemia, atrial fibrillation, cachexia, cardiomyopathy, congestive heart failure, chronic pain, depression, diaphragmatic hernia, diverticulosis, dysphagia, GERD, hearing loss, peripheral neuropathy, presbycusis, PSA elevation, recurrent falls, sepsis, latent TB of lung, urinary incontinence, daily smoker  Family history: No prostate cancer  Daughter and son present with an . Patient says little or nothing  Medications: Tylenol, Bumex, Coreg, finasteride, Norco, lisinopril, magnesium oxide, multivitamin, Lyrica, Flomax, Effexor   Allergies: None  Exam: Very quiet but seems somewhat alert. Urine is clear in leg bag  CT scan, prostatic calculus, left renal calculus, large left renal mass  Assessment: Multiple medical issues, urinary retention, left renal mass, left renal stone, gross hematuria  Long discussion with family through the . Patient does not want anything done with a left renal mass and he is not a candidate for treatment in any case. If he would have bleeding in the future from his left renal mass he would have to have left renal artery embolization. He should have an exam under anesthesia, cystoscopy and urethroscopy as an outpatient. I will discuss this with Dr. wallis to see if he is a candidate for a short anesthetic. Also discussed chronic indwelling Youssef that would be changed monthly  45 minutes total time-all discussion and review of records and CT scans

## 2018-08-03 NOTE — MR AVS SNAPSHOT
After Visit Summary   8/3/2018    Barrie Woods    MRN: [id number]           Patient Information     Date Of Birth          1/25/1930        Visit Information        Provider Department      8/3/2018 7:45 AM Antonio Currie MD; [facility] [place]        Today's Diagnoses     Urinary retention    -  1       Follow-ups after your visit        Your next 10 appointments already scheduled     Aug 17, 2018  4:00 PM CDT   SHORT with Yonatan Hurtado MD   [facility]    [address] [place] [address] [id number] [phone]              Future tests that were ordered for you today     Open Future Orders        Priority Expected Expires Ordered    UA without Microscopic Routine  8/2/2019 8/2/2018            Who to contact     If you have questions or need follow up information about today's clinic visit or your schedule please contact [facility] directly at [phone].  Normal or non-critical lab and imaging results will be communicated to you by MyChart, letter or phone within 4 business days after the clinic has received the results. If you do not hear from us within 7 days, please contact the clinic through MyChart or phone. If you have a critical or abnormal lab result, we will notify you by phone as soon as possible.  Submit refill requests through [employer] or call your pharmacy and they will forward the refill request to us. Please allow 3 business days for your refill to be completed.          Additional Information About Your Visit        Care EveryWhere ID     This is your Care EveryWhere ID. This could be used by other organizations to access your [facility] medical records  [id number]        Your Vitals Were     BMI (Body Mass Index)                   26.31 kg/m2            Blood Pressure from Last 3 Encounters:   08/03/18 124/50   07/25/18  163/86   07/06/18 146/68    Weight from Last 3 Encounters:   08/03/18 73.9 kg (163 lb)   07/06/18 74.1 kg (163 lb 7 oz)   06/04/18 75.8 kg (167 lb)              Today, you had the following     No orders found for display       Primary Care Provider Office Phone # Fax #    Yonatan Hurtado [phone] [address] [facility] [address]        Equal Access to Services     GRISELDA CHARLES : Hadii aad ku hadasho Soomaali, waaxda luqadaha, qaybta kaalmada adeegyada, waxay idiin hayaan adeeg kharajordyn latomi . So [facility] [phone].    ATENCIÓN: Si habla español, tiene a maldonado disposición servicios gratuitos de asistencia lingüística. Llame al [phone].    We comply with applicable federal civil rights laws and [place] laws. We do not discriminate on the basis of race, color, national origin, age, disability, sex, sexual orientation, or gender identity.            Thank you!     Thank you for choosing [facility] [place]  for your care. Our goal is always to provide you with excellent care. Hearing back from our patients is one way we can continue to improve our services. Please take a few minutes to complete the written survey that you may receive in the mail after your visit with us. Thank you!             Your Updated Medication List - Protect others around you: Learn how to safely use, store and throw away your medicines at www.disposemymeds.org.          This list is accurate as of 8/3/18  9:22 AM.  Always use your most recent med list.                   Brand Name Dispense Instructions for use Diagnosis    acetaminophen 500 MG tablet    TYLENOL    84 tablet    Take 2 tablets (1,000 mg) by mouth every 6 hours as needed for mild pain    Malaise       bumetanide 1 MG tablet    BUMEX    30 tablet    TAKE ONE TABLET BY MOUTH EVERY DAY    Acute pulmonary edema (H)       carvedilol 12.5 MG tablet    COREG    60 tablet    Take 1 tablet (12.5 mg) by mouth 2 times daily  (with meals)    Chronic combined systolic and diastolic congestive heart failure (H), HTN, goal below 140/90       clotrimazole 1 % cream    LOTRIMIN    30 g    Apply topically 2 times daily    Balanitis       diclofenac 1 % Gel topical gel    VOLTAREN    100 g    Apply 4 gm to knees qid    Primary osteoarthritis of both knees       finasteride 5 MG tablet    PROSCAR    30 tablet    Take 1 tablet (5 mg) by mouth daily    Benign prostatic hyperplasia with urinary obstruction       HYDROcodone-acetaminophen 5-325 MG per tablet    NORCO    30 tablet    Take 1 tablet by mouth every 6 hours as needed for moderate to severe pain    Midline low back pain without sciatica, unspecified chronicity       lisinopril 10 MG tablet    PRINIVIL/ZESTRIL    30 tablet    Take 1 tablet (10 mg) by mouth daily    Chronic combined systolic and diastolic congestive heart failure (H), HTN, goal below 140/90       * magnesium oxide 400 (241.3 Mg) MG tablet    MAG-OX    30 tablet    TAKE 1 TABLET BY MOUTH ONCE DAILY    Routine general medical examination at a health care facility       * magnesium oxide 400 (241.3 Mg) MG tablet    MAG-OX    30 tablet    TAKE 1 TABLET BY MOUTH ONCE DAILY    Routine general medical examination at a health care facility       MULTILEX Tabs     100 tablet    TAKE ONE TABLET BY MOUTH ONCE DAILY    Routine general medical examination at a health care facility       order for DME     1 Device    Equipment being ordered: walker with slides    Recurrent falls       pregabalin 100 MG capsule    LYRICA    60 capsule    Take 1 capsule (100 mg) by mouth 2 times daily    Midline low back pain without sciatica, unspecified chronicity       tamsulosin 0.4 MG capsule    FLOMAX    30 capsule    Take 1 capsule (0.4 mg) by mouth daily    Urinary retention, Hypertrophy of prostate, PSA elevation       venlafaxine 75 MG 24 hr capsule    EFFEXOR-XR    30 capsule    Take 1 capsule (75 mg) by mouth daily    Midline low back pain  without sciatica, unspecified chronicity       * Notice:  This list has 2 medication(s) that are the same as other medications prescribed for you. Read the directions carefully, and ask your doctor or other care provider to review them with you.

## 2018-08-03 NOTE — NURSING NOTE
Chief Complaint   Patient presents with     Consult     New pt. is here due to Gross hematuria.        Marissa Barr

## 2018-08-06 DIAGNOSIS — R53.81 MALAISE: ICD-10-CM

## 2018-08-06 RX ORDER — AMOXICILLIN AND CLAVULANATE POTASSIUM 500; 125 MG/1; MG/1
TABLET, FILM COATED ORAL
Qty: 40 TABLET | Refills: 0 | Status: SHIPPED | OUTPATIENT
Start: 2018-08-06 | End: 2018-08-24

## 2018-08-06 NOTE — TELEPHONE ENCOUNTER
Forwarding to provider; not on protocol    Last filled and written on: 4/16/18 qty 40    Last Office Visit: 7/6/18    Jen Garcia, Pharm[facility]

## 2018-08-17 ENCOUNTER — OFFICE VISIT (OUTPATIENT)
Dept: FAMILY MEDICINE | Facility: CLINIC | Age: 83
End: 2018-08-17
Payer: COMMERCIAL

## 2018-08-17 VITALS
BODY MASS INDEX: 26.2 KG/M2 | DIASTOLIC BLOOD PRESSURE: 74 MMHG | SYSTOLIC BLOOD PRESSURE: 116 MMHG | WEIGHT: 163 LBS | TEMPERATURE: 98.1 F | HEIGHT: 66 IN | OXYGEN SATURATION: 97 % | RESPIRATION RATE: 18 BRPM | HEART RATE: 64 BPM

## 2018-08-17 DIAGNOSIS — Z96.0 PRESENCE OF INDWELLING URETHRAL CATHETER: ICD-10-CM

## 2018-08-17 DIAGNOSIS — R33.9 URINARY RETENTION: ICD-10-CM

## 2018-08-17 DIAGNOSIS — M54.50 MIDLINE LOW BACK PAIN WITHOUT SCIATICA, UNSPECIFIED CHRONICITY: Primary | ICD-10-CM

## 2018-08-17 DIAGNOSIS — R97.20 PSA ELEVATION: ICD-10-CM

## 2018-08-17 DIAGNOSIS — N40.0 HYPERTROPHY OF PROSTATE: ICD-10-CM

## 2018-08-17 PROCEDURE — 99215 OFFICE O/P EST HI 40 MIN: CPT | Performed by: FAMILY MEDICINE

## 2018-08-17 RX ORDER — TAMSULOSIN HYDROCHLORIDE 0.4 MG/1
0.4 CAPSULE ORAL DAILY
Qty: 30 CAPSULE | Refills: 5 | Status: SHIPPED | OUTPATIENT
Start: 2018-08-17 | End: 2018-11-26

## 2018-08-17 RX ORDER — HYDROCODONE BITARTRATE AND ACETAMINOPHEN 5; 325 MG/1; MG/1
1 TABLET ORAL EVERY 6 HOURS PRN
Qty: 30 TABLET | Refills: 0 | Status: SHIPPED | OUTPATIENT
Start: 2018-08-17 | End: 2018-09-07

## 2018-08-17 NOTE — MR AVS SNAPSHOT
After Visit Summary   8/17/2018    Barrie Woods    MRN: [id number]           Patient Information     Date Of Birth          1/25/1930        Visit Information        Provider Department      8/17/2018 3:45 PM Yonatan Hurtado MD; [employer] [facility]        Today's Diagnoses     Preop general physical exam    -  1    Midline low back pain without sciatica, unspecified chronicity        Urinary retention        Hypertrophy of prostate        PSA elevation          Care Instructions      Before Your Surgery      Call your surgeon if there is any change in your health. This includes signs of a cold or flu (such as a sore throat, runny nose, cough, rash or fever).    Do not smoke, drink alcohol or take over the counter medicine (unless your surgeon or primary care doctor tells you to) for the 24 hours before and after surgery.    If you take prescribed drugs: Follow your doctor s orders about which medicines to take and which to stop until after surgery.    Eating and drinking prior to surgery: follow the instructions from your surgeon    Take a shower or bath the night before surgery. Use the soap your surgeon gave you to gently clean your skin. If you do not have soap from your surgeon, use your regular soap. Do not shave or scrub the surgery site.  Wear clean pajamas and have clean sheets on your bed.             Follow-ups after your visit        Additional Services     UROLOGY ADULT REFERRAL       Your provider has referred you to: [facility]: [place] [facility]  [phone]   [email or web address]    Please be aware that coverage of these services is subject to the terms and limitations of your health insurance plan.  Call member services at your health plan with any benefit or coverage questions.      Please bring the following with you to your appointment:    (1) Any X-Rays, CTs or MRIs which have been performed.  Contact the facility where they  "were done to arrange for  prior to your scheduled appointment.    (2) List of current medications  (3) This referral request   (4) Any documents/labs given to you for this referral                  Your next 10 appointments already scheduled     Aug 23, 2018   Procedure with Antonio Currie MD   [facility] (--)    [address]  [facility] [facility] [address]   [phone]            Sep 25, 2018  2:20 PM CDT   Return Visit with Antonio Currie MD   [facility] [place] ([facility])    [address] [facility]  [address]  [facility] [id number] [phone]              Who to contact     If you have questions or need follow up information about today's clinic visit or your schedule please contact [facility] directly at [phone].  Normal or non-critical lab and imaging results will be communicated to you by MyChart, letter or phone within 4 business days after the clinic has received the results. If you do not hear from us within 7 days, please contact the clinic through MyChart or phone. If you have a critical or abnormal lab result, we will notify you by phone as soon as possible.  Submit refill requests through [employer] or call your pharmacy and they will forward the refill request to us. Please allow 3 business days for your refill to be completed.          Additional Information About Your Visit        Care EveryWhere ID     This is your Care EveryWhere ID. This could be used by other organizations to access your [facility] medical records  [id number]        Your Vitals Were     Pulse Temperature Respirations Height Pulse Oximetry BMI (Body Mass Index)    64 98.1  F (36.7  C) (Oral) 18 5' 6\" (1.676 m) 97% 26.31 kg/m2       Blood Pressure from Last 3 Encounters:   08/17/18 116/74   08/03/18 124/50   07/25/18 163/86    Weight from Last 3 Encounters:   08/17/18 163 lb (73.9 kg)   08/03/18 163 lb (73.9 kg) "   07/06/18 163 lb 7 oz (74.1 kg)              We Performed the Following     Basic metabolic panel  (Ca, Cl, CO2, Creat, Gluc, K, Na, BUN)     CBC with platelets and differential     EKG 12-lead complete w/read - Clinics     UROLOGY ADULT REFERRAL          Where to get your medicines      These medications were sent to [facility] [address]  [address] [facility] [address]     Phone:  [phone]     tamsulosin 0.4 MG capsule         Some of these will need a paper prescription and others can be bought over the counter.  Ask your nurse if you have questions.     Bring a paper prescription for each of these medications     HYDROcodone-acetaminophen 5-325 MG per tablet         Information about OPIOIDS     PRESCRIPTION OPIOIDS: WHAT YOU NEED TO KNOW   We gave you an opioid (narcotic) pain medicine. It is important to manage your pain, but opioids are not always the best choice. You should first try all the other options your care team gave you. Take this medicine for as short a time (and as few doses) as possible.    Some activities can increase your pain, such as bandage changes or therapy sessions. It may help to take your pain medicine 30 to 60 minutes before these activities. Reduce your stress by getting enough sleep, working on hobbies you enjoy and practicing relaxation or meditation. Talk to your care team about ways to manage your pain beyond prescription opioids.    These medicines have risks:    DO NOT drive when on new or higher doses of pain medicine. These medicines can affect your alertness and reaction times, and you could be arrested for driving under the influence (DUI). If you need to use opioids long-term, talk to your care team about driving.    DO NOT operate heavy machinery    DO NOT do any other dangerous activities while taking these medicines.    DO NOT drink any alcohol while taking these medicines.     If the opioid prescribed includes  acetaminophen, DO NOT take with any other medicines that contain acetaminophen. Read all labels carefully. Look for the word  acetaminophen  or  Tylenol.  Ask your pharmacist if you have questions or are unsure.    You can get addicted to pain medicines, especially if you have a history of addiction (chemical, alcohol or substance dependence). Talk to your care team about ways to reduce this risk.    All opioids tend to cause constipation. Drink plenty of water and eat foods that have a lot of fiber, such as fruits, vegetables, prune juice, apple juice and high-fiber cereal. Take a laxative (Miralax, milk of magnesia, Colace, Senna) if you don t move your bowels at least every other day. Other side effects include upset stomach, sleepiness, dizziness, throwing up, tolerance (needing more of the medicine to have the same effect), physical dependence and slowed breathing.    Store your pills in a secure place, locked if possible. We will not replace any lost or stolen medicine. If you don t finish your medicine, please throw away (dispose) as directed by your pharmacist. The [place] Pollution Control [employer] has more information about safe disposal: https://www.pca.[facility].[place].us/living-green/managing-unwanted-medications         Primary Care Provider Office Phone # Fax #    Yonatan Hurtado [phone] [address] [facility] [address]        Equal Access to Services     IWONA CHARLES : Hadii liborio ku hadasho Soomaali, waaxda luqadaha, qaybta kaalmada adechrisyada, asia srinivasan. So [facility] [phone].    ATENCIÓN: Si habla español, tiene a maldonado disposición servicios gratuitos de asistencia lingüística. Gavino al [phone].    We comply with applicable federal civil rights laws and [place] laws. We do not discriminate on the basis of race, color, national origin, age, disability, sex, sexual orientation, or gender identity.            Thank you!     Thank you for  choosing [facility]  for your care. Our goal is always to provide you with excellent care. Hearing back from our patients is one way we can continue to improve our services. Please take a few minutes to complete the written survey that you may receive in the mail after your visit with us. Thank you!             Your Updated Medication List - Protect others around you: Learn how to safely use, store and throw away your medicines at www.disposemymeds.org.          This list is accurate as of 8/17/18  4:45 PM.  Always use your most recent med list.                   Brand Name Dispense Instructions for use Diagnosis    acetaminophen 500 MG tablet    TYLENOL    84 tablet    Take 2 tablets (1,000 mg) by mouth every 6 hours as needed for mild pain    Malaise       amoxicillin-clavulanate 500-125 MG per tablet    AUGMENTIN    40 tablet    TAKE ONE TABLET BY MOUTH TWICE A DAY    Malaise       bumetanide 1 MG tablet    BUMEX    30 tablet    TAKE ONE TABLET BY MOUTH EVERY DAY    Acute pulmonary edema (H)       carvedilol 12.5 MG tablet    COREG    60 tablet    Take 1 tablet (12.5 mg) by mouth 2 times daily (with meals)    Chronic combined systolic and diastolic congestive heart failure (H), HTN, goal below 140/90       clotrimazole 1 % cream    LOTRIMIN    30 g    Apply topically 2 times daily    Balanitis       diclofenac 1 % Gel topical gel    VOLTAREN    100 g    Apply 4 gm to knees qid    Primary osteoarthritis of both knees       finasteride 5 MG tablet    PROSCAR    30 tablet    Take 1 tablet (5 mg) by mouth daily    Benign prostatic hyperplasia with urinary obstruction       HYDROcodone-acetaminophen 5-325 MG per tablet    NORCO    30 tablet    Take 1 tablet by mouth every 6 hours as needed for moderate to severe pain    Midline low back pain without sciatica, unspecified chronicity       lisinopril 10 MG tablet    PRINIVIL/ZESTRIL    30 tablet    Take 1 tablet (10 mg) by mouth daily    Chronic  combined systolic and diastolic congestive heart failure (H), HTN, goal below 140/90       * magnesium oxide 400 (241.3 Mg) MG tablet    MAG-OX    30 tablet    TAKE 1 TABLET BY MOUTH ONCE DAILY    Routine general medical examination at a health care facility       * magnesium oxide 400 (241.3 Mg) MG tablet    MAG-OX    30 tablet    TAKE 1 TABLET BY MOUTH ONCE DAILY    Routine general medical examination at a health care facility       MULTILEX Tabs     100 tablet    TAKE ONE TABLET BY MOUTH ONCE DAILY    Routine general medical examination at a health care facility       order for DME     1 Device    Equipment being ordered: walker with slides    Recurrent falls       pregabalin 100 MG capsule    LYRICA    60 capsule    Take 1 capsule (100 mg) by mouth 2 times daily    Midline low back pain without sciatica, unspecified chronicity       tamsulosin 0.4 MG capsule    FLOMAX    30 capsule    Take 1 capsule (0.4 mg) by mouth daily    Urinary retention, Hypertrophy of prostate, PSA elevation       venlafaxine 75 MG 24 hr capsule    EFFEXOR-XR    30 capsule    Take 1 capsule (75 mg) by mouth daily    Midline low back pain without sciatica, unspecified chronicity       * Notice:  This list has 2 medication(s) that are the same as other medications prescribed for you. Read the directions carefully, and ask your doctor or other care provider to review them with you.

## 2018-08-17 NOTE — PROGRESS NOTES
States that he is feeling good.       Patient accompanied by son and interpretor due to language barrier.

## 2018-08-17 NOTE — Clinical Note
Mary Jo: please arrange appt with [facility] GOLD: inform Dr Currie's office that surgery is to be cancelled Yonatan Hurtado

## 2018-08-20 ENCOUNTER — TELEPHONE (OUTPATIENT)
Dept: FAMILY MEDICINE | Facility: CLINIC | Age: 83
End: 2018-08-20

## 2018-08-20 NOTE — TELEPHONE ENCOUNTER
Son Jose calls, per our TC CTC on file.   They had and canceled appointment 8/23/18 with urologist. Now states they wish to keep that appointment. We see  scheduled 8/23 but do not see that MD appointment was or is scheduled.  Call transferred to [facility]  [phone]     Augie Fabian RN

## 2018-08-22 ENCOUNTER — TELEPHONE (OUTPATIENT)
Dept: FAMILY MEDICINE | Facility: CLINIC | Age: 83
End: 2018-08-22

## 2018-08-22 NOTE — TELEPHONE ENCOUNTER
Gunjan calling and wondering why no physical in his appointment from yesterday.  Advised of below.  Gunjan will follow-up on below and if surgery to be cancelled.  Tia Mcconnell RN    8/22/18  Presence of indwelling urethral catheter Plan was a cystoscopy and TUR to relieve recurrent urinary retention with current indwelling catheter. However, son was not pleased with Urology treatment, and desires a referral to another urologist to consider TUR.     Pt is referred to [facility]. We shall inform Dr Currie's office, and they can cancel surgery. Son notes he would be willing, as well, to see an alternate urologist in Dr Currie's office     Yonatan Hurtado MD

## 2018-08-23 ENCOUNTER — TELEPHONE (OUTPATIENT)
Dept: FAMILY MEDICINE | Facility: CLINIC | Age: 83
End: 2018-08-23

## 2018-08-23 NOTE — TELEPHONE ENCOUNTER
Staff from Dr Currie's office, informed, BW MARI Rodriguez, RN, BSN  Message handled by Nurse Triage.

## 2018-08-23 NOTE — TELEPHONE ENCOUNTER
Called Dr. Currie's office.  L/M to call.  JESSICA Smith Brian C, MD Fedderly, Kelli K, RN                     Call dr Currie's office and offer our deepest apologies. I am unable to find the note re cancelling Dr Currie's surgery and making the referral to [facility] (the latter was done), so I don't know who that was   We shall take the blame, as the error is in this office   Yonatan Hurtado            Previous Messages       ----- Message -----      From: Tia Mcconnell RN      Sent: 8/23/2018   7:04 AM        To: MD Dr. Bryant Epperson-see below.  Advised I would forward their concern to you as I am not aware who was to cancel his surgery.     Thanks,     Tia Mcconnell RN     ----- Message -----      From: Alesia Lance      Sent: 8/22/2018   4:09 PM        To: JESSICA Wayne, We are just finding out at 4pm the day before surgery that this patient wanted a referral to a different urologist and is cancelling his case.  The note from Dr. Hurtado on 8/17 says that they would contact us.  Do you know who this was delegated to?  Would you mind asking them to contact us in the future since OR time is difficult to come by and we have other patients that we could have scheduled.

## 2018-08-24 DIAGNOSIS — R53.81 MALAISE: ICD-10-CM

## 2018-08-24 RX ORDER — AMOXICILLIN AND CLAVULANATE POTASSIUM 500; 125 MG/1; MG/1
TABLET, FILM COATED ORAL
Qty: 40 TABLET | Refills: 0 | Status: SHIPPED | OUTPATIENT
Start: 2018-08-24 | End: 2018-10-22

## 2018-08-24 NOTE — TELEPHONE ENCOUNTER
Requested Prescriptions   Pending Prescriptions Disp Refills     amoxicillin-clavulanate (AUGMENTIN) 500-125 MG per tablet [Pharmacy Med Name: AMOXICILLIN-POT CLAVUL 500-125 TABS] 40 tablet 0     Sig: TAKE ONE TABLET BY MOUTH TWICE A DAY    There is no refill protocol information for this order        Last Written Prescription Date:  8/6/18  Last Fill Quantity: 40,  # refills: 0   Last Office Visit: 8/17/2018   Future Office Visit:    Next 5 appointments (look out 90 days)     Sep 07, 2018  4:00 PM CDT   Pre[facility] with [facility]ado [facility]    [address] [place] [id number] [phone]                 Associated Diagnoses   Malaise [R53.81]

## 2018-08-27 ENCOUNTER — HOSPITAL ENCOUNTER (OUTPATIENT)
Facility: CLINIC | Age: 83
End: 2018-08-27
Attending: UROLOGY | Admitting: UROLOGY
Payer: COMMERCIAL

## 2018-09-07 ENCOUNTER — OFFICE VISIT (OUTPATIENT)
Dept: FAMILY MEDICINE | Facility: CLINIC | Age: 83
End: 2018-09-07
Payer: COMMERCIAL

## 2018-09-07 VITALS
TEMPERATURE: 97.9 F | HEART RATE: 92 BPM | DIASTOLIC BLOOD PRESSURE: 82 MMHG | BODY MASS INDEX: 26.03 KG/M2 | RESPIRATION RATE: 12 BRPM | SYSTOLIC BLOOD PRESSURE: 127 MMHG | WEIGHT: 162 LBS | HEIGHT: 66 IN

## 2018-09-07 DIAGNOSIS — M54.50 MIDLINE LOW BACK PAIN WITHOUT SCIATICA, UNSPECIFIED CHRONICITY: ICD-10-CM

## 2018-09-07 DIAGNOSIS — Z01.818 PREOP GENERAL PHYSICAL EXAM: Primary | ICD-10-CM

## 2018-09-07 LAB
BASOPHILS # BLD AUTO: 0 10E9/L (ref 0–0.2)
BASOPHILS NFR BLD AUTO: 0.2 %
DIFFERENTIAL METHOD BLD: ABNORMAL
EOSINOPHIL # BLD AUTO: 0.9 10E9/L (ref 0–0.7)
EOSINOPHIL NFR BLD AUTO: 8.1 %
ERYTHROCYTE [DISTWIDTH] IN BLOOD BY AUTOMATED COUNT: 14.9 % (ref 10–15)
HCT VFR BLD AUTO: 42.9 % (ref 40–53)
HGB BLD-MCNC: 13.4 G/DL (ref 13.3–17.7)
LYMPHOCYTES # BLD AUTO: 2.1 10E9/L (ref 0.8–5.3)
LYMPHOCYTES NFR BLD AUTO: 19.6 %
MCH RBC QN AUTO: 28.8 PG (ref 26.5–33)
MCHC RBC AUTO-ENTMCNC: 31.2 G/DL (ref 31.5–36.5)
MCV RBC AUTO: 92 FL (ref 78–100)
MONOCYTES # BLD AUTO: 0.9 10E9/L (ref 0–1.3)
MONOCYTES NFR BLD AUTO: 8.7 %
NEUTROPHILS # BLD AUTO: 6.7 10E9/L (ref 1.6–8.3)
NEUTROPHILS NFR BLD AUTO: 63.4 %
PLATELET # BLD AUTO: 217 10E9/L (ref 150–450)
RBC # BLD AUTO: 4.66 10E12/L (ref 4.4–5.9)
WBC # BLD AUTO: 10.6 10E9/L (ref 4–11)

## 2018-09-07 PROCEDURE — 85025 COMPLETE CBC W/AUTO DIFF WBC: CPT | Performed by: FAMILY MEDICINE

## 2018-09-07 PROCEDURE — 80048 BASIC METABOLIC PNL TOTAL CA: CPT | Performed by: FAMILY MEDICINE

## 2018-09-07 PROCEDURE — 36415 COLL VENOUS BLD VENIPUNCTURE: CPT | Performed by: FAMILY MEDICINE

## 2018-09-07 PROCEDURE — 99214 OFFICE O/P EST MOD 30 MIN: CPT | Performed by: FAMILY MEDICINE

## 2018-09-07 PROCEDURE — 93000 ELECTROCARDIOGRAM COMPLETE: CPT | Performed by: FAMILY MEDICINE

## 2018-09-07 RX ORDER — HYDROCODONE BITARTRATE AND ACETAMINOPHEN 5; 325 MG/1; MG/1
1 TABLET ORAL EVERY 6 HOURS PRN
Qty: 30 TABLET | Refills: 0 | Status: SHIPPED | OUTPATIENT
Start: 2018-09-07 | End: 2018-10-22

## 2018-09-07 NOTE — LETTER
September 11, 2018      Barrie Woods  [address]  [facility] [address]        Dear ,    We are writing to inform you of your test results.    Tests are all OK     Resulted Orders   CBC with platelets and differential   Result Value Ref Range    WBC 10.6 4.0 - 11.0 10e9/L    RBC Count 4.66 4.4 - 5.9 10e12/L    Hemoglobin 13.4 13.3 - 17.7 g/dL    Hematocrit 42.9 40.0 - 53.0 %    MCV 92 78 - 100 fl    MCH 28.8 26.5 - 33.0 pg    MCHC 31.2 (L) 31.5 - 36.5 g/dL      Comment:      Results confirmed by repeat test    RDW 14.9 10.0 - 15.0 %    Platelet Count 217 150 - 450 10e9/L    Diff Method Automated Method     % Neutrophils 63.4 %    % Lymphocytes 19.6 %    % Monocytes 8.7 %    % Eosinophils 8.1 %    % Basophils 0.2 %    Absolute Neutrophil 6.7 1.6 - 8.3 10e9/L    Absolute Lymphocytes 2.1 0.8 - 5.3 10e9/L    Absolute Monocytes 0.9 0.0 - 1.3 10e9/L    Absolute Eosinophils 0.9 (H) 0.0 - 0.7 10e9/L    Absolute Basophils 0.0 0.0 - 0.2 10e9/L   Basic metabolic panel  (Ca, Cl, CO2, Creat, Gluc, K, Na, BUN)   Result Value Ref Range    Sodium 148 (H) 133 - 144 mmol/L    Potassium 4.7 3.4 - 5.3 mmol/L    Chloride 113 (H) 94 - 109 mmol/L    Carbon Dioxide 29 20 - 32 mmol/L    Anion Gap 6 3 - 14 mmol/L    Glucose 110 (H) 70 - 99 mg/dL    Urea Nitrogen 19 7 - 30 mg/dL    Creatinine 1.25 0.66 - 1.25 mg/dL    GFR Estimate 54 (L) >60 mL/min/1.7m2      Comment:      Non  GFR Calc    GFR Estimate If Black 66 >60 mL/min/1.7m2      Comment:       GFR Calc    Calcium 8.8 8.5 - 10.1 mg/dL       If you have any questions or concerns, please call the clinic at the number listed above.       Sincerely,        Yonatan Hurtado MD/brittany

## 2018-09-07 NOTE — PROGRESS NOTES
[facility]  [address] [facility] [address]  [phone]  Dept: [phone]    PRE-OP EVALUATION:  Today's date: 2018    Barrie Woods (: 1930) presents for pre-operative evaluation assessment as requested by Dr. Currie.  He requires evaluation and anesthesia risk assessment prior to undergoing surgery/procedure for treatment of TUR .  Proposed Surgery/ Procedure: TUR  Date of Surgery/ Procedure: 18  Time of Surgery/ Procedure:   Hospital/Surgical Facility:     Primary Physician: Yonatan Hurtado  Type of Anesthesia Anticipated: General    Patient has a Health Care Directive or Living Will:  YES     1. YES - DO YOU HAVE A HISTORY OF HEART ATTACK, STROKE, STENT, BYPASS OR SURGERY ON AN ARTERY IN THE HEAD, NECK, HEART OR LEG?   2. NO - Do you ever have any pain or discomfort in your chest?  3. NO - Do you have a history of  Heart Failure?  4. NO - Are you troubled by shortness of breath when: walking on the level, up a slight hill or at night?  5. NO - Do you currently have a cold, bronchitis or other respiratory infection?  6. NO - Do you have a cough, shortness of breath or wheezing?  7. NO - Do you sometimes get pains in the calves of your legs when you walk?  8. NO - Do you or anyone in your family have previous history of blood clots?  9. NO - Do you or does anyone in your family have a serious bleeding problem such as prolonged bleeding following surgeries or cuts?  10. NO - Have you ever had problems with anemia or been told to take iron pills?  11. NO - Have you had any abnormal blood loss such as black, tarry or bloody stools, or abnormal vaginal bleeding?  12. NO - Have you ever had a blood transfusion?  13. NO - Have you or any of your relatives ever had problems with anesthesia?  14. NO - Do you have sleep apnea, excessive snoring or daytime drowsiness?  15. NO - Do you have any prosthetic heart valves?  16. NO - Do you have prosthetic  joints?  17. NO - Is there any chance that you may be pregnant?  1. YES - Do you have a history of heart attack, stroke, stent, bypass or surgery on an artery in the head, neck, heart or legs?        No flowsheet data found.    HPI:     HPI related to upcoming procedure: Urinary retention with indwelling catheter, known renal tumor , not to be treated, elevated PSA.  Palliative procedure to improve patient quality of life    MEDICAL HISTORY:     Patient Active Problem List    Diagnosis Date Noted     Presence of indwelling urethral catheter 08/17/2018     Priority: Medium     Balanitis 06/04/2018     Priority: Medium     Cachexia (H) 01/09/2018     Priority: Medium     Presbycusis of both ears 07/17/2017     Priority: Medium     Dementia without behavioral disturbance, unspecified dementia type 05/09/2017     Priority: Medium     CAP (community acquired pneumonia) 04/26/2017     Priority: Medium     Elevated blood sugar 01/25/2016     Priority: Medium     Atrial fibrillation, unspecified 01/22/2016     Priority: Medium     Hx of congestive heart failure 01/12/2016     Priority: Medium     Chronic pain 10/12/2015     Priority: Medium     Patient is followed by Yonatan Hurtado MD for ongoing prescription of pain medication.  All refills should only be approved by this provider, or covering partner.    Medication(s): lyrica 60/ month.  Hydrocodone 30 prn  Maximum quantity per month:   Clinic visit frequency required: Q 6  months     Controlled substance agreement:  Encounter-Level CSA:     There are no encounter-level csa.        Pain Clinic evaluation in the past: no  Pt is frail elderly, palliative care, demented  DIRE Total Score(s):  No flowsheet data found.    Last [facility] website verification:  4/17/18   [email or web address]         Urinary incontinence 07/28/2015     Priority: Medium     Recurrent falls 07/28/2015     Priority: Medium     Dysphagia 07/27/2015     Priority: Medium     Right rib fracture  07/10/2015     Priority: Medium     Hypertrophy of prostate 2015     Priority: Medium     Dizziness 2015     Priority: Medium     Atypical chest pain 2015     Priority: Medium     Nausea with vomiting 2014     Priority: Medium     Renal cell carcinoma (H) 2014     Priority: Medium     Surgery, therapies, repeat imaging  refused       Anemia 2014     Priority: Medium     PSA elevation 2014     Priority: Medium     Low back pain 2014     Priority: Medium     Regularly treated with penicillin injection as placebo  Diagnosis updated by automated process. Provider to review and confirm.       Dysuria 2014     Priority: Medium     TB lung, latent 2014     Priority: Medium     INH declined       HTN, goal below 140/90 2014     Priority: Medium     Hyperlipidemia LDL goal <100 2014     Priority: Medium     Microscopic hematuria 2014     Priority: Medium     Health Care Home 10/01/2013     Priority: Medium     [facility] Case Management  MORELIA Blum  [phone]    [facility] CARE PLAN SUMMARY    Client Name:  Barrie Lewalvo  Address:  [address] [address] Phone: [phone]   :  1930 Date of Assessment:  UTR 18   [place]  [facility]  [place] Number: [id number] Medical Assistance Number: [id number]  Financial Worker:  Melecio Call fax: [phone]  Case #:  [id number]   [facility] :  MORELIA Blum CM Phone:  [phone]   Fax:  [phone]   LINDA Enrollment Date: 10/1/13 Case Mix:  B  Rate Cell:  A  Waiver Type:  None    Emergency Contact:  Extended Emergency Contact Information  Primary Emergency Contact: Jose Woods  Address: [address] [address]  Home Phone: [phone]  Work Phone: none  Mobile Phone: [phone]  Relation: Son   Language:  English  :  Yes:    Health Care Agent/POA:  none  "Advanced Directives/Living Will:  none   Primary Care Clinic/Phone/Fax:  [facility]/(p) [phone], (f) [phone] Primary Dx:  F03.90 Dementia  Secondary Dx:  R42 dizziness   Primary Physician:  Yonatan Hurtado   Height:  5' 6\"  Weight:  176 lbs   Specialty Physician:    Audiologist:     Eye Care Provider:  [facility] Provider:    [facility] [phone]   Other:  [facility]      [facility] CURRENT SERVICES SUMMARY  Equipment owned/DME history:  transfer belt, shower chair, commode, walker, w/c   SERVICE TYPE/PROVIDER NAME/PHONE AUTH DATE FREQUENCY Units OR $ Amt DESCRIPTION   Medical Transportation: [facility] [phone]  Fax:  8/1/17-7/31/18 as needed     Supplies: [facility] [phone]  Fax:  8/1/17-7/31/18 monthly  Incontinence supplies            * For ADC please select ADC provider and [facility] [employer] in order to process auth                                             Advanced directives, counseling/discussion 07/18/2011     Priority: Medium     Advance Care Planning 7/26/2016: ACP Review of Chart / Resources Provided:  Reviewed chart for advance care plan.  Barrie Woods has an up to date advance care plan on file.  Added by Shelly Alanis    No pacemaker  Episodic penicillin injection  Dann Garcia Woods       Esophageal reflux 03/17/2004     Priority: Medium     Acute, but ill-defined, cerebrovascular disease 01/27/2004     Priority: Medium     Tobacco use disorder 01/27/2004     Priority: Medium     Cardiomyopathy in other diseases classified elsewhere 01/27/2004     Priority: Medium     EF 35% 2009        Past Medical History:   Diagnosis Date     Acute, but ill-defined, cerebrovascular disease 1/04    left middle cerebral artery infarct     Alcohol abuse      Anemia 7/14/2014     Atrial fibrillation (H)     CVA occurred off anticoagulation     Balanitis 6/4/2018     Cachexia (H) 1/9/2018     Cardiomyopathy (H) "     stress tests 6/04 and 10/04 negative for ischemia, EF 36-40%     CHF (congestive heart failure) (H) 12/24/2009     Chronic pain 10/12/2015     Dementia without behavioral disturbance, unspecified dementia type 5/9/2017     Dementia, without behavioral disturbance 6/29/2015     Depressive disorder      Diaphragmatic hernia without mention of obstruction or gangrene     left sided chest pain, hiatal hernia/gastritis     Diverticulosis of colon (without mention of hemorrhage)     sigmoid     Dysphagia 7/27/2015     Elevated blood sugar 1/25/2016     Esophageal reflux      Hearing loss      Hematuria     negative cystoscopy 3/04     Hereditary and idiopathic peripheral neuropathy 2006    EMG-peripheral polyneuropathy, B12 and VitD deficient, ? alcohol related     Hx of congestive heart failure 1/12/2016     Hypertrophy of prostate 6/17/2015     Nausea with vomiting 12/8/2014     Presbycusis of both ears 7/17/2017     Presence of indwelling urethral catheter 8/17/2018     PSA elevation 5/30/2014     Recurrent falls 7/28/2015     Renal cell carcinoma (H) 8/25/2014     Sepsis (H) 1/7/2015     TB lung, latent 5/6/2014     Urinary incontinence 7/28/2015     Past Surgical History:   Procedure Laterality Date     EYE SURGERY      cataract surgery bilat     Current Outpatient Prescriptions   Medication Sig Dispense Refill     acetaminophen (TYLENOL) 500 MG tablet Take 2 tablets (1,000 mg) by mouth every 6 hours as needed for mild pain 84 tablet 5     amoxicillin-clavulanate (AUGMENTIN) 500-125 MG per tablet TAKE ONE TABLET BY MOUTH TWICE A DAY 40 tablet 0     bumetanide (BUMEX) 1 MG tablet TAKE ONE TABLET BY MOUTH EVERY DAY 30 tablet 5     carvedilol (COREG) 12.5 MG tablet Take 1 tablet (12.5 mg) by mouth 2 times daily (with meals) 60 tablet 5     clotrimazole (LOTRIMIN) 1 % cream Apply topically 2 times daily 30 g 3     diclofenac (VOLTAREN) 1 % GEL topical gel Apply 4 gm to knees qid 100 g 11     finasteride (PROSCAR) 5  MG tablet Take 1 tablet (5 mg) by mouth daily 30 tablet 3     HYDROcodone-acetaminophen (NORCO) 5-325 MG per tablet Take 1 tablet by mouth every 6 hours as needed for moderate to severe pain 30 tablet 0     lisinopril (PRINIVIL/ZESTRIL) 10 MG tablet Take 1 tablet (10 mg) by mouth daily 30 tablet 5     magnesium oxide (MAG-OX) 400 (241.3 MG) MG tablet TAKE 1 TABLET BY MOUTH ONCE DAILY 30 tablet 5     Multiple Vitamins-Minerals (MULTILEX) TABS TAKE ONE TABLET BY MOUTH ONCE DAILY 100 tablet 3     order for DME Equipment being ordered: walker with slides 1 Device 0     pregabalin (LYRICA) 100 MG capsule Take 1 capsule (100 mg) by mouth 2 times daily 60 capsule 5     tamsulosin (FLOMAX) 0.4 MG capsule Take 1 capsule (0.4 mg) by mouth daily 30 capsule 5     venlafaxine (EFFEXOR-XR) 75 MG 24 hr capsule Take 1 capsule (75 mg) by mouth daily 30 capsule 5     OTC products: None, except as noted above    Allergies   Allergen Reactions     Nkda [No Known Drug Allergies]       Latex Allergy: NO    Social History   Substance Use Topics     Smoking status: Former Smoker     Types: Dip, chew, snus or snuff     Smokeless tobacco: Current User     Types: Chew     Last attempt to quit: 1/9/2004      Comment: pt chews tobacco     Alcohol use No     History   Drug Use No     REVIEW OF SYSTEMS:   CONSTITUTIONAL: NEGATIVE for fever, chills, change in weight  INTEGUMENTARY/SKIN: NEGATIVE for worrisome rashes, moles or lesions  ENT/MOUTH: They have trouble swallowing thin liquids  RESP: NEGATIVE for significant cough or SOB  CV: NEGATIVE for chest pain, palpitations or peripheral edema  GI: NEGATIVE for nausea, abdominal pain, heartburn, or change in bowel habits  : Per Urology  MUSCULOSKELETAL: NEGATIVE for significant arthralgias or myalgia  NEURO: weakness,   ENDOCRINE: NEGATIVE for temperature intolerance, skin/hair changes  HEME: NEGATIVE for bleeding problems  PSYCHIATRIC: NEGATIVE for changes in mood or affect    This document  "serves as a record of the services and decisions personally performed and made by Yonatan Hurtado MD. It was created on his behalf by Kong Escalera, a trained medical scribe.  The creation of this document is based on the scribe's personal observations and the provider's statements to the medical scribe.  Kong Escalera, September 7, 2018 4:45 PM    EXAM:   /82 (BP Location: Right arm, Patient Position: Chair, Cuff Size: Adult Regular)  Pulse 92  Temp 97.9  F (36.6  C) (Oral)  Resp 12  Ht 1.676 m (5' 6\")  Wt 73.5 kg (162 lb)  BMI 26.15 kg/m2    GENERAL APPEARANCE:  frail elderly     NECK: no adenopathy, no asymmetry, masses, or scars and thyroid normal to palpation     RESP: lungs clear to auscultation - no rales, rhonchi or wheezes     CV: regular rates and rhythm, normal S1 S2, no S3 or S4 and no murmur, click or rub     ABDOMEN:  soft, nontender, no HSM or masses and bowel sounds normal  GI: Per Urology      MS: Broad-based stance     PSYCH: Affect flat     LYMPHATICS: No cervical adenopathy    DIAGNOSTICS:     EKG: atrial fibrillation, rate acceptable, normal axis, normal intervals, no acute ST/T changes c/w ischemia, no LVH by voltage criteria, unchanged from previous tracings  Labs Resulted Today:   Results for orders placed or performed in visit on 09/07/18   CBC with platelets and differential   Result Value Ref Range    WBC 10.6 4.0 - 11.0 10e9/L    RBC Count 4.66 4.4 - 5.9 10e12/L    Hemoglobin 13.4 13.3 - 17.7 g/dL    Hematocrit 42.9 40.0 - 53.0 %    MCV 92 78 - 100 fl    MCH 28.8 26.5 - 33.0 pg    MCHC 31.2 (L) 31.5 - 36.5 g/dL    RDW 14.9 10.0 - 15.0 %    Platelet Count 217 150 - 450 10e9/L    Diff Method Automated Method     % Neutrophils 63.4 %    % Lymphocytes 19.6 %    % Monocytes 8.7 %    % Eosinophils 8.1 %    % Basophils 0.2 %    Absolute Neutrophil 6.7 1.6 - 8.3 10e9/L    Absolute Lymphocytes 2.1 0.8 - 5.3 10e9/L    Absolute Monocytes 0.9 0.0 - 1.3 10e9/L    Absolute Eosinophils 0.9 (H) " 0.0 - 0.7 10e9/L    Absolute Basophils 0.0 0.0 - 0.2 10e9/L     *Note: Due to a large number of results and/or encounters for the requested time period, some results have not been displayed. A complete set of results can be found in Results Review.     Recent Labs   Lab Test  07/25/18   1028  06/02/18 2009 07/09/16   1210   07/12/15   0643  07/11/15   0515   HGB  13.4  12.7*   < >   --    < >  13.0*  11.9*   PLT  287  214   < >   --    < >  230  202   INR   --    --    --    --    --   1.48*  1.90*   NA  138  142   < >  143   < >  139  139   POTASSIUM  4.6  4.5   < >  4.5   < >  4.6  4.8   CR  1.09  1.41*   < >  1.02   < >  1.14  1.00   A1C   --    --    --   5.7   --    --    --     < > = values in this interval not displayed.     IMPRESSION:   Reason for surgery/procedure: Combined Cystoscopy, Transurethral Resection (TUR) Prostate  palliative care for urinary retention  Diagnosis/reason for consult: assess perioperative risk    The proposed surgical procedure is considered INTERMEDIATE risk.    REVISED CARDIAC RISK INDEX  The patient has the following serious cardiovascular risks for perioperative complications such as (MI, PE, VFib and 3  AV Block):  Congestive Heart Failure (pulmonary edema, PND, s3 enrrique, CXR with pulmonary congestion, basilar rales)  INTERPRETATION: 1 risks: Class II (low risk - 0.9% complication rate)    The patient has the following additional risks for perioperative complications:  Atrial fibrillation last ejection fraction 40%, frail elderly, , possible clear liquid dysphasia      ICD-10-CM    1. Preop general physical exam Z01.818 EKG 12-lead complete w/read - Clinics     CBC with platelets and differential     Basic metabolic panel  (Ca, Cl, CO2, Creat, Gluc, K, Na, BUN)     RECOMMENDATIONS:     Hold all medications morning of procedure.  Patient is not anticoagulated,   Due to fall risk  APPROVAL GIVEN to proceed with proposed procedure, without further diagnostic evaluation  It  is recognized that this is not a procedure planned for cure, rather palliation    The information in this document, created by the medical scribe for me, accurately reflects the services I personally performed and the decisions made by me. I have reviewed and approved this document for accuracy prior to leaving the patient care area.  Yonatan Hurtado MD September 7, 2018 4:25 PM    Signed Electronically by: Yonatan Hurtado MD    Copy of this evaluation report is provided to requesting physician.    [facility] Preop Guidelines    Revised Cardiac Risk Index

## 2018-09-07 NOTE — PATIENT INSTRUCTIONS
Before Your Surgery      Call your surgeon if there is any change in your health. This includes signs of a cold or flu (such as a sore throat, runny nose, cough, rash or fever).    Do not smoke, drink alcohol or take over the counter medicine (unless your surgeon or primary care doctor tells you to) for the 24 hours before and after surgery.    If you take prescribed drugs: Follow your doctor s orders about which medicines to take and which to stop until after surgery.    Eating and drinking prior to surgery: follow the instructions from your surgeon    Take a shower or bath the night before surgery. Use the soap your surgeon gave you to gently clean your skin. If you do not have soap from your surgeon, use your regular soap. Do not shave or scrub the surgery site.  Wear clean pajamas and have clean sheets on your bed.     No medicines AM of surgery

## 2018-09-07 NOTE — MR AVS SNAPSHOT
After Visit Summary   9/7/2018    Barrie Woods    MRN: [id number]           Patient Information     Date Of Birth          1/25/1930        Visit Information        Provider Department      9/7/2018 4:00 PM Yonatan Hurtado MD; [employer] [facility]        Today's Diagnoses     Preop general physical exam    -  1    Midline low back pain without sciatica, unspecified chronicity          Care Instructions      Before Your Surgery      Call your surgeon if there is any change in your health. This includes signs of a cold or flu (such as a sore throat, runny nose, cough, rash or fever).    Do not smoke, drink alcohol or take over the counter medicine (unless your surgeon or primary care doctor tells you to) for the 24 hours before and after surgery.    If you take prescribed drugs: Follow your doctor s orders about which medicines to take and which to stop until after surgery.    Eating and drinking prior to surgery: follow the instructions from your surgeon    Take a shower or bath the night before surgery. Use the soap your surgeon gave you to gently clean your skin. If you do not have soap from your surgeon, use your regular soap. Do not shave or scrub the surgery site.  Wear clean pajamas and have clean sheets on your bed.     No medicines AM of surgery          Follow-ups after your visit        Who to contact     If you have questions or need follow up information about today's clinic visit or your schedule please contact [facility] directly at [phone].  Normal or non-critical lab and imaging results will be communicated to you by MyChart, letter or phone within 4 business days after the clinic has received the results. If you do not hear from us within 7 days, please contact the clinic through MyChart or phone. If you have a critical or abnormal lab result, we will notify you by phone as soon as possible.  Submit refill requests  "through [employer] or call your pharmacy and they will forward the refill request to us. Please allow 3 business days for your refill to be completed.          Additional Information About Your Visit        Care EveryWhere ID     This is your Care EveryWhere ID. This could be used by other organizations to access your [place] medical records  [id number]        Your Vitals Were     Pulse Temperature Respirations Height BMI (Body Mass Index)       92 97.9  F (36.6  C) (Oral) 12 5' 6\" (1.676 m) 26.15 kg/m2        Blood Pressure from Last 3 Encounters:   09/07/18 127/82   08/17/18 116/74   08/03/18 124/50    Weight from Last 3 Encounters:   09/07/18 162 lb (73.5 kg)   08/17/18 163 lb (73.9 kg)   08/03/18 163 lb (73.9 kg)              We Performed the Following     Basic metabolic panel  (Ca, Cl, CO2, Creat, Gluc, K, Na, BUN)     CBC with platelets and differential     EKG 12-lead complete w/read - Clinics          Where to get your medicines      Some of these will need a paper prescription and others can be bought over the counter.  Ask your nurse if you have questions.     Bring a paper prescription for each of these medications     HYDROcodone-acetaminophen 5-325 MG per tablet         Information about OPIOIDS     PRESCRIPTION OPIOIDS: WHAT YOU NEED TO KNOW   We gave you an opioid (narcotic) pain medicine. It is important to manage your pain, but opioids are not always the best choice. You should first try all the other options your care team gave you. Take this medicine for as short a time (and as few doses) as possible.    Some activities can increase your pain, such as bandage changes or therapy sessions. It may help to take your pain medicine 30 to 60 minutes before these activities. Reduce your stress by getting enough sleep, working on hobbies you enjoy and practicing relaxation or meditation. Talk to your care team about ways to manage your pain beyond prescription opioids.    These medicines have risks:    DO " NOT drive when on new or higher doses of pain medicine. These medicines can affect your alertness and reaction times, and you could be arrested for driving under the influence (DUI). If you need to use opioids long-term, talk to your care team about driving.    DO NOT operate heavy machinery    DO NOT do any other dangerous activities while taking these medicines.    DO NOT drink any alcohol while taking these medicines.     If the opioid prescribed includes acetaminophen, DO NOT take with any other medicines that contain acetaminophen. Read all labels carefully. Look for the word  acetaminophen  or  Tylenol.  Ask your pharmacist if you have questions or are unsure.    You can get addicted to pain medicines, especially if you have a history of addiction (chemical, alcohol or substance dependence). Talk to your care team about ways to reduce this risk.    All opioids tend to cause constipation. Drink plenty of water and eat foods that have a lot of fiber, such as fruits, vegetables, prune juice, apple juice and high-fiber cereal. Take a laxative (Miralax, milk of magnesia, Colace, Senna) if you don t move your bowels at least every other day. Other side effects include upset stomach, sleepiness, dizziness, throwing up, tolerance (needing more of the medicine to have the same effect), physical dependence and slowed breathing.    Store your pills in a secure place, locked if possible. We will not replace any lost or stolen medicine. If you don t finish your medicine, please throw away (dispose) as directed by your pharmacist. The [place] [employer] has more information about safe disposal: https://www.pca.[facility].mn.us/living-green/managing-unwanted-medications         Primary Care Provider Office Phone # Fax #    Yonatan Hurtado [phone] [address] [facility] [address]        Equal Access to Services     IWONA CHARLES AH: donita Ziegler qaybta  asia dennischris jeffers ah. [facility] [phone].    ATENCIÓN: Si criss grimaldo, tiene a maldonado disposición servicios gratuitos de asistencia lingüística. Gavino al [phone].    We comply with applicable federal civil rights laws and [place] laws. We do not discriminate on the basis of race, color, national origin, age, disability, sex, sexual orientation, or gender identity.            Thank you!     Thank you for choosing [facility]  for your care. Our goal is always to provide you with excellent care. Hearing back from our patients is one way we can continue to improve our services. Please take a few minutes to complete the written survey that you may receive in the mail after your visit with us. Thank you!             Your Updated Medication List - Protect others around you: Learn how to safely use, store and throw away your medicines at www.disposemymeds.org.          This list is accurate as of 9/7/18  4:52 PM.  Always use your most recent med list.                   Brand Name Dispense Instructions for use Diagnosis    acetaminophen 500 MG tablet    TYLENOL    84 tablet    Take 2 tablets (1,000 mg) by mouth every 6 hours as needed for mild pain    Malaise       amoxicillin-clavulanate 500-125 MG per tablet    AUGMENTIN    40 tablet    TAKE ONE TABLET BY MOUTH TWICE A DAY    Malaise       bumetanide 1 MG tablet    BUMEX    30 tablet    TAKE ONE TABLET BY MOUTH EVERY DAY    Acute pulmonary edema (H)       carvedilol 12.5 MG tablet    COREG    60 tablet    Take 1 tablet (12.5 mg) by mouth 2 times daily (with meals)    Chronic combined systolic and diastolic congestive heart failure (H), HTN, goal below 140/90       clotrimazole 1 % cream    LOTRIMIN    30 g    Apply topically 2 times daily    Balanitis       diclofenac 1 % Gel topical gel    VOLTAREN    100 g    Apply 4 gm to knees qid    Primary osteoarthritis of both knees       finasteride 5 MG tablet     PROSCAR    30 tablet    Take 1 tablet (5 mg) by mouth daily    Benign prostatic hyperplasia with urinary obstruction       HYDROcodone-acetaminophen 5-325 MG per tablet    NORCO    30 tablet    Take 1 tablet by mouth every 6 hours as needed for moderate to severe pain    Midline low back pain without sciatica, unspecified chronicity       lisinopril 10 MG tablet    PRINIVIL/ZESTRIL    30 tablet    Take 1 tablet (10 mg) by mouth daily    Chronic combined systolic and diastolic congestive heart failure (H), HTN, goal below 140/90       magnesium oxide 400 (241.3 Mg) MG tablet    MAG-OX    30 tablet    TAKE 1 TABLET BY MOUTH ONCE DAILY    Routine general medical examination at a health care facility       MULTILEX Tabs     100 tablet    TAKE ONE TABLET BY MOUTH ONCE DAILY    Routine general medical examination at a health care facility       order for DME     1 Device    Equipment being ordered: walker with slides    Recurrent falls       pregabalin 100 MG capsule    LYRICA    60 capsule    Take 1 capsule (100 mg) by mouth 2 times daily    Midline low back pain without sciatica, unspecified chronicity       tamsulosin 0.4 MG capsule    FLOMAX    30 capsule    Take 1 capsule (0.4 mg) by mouth daily    Urinary retention, Hypertrophy of prostate, PSA elevation       venlafaxine 75 MG 24 hr capsule    EFFEXOR-XR    30 capsule    Take 1 capsule (75 mg) by mouth daily    Midline low back pain without sciatica, unspecified chronicity

## 2018-09-08 LAB
ANION GAP SERPL CALCULATED.3IONS-SCNC: 6 MMOL/L (ref 3–14)
BUN SERPL-MCNC: 19 MG/DL (ref 7–30)
CALCIUM SERPL-MCNC: 8.8 MG/DL (ref 8.5–10.1)
CHLORIDE SERPL-SCNC: 113 MMOL/L (ref 94–109)
CO2 SERPL-SCNC: 29 MMOL/L (ref 20–32)
CREAT SERPL-MCNC: 1.25 MG/DL (ref 0.66–1.25)
GFR SERPL CREATININE-BSD FRML MDRD: 54 ML/MIN/1.7M2
GLUCOSE SERPL-MCNC: 110 MG/DL (ref 70–99)
POTASSIUM SERPL-SCNC: 4.7 MMOL/L (ref 3.4–5.3)
SODIUM SERPL-SCNC: 148 MMOL/L (ref 133–144)

## 2018-09-13 ENCOUNTER — HOSPITAL ENCOUNTER (EMERGENCY)
Facility: CLINIC | Age: 83
Discharge: HOME | End: 2018-09-13
Attending: PHYSICIAN ASSISTANT | Admitting: PHYSICIAN ASSISTANT
Payer: COMMERCIAL

## 2018-09-13 VITALS
RESPIRATION RATE: 20 BRPM | SYSTOLIC BLOOD PRESSURE: 156 MMHG | DIASTOLIC BLOOD PRESSURE: 80 MMHG | OXYGEN SATURATION: 100 % | TEMPERATURE: 98.1 F

## 2018-09-13 DIAGNOSIS — N48.1 BALANITIS: ICD-10-CM

## 2018-09-13 DIAGNOSIS — Z46.6 URINARY CATHETER (FOLEY) CHANGE REQUIRED: ICD-10-CM

## 2018-09-13 PROCEDURE — 99283 EMERGENCY DEPT VISIT LOW MDM: CPT

## 2018-09-13 PROCEDURE — 51702 INSERT TEMP BLADDER CATH: CPT

## 2018-09-13 RX ORDER — CLOTRIMAZOLE 1 %
CREAM (GRAM) TOPICAL 2 TIMES DAILY
Qty: 45 G | Refills: 0 | Status: SHIPPED | OUTPATIENT
Start: 2018-09-13 | End: 2019-01-30

## 2018-09-13 ASSESSMENT — ENCOUNTER SYMPTOMS: DIFFICULTY URINATING: 1

## 2018-09-13 NOTE — ED AVS SNAPSHOT
[facility] Emergency Department    [address] [facility] [address]    Phone:  [phone]    Fax:  [phone]                                       Barrie Woods   MRN: [id number]    Department:  [facility] Emergency Department   Date of Visit:  9/13/2018           Patient Information     Date Of Birth          1/25/1930        Your diagnoses for this visit were:     Urinary catheter (Youssef) change required     Balanitis        You were seen by Jennie Maradiaga PA-C.      Follow-up Information     Follow up with Rambo, Antonio MOREL MD. Call in 2 days.    Specialty:  Urology    Why:  to schedule follow up appointment    Contact information:    [facility]  [address] [place] [address] [phone]          Follow up with [facility] Emergency Department.    Specialty:  EMERGENCY MEDICINE    Why:  As needed, If symptoms worsen    Contact information:    [address] [facility] [address] [phone]        Discharge Instructions         *Follow up with urology next week. Provided referral to El[facility] Subramanian[facility]Dr. Ricks.   *Return for fever/chills, abdominal pain, catheter not draining, or any other new/concerning symptoms.   Balanitis [Balanitis]    La Balanitis es flavia inflamación en la philip del pene (glande) Puede ocurrir a partir de flavia acumulación de gérmenes (bacterias o virus) bajo el prepucio. Con mayor frecuencia se trata de flavia complicación de la diabetes. También puede ocurrir por obesidad o hábitos higiénicos pobres de los genitales.  Si no se trata de inmediato, esto puede conducir a flavia condición llamada Fimosis. Ésta es la incapacidad de tirar hacia atrás (retraer) desde el glande el prepucio.  Los síntomas de la balanitis pueden incluir dolor del pene o sensibilidad al tacto, descarga de líquido, imposibilidad de retraer el prepucio, dificultad para orinar e impotencia.  Cuidado En [place]:  1. Si  puede retraer el prepucio:  ? Para los niños, retraiga el prepucio y lave con agua. Aplique Bacitracin crema o pomada al glande page veces al día.  ? Para los adultos, retraiga el prepucio y lave con agua. A menos que le hayan recetado otro medicamento, aplique clotrimazol crema (Lotrimin, Mycelex) (disponible sin receta) al glande page veces al día.  2. Si usted es diabético, hable con maldonado médico para lograr un buen control de maldonado diabetes.  3. Si tiene sobrepeso, hable con maldonado médico para hacer un plan para adelgazar.  Seguimiento  con maldonado médico o de acuerdo a lo indicado por nuestro personal.  Busque Prontamente Atención Médica  si algo de lo siguiente ocurre:    Incapacidad para regresar el prepucio retraído a la posición original ([place] requiere atención inmediata!)    Empeoramiento de david síntomas    Bloqueo parcial o total del flujo de orina  Date Last Reviewed: 3/10/2014    5652-4012 [employer]. [address] [place] [address]. Todos los derechos reservados. Esta información no pretende sustituir la atención médica profesional. Sólo maldonado médico puede diagnosticar y tratar un problema de fanny.          24 Hour Appointment Hotline       To make an appointment at any [facility] clinic, call [phone] ([phone]). If you don't have a family doctor or clinic, we will help you find one. [facility] clinics are conveniently located to serve the needs of you and your family.             Review of your medicines      Our records show that you are taking the medicines listed below. If these are incorrect, please call your family doctor or clinic.        Dose / Directions Last dose taken    acetaminophen 500 MG tablet   Commonly known as:  TYLENOL   Dose:  1000 mg   Quantity:  84 tablet        Take 2 tablets (1,000 mg) by mouth every 6 hours as needed for mild pain   Refills:  5        amoxicillin-clavulanate 500-125 MG per tablet   Commonly known as:  AUGMENTIN   Quantity:  40 tablet         TAKE ONE TABLET BY MOUTH TWICE A DAY   Refills:  0        bumetanide 1 MG tablet   Commonly known as:  BUMEX   Quantity:  30 tablet        TAKE ONE TABLET BY MOUTH EVERY DAY   Refills:  5        carvedilol 12.5 MG tablet   Commonly known as:  COREG   Dose:  12.5 mg   Quantity:  60 tablet        Take 1 tablet (12.5 mg) by mouth 2 times daily (with meals)   Refills:  5        clotrimazole 1 % cream   Commonly known as:  LOTRIMIN   Quantity:  45 g        Apply topically 2 times daily for 15 days   Refills:  0        diclofenac 1 % Gel topical gel   Commonly known as:  VOLTAREN   Quantity:  100 g        Apply 4 gm to knees qid   Refills:  11        finasteride 5 MG tablet   Commonly known as:  PROSCAR   Dose:  5 mg   Quantity:  30 tablet        Take 1 tablet (5 mg) by mouth daily   Refills:  3        HYDROcodone-acetaminophen 5-325 MG per tablet   Commonly known as:  NORCO   Dose:  1 tablet   Quantity:  30 tablet        Take 1 tablet by mouth every 6 hours as needed for moderate to severe pain   Refills:  0        lisinopril 10 MG tablet   Commonly known as:  PRINIVIL/ZESTRIL   Dose:  10 mg   Quantity:  30 tablet        Take 1 tablet (10 mg) by mouth daily   Refills:  5        magnesium oxide 400 (241.3 Mg) MG tablet   Commonly known as:  MAG-OX   Quantity:  30 tablet        TAKE 1 TABLET BY MOUTH ONCE DAILY   Refills:  5        MULTILEX Tabs   Quantity:  100 tablet        TAKE ONE TABLET BY MOUTH ONCE DAILY   Refills:  3        order for DME   Quantity:  1 Device        Equipment being ordered: walker with slides   Refills:  0        pregabalin 100 MG capsule   Commonly known as:  LYRICA   Dose:  100 mg   Quantity:  60 capsule        Take 1 capsule (100 mg) by mouth 2 times daily   Refills:  5        tamsulosin 0.4 MG capsule   Commonly known as:  FLOMAX   Dose:  0.4 mg   Quantity:  30 capsule        Take 1 capsule (0.4 mg) by mouth daily   Refills:  5        venlafaxine 75 MG 24 hr capsule   Commonly known as:   EFFEXOR-XR   Dose:  75 mg   Quantity:  30 capsule        Take 1 capsule (75 mg) by mouth daily   Refills:  5                Prescriptions were sent or printed at these locations (1 Prescription)                   Other Prescriptions                Printed at Department/Unit printer (1 of 1)         clotrimazole (LOTRIMIN) 1 % cream                Orders Needing Specimen Collection     None      Pending Results     No orders found from 9/11/2018 to 9/14/2018.            Pending Culture Results     No orders found from 9/11/2018 to 9/14/2018.            Pending Results Instructions     If you had any lab results that were not finalized at the time of your Discharge, you can call the ED Lab Result RN at [phone]. You will be contacted by this team for any positive Lab results or changes in treatment. The nurses are available 7 days a week from 10A to 6:30P.  You can leave a message 24 hours per day and they will return your call.        Test Results From Your Hospital Stay               Clinical Quality Measure: Blood Pressure Screening     Your blood pressure was checked while you were in the emergency department today. The last reading we obtained was  BP: 156/80 . Please read the guidelines below about what these numbers mean and what you should do about them.  If your systolic blood pressure (the top number) is less than 120 and your diastolic blood pressure (the bottom number) is less than 80, then your blood pressure is normal. There is nothing more that you need to do about it.  If your systolic blood pressure (the top number) is 120-139 or your diastolic blood pressure (the bottom number) is 80-89, your blood pressure may be higher than it should be. You should have your blood pressure rechecked within a year by a primary care provider.  If your systolic blood pressure (the top number) is 140 or greater or your diastolic blood pressure (the bottom number) is 90 or greater, you may have high blood pressure.  High blood pressure is treatable, but if left untreated over time it can put you at risk for heart attack, stroke, or kidney failure. You should have your blood pressure rechecked by a primary care provider within the next 4 weeks.  If your provider in the emergency department today gave you specific instructions to follow-up with your doctor or provider even sooner than that, you should follow that instruction and not wait for up to 4 weeks for your follow-up visit.        Thank you for choosing [facility]       Thank you for choosing [facility] for your care. Our goal is always to provide you with excellent care. Hearing back from our patients is one way we can continue to improve our services. Please take a few minutes to complete the written survey that you may receive in the mail after you visit with us. Thank you!        Care EveryWhere ID     This is your Care EveryWhere ID. This could be used by other organizations to access your [facility] medical records  [id number]        Equal Access to Services     IWONA CHARLES : Fady Valdes, donita silvestre, asia clancy . So [facility] [phone].    ATENCIÓN: Si habla español, tiene a maldonado disposición servicios gratuitos de asistencia lingüística. Llame al [phone].    We comply with applicable federal civil rights laws and [place] laws. We do not discriminate on the basis of race, color, national origin, age, disability, sex, sexual orientation, or gender identity.            After Visit Summary       This is your record. Keep this with you and show to your community pharmacist(s) and doctor(s) at your next visit.

## 2018-09-13 NOTE — DISCHARGE INSTRUCTIONS
*Follow up with urology next week. Provided referral to [facility], Dr. Ricks.   *Return for fever/chills, abdominal pain, catheter not draining, or any other new/concerning symptoms.   Balanitis [Balanitis]    La Balanitis es flavia inflamación en la philip del pene (glande) Puede ocurrir a partir de flavia acumulación de gérmenes (bacterias o virus) bajo el prepucio. Con mayor frecuencia se trata de flavia complicación de la diabetes. También puede ocurrir por obesidad o hábitos higiénicos pobres de los genitales.  Si no se trata de inmediato, esto puede conducir a flavia condición llamada Fimosis. Ésta es la incapacidad de tirar hacia atrás (retraer) desde el glande el prepucio.  Los síntomas de la balanitis pueden incluir dolor del pene o sensibilidad al tacto, descarga de líquido, imposibilidad de retraer el prepucio, dificultad para orinar e impotencia.  Cuidado En [place]:  1. Si puede retraer el prepucio:  ? Para los niños, retraiga el prepucio y lave con agua. Aplique Bacitracin crema o pomada al glande page veces al día.  ? Para los adultos, retraiga el prepucio y lave con agua. A menos que le hayan recetado otro medicamento, aplique clotrimazol crema (Lotrimin, Mycelex) (disponible sin receta) al glande page veces al día.  2. Si usted es diabético, hable con maldonado médico para lograr un buen control de maldonado diabetes.  3. Si tiene sobrepeso, hable con maldonado médico para hacer un plan para adelgazar.  Seguimiento  con maldonado médico o de acuerdo a lo indicado por nuestro personal.  Busque Prontamente Atención Médica  si algo de lo siguiente ocurre:    Incapacidad para regresar el prepucio retraído a la posición original ([place] requiere atención inmediata!)    Empeoramiento de david síntomas    Bloqueo parcial o total del flujo de orina  Date Last Reviewed: 3/10/2014    9952-8097 [employer] [place]. [address] [place] [address], [place] [address]. Todos los derechos reservados. Esta información no pretende sustituir la atención  médica profesional. Sólo maldonado médico puede diagnosticar y tratar un problema de fanny.

## 2018-09-13 NOTE — ED TRIAGE NOTES
Pt is here for catheter change.  Son reports that it has been in place for 1.5 months and is due for change.

## 2018-09-13 NOTE — ED AVS SNAPSHOT
[facility] Emergency Department    [address]    [facility] [address]    Phone:  [phone]    Fax:  [phone]                                       Barrie Woods   MRN: [id number]    Department:  [facility] Emergency Department   Date of Visit:  9/13/2018           After Visit Summary Signature Page     I have received my discharge instructions, and my questions have been answered. I have discussed any challenges I see with this plan with the nurse or doctor.    ..........................................................................................................................................  Patient/Patient Representative Signature      ..........................................................................................................................................  Patient Representative Print Name and Relationship to Patient    ..................................................               ................................................  Date                                   Time    ..........................................................................................................................................  Reviewed by Signature/Title    ...................................................              ..............................................  Date                                               Time          22EPIC Rev 08/18

## 2018-09-13 NOTE — ED NOTES
Emergency Department Attending Supervision Note  9/13/2018  1:19 PM      I evaluated this patient in conjunction with Jennie Maradiaga PA-C.      Briefly, the patient presented with concerns that he had an appointment with Dr. Currie today for scheduled TURP.  Upon presenting to their office, patient was told he did not have an appointment today.  They then came to the ED for sarmiento catheter change, as this has not bee done for 3 months.  The patient has chronic lower abd.pain, but no fever or chills.  Son relates that he is not sure if they went to the right place for the appointment today.      On my exam, slightly hypertensive, no fever.  Sarmiento catheter has been removed.  Patient is uncircumcised, foreskin was easily retracted.  Glans is inflammed and reddened.  Small amount of blood at the meatus.      ED course:  Per discussion by Jennie Maradiaga with Dr. Currie, the patient's surgery today was cancelled per their request.    Sarmiento catheter replaced without difficulty.  Urine reportedly cloudy.  Given no fever and no acute change in condition (abd pain is chronic), will not send given likely to have bacteruria.    My impression is urinary retention secondary to prostatic hypertrophy.  Glans inflammation consistent with balanitis.  Patient has referral for urologist at the [facility].  Plan for antifungal, improve hygiene to deal with balanitis.  Will o/w f/u with urology.      Diagnosis    ICD-10-CM    1. Urinary catheter (Sarmiento) change required Z46.6    2. Balanitis N48.1          MD Uma Basilio Tracy Lynn, MD  09/13/18 9399

## 2018-09-17 ENCOUNTER — PATIENT OUTREACH (OUTPATIENT)
Dept: GERIATRIC MEDICINE | Facility: CLINIC | Age: 83
End: 2018-09-17

## 2018-09-17 NOTE — PROGRESS NOTES
[facility] Care Coordination Contact    CC received notification of Emergency Room visit.  ER visit occurred on 8/13/18 at [facility] with Dx of catheter change. Client was apparently supposed to have urology surgery 9/13 but showed up and was told it was cancelled. Per Clinic, client/family had cancelled the procedure. Son brought client to ED to have catheter changed as it had reportedly been in for 3 months.     CC contacted adult son Jose and left a message requesting a return call.  Member has a follow-up appointment with PCP: No: will offer Assistance with setting up a follow up appointment  Member has had a change in condition: No  New referrals placed: No  Home Visit Needed: No  Care plan reviewed and updated.  PCP notified of ED visit via EMR.    MORELIA Blum  [facility] Care Coordinator  [phone]

## 2018-09-18 NOTE — PROGRESS NOTES
[facility] Care Coordination Contact  CC left another message for Jose to call me with an update.    MORELIA Blum  [facility] Care Coordinator  [phone]

## 2018-09-25 ENCOUNTER — TELEPHONE (OUTPATIENT)
Dept: UROLOGY | Facility: CLINIC | Age: 83
End: 2018-09-25

## 2018-09-25 NOTE — TELEPHONE ENCOUNTER
Patient's son called on 9/24/18 to ask to make an appointment with Dr. Currie.  Explained that Dr. Currie would not see him based on the PATIENT's desire for a referral to a different urologist.  Son had expessed his dissatisfaction with Dr. Currie to Dr. Hurtado before a scheduled surgery.  Our office was not alerted so surgery was not cancelled at hospital until the patient no showed.  Patient was referred to [facility] but is now requesting to see Dr. Scott. Tyler agrees to see patient.  C

## 2018-10-22 DIAGNOSIS — G89.4 CHRONIC PAIN SYNDROME: ICD-10-CM

## 2018-10-22 DIAGNOSIS — R53.81 MALAISE: ICD-10-CM

## 2018-10-22 DIAGNOSIS — M54.50 MIDLINE LOW BACK PAIN WITHOUT SCIATICA, UNSPECIFIED CHRONICITY: ICD-10-CM

## 2018-10-22 NOTE — TELEPHONE ENCOUNTER
Controlled Substance Refill Request for Norco  Problem List Complete:  Yes    Patient is followed by Yonatan Hurtado MD for ongoing prescription of pain medication.  All refills should only be approved by this provider, or covering partner.    Medication(s): lyrica 60/ month.  Hydrocodone 30 prn  Maximum quantity per month:   Clinic visit frequency required: Q 6  Months Last Office Visit: 9/7/18    Controlled substance agreement:  Encounter-Level CSA:     There are no encounter-level csa.        Pain Clinic evaluation in the past: no  Pt is frail elderly, palliative care, demented  DIRE Total Score(s):  No flowsheet data found.    Last [facility] website verification:  4/17/18   [email or web address]   checked in past 3 months?  No, route to JESSICA Garcia, Pharmacy [facility]  [phone]

## 2018-10-24 RX ORDER — HYDROCODONE BITARTRATE AND ACETAMINOPHEN 5; 325 MG/1; MG/1
1 TABLET ORAL EVERY 6 HOURS PRN
Qty: 30 TABLET | Refills: 0 | Status: SHIPPED | OUTPATIENT
Start: 2018-10-24 | End: 2018-11-19

## 2018-10-24 RX ORDER — AMOXICILLIN AND CLAVULANATE POTASSIUM 500; 125 MG/1; MG/1
TABLET, FILM COATED ORAL
Qty: 40 TABLET | Refills: 0 | Status: SHIPPED | OUTPATIENT
Start: 2018-10-24 | End: 2018-11-26

## 2018-10-24 NOTE — TELEPHONE ENCOUNTER
Updated overview to every 3 month visit.  CSA not on file.  Updated .  No concerns noted.  Not PSO medications.  Sent to provider.  Tia Mcconnell RN

## 2018-10-29 ENCOUNTER — OFFICE VISIT (OUTPATIENT)
Dept: UROLOGY | Facility: CLINIC | Age: 83
End: 2018-10-29
Payer: COMMERCIAL

## 2018-10-29 VITALS — WEIGHT: 162 LBS | BODY MASS INDEX: 26.03 KG/M2 | HEART RATE: 42 BPM | OXYGEN SATURATION: 99 % | HEIGHT: 66 IN

## 2018-10-29 DIAGNOSIS — N40.0 ENLARGED PROSTATE: Primary | ICD-10-CM

## 2018-10-29 DIAGNOSIS — R33.9 URINARY RETENTION: ICD-10-CM

## 2018-10-29 PROCEDURE — 99215 OFFICE O/P EST HI 40 MIN: CPT | Performed by: UROLOGY

## 2018-10-29 ASSESSMENT — PAIN SCALES - GENERAL: PAINLEVEL: MILD PAIN (2)

## 2018-10-29 NOTE — LETTER
10/29/2018       RE: Barrie Woods  [address]  [place] [id number]     Dear Colleague,    Thank you for referring your patient, Barrie Woods, to the [facility] [place] [facility]. Please see a copy of my visit note below.    Office Visit Note  [facility]  [phone]    UROLOGIC DIAGNOSES:   Enlarged prostate, urinary retention, left renal mass    CURRENT INTERVENTIONS:   Youssef catheter    HISTORY:   This is a 88-year-old gentleman who had been seeing Dr. Currie previously. His previous history is as follows: The patient has a left renal mass but is not being treated due to the patient's health. He and his family understand this. He has urinary retention from an enlarged prostate and was scheduled for a TURP with Dr. Currie on August 23. However, the patient no-showed his surgery that day. Apparently, the patient's son expressed his dissatisfaction regarding his father's treatment and had requested a different urologist at his preoperative visit. He had requested that the surgery be canceled at that time but the message never gotten across to our office. There was more confusion in that the surgery was then rescheduled for September 13, again with Dr. Currie. However, this was a mistake as the patient's son requested that his father not see Dr. Currie anymore. Our office was then unable to reach the patient's son to inform him of this  and he did end up showing up for surgery on September 13. Instead that day he went to the emergency department for catheter change. The catheter has not been changed since that time. After a lengthy discussion through the  with the patient and his son I did ascertain that he is interested in still having the surgery, but wishes to have it performed with a different surgeon.      PAST MEDICAL HISTORY:   Past Medical History:   Diagnosis Date     Acute, but  ill-defined, cerebrovascular disease 1/04    left middle cerebral artery infarct     Alcohol abuse      Anemia 7/14/2014     Atrial fibrillation (H)     CVA occurred off anticoagulation     Balanitis 6/4/2018     Cachexia (H) 1/9/2018     Cardiomyopathy (H)     stress tests 6/04 and 10/04 negative for ischemia, EF 36-40%     CHF (congestive heart failure) (H) 12/24/2009     Chronic pain 10/12/2015     Dementia without behavioral disturbance, unspecified dementia type 5/9/2017     Dementia, without behavioral disturbance 6/29/2015     Depressive disorder      Diaphragmatic hernia without mention of obstruction or gangrene     left sided chest pain, hiatal hernia/gastritis     Diverticulosis of colon (without mention of hemorrhage)     sigmoid     Dysphagia 7/27/2015     Elevated blood sugar 1/25/2016     Esophageal reflux      Hearing loss      Hematuria     negative cystoscopy 3/04     Hereditary and idiopathic peripheral neuropathy 2006    EMG-peripheral polyneuropathy, B12 and VitD deficient, ? alcohol related     Hx of congestive heart failure 1/12/2016     Hypertrophy of prostate 6/17/2015     Nausea with vomiting 12/8/2014     Presbycusis of both ears 7/17/2017     Presence of indwelling urethral catheter 8/17/2018     PSA elevation 5/30/2014     Recurrent falls 7/28/2015     Renal cell carcinoma (H) 8/25/2014     Sepsis (H) 1/7/2015     TB lung, latent 5/6/2014     Urinary incontinence 7/28/2015       PAST SURGICAL HISTORY:   Past Surgical History:   Procedure Laterality Date     EYE SURGERY      cataract surgery bilat       FAMILY HISTORY:   Family History   Problem Relation Age of Onset     C.A.D. No family hx of      Diabetes No family hx of        SOCIAL HISTORY:   Social History   Substance Use Topics     Smoking status: Former Smoker     Types: Dip, chew, snus or snuff     Smokeless tobacco: Current User     Types: Chew     Last attempt to quit: 1/9/2004      Comment: pt chews tobacco     Alcohol use No  "      Current Outpatient Prescriptions   Medication     amoxicillin-clavulanate (AUGMENTIN) 500-125 MG per tablet     carvedilol (COREG) 12.5 MG tablet     diclofenac (VOLTAREN) 1 % GEL topical gel     finasteride (PROSCAR) 5 MG tablet     HYDROcodone-acetaminophen (NORCO) 5-325 MG per tablet     lisinopril (PRINIVIL/ZESTRIL) 10 MG tablet     magnesium oxide (MAG-OX) 400 (241.3 MG) MG tablet     Multiple Vitamins-Minerals (MULTILEX) TABS     pregabalin (LYRICA) 100 MG capsule     tamsulosin (FLOMAX) 0.4 MG capsule     venlafaxine (EFFEXOR-XR) 75 MG 24 hr capsule     acetaminophen (TYLENOL) 500 MG tablet     bumetanide (BUMEX) 1 MG tablet     order for DME     No current facility-administered medications for this visit.          PHYSICAL EXAM:    Pulse (!) 42  Ht 1.676 m (5' 6\")  Wt 73.5 kg (162 lb)  SpO2 99%  BMI 26.15 kg/m2    Constitutional: Well developed.  Luxembourgish-speaking gentleman. The  reports that he does have some confusion and dementia.   HEENT: Anicteric sclera, conjunctiva clear, normal extraocular movements  ENT: Normocephalic and atraumatic,   Skin: Warm and dry. No rashes or lesions  Cardiac: No peripheral edema  Back/Flank: Not done  CNS/PNS: Normal musculature and movements, moves all extremities normally  Respiratory: Normal non-labored breathing  Abdomen: Soft nontender and nondistended  Peripheral Vascular: No peripheral edema  Mental Status/Psych: Alert and Oriented x 3. Normal mood and affect    Penis: Normal uncircumcised, Youssef catheter in place  Scrotal skin: Normal, no lesions  Testicles: Normal to palpation bilaterally  Epididymis: Normal to palpation bilaterally  Lymphatic: Normal inguinal lymph nodes  Digital Rectal Exam's prostate is very enlarged, benign and symmetric to palpation    Cystoscopy: Not done    Imaging: None    Urinalysis: UA RESULTS:  Recent Labs   Lab Test  07/25/18   1032   09/08/16   1710   COLOR  Yellow   < >  Yellow   APPEARANCE  Cloudy   < >  Clear "   URINEGLC  Negative   < >  Negative   URINEBILI  Negative   < >  Moderate  This is an unconfirmed screening test result. A positive result may be false.  *   URINEKETONE  Negative   < >  15*   SG  1.008   < >  1.025   UBLD  Large*   < >  Moderate*   URINEPH  7.0   < >  5.0   PROTEIN  30*   < >  30*   UROBILINOGEN   --    --   1.0   NITRITE  Negative   < >  Negative   LEUKEST  Large*   < >  Trace*   RBCU  >182*   < >  5-10*   WBCU  >182*   < >  10-25*    < > = values in this interval not displayed.       PSA:     Post Void Residual:     Other labs: None today      IMPRESSION:  Enlarged prostate with urinary retention    PLAN:  He was previously scheduled for a TURP. He is interested in going ahead with the surgery, but wishes to have a different doctor. I discussed TURP in detail today with the patient and his son through an . We discussed the risks of the procedure, in particular given his frail health. We also discussed the risks of the procedure not resolving his urinary retention. He understands that he may still have urinary retention after the procedure and they still need to simply be catheterized. We discussed catheterization options as an alternative to surgery however he declines. We will get him rescheduled for his TURP and he will be admitted to the hospital postoperatively. His catheter was changed today. He will need to come back again in 1 month to have the catheter changed again  since the surgery schedule is very far out at this point. There was a great deal of confusion about his previous history and about previous scheduling issues and we had a very prolonged discussion today with the help of an .     Total Time: 45 minutes                                      Total in Consultation: 40 minutes       Justin Scott M.D.

## 2018-10-29 NOTE — PROGRESS NOTES
Office Visit Note  [facility]  [phone]    UROLOGIC DIAGNOSES:   Enlarged prostate, urinary retention, left renal mass    CURRENT INTERVENTIONS:   Youssef catheter    HISTORY:   This is a 88-year-old gentleman who had been seeing Dr. Currie previously. His previous history is as follows: The patient has a left renal mass but is not being treated due to the patient's health. He and his family understand this. He has urinary retention from an enlarged prostate and was scheduled for a TURP with Dr. Currie on August 23. However, the patient no-showed his surgery that day. Apparently, the patient's son expressed his dissatisfaction regarding his father's treatment and had requested a different urologist at his preoperative visit. He had requested that the surgery be canceled at that time but the message never gotten across to our office. There was more confusion in that the surgery was then rescheduled for September 13, again with Dr. Currie. However, this was a mistake as the patient's son requested that his father not see Dr. Currie anymore. Our office was then unable to reach the patient's son to inform him of this  and he did end up showing up for surgery on September 13. Instead that day he went to the emergency department for catheter change. The catheter has not been changed since that time. After a lengthy discussion through the  with the patient and his son I did ascertain that he is interested in still having the surgery, but wishes to have it performed with a different surgeon.      PAST MEDICAL HISTORY:   Past Medical History:   Diagnosis Date     Acute, but ill-defined, cerebrovascular disease 1/04    left middle cerebral artery infarct     Alcohol abuse      Anemia 7/14/2014     Atrial fibrillation (H)     CVA occurred off anticoagulation     Balanitis 6/4/2018     Cachexia (H) 1/9/2018     Cardiomyopathy (H)     stress tests 6/04 and 10/04 negative for ischemia, EF 36-40%      CHF (congestive heart failure) (H) 12/24/2009     Chronic pain 10/12/2015     Dementia without behavioral disturbance, unspecified dementia type 5/9/2017     Dementia, without behavioral disturbance 6/29/2015     Depressive disorder      Diaphragmatic hernia without mention of obstruction or gangrene     left sided chest pain, hiatal hernia/gastritis     Diverticulosis of colon (without mention of hemorrhage)     sigmoid     Dysphagia 7/27/2015     Elevated blood sugar 1/25/2016     Esophageal reflux      Hearing loss      Hematuria     negative cystoscopy 3/04     Hereditary and idiopathic peripheral neuropathy 2006    EMG-peripheral polyneuropathy, B12 and VitD deficient, ? alcohol related     Hx of congestive heart failure 1/12/2016     Hypertrophy of prostate 6/17/2015     Nausea with vomiting 12/8/2014     Presbycusis of both ears 7/17/2017     Presence of indwelling urethral catheter 8/17/2018     PSA elevation 5/30/2014     Recurrent falls 7/28/2015     Renal cell carcinoma (H) 8/25/2014     Sepsis (H) 1/7/2015     TB lung, latent 5/6/2014     Urinary incontinence 7/28/2015       PAST SURGICAL HISTORY:   Past Surgical History:   Procedure Laterality Date     EYE SURGERY      cataract surgery bilat       FAMILY HISTORY:   Family History   Problem Relation Age of Onset     C.A.D. No family hx of      Diabetes No family hx of        SOCIAL HISTORY:   Social History   Substance Use Topics     Smoking status: Former Smoker     Types: Dip, chew, snus or snuff     Smokeless tobacco: Current User     Types: Chew     Last attempt to quit: 1/9/2004      Comment: pt chews tobacco     Alcohol use No       Current Outpatient Prescriptions   Medication     amoxicillin-clavulanate (AUGMENTIN) 500-125 MG per tablet     carvedilol (COREG) 12.5 MG tablet     diclofenac (VOLTAREN) 1 % GEL topical gel     finasteride (PROSCAR) 5 MG tablet     HYDROcodone-acetaminophen (NORCO) 5-325 MG per tablet     lisinopril (PRINIVIL/ZESTRIL)  "10 MG tablet     magnesium oxide (MAG-OX) 400 (241.3 MG) MG tablet     Multiple Vitamins-Minerals (MULTILEX) TABS     pregabalin (LYRICA) 100 MG capsule     tamsulosin (FLOMAX) 0.4 MG capsule     venlafaxine (EFFEXOR-XR) 75 MG 24 hr capsule     acetaminophen (TYLENOL) 500 MG tablet     bumetanide (BUMEX) 1 MG tablet     order for DME     No current facility-administered medications for this visit.          PHYSICAL EXAM:    Pulse (!) 42  Ht 1.676 m (5' 6\")  Wt 73.5 kg (162 lb)  SpO2 99%  BMI 26.15 kg/m2    Constitutional: Well developed.  Prydeinig-speaking gentleman. The  reports that he does have some confusion and dementia.   HEENT: Anicteric sclera, conjunctiva clear, normal extraocular movements  ENT: Normocephalic and atraumatic,   Skin: Warm and dry. No rashes or lesions  Cardiac: No peripheral edema  Back/Flank: Not done  CNS/PNS: Normal musculature and movements, moves all extremities normally  Respiratory: Normal non-labored breathing  Abdomen: Soft nontender and nondistended  Peripheral Vascular: No peripheral edema  Mental Status/Psych: Alert and Oriented x 3. Normal mood and affect    Penis: Normal uncircumcised, Youssef catheter in place  Scrotal skin: Normal, no lesions  Testicles: Normal to palpation bilaterally  Epididymis: Normal to palpation bilaterally  Lymphatic: Normal inguinal lymph nodes  Digital Rectal Exam's prostate is very enlarged, benign and symmetric to palpation    Cystoscopy: Not done    Imaging: None    Urinalysis: UA RESULTS:  Recent Labs   Lab Test  07/25/18   1032   09/08/16   1710   COLOR  Yellow   < >  Yellow   APPEARANCE  Cloudy   < >  Clear   URINEGLC  Negative   < >  Negative   URINEBILI  Negative   < >  Moderate  This is an unconfirmed screening test result. A positive result may be false.  *   URINEKETONE  Negative   < >  15*   SG  1.008   < >  1.025   UBLD  Large*   < >  Moderate*   URINEPH  7.0   < >  5.0   PROTEIN  30*   < >  30*   UROBILINOGEN   --    --   " 1.0   NITRITE  Negative   < >  Negative   LEUKEST  Large*   < >  Trace*   RBCU  >182*   < >  5-10*   WBCU  >182*   < >  10-25*    < > = values in this interval not displayed.       PSA:     Post Void Residual:     Other labs: None today      IMPRESSION:  Enlarged prostate with urinary retention    PLAN:  He was previously scheduled for a TURP. He is interested in going ahead with the surgery, but wishes to have a different doctor. I discussed TURP in detail today with the patient and his son through an . We discussed the risks of the procedure, in particular given his frail health. We also discussed the risks of the procedure not resolving his urinary retention. He understands that he may still have urinary retention after the procedure and they still need to simply be catheterized. We discussed catheterization options as an alternative to surgery however he declines. We will get him rescheduled for his TURP and he will be admitted to the hospital postoperatively. His catheter was changed today. He will need to come back again in 1 month to have the catheter changed again  since the surgery schedule is very far out at this point. There was a great deal of confusion about his previous history and about previous scheduling issues and we had a very prolonged discussion today with the help of an .     Total Time: 45 minutes                                      Total in Consultation: 40 minutes       Justin Scott M.D.

## 2018-10-29 NOTE — MR AVS SNAPSHOT
"              After Visit Summary   10/29/2018    Barrie Woods    MRN: [id number]           Patient Information     Date Of Birth          1/25/1930        Visit Information        Provider Department      10/29/2018 1:30 PM Justin Scott MD; [employer] [facility] Urology [facility]         Follow-ups after your visit        Your next 10 appointments already scheduled     Nov 26, 2018  9:00 AM CST   Nurse Visit with UB NURSE   [facility]rology [facility] ([facility] [place])    [address]  [address]  [facility] [id number] [phone]            Jan 09, 2019   Procedure with Justin Scott MD   [facility] PeriOp Services (--)    [address]  [facility] [id number] [phone]              Who to contact     If you have questions or need follow up information about today's clinic visit or your schedule please contact [facility] UROLOGY [facility] directly at [phone].  Normal or non-critical lab and imaging results will be communicated to you by [email or web address], letter or phone within 4 business days after the clinic has received the results. If you do not hear from us within 7 days, please contact the clinic through [email or web address] or phone. If you have a critical or abnormal lab result, we will notify you by phone as soon as possible.  Submit refill requests through [employer] or call your pharmacy and they will forward the refill request to us. Please allow 3 business days for your refill to be completed.          Additional Information About Your Visit        [email or web address] Information     [employer] lets you send messages to your doctor, view your test results, renew your prescriptions, schedule appointments and more. To sign up, go to [email or web address] . Click on \"Log in\" on the left side of the screen, which will take you to the Welcome page. Then click on \"Sign " "up Now\" on the right side of the page.     You will be asked to enter the access code listed below, as well as some personal information. Please follow the directions to create your username and password.     Your access code is: [email or web address]  Expires: 2019  2:31 PM     Your access code will  in 90 days. If you need help or a new code, please call your [place] clinic or [phone].        Care EveryWhere ID     This is your Care EveryWhere ID. This could be used by other organizations to access your [place] medical records  [id number]        Your Vitals Were     Pulse Height Pulse Oximetry BMI (Body Mass Index)          42 1.676 m (5' 6\") 99% 26.15 kg/m2         Blood Pressure from Last 3 Encounters:   18 156/80   18 127/82   18 116/74    Weight from Last 3 Encounters:   10/29/18 73.5 kg (162 lb)   18 73.5 kg (162 lb)   18 73.9 kg (163 lb)              Today, you had the following     No orders found for display       Primary Care Provider Office Phone # Fax #    Yonatan Hurtado [phone] [address] [facility] [address]        Equal Access to Services     [facility] MELVIN AH: Hadii liborio ku hadasho Soomaali, waaxda luqadaha, qaybta kaalmada adeegyada, asia cullen haycarli jeffers . So [facility] [phone].    ATENCIÓN: Si habla español, tiene a maldonado disposición servicios gratuitos de asistencia lingüística. Llame al [phone].    We comply with applicable federal civil rights laws and [place] laws. We do not discriminate on the basis of race, color, national origin, age, disability, sex, sexual orientation, or gender identity.            Thank you!     Thank you for choosing [facility] UROLOGY CLINIC [place]  for your care. Our goal is always to provide you with excellent care. Hearing back from our patients is one way we can continue to improve our services. Please take a few minutes to complete the written " survey that you may receive in the mail after your visit with us. Thank you!             Your Updated Medication List - Protect others around you: Learn how to safely use, store and throw away your medicines at www.disposemymeds.org.          This list is accurate as of 10/29/18  2:31 PM.  Always use your most recent med list.                   Brand Name Dispense Instructions for use Diagnosis    acetaminophen 500 MG tablet    TYLENOL    84 tablet    Take 2 tablets (1,000 mg) by mouth every 6 hours as needed for mild pain    Malaise       amoxicillin-clavulanate 500-125 MG per tablet    AUGMENTIN    40 tablet    TAKE ONE TABLET BY MOUTH TWICE A DAY    Malaise       bumetanide 1 MG tablet    BUMEX    30 tablet    TAKE ONE TABLET BY MOUTH EVERY DAY    Acute pulmonary edema (H)       carvedilol 12.5 MG tablet    COREG    60 tablet    Take 1 tablet (12.5 mg) by mouth 2 times daily (with meals)    Chronic combined systolic and diastolic congestive heart failure (H), HTN, goal below 140/90       diclofenac 1 % Gel topical gel    VOLTAREN    100 g    Apply 4 gm to knees qid    Primary osteoarthritis of both knees       finasteride 5 MG tablet    PROSCAR    30 tablet    Take 1 tablet (5 mg) by mouth daily    Benign prostatic hyperplasia with urinary obstruction       HYDROcodone-acetaminophen 5-325 MG per tablet    NORCO    30 tablet    Take 1 tablet by mouth every 6 hours as needed for moderate to severe pain    Midline low back pain without sciatica, unspecified chronicity       lisinopril 10 MG tablet    PRINIVIL/ZESTRIL    30 tablet    Take 1 tablet (10 mg) by mouth daily    Chronic combined systolic and diastolic congestive heart failure (H), HTN, goal below 140/90       magnesium oxide 400 (241.3 Mg) MG tablet    MAG-OX    30 tablet    TAKE 1 TABLET BY MOUTH ONCE DAILY    Routine general medical examination at a health care facility       MULTILEX Tabs     100 tablet    TAKE ONE TABLET BY MOUTH ONCE DAILY    Routine  general medical examination at a health care facility       order for DME     1 Device    Equipment being ordered: walker with slides    Recurrent falls       pregabalin 100 MG capsule    LYRICA    60 capsule    Take 1 capsule (100 mg) by mouth 2 times daily    Midline low back pain without sciatica, unspecified chronicity       tamsulosin 0.4 MG capsule    FLOMAX    30 capsule    Take 1 capsule (0.4 mg) by mouth daily    Urinary retention, Hypertrophy of prostate, PSA elevation       venlafaxine 75 MG 24 hr capsule    EFFEXOR-XR    30 capsule    Take 1 capsule (75 mg) by mouth daily    Midline low back pain without sciatica, unspecified chronicity

## 2018-10-29 NOTE — NURSING NOTE
Pt has cath for about 2.5 months.  Pt has had it changed when needed.  Pt needs cath changed.  Pt does not talk. Son talks for him.  CELY Woody, CMA

## 2018-11-10 DIAGNOSIS — N13.8 BENIGN PROSTATIC HYPERPLASIA WITH URINARY OBSTRUCTION: ICD-10-CM

## 2018-11-10 DIAGNOSIS — N40.1 BENIGN PROSTATIC HYPERPLASIA WITH URINARY OBSTRUCTION: ICD-10-CM

## 2018-11-10 NOTE — TELEPHONE ENCOUNTER
"Requested Prescriptions   Pending Prescriptions Disp Refills     finasteride (PROSCAR) 5 MG tablet [Pharmacy Med Name: FINASTERIDE 5MG TABS]  Last Written Prescription Date:  7/6/2018  Last Fill Quantity: 30 tablet,  # refills: 3   Last office visit: 9/7/2018 with prescribing provider:  Bryant   Future Office Visit:     30 tablet 3     Sig: TAKE ONE TABLET BY MOUTH EVERY DAY    Alpha Blockers Failed    11/10/2018  9:30 AM       Failed - Blood pressure under 140/90 in past 12 months    BP Readings from Last 3 Encounters:   09/13/18 156/80   09/07/18 127/82   08/17/18 116/74                Passed - Recent (12 mo) or future (30 days) visit within the authorizing provider's specialty    Patient had office visit in the last 12 months or has a visit in the next 30 days with authorizing provider or within the authorizing provider's specialty.  See \"Patient Info\" tab in inbasket, or \"Choose Columns\" in Meds & Orders section of the refill encounter.             Passed - Patient does not have Tadalafil, Vardenafil, or Sildenafil on their medication list       Passed - Patient is 18 years of age or older          "

## 2018-11-13 ENCOUNTER — TELEPHONE (OUTPATIENT)
Dept: FAMILY MEDICINE | Facility: CLINIC | Age: 83
End: 2018-11-13

## 2018-11-13 RX ORDER — FINASTERIDE 5 MG/1
TABLET, FILM COATED ORAL
Qty: 30 TABLET | Refills: 5 | Status: ON HOLD | OUTPATIENT
Start: 2018-11-13 | End: 2019-02-08

## 2018-11-13 NOTE — TELEPHONE ENCOUNTER
Failing bp.  Elevated bp was at ER.  Normal here 9/7/18.  Below does state RTC in 2 weeks.  Please advise.  Tia Mcconnell RN    9/7/18  RECOMMENDATIONS:      Hold all medications morning of procedure.  Patient is not anticoagulated,   Due to fall risk  APPROVAL GIVEN to proceed with proposed procedure, without further diagnostic evaluation  It is recognized that this is not a procedure planned for cure, rather palliation     The information in this document, created by the medical scribe for me, accurately reflects the services I personally performed and the decisions made by me. I have reviewed and approved this document for accuracy prior to leaving the patient care area.  Yonatan Hurtado MD September 7, 2018 4:25 PM     Signed Electronically by: Yonatan Hurtado MD     Copy of this evaluation report is provided to requesting physician.     [place] Preop Guidelines     Revised Cardiac Risk Index      Instructions        Return in about 2 weeks (around 9/21/2018).

## 2018-11-19 DIAGNOSIS — M54.50 MIDLINE LOW BACK PAIN WITHOUT SCIATICA, UNSPECIFIED CHRONICITY: ICD-10-CM

## 2018-11-19 NOTE — TELEPHONE ENCOUNTER
Controlled Substance Refill Request for Norco  Problem List Complete:  Yes    Patient is followed by Yonatan Hurtado MD for ongoing prescription of pain medication.  All refills should only be approved by this provider, or covering partner.    Medication(s): lyrica 60/ month.  Hydrocodone 30 prn  Maximum quantity per month:   Clinic visit frequency required: Q 3  months  Last Office Visit: 9/7/18    Controlled substance agreement:  Encounter-Level CSA:     There are no encounter-level csa.        Pain Clinic evaluation in the past: no  Pt is frail elderly, palliative care, demented  DIRE Total Score(s):  No flowsheet data found.    Last [facility] website verification:  10/24/18   [email or web address]     checked in past 3 months?  Yes 10/24/18     Jen Garcia, Pharmacy [facility]  [phone]

## 2018-11-20 RX ORDER — HYDROCODONE BITARTRATE AND ACETAMINOPHEN 5; 325 MG/1; MG/1
1 TABLET ORAL EVERY 6 HOURS PRN
Qty: 30 TABLET | Refills: 0 | Status: SHIPPED | OUTPATIENT
Start: 2018-11-20 | End: 2018-12-26

## 2018-11-20 NOTE — TELEPHONE ENCOUNTER
RX walked to [facility] pharmacy    Jennie Araujo/MELANIE  [place]

## 2018-11-20 NOTE — TELEPHONE ENCOUNTER
Controlled Substance Refill Request for Norco  Problem List Complete:  Yes   checked in past 3 months?  No, route to RN

## 2018-11-23 DIAGNOSIS — I10 HTN, GOAL BELOW 140/90: ICD-10-CM

## 2018-11-23 DIAGNOSIS — Z00.00 ROUTINE GENERAL MEDICAL EXAMINATION AT A HEALTH CARE FACILITY: ICD-10-CM

## 2018-11-23 DIAGNOSIS — R53.81 MALAISE: ICD-10-CM

## 2018-11-23 DIAGNOSIS — I50.42 CHRONIC COMBINED SYSTOLIC AND DIASTOLIC CONGESTIVE HEART FAILURE (H): ICD-10-CM

## 2018-11-23 NOTE — TELEPHONE ENCOUNTER
"Requested Prescriptions   Pending Prescriptions Disp Refills     amoxicillin-clavulanate (AUGMENTIN) 500-125 MG per tablet [Pharmacy Med Name: AMOXICILLIN-POT CLAVUL 500-125 TABS]    Last Written Prescription Date:  2/14/18  Last Fill Quantity: 30 tablet,  # refills: 5   Last office visit: 9/7/2018 with prescribing provider:  Bryant   Future Office Visit:     40 tablet 0     Sig: TAKE ONE TABLET BY MOUTH TWICE A DAY    There is no refill protocol information for this order        lisinopril (PRINIVIL/ZESTRIL) 10 MG tablet [Pharmacy Med Name: LISINOPRIL 10MG TABS]    Last Written Prescription Date:  5/7/18  Last Fill Quantity: 30 tablet,  # refills: 5   Last office visit: 9/7/2018 with prescribing provider:  Bryant   Future Office Visit:     30 tablet 5     Sig: TAKE ONE TABLET BY MOUTH EVERY DAY    ACE Inhibitors (Including Combos) Protocol Failed    11/23/2018 10:45 AM       Failed - Blood pressure under 140/90 in past 12 months    BP Readings from Last 3 Encounters:   09/13/18 156/80   09/07/18 127/82   08/17/18 116/74                Passed - Recent (12 mo) or future (30 days) visit within the authorizing provider's specialty    Patient had office visit in the last 12 months or has a visit in the next 30 days with authorizing provider or within the authorizing provider's specialty.  See \"Patient Info\" tab in inbasket, or \"Choose Columns\" in Meds & Orders section of the refill encounter.             Passed - Patient is age 18 or older       Passed - Normal serum creatinine on file in past 12 months    Recent Labs   Lab Test  09/07/18   1623  07/25/18   1029   CR  1.25   --    CREAT   --   1.1            Passed - Normal serum potassium on file in past 12 months    Recent Labs   Lab Test  09/07/18   1623   POTASSIUM  4.7             magnesium oxide (MAG-OX) 400 (241.3 Mg) MG tablet [Pharmacy Med Name: MAGNESIUM OXIDE 400MG TABS] 30 tablet 5     Sig: TAKE 1 TABLET BY MOUTH ONCE DAILY    There is no refill protocol " information for this order   magnesium oxide      Last Written Prescription Date: 2/14/18  Last Fill Quantity: 30 tablet,   # refills: 5  Last Office Visit: 9/7/18  Bryant  Future Office visit:       Routing refill request to provider for review/approval because:  Drug not on the [facility], [facility] or [facility] refill protocol or controlled substance

## 2018-11-26 ENCOUNTER — ALLIED HEALTH/NURSE VISIT (OUTPATIENT)
Dept: UROLOGY | Facility: CLINIC | Age: 83
End: 2018-11-26
Payer: COMMERCIAL

## 2018-11-26 DIAGNOSIS — N40.0 HYPERTROPHY OF PROSTATE: Primary | ICD-10-CM

## 2018-11-26 PROCEDURE — 51702 INSERT TEMP BLADDER CATH: CPT

## 2018-11-26 RX ORDER — AMOXICILLIN AND CLAVULANATE POTASSIUM 500; 125 MG/1; MG/1
TABLET, FILM COATED ORAL
Qty: 40 TABLET | Refills: 0 | Status: ON HOLD | OUTPATIENT
Start: 2018-11-26 | End: 2019-01-21

## 2018-11-26 RX ORDER — LISINOPRIL 10 MG/1
TABLET ORAL
Qty: 30 TABLET | Refills: 1 | Status: ON HOLD | OUTPATIENT
Start: 2018-11-26 | End: 2019-01-27

## 2018-11-26 ASSESSMENT — PAIN SCALES - GENERAL: PAINLEVEL: NO PAIN (0)

## 2018-11-26 NOTE — TELEPHONE ENCOUNTER
Augmentin and mag ox not [facility] medications.  Sent to provider.  Please advise.  Lisinopril-last bp elevated but was at ER and one week earlier was normal.  Refill of Lisinopril sent.  Tia Mcconnell RN

## 2018-11-26 NOTE — PROGRESS NOTES
Barrie Woods comes into clinic today at the request of Dr. Scott for sarmiento catheter change.  This service provided today was under the supervising provider of the day, Dr. Scott, who was available if needed.  Patient is here with his son and an .      Catheter change documentation on 11/26/2018:    Barrie Woods presents to the clinic for catheter insertion.  Reason for insertion: enlarged prostate  Order has been verified. Yes  Purchase agreement signed by son  Leg bag detached from sarmiento catheter.  60 ml sterile water instilled into bladder through catheter via gravity. Balloon deflated.  Old sarmiento catheter removed without difficulty.  New sarmiento catheter successfully inserted into the urethral meatus in the usual sterile fashion without immediate complication.  Type of catheter placed: 16 fr coude indwelling  Large bag attached to sarmiento catheter.  Urine is slightly bloody at start of insertion then clear in color. 50 cc's of urine output returned.  Balloon was filled with 8 cc's of normal saline.  Securement device placed for the catheter. Leg bag sent with patient.   The patient tolerated the procedure well and was instructed to call with any questions or concerns.    Mone Rodriguez LPN

## 2018-11-26 NOTE — MR AVS SNAPSHOT
"              After Visit Summary   11/26/2018    Barrie Woods    MRN: [id number]           Patient Information     Date Of Birth          1/25/1930        Visit Information        Provider Department      11/26/2018 8:45 AM [employer] PIS[employer]; UB NURSE [facility] Urology [facility]         Follow-ups after your visit        Your next 10 appointments already scheduled     Dec 31, 2018  9:00 AM CST   Nurse Visit with UB NURSE   [facility]rology [facility] ([facility] [place])    [address]  [address]  [facility] [id number] [phone]            Jan 09, 2019   Procedure with Justin Scott MD   [facility] PeriOp Services (--)    [address]  [facility] [id number] [phone]              Who to contact     If you have questions or need follow up information about today's clinic visit or your schedule please contact [facility] UROLOGY [facility] directly at [phone].  Normal or non-critical lab and imaging results will be communicated to you by [employer]hart, letter or phone within 4 business days after the clinic has received the results. If you do not hear from us within 7 days, please contact the clinic through [employer] or phone. If you have a critical or abnormal lab result, we will notify you by phone as soon as possible.  Submit refill requests through [employer] or call your pharmacy and they will forward the refill request to us. Please allow 3 business days for your refill to be completed.          Additional Information About Your Visit        [employer]Graine [employer]ade[employer] Information     [employer] lets you send messages to your doctor, view your test results, renew your prescriptions, schedule appointments and more. To sign up, go to [email or web address] . Click on \"Log in\" on the left side of the screen, which will take you to the Welcome page. Then click on \"Sign up Now\" on the " right side of the page.     You will be asked to enter the access code listed below, as well as some personal information. Please follow the directions to create your username and password.     Your access code is: [email or web address]  Expires: 2019  1:31 PM     Your access code will  in 90 days. If you need help or a new code, please call your [facility] or [phone].        Care EveryWhere ID     This is your Care EveryWhere ID. This could be used by other organizations to access your [place] medical records  [id number]         Blood Pressure from Last 3 Encounters:   18 156/80   18 127/82   18 116/74    Weight from Last 3 Encounters:   10/29/18 73.5 kg (162 lb)   18 73.5 kg (162 lb)   18 73.9 kg (163 lb)              Today, you had the following     No orders found for display       Primary Care Provider Office Phone # Fax #    Yonatan Hurtado [phone] [address] [facility] [address]        Equal Access to Services     [facility]: Hadii aad ku hadasho Soshamaali, waaxda luqadaha, qaybta kaalmada adeegyada, asia tejadan henrik jeffers . So [facility] [phone].    ATENCIÓN: Si habla español, tiene a maldonado disposición servicios gratuitos de asistencia lingüística. Llame al [phone].    We comply with applicable federal civil rights laws and [place] laws. We do not discriminate on the basis of race, color, national origin, age, disability, sex, sexual orientation, or gender identity.            Thank you!     Thank you for choosing [facility] UROLOGY CLINIC [place]  for your care. Our goal is always to provide you with excellent care. Hearing back from our patients is one way we can continue to improve our services. Please take a few minutes to complete the written survey that you may receive in the mail after your visit with us. Thank you!             Your Updated Medication List - Protect others around  you: Learn how to safely use, store and throw away your medicines at www.disposemymeds.org.          This list is accurate as of 11/26/18 10:10 AM.  Always use your most recent med list.                   Brand Name Dispense Instructions for use Diagnosis    acetaminophen 500 MG tablet    TYLENOL    84 tablet    Take 2 tablets (1,000 mg) by mouth every 6 hours as needed for mild pain    Malaise       bumetanide 1 MG tablet    BUMEX    30 tablet    TAKE ONE TABLET BY MOUTH EVERY DAY    Acute pulmonary edema (H)       carvedilol 12.5 MG tablet    COREG    60 tablet    Take 1 tablet (12.5 mg) by mouth 2 times daily (with meals)    Chronic combined systolic and diastolic congestive heart failure (H), HTN, goal below 140/90       diclofenac 1 % topical gel    VOLTAREN    100 g    Apply 4 gm to knees qid    Primary osteoarthritis of both knees       finasteride 5 MG tablet    PROSCAR    30 tablet    TAKE ONE TABLET BY MOUTH EVERY DAY    Benign prostatic hyperplasia with urinary obstruction       HYDROcodone-acetaminophen 5-325 MG tablet    NORCO    30 tablet    Take 1 tablet by mouth every 6 hours as needed for moderate to severe pain    Midline low back pain without sciatica, unspecified chronicity       lisinopril 10 MG tablet    PRINIVIL/ZESTRIL    30 tablet    Take 1 tablet (10 mg) by mouth daily    Chronic combined systolic and diastolic congestive heart failure (H), HTN, goal below 140/90       magnesium oxide 400 (241.3 Mg) MG tablet    MAG-OX    30 tablet    TAKE 1 TABLET BY MOUTH ONCE DAILY    Routine general medical examination at a health care facility       MULTILEX Tabs     100 tablet    TAKE ONE TABLET BY MOUTH ONCE DAILY    Routine general medical examination at a health care facility       order for DME     1 Device    Equipment being ordered: walker with slides    Recurrent falls       pregabalin 100 MG capsule    LYRICA    60 capsule    Take 1 capsule (100 mg) by mouth 2 times daily    Midline low back  pain without sciatica, unspecified chronicity

## 2018-12-26 DIAGNOSIS — M54.50 MIDLINE LOW BACK PAIN WITHOUT SCIATICA, UNSPECIFIED CHRONICITY: ICD-10-CM

## 2018-12-26 RX ORDER — HYDROCODONE BITARTRATE AND ACETAMINOPHEN 5; 325 MG/1; MG/1
1 TABLET ORAL EVERY 6 HOURS PRN
Qty: 30 TABLET | Refills: 0 | Status: SHIPPED | OUTPATIENT
Start: 2018-12-26 | End: 2019-01-27

## 2018-12-26 NOTE — TELEPHONE ENCOUNTER
Controlled Substance Refill Request for Norco  Problem List Complete:  Yes    Patient is followed by Yonatan Hurtado MD for ongoing prescription of pain medication.  All refills should only be approved by this provider, or covering partner.     Medication(s): lyrica 60/ month.  Hydrocodone 30 prn  Maximum quantity per month:   Clinic visit frequency required: Q 3  months Last Office visit: 9/7/18     Controlled substance agreement:      Encounter-Level CSA:      There are no encounter-level csa.          Pain Clinic evaluation in the past: no  Pt is frail elderly, palliative care, demented  DIRE Total Score(s):  No flowsheet data found.     Last [facility] website verification:  10/24/18   [email or web address]        checked in past 3 months?  Yes 10/24/18     Jen Garcia, Pharmacy [facility]  [phone]

## 2018-12-28 ENCOUNTER — OFFICE VISIT (OUTPATIENT)
Dept: FAMILY MEDICINE | Facility: CLINIC | Age: 83
End: 2018-12-28
Payer: COMMERCIAL

## 2018-12-28 VITALS
SYSTOLIC BLOOD PRESSURE: 152 MMHG | OXYGEN SATURATION: 100 % | HEART RATE: 68 BPM | BODY MASS INDEX: 23.08 KG/M2 | TEMPERATURE: 97.3 F | DIASTOLIC BLOOD PRESSURE: 80 MMHG | WEIGHT: 143 LBS

## 2018-12-28 DIAGNOSIS — C64.9 RENAL CELL CARCINOMA, UNSPECIFIED LATERALITY (H): ICD-10-CM

## 2018-12-28 DIAGNOSIS — R64 CACHEXIA (H): ICD-10-CM

## 2018-12-28 DIAGNOSIS — I50.42 CHRONIC COMBINED SYSTOLIC AND DIASTOLIC CONGESTIVE HEART FAILURE (H): ICD-10-CM

## 2018-12-28 DIAGNOSIS — M54.50 MIDLINE LOW BACK PAIN WITHOUT SCIATICA, UNSPECIFIED CHRONICITY: ICD-10-CM

## 2018-12-28 DIAGNOSIS — Z01.818 PREOP GENERAL PHYSICAL EXAM: Primary | ICD-10-CM

## 2018-12-28 DIAGNOSIS — I48.0 PAROXYSMAL ATRIAL FIBRILLATION (H): ICD-10-CM

## 2018-12-28 LAB
ERYTHROCYTE [DISTWIDTH] IN BLOOD BY AUTOMATED COUNT: 15.6 % (ref 10–15)
HCT VFR BLD AUTO: 39.1 % (ref 40–53)
HGB BLD-MCNC: 12.1 G/DL (ref 13.3–17.7)
MCH RBC QN AUTO: 27.5 PG (ref 26.5–33)
MCHC RBC AUTO-ENTMCNC: 30.9 G/DL (ref 31.5–36.5)
MCV RBC AUTO: 89 FL (ref 78–100)
PLATELET # BLD AUTO: 275 10E9/L (ref 150–450)
RBC # BLD AUTO: 4.4 10E12/L (ref 4.4–5.9)
WBC # BLD AUTO: 12.1 10E9/L (ref 4–11)

## 2018-12-28 PROCEDURE — 36415 COLL VENOUS BLD VENIPUNCTURE: CPT | Performed by: FAMILY MEDICINE

## 2018-12-28 PROCEDURE — 80048 BASIC METABOLIC PNL TOTAL CA: CPT | Performed by: FAMILY MEDICINE

## 2018-12-28 PROCEDURE — 93000 ELECTROCARDIOGRAM COMPLETE: CPT | Performed by: FAMILY MEDICINE

## 2018-12-28 PROCEDURE — 99215 OFFICE O/P EST HI 40 MIN: CPT | Performed by: FAMILY MEDICINE

## 2018-12-28 PROCEDURE — 85027 COMPLETE CBC AUTOMATED: CPT | Performed by: FAMILY MEDICINE

## 2018-12-28 RX ORDER — HYDROCODONE BITARTRATE AND ACETAMINOPHEN 5; 325 MG/1; MG/1
1 TABLET ORAL EVERY 6 HOURS PRN
Qty: 30 TABLET | Refills: 0 | Status: CANCELLED | OUTPATIENT
Start: 2018-12-28

## 2018-12-28 NOTE — PROGRESS NOTES
[facility]  [address] [facility] [address]  [phone]  Dept: [phone]    PRE-OP EVALUATION:  Today's date: 2018    Barrie Woods (: 1930) presents for pre-operative evaluation assessment as requested by Dr. Scott.  He requires evaluation and anesthesia risk assessment prior to undergoing surgery/procedure for treatment of cystoscopy TURP.    Fax number for surgical facility: [facility]  Primary Physician: Yonatan Hurtado  Type of Anesthesia Anticipated: General    Patient has a Health Care Directive or Living Will:  NO    Preop Questions 2018   1.  Do you have a history of Heart attack, stroke, stent, coronary bypass surgery, or other heart surgery? YES  - stroke 11 year ago   2.  Do you ever have any pain or discomfort in your chest? YES - in the past   3.  Do you have a history of  Heart Failure? No   4.   Are you troubled by shortness of breath when:  walking on a level surface, or up a slight hill, or at night? No   5.  Do you currently have a cold, bronchitis or other respiratory infection? No   6.  Do you have a cough, shortness of breath, or wheezing? No   7.  Do you sometimes get pains in the calves of your legs when you walk? Yes all the time   8. Do you or anyone in your family have previous history of blood clots? No   9.  Do you or does anyone in your family have a serious bleeding problem such as prolonged bleeding following surgeries or cuts? No   10. Have you ever had problems with anemia or been told to take iron pills? No   11. Have you had any abnormal blood loss such as black, tarry or bloody stools? YES - blood in stool   12. Have you ever had a blood transfusion? No   13. Have you or any of your relatives ever had problems with anesthesia? No   14. Do you have sleep apnea, excessive snoring or daytime drowsiness? No   15. Do you have any prosthetic heart valves? No   16. Do you have prosthetic joints? No         HPI:      HPI related to upcoming procedure: pt has been having prostate obstruction for long time, and Urology recommended possible surgical TURP to relief the obstruction and remove the cath.       See problem list for active medical problems.  Problems all longstanding and stable, except as noted/documented.  See ROS for pertinent symptoms related to these conditions.                                                                                                                                                          .  ANEMIA - Patient has a recent history of mild anemia, which has not been symptomatic. Work up to date has revealed anemia of chronic disease.                                                                                                                                             .  CHF - Patient has a longstanding history of moderate-severe CHF. Exacerbating conditions include ischemic heart disease. Currently the patient's condition is stable. Current treatment regimen includes ACE inhibitor and Coreg. The patient denies chest pain, edema, orthopnea, SOB or recent weight gain. Last Echocardiogram 2017, EKG 12/28/2019.                                                                                                                                                                               .  RENAL INSUFFICIENCY - Patient has a longstanding history of moderate-severe chronic renal insufficiency. Last Cr 1.52.                                                                                                                                                                               .    MEDICAL HISTORY:     Patient Active Problem List    Diagnosis Date Noted     Presence of indwelling urethral catheter 08/17/2018     Priority: Medium     Balanitis 06/04/2018     Priority: Medium     Cachexia (H) 01/09/2018     Priority: Medium     Presbycusis of both ears 07/17/2017     Priority: Medium     Dementia  without behavioral disturbance, unspecified dementia type 05/09/2017     Priority: Medium     CAP (community acquired pneumonia) 04/26/2017     Priority: Medium     Elevated blood sugar 01/25/2016     Priority: Medium     Atrial fibrillation, unspecified 01/22/2016     Priority: Medium     Hx of congestive heart failure 01/12/2016     Priority: Medium     Chronic pain 10/12/2015     Priority: Medium     Patient is followed by Yonatan Hurtado MD for ongoing prescription of pain medication.  All refills should only be approved by this provider, or covering partner.    Medication(s): lyrica 60/ month.  Hydrocodone 30 prn  Maximum quantity per month:   Clinic visit frequency required: Q 3  months     Controlled substance agreement:  Encounter-Level CSA:     There are no encounter-level csa.        Pain Clinic evaluation in the past: no  Pt is frail elderly, palliative care, demented  DIRE Total Score(s):  No flowsheet data found.    Last [facility] website verification:  10/24/18   [email or web address]         Urinary incontinence 07/28/2015     Priority: Medium     Recurrent falls 07/28/2015     Priority: Medium     Dysphagia 07/27/2015     Priority: Medium     Right rib fracture 07/10/2015     Priority: Medium     Hypertrophy of prostate 06/17/2015     Priority: Medium     Dizziness 03/30/2015     Priority: Medium     Atypical chest pain 03/30/2015     Priority: Medium     Nausea with vomiting 12/08/2014     Priority: Medium     Renal cell carcinoma (H) 08/25/2014     Priority: Medium     Surgery, therapies, repeat imaging  refused       Anemia 07/14/2014     Priority: Medium     PSA elevation 05/30/2014     Priority: Medium     Low back pain 05/06/2014     Priority: Medium     Regularly treated with penicillin injection as placebo  Diagnosis updated by automated process. Provider to review and confirm.       Dysuria 05/06/2014     Priority: Medium     TB lung, latent 05/06/2014     Priority: Medium     INH  "declined       HTN, goal below 140/90 2014     Priority: Medium     Hyperlipidemia LDL goal <100 2014     Priority: Medium     Microscopic hematuria 2014     Priority: Medium     Health Care Home 10/01/2013     Priority: Medium     [facility] Case Management  MORELIA Blum  [phone]    [facility] CARE PLAN SUMMARY    Client Name:  Barrie Woods  Address:  [address] [id number] Phone: [phone]   :  1930 Date of Assessment:  UTR 18   Health Plan:  [employer]  Health Plan Number: [id number] Medical Assistance Number: [id number]  Financial Worker:  Melecio Call fax: [phone]  Case #:  [id number]   [facility] :  MORELIA Blum CM Phone:  [phone]  CM Fax:  [phone]   [facility] Enrollment Date: 10/1/13 Case Mix:  B  Rate Cell:  A  Waiver Type:  None    Emergency Contact:  Extended Emergency Contact Information  Primary Emergency Contact: Ger Woodss  Address: [id number] [place] [address]  Home Phone: [phone]  Work Phone: none  Mobile Phone: [phone]  Relation: Son   Language:  English  :  Yes:    Health Care Agent/POA:  none Advanced Directives/Living Will:  none   Primary Care Clinic/Phone/Fax:  [facility]/(p) [phone], (f) [phone] Primary Dx:  F03.90 Dementia  Secondary Dx:  R42 dizziness   Primary Physician:  Yonatan Hurtado   Height:  5' 6\"  Weight:  176 lbs   Specialty Physician:    Audiologist:     Eye Care Provider:  [facility] Provider:    [facility] [phone]   Other:  [facility]      [facility] CURRENT SERVICES SUMMARY  Equipment owned/DME history:  transfer belt, shower chair, commode, walker, w/c   SERVICE TYPE/PROVIDER NAME/PHONE AUTH DATE FREQUENCY Units OR $ Amt DESCRIPTION   Medical Transportation: [id number] [phone]  Fax:  17-18 as needed   "   Supplies: [facility] [phone]  Fax:  8/1/17-7/31/18 monthly  Incontinence supplies            * For ADC please select ADC provider and [facility] [employer] in order to process auth                                             Advanced directives, counseling/discussion 07/18/2011     Priority: Medium     Advance Care Planning 7/26/2016: ACP Review of Chart / Resources Provided:  Reviewed chart for advance care plan.  Barrie MCKEON Woods has an up to date advance care plan on file.  Added by Shelly Alanis    No pacemaker  Episodic penicillin injection  Son Jose Woods       Esophageal reflux 03/17/2004     Priority: Medium     Acute, but ill-defined, cerebrovascular disease 01/27/2004     Priority: Medium     Tobacco use disorder 01/27/2004     Priority: Medium     Cardiomyopathy in other diseases classified elsewhere 01/27/2004     Priority: Medium     EF 35% 2009        Past Medical History:   Diagnosis Date     Acute, but ill-defined, cerebrovascular disease 1/04    left middle cerebral artery infarct     Alcohol abuse      Anemia 7/14/2014     Atrial fibrillation (H)     CVA occurred off anticoagulation     Balanitis 6/4/2018     Cachexia (H) 1/9/2018     Cardiomyopathy (H)     stress tests 6/04 and 10/04 negative for ischemia, EF 36-40%     CHF (congestive heart failure) (H) 12/24/2009     Chronic pain 10/12/2015     Dementia without behavioral disturbance, unspecified dementia type 5/9/2017     Dementia, without behavioral disturbance 6/29/2015     Depressive disorder      Diaphragmatic hernia without mention of obstruction or gangrene     left sided chest pain, hiatal hernia/gastritis     Diverticulosis of colon (without mention of hemorrhage)     sigmoid     Dysphagia 7/27/2015     Elevated blood sugar 1/25/2016     Esophageal reflux      Hearing loss      Hematuria     negative cystoscopy 3/04     Hereditary and idiopathic peripheral neuropathy 2006    EMG-peripheral polyneuropathy, B12 and VitD  deficient, ? alcohol related     Hx of congestive heart failure 1/12/2016     Hypertrophy of prostate 6/17/2015     Nausea with vomiting 12/8/2014     Presbycusis of both ears 7/17/2017     Presence of indwelling urethral catheter 8/17/2018     PSA elevation 5/30/2014     Recurrent falls 7/28/2015     Renal cell carcinoma (H) 8/25/2014     Sepsis (H) 1/7/2015     TB lung, latent 5/6/2014     Urinary incontinence 7/28/2015     Past Surgical History:   Procedure Laterality Date     EYE SURGERY      cataract surgery bilat     Current Outpatient Medications   Medication Sig Dispense Refill     acetaminophen (TYLENOL) 500 MG tablet Take 2 tablets (1,000 mg) by mouth every 6 hours as needed for mild pain 84 tablet 5     amoxicillin-clavulanate (AUGMENTIN) 500-125 MG tablet TAKE ONE TABLET BY MOUTH TWICE A DAY 40 tablet 0     bumetanide (BUMEX) 1 MG tablet TAKE ONE TABLET BY MOUTH EVERY DAY 30 tablet 5     carvedilol (COREG) 12.5 MG tablet Take 1 tablet (12.5 mg) by mouth 2 times daily (with meals) 60 tablet 5     diclofenac (VOLTAREN) 1 % GEL topical gel Apply 4 gm to knees qid 100 g 11     finasteride (PROSCAR) 5 MG tablet TAKE ONE TABLET BY MOUTH EVERY DAY 30 tablet 5     HYDROcodone-acetaminophen (NORCO) 5-325 MG tablet Take 1 tablet by mouth every 6 hours as needed for moderate to severe pain 30 tablet 0     lisinopril (PRINIVIL/ZESTRIL) 10 MG tablet TAKE ONE TABLET BY MOUTH EVERY DAY 30 tablet 1     magnesium oxide (MAG-OX) 400 (241.3 Mg) MG tablet TAKE 1 TABLET BY MOUTH ONCE DAILY 30 tablet 5     magnesium oxide (MAG-OX) 400 (241.3 MG) MG tablet TAKE 1 TABLET BY MOUTH ONCE DAILY 30 tablet 5     Multiple Vitamins-Minerals (MULTILEX) TABS TAKE ONE TABLET BY MOUTH ONCE DAILY 100 tablet 3     order for DME Equipment being ordered: walker with slides 1 Device 0     pregabalin (LYRICA) 100 MG capsule Take 1 capsule (100 mg) by mouth 2 times daily 60 capsule 5     OTC products: none    Allergies   Allergen Reactions      Nkda [No Known Drug Allergies]       Latex Allergy: NO    Social History     Tobacco Use     Smoking status: Former Smoker     Types: Dip, chew, snus or snuff     Smokeless tobacco: Current User     Types: Chew     Last attempt to quit: 1/9/2004     Tobacco comment: pt chews tobacco   Substance Use Topics     Alcohol use: No     History   Drug Use No       REVIEW OF SYSTEMS:   CONSTITUTIONAL: NEGATIVE for fever, chills, change in weight  EYES: NEGATIVE for vision changes or irritation  ENT/MOUTH: decrease in hearing.  RESP: NEGATIVE for significant cough or SOB  CV: NEGATIVE for chest pain, palpitations or peripheral edema  GI: decrease in appetite   male :as asbove, significant urinary symptoms.   MUSCULOSKELETAL:weak, using cane when walks.   NEURO: dementia, progressive.   ENDOCRINE: NEGATIVE for temperature intolerance, skin/hair changes  HEME/ALLERGY/IMMUNE: NEGATIVE for bleeding problems  PSYCHIATRIC: no changes.    EXAM:   There were no vitals taken for this visit.    GENERAL APPEARANCE: underweight, cachextic      EYES: EOMI,  PERRL     HENT: ear canals and TM's normal and nose and mouth without ulcers or lesions     NECK: no adenopathy, no asymmetry, masses, or scars and thyroid normal to palpation     RESP: lungs clear to auscultation - no rales, rhonchi or wheezes     CV: irregularly irregular rhythm and but no JVP.     MS: extremities normal- no gross deformities noted, no evidence of inflammation in joints     NEURO: oriented to place only, no to person or time.   Responds to commands.     LYMPHATICS: No cervical adenopathy    DIAGNOSTICS:     EKG: atrial fibrillation, rate 60's, unchanged from previous tracings  Labs Resulted Today:   Results for orders placed or performed in visit on 12/28/18   Basic metabolic panel   Result Value Ref Range    Sodium 139 133 - 144 mmol/L    Potassium 5.3 3.4 - 5.3 mmol/L    Chloride 106 94 - 109 mmol/L    Carbon Dioxide 29 20 - 32 mmol/L    Anion Gap 4 3 - 14 mmol/L     Glucose 109 (H) 70 - 99 mg/dL    Urea Nitrogen 31 (H) 7 - 30 mg/dL    Creatinine 1.52 (H) 0.66 - 1.25 mg/dL    GFR Estimate 40 (L) >60 mL/min/[1.73_m2]    GFR Estimate If Black 46 (L) >60 mL/min/[1.73_m2]    Calcium 9.4 8.5 - 10.1 mg/dL   CBC with platelets   Result Value Ref Range    WBC 12.1 (H) 4.0 - 11.0 10e9/L    RBC Count 4.40 4.4 - 5.9 10e12/L    Hemoglobin 12.1 (L) 13.3 - 17.7 g/dL    Hematocrit 39.1 (L) 40.0 - 53.0 %    MCV 89 78 - 100 fl    MCH 27.5 26.5 - 33.0 pg    MCHC 30.9 (L) 31.5 - 36.5 g/dL    RDW 15.6 (H) 10.0 - 15.0 %    Platelet Count 275 150 - 450 10e9/L     *Note: Due to a large number of results and/or encounters for the requested time period, some results have not been displayed. A complete set of results can be found in Results Review.       Recent Labs   Lab Test 09/07/18  1623 07/25/18  1028  07/09/16  1210  07/12/15  0643 07/11/15  0515   HGB 13.4 13.4   < >  --    < > 13.0* 11.9*    287   < >  --    < > 230 202   INR  --   --   --   --   --  1.48* 1.90*   * 138   < > 143   < > 139 139   POTASSIUM 4.7 4.6   < > 4.5   < > 4.6 4.8   CR 1.25 1.09   < > 1.02   < > 1.14 1.00   A1C  --   --   --  5.7  --   --   --     < > = values in this interval not displayed.        IMPRESSION:   Reason for surgery/procedure: TURP with cystoscopy  Diagnosis/reason for consult: pre op    The proposed surgical procedure is considered INTERMEDIATE risk.    REVISED CARDIAC RISK INDEX  The patient has the following serious cardiovascular risks for perioperative complications such as (MI, PE, VFib and 3  AV Block):  Congestive Heart Failure (pulmonary edema, PND, s3 enrrique, CXR with pulmonary congestion, basilar rales)  Cerebrovascular Disease (TIA or CVA)  INTERPRETATION: 2 risks: Class III (moderate risk - 6.6% complication rate)    The patient has the following additional risks for perioperative complications:   Dementia  Advanced age.      ICD-10-CM    1. Preop general physical exam Z01.818         RECOMMENDATIONS:     --Consult hospital rounder / IM to assist post-op medical management    Cardiovascular Risk  Patient is already on a Beta Blocker. Continue Betablocker therapy after surgery, using Beta blocker order set as necessary for NPO status.      Anemia  Anemia and does not require treatment prior to surgery.  Monitor Hemoglobin postoperatively.      --Patient is to take all scheduled medications on the day of surgery EXCEPT for modifications listed below.    Anticoagulant or Antiplatelet Medication Use  Pt is not on any ASA or other anticoagulant, even with his hx of Afib, when I asked the family, they concluded that it was decided medically not to treat for Afib, with any blood thinner or asa. (I wonder if this is due to his high risk of fall, vs. GI bleed that occured in the past also )  At this time, I will hold off on starting on ASA or any blood thinner due to surgery, and pt to follow up with primary in regards to his chronic Afib. (possibly start on ASa)     ACE Inhibitor or Angiotensin Receptor Blocker (ARB) Use  Ace inhibitor or Angiotensin Receptor Blocker (ARB) and should HOLD this medication for the 24 hours prior to surgery.    I discussed the case with Primary Dr. stanley, this is a patient who is battling renal cell carcinoma, and he is having very hard time urinating, and I think the surgery will help significantly his symptoms and improve his life quality.  Although it is risky to have surgery given his age, and other medical complications, family informed about the bossible complications in details, and they approve to continue.   APPROVAL GIVEN to proceed with proposed procedure, without further diagnostic evaluation       Signed Electronically by: Tamy Dean MD    Copy of this evaluation report is provided to requesting physician.    [facility] Preop Guidelines    Revised Cardiac Risk Index

## 2018-12-29 LAB
ANION GAP SERPL CALCULATED.3IONS-SCNC: 4 MMOL/L (ref 3–14)
BUN SERPL-MCNC: 31 MG/DL (ref 7–30)
CALCIUM SERPL-MCNC: 9.4 MG/DL (ref 8.5–10.1)
CHLORIDE SERPL-SCNC: 106 MMOL/L (ref 94–109)
CO2 SERPL-SCNC: 29 MMOL/L (ref 20–32)
CREAT SERPL-MCNC: 1.52 MG/DL (ref 0.66–1.25)
GFR SERPL CREATININE-BSD FRML MDRD: 40 ML/MIN/{1.73_M2}
GLUCOSE SERPL-MCNC: 109 MG/DL (ref 70–99)
POTASSIUM SERPL-SCNC: 5.3 MMOL/L (ref 3.4–5.3)
SODIUM SERPL-SCNC: 139 MMOL/L (ref 133–144)

## 2019-01-07 ENCOUNTER — TELEPHONE (OUTPATIENT)
Dept: FAMILY MEDICINE | Facility: CLINIC | Age: 84
End: 2019-01-07

## 2019-01-07 ENCOUNTER — ANESTHESIA EVENT (OUTPATIENT)
Dept: SURGERY | Facility: CLINIC | Age: 84
DRG: 698 | End: 2019-01-07
Payer: COMMERCIAL

## 2019-01-07 ASSESSMENT — MIFFLIN-ST. JEOR: SCORE: 1261.39

## 2019-01-07 NOTE — TELEPHONE ENCOUNTER
Please call Barrie's son, he just need a follow up with Dr. stanley after his surgery as his kidney function is getting slightly worse.  Tamy Dean MD  [facility]  [phone]

## 2019-01-08 NOTE — TELEPHONE ENCOUNTER
Patient's son returned call. I passed on the message that Dr. Dean wants to see him about his kidney function. Son says that he already has an appointment scheduled and didn't want to schedule anything else. Informed him that we don't have an appointment on the schedule yet for him. Son is adamant that he does, told him that he has a surgery scheduled for tomorrow but not a visit. Son didn't want to schedule anything else and hung up.    [place] BOONE - TC/FD  1/8/2019 11:51 AM'

## 2019-01-09 ENCOUNTER — ANESTHESIA (OUTPATIENT)
Dept: SURGERY | Facility: CLINIC | Age: 84
DRG: 698 | End: 2019-01-09
Payer: COMMERCIAL

## 2019-01-09 ENCOUNTER — HOSPITAL ENCOUNTER (INPATIENT)
Facility: CLINIC | Age: 84
LOS: 18 days | Discharge: NURSING, REHAB OR LONG-TERM CARE | DRG: 698 | End: 2019-01-27
Attending: UROLOGY | Admitting: HOSPITALIST
Payer: COMMERCIAL

## 2019-01-09 ENCOUNTER — APPOINTMENT (OUTPATIENT)
Dept: GENERAL RADIOLOGY | Facility: CLINIC | Age: 84
DRG: 698 | End: 2019-01-09
Attending: UROLOGY
Payer: COMMERCIAL

## 2019-01-09 DIAGNOSIS — I10 HTN, GOAL BELOW 140/90: ICD-10-CM

## 2019-01-09 DIAGNOSIS — Z96.0 PRESENCE OF INDWELLING URETHRAL CATHETER: Primary | ICD-10-CM

## 2019-01-09 DIAGNOSIS — M54.50 MIDLINE LOW BACK PAIN WITHOUT SCIATICA, UNSPECIFIED CHRONICITY: ICD-10-CM

## 2019-01-09 DIAGNOSIS — R33.9 URINARY RETENTION: ICD-10-CM

## 2019-01-09 DIAGNOSIS — N39.0 URINARY TRACT INFECTION WITHOUT HEMATURIA, SITE UNSPECIFIED: ICD-10-CM

## 2019-01-09 DIAGNOSIS — F32.0 CURRENT MILD EPISODE OF MAJOR DEPRESSIVE DISORDER WITHOUT PRIOR EPISODE (H): ICD-10-CM

## 2019-01-09 DIAGNOSIS — K21.00 GASTROESOPHAGEAL REFLUX DISEASE WITH ESOPHAGITIS: ICD-10-CM

## 2019-01-09 PROBLEM — A41.9 SEPSIS (H): Status: ACTIVE | Noted: 2019-01-09

## 2019-01-09 LAB
ALBUMIN UR-MCNC: 100 MG/DL
ANION GAP SERPL CALCULATED.3IONS-SCNC: 5 MMOL/L (ref 3–14)
ANION GAP SERPL CALCULATED.3IONS-SCNC: 5 MMOL/L (ref 3–14)
APPEARANCE UR: ABNORMAL
BACTERIA #/AREA URNS HPF: ABNORMAL /HPF
BILIRUB UR QL STRIP: NEGATIVE
BUN SERPL-MCNC: 44 MG/DL (ref 7–30)
BUN SERPL-MCNC: 45 MG/DL (ref 7–30)
CALCIUM SERPL-MCNC: 8.8 MG/DL (ref 8.5–10.1)
CALCIUM SERPL-MCNC: 9.1 MG/DL (ref 8.5–10.1)
CHLORIDE SERPL-SCNC: 108 MMOL/L (ref 94–109)
CHLORIDE SERPL-SCNC: 109 MMOL/L (ref 94–109)
CO2 SERPL-SCNC: 27 MMOL/L (ref 20–32)
CO2 SERPL-SCNC: 28 MMOL/L (ref 20–32)
COLOR UR AUTO: YELLOW
CREAT SERPL-MCNC: 2.4 MG/DL (ref 0.66–1.25)
CREAT SERPL-MCNC: 2.58 MG/DL (ref 0.66–1.25)
CREAT UR-MCNC: 174 MG/DL
ERYTHROCYTE [DISTWIDTH] IN BLOOD BY AUTOMATED COUNT: 15.6 % (ref 10–15)
ERYTHROCYTE [DISTWIDTH] IN BLOOD BY AUTOMATED COUNT: 15.7 % (ref 10–15)
FLUAV+FLUBV RNA SPEC QL NAA+PROBE: NEGATIVE
FLUAV+FLUBV RNA SPEC QL NAA+PROBE: NEGATIVE
FRACT EXCRET NA UR+SERPL-RTO: 0.3 %
GFR SERPL CREATININE-BSD FRML MDRD: 21 ML/MIN/{1.73_M2}
GFR SERPL CREATININE-BSD FRML MDRD: 23 ML/MIN/{1.73_M2}
GLUCOSE BLDC GLUCOMTR-MCNC: 142 MG/DL (ref 70–99)
GLUCOSE SERPL-MCNC: 115 MG/DL (ref 70–99)
GLUCOSE SERPL-MCNC: 149 MG/DL (ref 70–99)
GLUCOSE UR STRIP-MCNC: NEGATIVE MG/DL
HCT VFR BLD AUTO: 36.7 % (ref 40–53)
HCT VFR BLD AUTO: 37.3 % (ref 40–53)
HGB BLD-MCNC: 11.3 G/DL (ref 13.3–17.7)
HGB BLD-MCNC: 11.7 G/DL (ref 13.3–17.7)
HGB UR QL STRIP: ABNORMAL
INR PPP: 1.19 (ref 0.86–1.14)
KETONES UR STRIP-MCNC: NEGATIVE MG/DL
LACTATE BLD-SCNC: 1.4 MMOL/L (ref 0.7–2)
LEUKOCYTE ESTERASE UR QL STRIP: ABNORMAL
MCH RBC QN AUTO: 28 PG (ref 26.5–33)
MCH RBC QN AUTO: 28.2 PG (ref 26.5–33)
MCHC RBC AUTO-ENTMCNC: 30.8 G/DL (ref 31.5–36.5)
MCHC RBC AUTO-ENTMCNC: 31.4 G/DL (ref 31.5–36.5)
MCV RBC AUTO: 90 FL (ref 78–100)
MCV RBC AUTO: 91 FL (ref 78–100)
MUCOUS THREADS #/AREA URNS LPF: PRESENT /LPF
NITRATE UR QL: NEGATIVE
PH UR STRIP: 5 PH (ref 5–7)
PLATELET # BLD AUTO: 217 10E9/L (ref 150–450)
PLATELET # BLD AUTO: 226 10E9/L (ref 150–450)
POTASSIUM SERPL-SCNC: 4.6 MMOL/L (ref 3.4–5.3)
POTASSIUM SERPL-SCNC: 5.9 MMOL/L (ref 3.4–5.3)
PROCALCITONIN SERPL-MCNC: 0.65 NG/ML
RBC # BLD AUTO: 4.03 10E12/L (ref 4.4–5.9)
RBC # BLD AUTO: 4.15 10E12/L (ref 4.4–5.9)
RBC #/AREA URNS AUTO: 141 /HPF (ref 0–2)
RSV RNA SPEC NAA+PROBE: NEGATIVE
SODIUM SERPL-SCNC: 141 MMOL/L (ref 133–144)
SODIUM SERPL-SCNC: 141 MMOL/L (ref 133–144)
SODIUM UR-SCNC: 32 MMOL/L
SOURCE: ABNORMAL
SP GR UR STRIP: 1.02 (ref 1–1.03)
SPECIMEN SOURCE: NORMAL
TRANS CELLS #/AREA URNS HPF: 4 /HPF (ref 0–1)
UROBILINOGEN UR STRIP-MCNC: 2 MG/DL (ref 0–2)
WBC # BLD AUTO: 25.1 10E9/L (ref 4–11)
WBC # BLD AUTO: 25.1 10E9/L (ref 4–11)
WBC #/AREA URNS AUTO: >182 /HPF (ref 0–5)
WBC CLUMPS #/AREA URNS HPF: PRESENT /HPF

## 2019-01-09 PROCEDURE — 84300 ASSAY OF URINE SODIUM: CPT | Performed by: HOSPITALIST

## 2019-01-09 PROCEDURE — 25000128 H RX IP 250 OP 636: Performed by: HOSPITALIST

## 2019-01-09 PROCEDURE — 87086 URINE CULTURE/COLONY COUNT: CPT | Performed by: HOSPITALIST

## 2019-01-09 PROCEDURE — 71046 X-RAY EXAM CHEST 2 VIEWS: CPT

## 2019-01-09 PROCEDURE — 84145 PROCALCITONIN (PCT): CPT | Performed by: HOSPITALIST

## 2019-01-09 PROCEDURE — 25000132 ZZH RX MED GY IP 250 OP 250 PS 637: Performed by: INTERNAL MEDICINE

## 2019-01-09 PROCEDURE — 87631 RESP VIRUS 3-5 TARGETS: CPT | Performed by: HOSPITALIST

## 2019-01-09 PROCEDURE — 25000132 ZZH RX MED GY IP 250 OP 250 PS 637: Performed by: HOSPITALIST

## 2019-01-09 PROCEDURE — 87186 SC STD MICRODIL/AGAR DIL: CPT | Performed by: HOSPITALIST

## 2019-01-09 PROCEDURE — 81001 URINALYSIS AUTO W/SCOPE: CPT | Performed by: HOSPITALIST

## 2019-01-09 PROCEDURE — 36415 COLL VENOUS BLD VENIPUNCTURE: CPT | Performed by: UROLOGY

## 2019-01-09 PROCEDURE — 87088 URINE BACTERIA CULTURE: CPT | Performed by: HOSPITALIST

## 2019-01-09 PROCEDURE — 25000128 H RX IP 250 OP 636: Performed by: UROLOGY

## 2019-01-09 PROCEDURE — 85610 PROTHROMBIN TIME: CPT | Performed by: HOSPITALIST

## 2019-01-09 PROCEDURE — 12000000 ZZH R&B MED SURG/OB

## 2019-01-09 PROCEDURE — 25000128 H RX IP 250 OP 636: Performed by: ANESTHESIOLOGY

## 2019-01-09 PROCEDURE — 83605 ASSAY OF LACTIC ACID: CPT | Performed by: UROLOGY

## 2019-01-09 PROCEDURE — 99231 SBSQ HOSP IP/OBS SF/LOW 25: CPT | Performed by: UROLOGY

## 2019-01-09 PROCEDURE — 36415 COLL VENOUS BLD VENIPUNCTURE: CPT | Performed by: HOSPITALIST

## 2019-01-09 PROCEDURE — 85027 COMPLETE CBC AUTOMATED: CPT | Performed by: HOSPITALIST

## 2019-01-09 PROCEDURE — 85027 COMPLETE CBC AUTOMATED: CPT | Performed by: UROLOGY

## 2019-01-09 PROCEDURE — 87040 BLOOD CULTURE FOR BACTERIA: CPT | Performed by: HOSPITALIST

## 2019-01-09 PROCEDURE — 80048 BASIC METABOLIC PNL TOTAL CA: CPT | Performed by: HOSPITALIST

## 2019-01-09 PROCEDURE — 82570 ASSAY OF URINE CREATININE: CPT | Performed by: HOSPITALIST

## 2019-01-09 PROCEDURE — 00000146 ZZHCL STATISTIC GLUCOSE BY METER IP

## 2019-01-09 PROCEDURE — 80048 BASIC METABOLIC PNL TOTAL CA: CPT | Performed by: UROLOGY

## 2019-01-09 PROCEDURE — 99223 1ST HOSP IP/OBS HIGH 75: CPT | Mod: AI | Performed by: HOSPITALIST

## 2019-01-09 RX ORDER — CEFTRIAXONE 1 G/1
1 INJECTION, POWDER, FOR SOLUTION INTRAMUSCULAR; INTRAVENOUS ONCE
Status: COMPLETED | OUTPATIENT
Start: 2019-01-09 | End: 2019-01-09

## 2019-01-09 RX ORDER — ONDANSETRON 4 MG/1
4 TABLET, ORALLY DISINTEGRATING ORAL EVERY 6 HOURS PRN
Status: DISCONTINUED | OUTPATIENT
Start: 2019-01-09 | End: 2019-01-27 | Stop reason: HOSPADM

## 2019-01-09 RX ORDER — CEFAZOLIN SODIUM 2 G/100ML
2 INJECTION, SOLUTION INTRAVENOUS
Status: DISCONTINUED | OUTPATIENT
Start: 2019-01-09 | End: 2019-01-09

## 2019-01-09 RX ORDER — ACETAMINOPHEN 325 MG/1
650 TABLET ORAL EVERY 4 HOURS PRN
Status: DISCONTINUED | OUTPATIENT
Start: 2019-01-09 | End: 2019-01-27 | Stop reason: HOSPADM

## 2019-01-09 RX ORDER — ALBUTEROL SULFATE 0.83 MG/ML
2.5 SOLUTION RESPIRATORY (INHALATION) EVERY 4 HOURS PRN
Status: DISCONTINUED | OUTPATIENT
Start: 2019-01-09 | End: 2019-01-27 | Stop reason: HOSPADM

## 2019-01-09 RX ORDER — POLYETHYLENE GLYCOL 3350 17 G/17G
17 POWDER, FOR SOLUTION ORAL 2 TIMES DAILY PRN
Status: DISCONTINUED | OUTPATIENT
Start: 2019-01-09 | End: 2019-01-27 | Stop reason: HOSPADM

## 2019-01-09 RX ORDER — ACETAMINOPHEN 325 MG/1
650 TABLET ORAL EVERY 4 HOURS PRN
Status: DISCONTINUED | OUTPATIENT
Start: 2019-01-09 | End: 2019-01-09

## 2019-01-09 RX ORDER — AMOXICILLIN 250 MG
2 CAPSULE ORAL 2 TIMES DAILY PRN
Status: DISCONTINUED | OUTPATIENT
Start: 2019-01-09 | End: 2019-01-27 | Stop reason: HOSPADM

## 2019-01-09 RX ORDER — FINASTERIDE 5 MG/1
5 TABLET, FILM COATED ORAL DAILY
Status: DISCONTINUED | OUTPATIENT
Start: 2019-01-09 | End: 2019-01-27 | Stop reason: HOSPADM

## 2019-01-09 RX ORDER — ACETAMINOPHEN 500 MG
1000 TABLET ORAL EVERY 6 HOURS PRN
Status: DISCONTINUED | OUTPATIENT
Start: 2019-01-09 | End: 2019-01-09

## 2019-01-09 RX ORDER — METOPROLOL TARTRATE 1 MG/ML
1-2 INJECTION, SOLUTION INTRAVENOUS EVERY 5 MIN PRN
Status: DISCONTINUED | OUTPATIENT
Start: 2019-01-09 | End: 2019-01-09

## 2019-01-09 RX ORDER — ONDANSETRON 2 MG/ML
4 INJECTION INTRAMUSCULAR; INTRAVENOUS EVERY 30 MIN PRN
Status: DISCONTINUED | OUTPATIENT
Start: 2019-01-09 | End: 2019-01-09

## 2019-01-09 RX ORDER — LIDOCAINE 40 MG/G
CREAM TOPICAL
Status: DISCONTINUED | OUTPATIENT
Start: 2019-01-09 | End: 2019-01-27 | Stop reason: HOSPADM

## 2019-01-09 RX ORDER — ONDANSETRON 4 MG/1
4 TABLET, ORALLY DISINTEGRATING ORAL EVERY 30 MIN PRN
Status: DISCONTINUED | OUTPATIENT
Start: 2019-01-09 | End: 2019-01-09

## 2019-01-09 RX ORDER — SODIUM CHLORIDE, SODIUM LACTATE, POTASSIUM CHLORIDE, CALCIUM CHLORIDE 600; 310; 30; 20 MG/100ML; MG/100ML; MG/100ML; MG/100ML
INJECTION, SOLUTION INTRAVENOUS CONTINUOUS
Status: DISCONTINUED | OUTPATIENT
Start: 2019-01-09 | End: 2019-01-09

## 2019-01-09 RX ORDER — AMOXICILLIN 250 MG
1 CAPSULE ORAL 2 TIMES DAILY PRN
Status: DISCONTINUED | OUTPATIENT
Start: 2019-01-09 | End: 2019-01-27 | Stop reason: HOSPADM

## 2019-01-09 RX ORDER — CEFAZOLIN SODIUM 1 G/3ML
1 INJECTION, POWDER, FOR SOLUTION INTRAMUSCULAR; INTRAVENOUS SEE ADMIN INSTRUCTIONS
Status: DISCONTINUED | OUTPATIENT
Start: 2019-01-09 | End: 2019-01-09

## 2019-01-09 RX ORDER — ACETAMINOPHEN 650 MG/1
650 SUPPOSITORY RECTAL EVERY 4 HOURS PRN
Status: DISCONTINUED | OUTPATIENT
Start: 2019-01-09 | End: 2019-01-22

## 2019-01-09 RX ORDER — CIPROFLOXACIN 2 MG/ML
400 INJECTION, SOLUTION INTRAVENOUS EVERY 24 HOURS
Status: DISCONTINUED | OUTPATIENT
Start: 2019-01-09 | End: 2019-01-13

## 2019-01-09 RX ORDER — HYDRALAZINE HYDROCHLORIDE 20 MG/ML
2.5-5 INJECTION INTRAMUSCULAR; INTRAVENOUS EVERY 10 MIN PRN
Status: DISCONTINUED | OUTPATIENT
Start: 2019-01-09 | End: 2019-01-09

## 2019-01-09 RX ORDER — NALOXONE HYDROCHLORIDE 0.4 MG/ML
.1-.4 INJECTION, SOLUTION INTRAMUSCULAR; INTRAVENOUS; SUBCUTANEOUS
Status: ACTIVE | OUTPATIENT
Start: 2019-01-09 | End: 2019-01-10

## 2019-01-09 RX ORDER — SODIUM CHLORIDE 9 MG/ML
INJECTION, SOLUTION INTRAVENOUS CONTINUOUS
Status: DISCONTINUED | OUTPATIENT
Start: 2019-01-09 | End: 2019-01-11

## 2019-01-09 RX ORDER — CEFTRIAXONE 1 G/1
1 INJECTION, POWDER, FOR SOLUTION INTRAMUSCULAR; INTRAVENOUS ONCE
Status: DISCONTINUED | OUTPATIENT
Start: 2019-01-09 | End: 2019-01-09

## 2019-01-09 RX ORDER — FENTANYL CITRATE 50 UG/ML
25-50 INJECTION, SOLUTION INTRAMUSCULAR; INTRAVENOUS
Status: CANCELLED | OUTPATIENT
Start: 2019-01-09

## 2019-01-09 RX ORDER — NALOXONE HYDROCHLORIDE 0.4 MG/ML
.1-.4 INJECTION, SOLUTION INTRAMUSCULAR; INTRAVENOUS; SUBCUTANEOUS
Status: DISCONTINUED | OUTPATIENT
Start: 2019-01-09 | End: 2019-01-27 | Stop reason: HOSPADM

## 2019-01-09 RX ORDER — FENTANYL CITRATE 50 UG/ML
25-50 INJECTION, SOLUTION INTRAMUSCULAR; INTRAVENOUS EVERY 5 MIN PRN
Status: DISCONTINUED | OUTPATIENT
Start: 2019-01-09 | End: 2019-01-09

## 2019-01-09 RX ORDER — ONDANSETRON 2 MG/ML
4 INJECTION INTRAMUSCULAR; INTRAVENOUS EVERY 6 HOURS PRN
Status: DISCONTINUED | OUTPATIENT
Start: 2019-01-09 | End: 2019-01-27 | Stop reason: HOSPADM

## 2019-01-09 RX ORDER — BISACODYL 10 MG
10 SUPPOSITORY, RECTAL RECTAL DAILY PRN
Status: DISCONTINUED | OUTPATIENT
Start: 2019-01-09 | End: 2019-01-27 | Stop reason: HOSPADM

## 2019-01-09 RX ORDER — LANOLIN ALCOHOL/MO/W.PET/CERES
3 CREAM (GRAM) TOPICAL
Status: DISCONTINUED | OUTPATIENT
Start: 2019-01-09 | End: 2019-01-27 | Stop reason: HOSPADM

## 2019-01-09 RX ORDER — MEPERIDINE HYDROCHLORIDE 50 MG/ML
12.5 INJECTION INTRAMUSCULAR; INTRAVENOUS; SUBCUTANEOUS
Status: DISCONTINUED | OUTPATIENT
Start: 2019-01-09 | End: 2019-01-09

## 2019-01-09 RX ORDER — CEFTRIAXONE 2 G/1
2 INJECTION, POWDER, FOR SOLUTION INTRAMUSCULAR; INTRAVENOUS EVERY 24 HOURS
Status: DISCONTINUED | OUTPATIENT
Start: 2019-01-10 | End: 2019-01-09

## 2019-01-09 RX ORDER — CARVEDILOL 12.5 MG/1
12.5 TABLET ORAL 2 TIMES DAILY WITH MEALS
Status: DISCONTINUED | OUTPATIENT
Start: 2019-01-09 | End: 2019-01-16

## 2019-01-09 RX ADMIN — CIPROFLOXACIN 400 MG: 2 INJECTION, SOLUTION INTRAVENOUS at 18:36

## 2019-01-09 RX ADMIN — FINASTERIDE 5 MG: 5 TABLET, FILM COATED ORAL at 20:51

## 2019-01-09 RX ADMIN — SODIUM CHLORIDE: 9 INJECTION, SOLUTION INTRAVENOUS at 19:30

## 2019-01-09 RX ADMIN — ACETAMINOPHEN 650 MG: 325 TABLET, FILM COATED ORAL at 19:03

## 2019-01-09 RX ADMIN — CEFTRIAXONE SODIUM 1 G: 1 INJECTION, POWDER, FOR SOLUTION INTRAMUSCULAR; INTRAVENOUS at 13:27

## 2019-01-09 RX ADMIN — SODIUM CHLORIDE 1000 ML: 9 INJECTION, SOLUTION INTRAVENOUS at 18:19

## 2019-01-09 RX ADMIN — CARVEDILOL 12.5 MG: 12.5 TABLET, FILM COATED ORAL at 20:51

## 2019-01-09 RX ADMIN — FENTANYL CITRATE 50 MCG: 50 INJECTION INTRAMUSCULAR; INTRAVENOUS at 15:39

## 2019-01-09 RX ADMIN — TAZOBACTAM SODIUM AND PIPERACILLIN SODIUM 2.25 G: 250; 2 INJECTION, SOLUTION INTRAVENOUS at 20:51

## 2019-01-09 ASSESSMENT — MIFFLIN-ST. JEOR
SCORE: 1356.65
SCORE: 1332.61

## 2019-01-09 ASSESSMENT — LIFESTYLE VARIABLES: TOBACCO_USE: 1

## 2019-01-09 ASSESSMENT — ENCOUNTER SYMPTOMS: DYSRHYTHMIAS: 1

## 2019-01-09 ASSESSMENT — ACTIVITIES OF DAILY LIVING (ADL): ADLS_ACUITY_SCORE: 11

## 2019-01-09 NOTE — OR NURSING
Dr. Scott called with lab results and states that the patient needs to be admitted for a UTI.  WBC was 25.  Patient has already had a dose of rocephin.    I talked with son about when patient will go home. Barrie lives with his son.  The patient's wife lives in [place] with another family member.   He feels very comfortable caring for him but he states his father has been very weak and unable to walk without help for the last 2 days.  The son states that they do have a  that he feels comfortable calling for help if needed or his family doctor is always available for help when needed.    Patient's head of penis is very swollen and red,  I am unable to retract any foreskin.  Patient states that the pain comes and goes.  If he doesn't move, he is ok.  But if the catheter is moved the pain is like a nail going through him.

## 2019-01-09 NOTE — OR NURSING
"Patient waiting in Pre-Op area for room to be available for admission. Patient expressing \"mucho\" amounts of pain. Verbal orders received from LUKAS Granda, to administer Fentanyl IV 50 mcg x1 dose. Will monitor and assess for relief.   "

## 2019-01-09 NOTE — ANESTHESIA PREPROCEDURE EVALUATION
Anesthesia Pre-Procedure Evaluation    Patient: Barrie Woods   MRN: [id number] : 1930          Preoperative Diagnosis: enlarged prostate    Procedure(s):  Cystoscopy, transurethral resection of prostate    Past Medical History:   Diagnosis Date     Acute, but ill-defined, cerebrovascular disease     left middle cerebral artery infarct     Alcohol abuse      Anemia 2014     Atrial fibrillation (H)     CVA occurred off anticoagulation     Balanitis 2018     Cachexia (H) 2018     Cardiomyopathy (H)     stress tests  and 10/04 negative for ischemia, EF 36-40%     CHF (congestive heart failure) (H) 2009     Chronic infection      Chronic pain 10/12/2015     Dementia without behavioral disturbance, unspecified dementia type 2017     Dementia, without behavioral disturbance 2015     Depressive disorder      Diaphragmatic hernia without mention of obstruction or gangrene     left sided chest pain, hiatal hernia/gastritis     Diverticulosis of colon (without mention of hemorrhage)     sigmoid     Dysphagia 2015     Elevated blood sugar 2016     Esophageal reflux      Hearing loss      Hematuria     negative cystoscopy 3/04     Hereditary and idiopathic peripheral neuropathy 2006    EMG-peripheral polyneuropathy, B12 and VitD deficient, ? alcohol related     Hx of congestive heart failure 2016     Hypertrophy of prostate 2015     Nausea with vomiting 2014     Presbycusis of both ears 2017     Presence of indwelling urethral catheter 2018     PSA elevation 2014     Recurrent falls 2015     Renal cell carcinoma (H) 2014     Sepsis (H) 2015     TB lung, latent 2014     Urinary incontinence 2015     Past Surgical History:   Procedure Laterality Date     EYE SURGERY      cataract surgery bilat     Anesthesia Evaluation     . Pt has had prior anesthetic.            ROS/MED HX    ENT/Pulmonary:     (+)tobacco use, Past use ,  . .    Neurologic:     (+)CVA     Cardiovascular:     (+) Dyslipidemia, hypertension----. : . CHF Last EF: 35-40% . . :. dysrhythmias a-fib, .       METS/Exercise Tolerance:     Hematologic:     (+) Anemia, -      Musculoskeletal:         GI/Hepatic:     (+) GERD       Renal/Genitourinary:     (+) chronic renal disease, type: CRI,       Endo:         Psychiatric:         Infectious Disease:         Malignancy:   (+) Malignancy History of Other  Other CA renal cell ca status post         Other:    (+) H/O Chronic Pain,                        Physical Exam      Airway   Mallampati: II  TM distance: >3 FB  Neck ROM: full    Dental     Cardiovascular   Rhythm and rate: regular and normal      Pulmonary    breath sounds clear to auscultation            Lab Results   Component Value Date    WBC 12.1 (H) 12/28/2018    HGB 12.1 (L) 12/28/2018    HCT 39.1 (L) 12/28/2018     12/28/2018    SED 4 05/19/2006     12/28/2018    POTASSIUM 5.3 12/28/2018    CHLORIDE 106 12/28/2018    CO2 29 12/28/2018    BUN 31 (H) 12/28/2018    CR 1.52 (H) 12/28/2018     (H) 12/28/2018    JOSE 9.4 12/28/2018    PHOS 3.0 01/11/2015    MAG 2.2 04/28/2017    ALBUMIN 3.2 (L) 07/25/2018    PROTTOTAL 7.5 07/25/2018    ALT 28 07/25/2018    AST 33 07/25/2018    ALKPHOS 96 07/25/2018    BILITOTAL 0.6 07/25/2018    LIPASE 93 07/25/2018    AMYLASE 87 03/05/2004    PTT 44 (H) 02/20/2005    INR 1.48 (H) 07/12/2015    TSH 1.02 07/09/2016       Preop Vitals  BP Readings from Last 3 Encounters:   12/28/18 152/80   09/13/18 156/80   09/07/18 127/82    Pulse Readings from Last 3 Encounters:   01/09/19 90   12/28/18 68   10/29/18 (!) 42      Resp Readings from Last 3 Encounters:   01/09/19 20   09/13/18 20   09/07/18 12    SpO2 Readings from Last 3 Encounters:   01/09/19 100%   12/28/18 100%   10/29/18 99%      Temp Readings from Last 1 Encounters:   01/09/19 97.7  F (36.5  C) (Temporal)    Ht Readings from Last 1 Encounters:   01/09/19 1.676 m (5'  "6\")      Wt Readings from Last 1 Encounters:   01/09/19 74.4 kg (164 lb)    Estimated body mass index is 26.47 kg/m  as calculated from the following:    Height as of this encounter: 1.676 m (5' 6\").    Weight as of this encounter: 74.4 kg (164 lb).       Anesthesia Plan      History & Physical Review  History and physical reviewed and following examination; no interval change.    ASA Status:  4 .    NPO Status:  > 8 hours    Plan for General and LMA with Intravenous and Propofol induction. Maintenance will be Balanced.    PONV prophylaxis:  Ondansetron (or other 5HT-3)       Postoperative Care  Postoperative pain management:  IV analgesics, Oral pain medications and Multi-modal analgesia.      Consents  Anesthetic plan, risks, benefits and alternatives discussed with:  Patient..                 Gelacio Motta MD                    .  "

## 2019-01-09 NOTE — PROGRESS NOTES
"Urology    Barrie is here for his scheduled TURP today. However, his son reports that he has been feeling unwell. He has not had his sarmiento catheter changed since 11/26/18 in our office. He had a n appointment for catheter change in late December but his son says he did not go to that appointment \"because a nurse called me and told me he didn't need it changed\". There is no record of this    His urine is very purulent in the sarmiento  WBC is 25  Creatinine 2.58    A/P: He has a UTI and requires admission to the hospital  Catheter will be changed by pre-op nurses  His surgery will be canceled today and rescheduled for a later date  "

## 2019-01-09 NOTE — PROVIDER NOTIFICATION
"Admitting hospitalist paged \"Per PACU hospitalist to see Dr Santos pt. Pt on floor, running a temp. Need orders. Thank you\"    1745-Dr Solano called back stating a doctor will be up shortly.   "

## 2019-01-09 NOTE — PROGRESS NOTES
Pt's son stated that scrotal area is swollen.  Dr. Scott notified and evaluated.  Also noted that urine from pre existing catheter cloudy/purulent in nature.  Surgery canceled.  CBC & BMP collected.  Nursing to wait results and notify Dr. Scott when available.  IV rocephin given as ordered.  Pre existing sarmiento removed and new sarmiento inserted.  Sarmiento tubing clamped to collect urinalysis as urine output is low at this time.  Pt and patient's son notified of plan of care via interpretor.  Report given to JESSICA Lew.

## 2019-01-10 ENCOUNTER — PATIENT OUTREACH (OUTPATIENT)
Dept: GERIATRIC MEDICINE | Facility: CLINIC | Age: 84
End: 2019-01-10

## 2019-01-10 LAB
ANION GAP SERPL CALCULATED.3IONS-SCNC: 7 MMOL/L (ref 3–14)
BUN SERPL-MCNC: 43 MG/DL (ref 7–30)
CALCIUM SERPL-MCNC: 7.8 MG/DL (ref 8.5–10.1)
CHLORIDE SERPL-SCNC: 111 MMOL/L (ref 94–109)
CO2 SERPL-SCNC: 24 MMOL/L (ref 20–32)
CREAT SERPL-MCNC: 2.18 MG/DL (ref 0.66–1.25)
ERYTHROCYTE [DISTWIDTH] IN BLOOD BY AUTOMATED COUNT: 15.9 % (ref 10–15)
GFR SERPL CREATININE-BSD FRML MDRD: 26 ML/MIN/{1.73_M2}
GLUCOSE SERPL-MCNC: 128 MG/DL (ref 70–99)
HCT VFR BLD AUTO: 31.2 % (ref 40–53)
HGB BLD-MCNC: 9.4 G/DL (ref 13.3–17.7)
LACTATE BLD-SCNC: 1.6 MMOL/L (ref 0.7–2)
MCH RBC QN AUTO: 27.6 PG (ref 26.5–33)
MCHC RBC AUTO-ENTMCNC: 30.1 G/DL (ref 31.5–36.5)
MCV RBC AUTO: 92 FL (ref 78–100)
PLATELET # BLD AUTO: 171 10E9/L (ref 150–450)
POTASSIUM SERPL-SCNC: 4.6 MMOL/L (ref 3.4–5.3)
RBC # BLD AUTO: 3.4 10E12/L (ref 4.4–5.9)
SODIUM SERPL-SCNC: 142 MMOL/L (ref 133–144)
WBC # BLD AUTO: 20.2 10E9/L (ref 4–11)

## 2019-01-10 PROCEDURE — 25000132 ZZH RX MED GY IP 250 OP 250 PS 637: Performed by: INTERNAL MEDICINE

## 2019-01-10 PROCEDURE — 25000132 ZZH RX MED GY IP 250 OP 250 PS 637: Performed by: HOSPITALIST

## 2019-01-10 PROCEDURE — 36415 COLL VENOUS BLD VENIPUNCTURE: CPT | Performed by: HOSPITALIST

## 2019-01-10 PROCEDURE — 99233 SBSQ HOSP IP/OBS HIGH 50: CPT | Performed by: INTERNAL MEDICINE

## 2019-01-10 PROCEDURE — 83605 ASSAY OF LACTIC ACID: CPT | Performed by: INTERNAL MEDICINE

## 2019-01-10 PROCEDURE — 36415 COLL VENOUS BLD VENIPUNCTURE: CPT | Performed by: INTERNAL MEDICINE

## 2019-01-10 PROCEDURE — 85027 COMPLETE CBC AUTOMATED: CPT | Performed by: HOSPITALIST

## 2019-01-10 PROCEDURE — 12000000 ZZH R&B MED SURG/OB

## 2019-01-10 PROCEDURE — 25000128 H RX IP 250 OP 636: Performed by: HOSPITALIST

## 2019-01-10 PROCEDURE — 80048 BASIC METABOLIC PNL TOTAL CA: CPT | Performed by: HOSPITALIST

## 2019-01-10 RX ADMIN — FINASTERIDE 5 MG: 5 TABLET, FILM COATED ORAL at 08:54

## 2019-01-10 RX ADMIN — TAZOBACTAM SODIUM AND PIPERACILLIN SODIUM 2.25 G: 250; 2 INJECTION, SOLUTION INTRAVENOUS at 14:56

## 2019-01-10 RX ADMIN — TAZOBACTAM SODIUM AND PIPERACILLIN SODIUM 2.25 G: 250; 2 INJECTION, SOLUTION INTRAVENOUS at 03:18

## 2019-01-10 RX ADMIN — TAZOBACTAM SODIUM AND PIPERACILLIN SODIUM 2.25 G: 250; 2 INJECTION, SOLUTION INTRAVENOUS at 20:56

## 2019-01-10 RX ADMIN — MELATONIN TAB 3 MG 3 MG: 3 TAB at 19:33

## 2019-01-10 RX ADMIN — CARVEDILOL 12.5 MG: 12.5 TABLET, FILM COATED ORAL at 08:53

## 2019-01-10 RX ADMIN — SODIUM CHLORIDE: 9 INJECTION, SOLUTION INTRAVENOUS at 03:24

## 2019-01-10 RX ADMIN — TAZOBACTAM SODIUM AND PIPERACILLIN SODIUM 2.25 G: 250; 2 INJECTION, SOLUTION INTRAVENOUS at 08:52

## 2019-01-10 RX ADMIN — CARVEDILOL 12.5 MG: 12.5 TABLET, FILM COATED ORAL at 19:23

## 2019-01-10 RX ADMIN — CIPROFLOXACIN 400 MG: 2 INJECTION, SOLUTION INTRAVENOUS at 19:23

## 2019-01-10 RX ADMIN — ACETAMINOPHEN 650 MG: 325 TABLET, FILM COATED ORAL at 17:18

## 2019-01-10 RX ADMIN — ACETAMINOPHEN 650 MG: 325 TABLET, FILM COATED ORAL at 09:21

## 2019-01-10 RX ADMIN — ACETAMINOPHEN 650 MG: 325 TABLET, FILM COATED ORAL at 03:34

## 2019-01-10 ASSESSMENT — ACTIVITIES OF DAILY LIVING (ADL)
ADLS_ACUITY_SCORE: 11.5
ADLS_ACUITY_SCORE: 17

## 2019-01-10 ASSESSMENT — MIFFLIN-ST. JEOR: SCORE: 1353.47

## 2019-01-10 NOTE — PLAN OF CARE
Assumed care from 1900 to 2330  Arabic speaking only, dementia, oriented to self only  Bed alarm on for safety, VPM in place  LDA: New PIV infusing, IV cipro & zosyn  Vitals: Tmax 101.2, recheck 99.8, PRN tylenol  Pain: PAINAD 1  Tele: Afib CVR  Skin: small blisters acorss chest  GI/: Youssef patent, bypass, purulent drainage from meatus  Plan: Urology, PT, OT, SW, & ID consults  Will continue to monitor

## 2019-01-10 NOTE — LETTER
TRANSITIONS OF CARE (HOWARD) LOG   HOWARD tasks should be completed by the CC within one (1) business day of notification of each transition. Follow up contact with member is required after return to their usual care setting.  Note:  If CC finds out about the transitions fifteen (15) days or more after the member has returned to their usual care setting, no HOWARD log is needed. However, the CC should check in with the member to discuss the transition process, any changes needed to the care plan and document it in a case note.    [place]  [place] [facility] [place] [employer] Member ID#: [id number]   Product[place] [facility] [place] Coordinator Contact:  MORELIA Blum [facility] System: [employer]   Transition Communication Actions from Care Management Contact   Transition #1   Notification Date: 1/10/19 Transition Date:   1/9/19 Transition From: Home     Is this the member s usual care setting?               yes Transition To: Hospital, [facility]   Transition Type:  Unplanned  Reason for Admission/Comments:  Client was to have scheduled TURP but found to have urosepsis so procedure cancelled and client admitted     Shared CC contact info, care plan/services with receiving setting--Date completed: 1/10/19   Notified PCP of transition--Date completed:  11/9/19     via  EMR   Transition #2   Transition #3  (if applicable)   Notification Date: ***         Transition To:  { :652058}  Transition Date: ***     Transition Type:    {Planned/Unplanned:299934}  Notified PCP -- Date completed: ***              Shared CC contact info, care plan/services with receiving setting or, if applicable, home care agency--Date completed:  ***  *Complete additional tasks below, if this transition is a return to usual care setting.      Comments:  ***    Notification Date:  ***        Transition To:  { :130855}  Transition Date:   ***           Transition Type:    {Planned/Unplanned:547807}  Notified PCP--Date  completed: ***         Shared CC contact info, care plan/services with receiving setting or, if applicable, home care agency--Date completed: ***      *Complete additional tasks below, if this transition is a return to usual care setting.      Comments:  ***     *Complete tasks below when the member is discharging TO their usual care setting within one (1) business day of notification.  For situations where the Care Coordinator is notified of the discharge prior to the date of discharge, the Care Coordinator must follow up with the member or designated representative to confirm that discharge actually occurred and discuss required HOWARD tasks as outlined in the HOWARD Instructions.  (This includes situations where it may be a  new  usual care setting for the member. (i.e., a community member who decides upon permanent nursing home placement following hospitalization and rehab).    Date completed: ***  Communicated with member or their designated representative about the following:  care transition process; about changes to the member s health status; plan of care updates; education about transitions and how to prevent unplanned transitions/readmissions  Four Pillars for Optimal Transition:    Check  Yes  - if the member, family member and/or SNF/facility staff manages the following:    If  No  provide explanation in the comments section.          []  Yes     []  No     Does the member have a follow-up appointment scheduled with primary care or specialist? (Mental health hospitalizations--the appt. should be w/in 7 days)   []  Yes     []  No     Can the member manage their medications or is there a system in place to manage medications (e.g. home care set-up)?         []  Yes     []  No     Can the member verbalize warning signs and symptoms to watch for and how to respond?         []  Yes     []  No     Does the member use a Personal Health Care Record?  Check  Yes  if visit summary, discharge summary, and/or healthcare  summary are being used as a PHR.                                                                                                                                                                                    [] Yes      [] No      Have you updated the member s care plan?  If  No  provide explanation in comments.   Comments:  ***

## 2019-01-10 NOTE — PLAN OF CARE
OT eval attempted,  present. Despite max education and encouragement, pt declined participation/OOB activity due to pain. Via , pt's answers to questions nonsensical at times. Pt unable to provide PLOF. Per discussion with RN, pt was able to ambulate to/from BR with Ax2 and FWW.  Will continue to follow and initiate OT eval as appropriate.

## 2019-01-10 NOTE — H&P
[facility]  Hospitalist H&P    Name: Barrie Woods      MRN: [id number]  YOB: 1930    Age: 88 year old  Date of admission: 1/9/2019  Primary care provider: Yonatan Hurtado            Assessment and Plan:   Barrie Woods is a 88 year old male with a history of cerebrovascular disease, dementia, chronic kidney disease, atrial fibrillation, cardiomyopathy and congestive heart failure with diastolic and systolic dysfunction, BPH, renal cell carcinoma, latent TB, GERD who presents for a scheduled elective TURP found to have urosepsis in the preoperative area.      1.  Severe sepsis secondary to urinary tract infection with chronic indwelling Youssef catheter: The catheter was exchanged in the preoperative area and showed significant purulent material.  Urinalysis was obtained following admission which is grossly abnormal and indicative of infection.  Urine and blood cultures have been sent.  He has rigors he has a history of CRE at the bedside.  Blood pressure is stable and lactic acid level is normal.  Will give 1 L of normal saline bolus at this time and continue maintenance normal saline IV fluids.  Per the EMR.  I looked at these cultures and most of the Enterobacter cultures have reasonable sensitivities.  I discussed with infectious disease and recommendation was for ciprofloxacin and Zosyn at this time based on previous culture results.  Will request formal urology and infectious disease consultations.    2.  Atrial fibrillation and cerebrovascular disease: Appears as though he previously had a stroke in the context of missing his anticoagulation.  It does not appear as though he is currently on any anticoagulation.  We will continue Coreg for rate control.  Blood pressure is stable.  Stroke prophylaxis could be addressed later in the hospitalization.    3.  Cardiomyopathy and congestive heart failure with systolic and diastolic dysfunction: Hypovolemic this time and clearly septic.   Will need to provide IV fluids and monitor volume status closely.  We will is prior to admission Bumex.    4.  Acute kidney injury on chronic kidney disease: Hyperkalemia is now resolved.  Continue normal saline as above.  Acute kidney injury likely secondary to sepsis and prerenal state.    5.  Hypertension: Blood pressure reassuringly stable.  Need to resume Coreg for rate control given his atrial fibrillation.  Holding his prior to admission Bumex and lisinopril given renal failure.    Code status: Full, confirmed with son at the bedside.  Admit to inpatient status, expected 2 midnight hospitalization.  Prophylaxis: PCD's.  Disposition: To be determined per expected lengthy hospitalization.            Chief Complaint:   Sepsis.         History of Present Illness:   Barrie Woods is a 88 year old male who presents with sepsis.  History is obtained from my discussion with the patient's son through a formal .  The EMR was also reviewed.    The patient was scheduled for a TURP earlier today.  In the preoperative area he was noted to be doing generally unwell so some basic laboratory work was sent off.  This included a CBC and BMP.  Creatinine was found to be elevated at about 2.5 with a potassium of 5.9.  CBC was indicative of white blood cells of 25,000.  The patient has a chronic indwelling Youssef catheter.  He was supposed to have this changed back in November but failed to do so so it has been in place for several months.  In the preoperative area it was exchanged and noted to be expressing purulent material.  He was directly admitted to the hospital and his surgery was canceled.  We were asked to admit him for evaluation and stabilization.  He is currently nonverbal which his son indicates his baseline.  I am unable to obtain any meaningful review of systems from this patient.            Past Medical History:     Past Medical History:   Diagnosis Date     Acute, but ill-defined,  cerebrovascular disease 1/04    left middle cerebral artery infarct     Alcohol abuse      Anemia 7/14/2014     Atrial fibrillation (H)     CVA occurred off anticoagulation     Balanitis 6/4/2018     Cachexia (H) 1/9/2018     Cardiomyopathy (H)     stress tests 6/04 and 10/04 negative for ischemia, EF 36-40%     CHF (congestive heart failure) (H) 12/24/2009     Chronic infection      Chronic pain 10/12/2015     Dementia without behavioral disturbance, unspecified dementia type 5/9/2017     Dementia, without behavioral disturbance 6/29/2015     Depressive disorder      Diaphragmatic hernia without mention of obstruction or gangrene     left sided chest pain, hiatal hernia/gastritis     Diverticulosis of colon (without mention of hemorrhage)     sigmoid     Dysphagia 7/27/2015     Elevated blood sugar 1/25/2016     Esophageal reflux      Hearing loss      Hematuria     negative cystoscopy 3/04     Hereditary and idiopathic peripheral neuropathy 2006    EMG-peripheral polyneuropathy, B12 and VitD deficient, ? alcohol related     Hx of congestive heart failure 1/12/2016     Hypertrophy of prostate 6/17/2015     Nausea with vomiting 12/8/2014     Presbycusis of both ears 7/17/2017     Presence of indwelling urethral catheter 8/17/2018     PSA elevation 5/30/2014     Recurrent falls 7/28/2015     Renal cell carcinoma (H) 8/25/2014     Sepsis (H) 1/7/2015     TB lung, latent 5/6/2014     Urinary incontinence 7/28/2015             Past Surgical History:     Past Surgical History:   Procedure Laterality Date     EYE SURGERY      cataract surgery bilat             Social History:     Social History     Tobacco Use     Smoking status: Former Smoker     Types: Dip, chew, snus or snuff     Smokeless tobacco: Current User     Types: Chew     Tobacco comment: pt chews tobacco   Substance Use Topics     Alcohol use: No             Family History:   The family history was fully reviewed and non-contributory in this case.          "Allergies:     Allergies   Allergen Reactions     Nkda [No Known Drug Allergies]              Medications:     Prior to Admission medications    Medication Sig Last Dose Taking? Auth Provider   acetaminophen (TYLENOL) 500 MG tablet Take 2 tablets (1,000 mg) by mouth every 6 hours as needed for mild pain  Yes Yonatan Hurtado MD   amoxicillin-clavulanate (AUGMENTIN) 500-125 MG tablet TAKE ONE TABLET BY MOUTH TWICE A DAY  Yes Yonatan Hurtado MD   bumetanide (BUMEX) 1 MG tablet TAKE ONE TABLET BY MOUTH EVERY DAY  Yes Yonatan Hurtado MD   carvedilol (COREG) 12.5 MG tablet Take 1 tablet (12.5 mg) by mouth 2 times daily (with meals)  Yes Yonatan Hurtado MD   diclofenac (VOLTAREN) 1 % GEL topical gel Apply 4 gm to knees qid  Yes Yonatan Hurtado MD   finasteride (PROSCAR) 5 MG tablet TAKE ONE TABLET BY MOUTH EVERY DAY  Yes Yonatan Hurtado MD   HYDROcodone-acetaminophen (NORCO) 5-325 MG tablet Take 1 tablet by mouth every 6 hours as needed for moderate to severe pain  Yes Jaclyn Law MD   lisinopril (PRINIVIL/ZESTRIL) 10 MG tablet TAKE ONE TABLET BY MOUTH EVERY DAY  Yes Yonatan Hurtado MD   magnesium oxide (MAG-OX) 400 (241.3 MG) MG tablet TAKE 1 TABLET BY MOUTH ONCE DAILY  Yes Yonatan Hurtado MD   Multiple Vitamins-Minerals (MULTILEX) TABS TAKE ONE TABLET BY MOUTH ONCE DAILY  Yes Yonatan Hurtado MD   pregabalin (LYRICA) 100 MG capsule Take 1 capsule (100 mg) by mouth 2 times daily  Yes Yonatan Hurtado MD   order for DME Equipment being ordered: walker with slides  Patient not taking: Reported on 12/28/2018   Yonatan Hurtado MD             Review of Systems:   A Comprehensive greater than 10 system review of systems was carried out.  Pertinent positives and negatives are noted above.  Otherwise negative for contributory information.           Physical Exam:   Blood pressure 136/64, pulse 90, temperature 100.6  F (38.1  C), temperature source Oral, resp. rate 28, height 1.676 m (5' 6\"), weight 72 kg (158 lb " 11.2 oz), SpO2 96 %.  Wt Readings from Last 1 Encounters:   01/09/19 72 kg (158 lb 11.2 oz)     Exam:  GENERAL: No apparent distress. Awake, alert, non verbal. Rigors.  HEENT: Normocephalic, atraumatic. Extraocular movements intact.  CARDIOVASCULAR: Regular rate and rhythm without murmurs or rubs. No S3.  PULMONARY: Clear to auscultation bilaterally.  ABDOMINAL: Soft, non-tender, non-distended. Bowel sounds normoactive.   EXTREMITIES: No cyanosis or clubbing. No appreciable edema. Youssef in place.  NEUROLOGICAL: CN 2-12 grossly intact, no focal neurological deficits.  DERMATOLOGICAL: No rash, ulcer, bruising, nor jaundice.          Data:   Laboratory:  Recent Labs   Lab 01/09/19  1815 01/09/19  1258   WBC 25.1* 25.1*   HGB 11.7* 11.3*   HCT 37.3* 36.7*   MCV 90 91    217     Recent Labs   Lab 01/09/19  1815 01/09/19  1258    141   POTASSIUM 4.6 5.9*   CHLORIDE 108 109   CO2 28 27   ANIONGAP 5 5   * 149*   BUN 44* 45*   CR 2.40* 2.58*   GFRESTIMATED 23* 21*   GFRESTBLACK 27* 24*   JOSE 8.8 9.1     Recent Labs   Lab 01/09/19  1815   INR 1.19*     No results for input(s): LACT in the last 168 hours.  Recent Labs   Lab 01/09/19  1815   COLOR Yellow   APPEARANCE Cloudy   URINEGLC Negative   URINEBILI Negative   URINEKETONE Negative   SG 1.021   UBLD Large*   URINEPH 5.0   PROTEIN 100*   NITRITE Negative   LEUKEST Large*   RBCU 141*   WBCU >182*     No results for input(s): CULT in the last 168 hours.    Imaging:  No results found for this or any previous visit (from the past 24 hour(s)).    Guzman Caldwell DO MPH  [facility] Hospitalist  [address] [place] [address]  Pager: [phone]  01/09/2019

## 2019-01-10 NOTE — PHARMACY-ADMISSION MEDICATION HISTORY
Medication reconciliation interview completed by pre-admitting nurse Isabelle Bermudez, reviewed by pharmacy. No further clarifications needed.       Prior to Admission medications    Medication Sig Last Dose Taking? Auth Provider   acetaminophen (TYLENOL) 500 MG tablet Take 2 tablets (1,000 mg) by mouth every 6 hours as needed for mild pain  Yes Yonatan Hurtado MD   amoxicillin-clavulanate (AUGMENTIN) 500-125 MG tablet TAKE ONE TABLET BY MOUTH TWICE A DAY  Yes Yonatan Hurtado MD   bumetanide (BUMEX) 1 MG tablet TAKE ONE TABLET BY MOUTH EVERY DAY  Yes Yonatan Hurtado MD   carvedilol (COREG) 12.5 MG tablet Take 1 tablet (12.5 mg) by mouth 2 times daily (with meals)  Yes Yonatan Hurtado MD   diclofenac (VOLTAREN) 1 % GEL topical gel Apply 4 gm to knees qid  Yes Yonatan Hurtado MD   finasteride (PROSCAR) 5 MG tablet TAKE ONE TABLET BY MOUTH EVERY DAY  Yes Yonatan Hurtado MD   HYDROcodone-acetaminophen (NORCO) 5-325 MG tablet Take 1 tablet by mouth every 6 hours as needed for moderate to severe pain  Yes Jaclyn Law MD   lisinopril (PRINIVIL/ZESTRIL) 10 MG tablet TAKE ONE TABLET BY MOUTH EVERY DAY  Yes Yonatan Hurtado MD   magnesium oxide (MAG-OX) 400 (241.3 MG) MG tablet TAKE 1 TABLET BY MOUTH ONCE DAILY  Yes Yonatan Hurtado MD   Multiple Vitamins-Minerals (MULTILEX) TABS TAKE ONE TABLET BY MOUTH ONCE DAILY  Yes Yonatan Hurtado MD   pregabalin (LYRICA) 100 MG capsule Take 1 capsule (100 mg) by mouth 2 times daily  Yes Yonatan Hurtado MD   order for DME Equipment being ordered: walker with slides  Patient not taking: Reported on 12/28/2018   Yonatan Hurtado MD

## 2019-01-10 NOTE — PROGRESS NOTES
[facility]  Hospitalist Progress Note  Rashad Acosta MD 01/10/2019    Reason for Stay (Diagnosis): Sepsis secondary to urinary tract infection         Assessment and Plan:      Summary of Stay: Barrie Woods is a 88 year old male with history of CVA, dementia, chronic kidney disease, A. fib, cardiomyopathy, congestive heart failure, benign prostatic hypertrophy, renal cell carcinoma, who was scheduled for elective TURP yesterday and found to have urosepsis and admitted to the hospital on 1/9/2019.    Problem List:   1. Severe sepsis secondary to urinary tract infection.  -This is due to indwelling Youssef catheter infection  -Continue IV antibiotics  -Follow-up cultures  -Monitor CBC and BMP.  -Patient has previous history of CRE and currently on ciprofloxacin and Zosyn.  -Infectious disease and urology consulted.  -The timing of TURP per urology recommendation    2. Chronic systolic and diastolic congestive heart failure due to cardiomyopathy.  -Monitor input and output  -Daily weight.  -Continue Coreg.  -Decrease IV fluid to 75 mL/h, risk of fluid overload, will likely stop it in the morning    3. Acute renal failure on chronic kidney disease stage III.  -Creatinine improved from 2.58 to 2.18, creatinine was 1.25, about 4 months ago  -BMP in the morning.  -Continue to hold ACE inhibitor and diuretics for now.  -He has hyperkalemia on presentation now resolved, which is due to renal failure and lisinopril.    4. Chronic arterial fibrillation.  -Continue to monitor.  -Continue Coreg  -Patient is not on full anticoagulation  5. History of renal cell carcinoma      DVT Prophylaxis: Pneumatic Compression Devices  Code Status: Full Code  Discharge Dispo: Home  Estimated Disch Date / # of Days until Disch: Expect hospital stay for at least 3 more days, or IV antibiotic pending cultures.        Interval History (Subjective):      Patient seen and examined, assumed care today, feels better, used  "professional  to discuss with the patient, stated he feels better, does not give details.                  Physical Exam:      Last Vital Signs:  /53 (BP Location: Right arm)   Pulse 90   Temp 97.3  F (36.3  C) (Oral)   Resp 24   Ht 1.676 m (5' 6\")   Wt 74.1 kg (163 lb 4.8 oz)   SpO2 97%   BMI 26.36 kg/m      I/O last 3 completed shifts:  In: 3587 [P.O.:390; I.V.:2197; IV Piggyback:1000]  Out: 605 [Urine:605]  Vitals:    01/07/19 1223 01/09/19 1149 01/09/19 1710 01/10/19 0618   Weight: 64.9 kg (143 lb) 74.4 kg (164 lb) 72 kg (158 lb 11.2 oz) 74.1 kg (163 lb 4.8 oz)     Current Facility-Administered Medications   Medication     acetaminophen (TYLENOL) tablet 650 mg    Or     acetaminophen (TYLENOL) Suppository 650 mg     albuterol (PROVENTIL) neb solution 2.5 mg     bisacodyl (DULCOLAX) Suppository 10 mg     carvedilol (COREG) tablet 12.5 mg     ciprofloxacin (CIPRO) infusion 400 mg     finasteride (PROSCAR) tablet 5 mg     HYDROmorphone (DILAUDID) injection 0.3-0.5 mg     lidocaine (LMX4) cream     lidocaine 1 % 1 mL     melatonin tablet 3 mg     naloxone (NARCAN) injection 0.1-0.4 mg     ondansetron (ZOFRAN-ODT) ODT tab 4 mg    Or     ondansetron (ZOFRAN) injection 4 mg     ORAL Pain Medications -  may administer as ordered by surgeon for take home use     piperacillin-tazobactam (ZOSYN) infusion 2.25 g     polyethylene glycol (MIRALAX/GLYCOLAX) Packet 17 g     prochlorperazine (COMPAZINE) injection 5 mg     senna-docusate (SENOKOT-S/PERICOLACE) 8.6-50 MG per tablet 1 tablet    Or     senna-docusate (SENOKOT-S/PERICOLACE) 8.6-50 MG per tablet 2 tablet     sodium chloride (PF) 0.9% PF flush 3 mL     sodium chloride (PF) 0.9% PF flush 3 mL     sodium chloride 0.9% infusion       Constitutional: Awake, alert, cooperative, no apparent distress   Respiratory: Clear to auscultation bilaterally, no crackles or wheezing   Cardiovascular: Regular rate and rhythm, normal S1 and S2, and no murmur noted "   Abdomen: Normal bowel sounds, soft, non-distended, non-tender   Skin: No rashes, no cyanosis, dry to touch   Neuro: Alert and oriented x3, no weakness, numbness, memory loss   Extremities: No edema, normal range of motion   Other(s):HEENT Pink, nonicteric, moist oral mucosa       All other systems: Negative          Medications:      All current medications were reviewed with changes reflected in problem list.         Data:      All new lab and imaging data was reviewed.   Labs:  Recent Labs   Lab 01/09/19  1815 01/09/19  1800   CULT >100,000 colonies/mL  Non lactose fermenting gram negative rods  *  Culture in progress  No growth after 7 hours No growth after 7 hours     Recent Labs   Lab 01/10/19  0650 01/09/19  1815 01/09/19  1258    141 141   POTASSIUM 4.6 4.6 5.9*   CHLORIDE 111* 108 109   CO2 24 28 27   ANIONGAP 7 5 5   * 115* 149*   BUN 43* 44* 45*   CR 2.18* 2.40* 2.58*   GFRESTIMATED 26* 23* 21*   GFRESTBLACK 30* 27* 24*   JOSE 7.8* 8.8 9.1     Recent Labs   Lab 01/10/19  0650 01/09/19  1815 01/09/19  1258   WBC 20.2* 25.1* 25.1*   HGB 9.4* 11.7* 11.3*   HCT 31.2* 37.3* 36.7*   MCV 92 90 91    226 217     Recent Labs   Lab 01/10/19  0650 01/09/19  1815 01/09/19  1258 01/09/19  1211   * 115* 149*  --    BGM  --   --   --  142*      Imaging:   Results for orders placed or performed during the hospital encounter of 01/09/19   XR Chest 2 Views    Narrative    CHEST TWO VIEWS 1/9/2019 7:57 PM     HISTORY: Fever, evaluate for PNA.    COMPARISON: January 9, 2019       Impression    IMPRESSION: Multiple rib deformities again noted and stable. No  definite acute infiltrates compared to previous.    NIKKI GUO MD     *Note: Due to a large number of results and/or encounters for the requested time period, some results have not been displayed. A complete set of results can be found in Results Review.

## 2019-01-10 NOTE — PLAN OF CARE
Pt remains admitted for UTI  A/O to self, confused  Pain noted, PO tylenol given x1 with relief  No nausea observed  On IV zosyn and cipro  Youssef in place, patent, dark urine output  Penile discharge and scrotal edema noted  On tele, afib with CVR and frequent PVCs  Up x2 with walker and gaitbelt  Regular diet, swallow eval pending  Discharge pending  Will continue to monitor

## 2019-01-10 NOTE — PLAN OF CARE
Physical Therapy - attempted to see patient at scheduled time for PT eval with  present.  Pt with limited verbalization but clearly declining participating in therapy.  Therapist and nurse attempted for 15 min to coax patient to get up to the chair, ambulate or sit at edge of bed.  Pt declined all mobility, stating that he wanted to stay in bed.  Pt unable to give clear reason as to why he does not want to participate; however, may be due to scrotal edema and pain with mobility.  Will reschedule eval for tomorrow.

## 2019-01-10 NOTE — PLAN OF CARE
Pt did not get up, used airmatt. Repositioned w/ pillows. Arouses to voice, minimal verbal response, confused, agitated at times, unable to redirect. VPM ordered. Youssef in place, UA is cloudy & audra. Scrotum & penis swollen. UA & flu swab sent. Tmax 102.6. Tylenol given, ice applied in armpits. Re-check 100.6. ID & urology consulted.

## 2019-01-10 NOTE — PLAN OF CARE
Pt Italian speaking, language barrier.  BP's soft. Tmax 102.6, noted to be moaning & groaning, tylenol given.   On Tele: Afib w/CVR/PVC's (80-90's).   Regular diet.  On VPM monitoring, set off alarms x3.   Appears restless, pointing @ PCD's, removed.  Scrotal & penile edema/discoloration Whitish drainage from meatus. Cares completed, scrotal area elevated/supported, ice applied intermittently.  Youssef patent, urine cloudy, tea colored & odorous.   Up w/2 assist.   IVF NS @ 125/hr.  Consults: Urology & ID. PT/OT/SW following.    services scheduled @  0800.

## 2019-01-10 NOTE — PROVIDER NOTIFICATION
"Admitting hospitalist paged \"Can we get Tylenol supp? refusing to swallow right now. Thanks.\"    -order put in   "

## 2019-01-10 NOTE — PROGRESS NOTES
[facility] Care Coordination Contact    CC received notification of Hospital admission.  Hospital admission occurred on 1/9/19 at [facility] with Dx of: client was scheduled for TURP and found to have urosepsis so procedure cancelled and client admitted to hospital.  524-1  CC contacted Hospital /discharge planner (MORELIA Kirkpatrick [phone]) and reviewed community POC. CC requested to be notified of concerns, care conference dates and discharge planning.     Client has been unable to contact/refusal of home visit past couple years. Transition log initiated. PCP notified of hospitalization via EMR.      TRANSITIONS OF CARE (HOWARD) LOG   HOWARD tasks should be completed by the CC within one (1) business day of notification of each transition. Follow up contact with member is required after return to their usual care setting.  Note:  If CC finds out about the transitions fifteen (15) days or more after the member has returned to their usual care setting, no HOWARD log is needed. However, the CC should check in with the member to discuss the transition process, any changes needed to the care plan and document it in a case note.    Member Name:  Barrie [place] [facility] Name: [employer] Member ID#: [id number]   Product: [facility] Care Coordinator Contact:  MORELIA Blum Agency/County/Care System: [facility]   Transition Communication Actions from Care Management Contact   Transition #1   Notification Date: 1/10/19 Transition Date:   1/9/19 Transition From: Home     Is this the member s usual care setting?               yes Transition To: Hospital, [facility]   Transition Type:  Unplanned  Reason for Admission/Comments:  Client was to have scheduled TURP but found to have urosepsis so procedure cancelled and client admitted     Shared CC contact info, care plan/services with receiving setting--Date completed: 1/10/19   Notified PCP of transition--Date completed:   11/9/19     via  EMR   Transition #2   Transition #3  (if applicable)   Notification Date: 1/28/19         [facility]  Transition Date: 1/27/19   Transition Type:    Planned  Notified PCP -- Date completed: 1/27/19              Shared CC contact info, care plan/services with receiving setting or, if applicable, home care agency--Date completed:  1/28/19  *Complete additional tasks below, if this transition is a return to usual care setting.      Comments:  transferred to TCU, see EPic notes.     Notification Date: 1/31/19        Transition To:  Hospital, [facility]  Transition Date:   1/30/19          Transition Type:    Unplanned  Notified PCP--Date completed: 1/30/19         Shared CC contact info, care plan/services with receiving setting or, if applicable, home care agency--Date completed: 1/31/19      *Complete additional tasks below, if this transition is a return to usual care setting.      Comments:  Admitted with blood in stool x2     *Complete tasks below when the member is discharging TO their usual care setting within one (1) business day of notification.  For situations where the Care Coordinator is notified of the discharge prior to the date of discharge, the Care Coordinator must follow up with the member or designated representative to confirm that discharge actually occurred and discuss required HOWARD tasks as outlined in the HOWARD Instructions.  (This includes situations where it may be a  new  usual care setting for the member. (i.e., a community member who decides upon permanent nursing home placement following hospitalization and rehab).    Date completed:   Communicated with member or their designated representative about the following:  care transition process; about changes to the member s health status; plan of care updates; education about transitions and how to prevent unplanned transitions/readmissions  Four Pillars for Optimal Transition:    Check   Yes  - if the member, family member and/or SNF/facility staff manages the following:    If  No  provide explanation in the comments section.          []  Yes     []  No     Does the member have a follow-up appointment scheduled with primary care or specialist? (Mental health hospitalizations--the appt. should be w/in 7 days)   []  Yes     []  No     Can the member manage their medications or is there a system in place to manage medications (e.g. home care set-up)?         []  Yes     []  No     Can the member verbalize warning signs and symptoms to watch for and how to respond?         []  Yes     []  No     Does the member use a Personal Health Care Record?  Check  Yes  if visit summary, discharge summary, and/or healthcare summary are being used as a PHR.                                                                                                                                                                                    [] Yes      [] No      Have you updated the member s care plan?  If  No  provide explanation in comments.   Comments:           MORELIA Blum  [facility] Care Coordinator  [phone]

## 2019-01-10 NOTE — CONSULTS
Consult Date:  01/10/2019      INFECTIOUS DISEASE CONSULTATION      ROOM:  524.      REFERRING PHYSICIAN:  Dr. Guzman Caldwell      IMPRESSION:   1.  An 88-year-old male with acute sepsis, presumed urosepsis, positive urine culture for gram-negative cullen, full information to follow, looks improved on antibiotics, chronic Youssef catheter in place.   2.  Prior flagged history of carbapenem-resistant Enterobacteriaceae, but in looking in the cultures I see nothing for that.  It appears to be a false flag and we will have Infection Control investigate this and remove the flag if we are able to.   3 Prior treated latent TB  4 cardiomyopathy  5 renal insuff    REc 1 continue same adjust to final cx  2 See no CRE in cxs prior, investigate and remove flag if not correct     HISTORY OF PRESENT ILLNESS:  This 88-year-old male is seen in consultation due to acute apparent urosepsis.  The patient has a history of prior recurrent urinary tract infections.  He has now been admitted with acute UTI and presumed sepsis.  He is getting antibiotics.  He has a prior history of CRE flag, but most of his cultures have looked quite sensitive.  The current full information is to follow and he is clinically improved on antibiotics.      PAST MEDICAL HISTORY:  CRE flag is noted, prior history of recurrent urine infections, history of latent tuberculosis in the past, history of mild acute renal insufficiency, history of atrial fibrillation and cardiomyopathy.      ALLERGIES:  NONE TO ANY ANTIMICROBIALS.      MEDICATIONS:  As listed.      REVIEW OF SYSTEMS:  Feels okay.  Currently, he does not think he is having any urinary symptoms.  He has a catheter in place.      PHYSICAL EXAMINATION:   GENERAL:  The patient appears his stated age.  Some mild cognitive dysfunction; unclear whether acute or not.  Does not look particularly toxic.   VITAL SIGNS:  He is afebrile.   HEENT:  No thrush or intraoral lesions.  Pupils reactive.   NECK:  Supple and  nontender.   HEART AND LUNGS:  Unremarkable.   ABDOMEN:  Soft, nontender, no suprapubic tenderness, no flank tenderness.   EXTREMITIES:  No major rashes or skin lesions.      LABORATORY DATA:  Current culture with a gram-negative cullen with grossly abnormal urine.  Full information to follow.  Prior cultures reviewed.  Does not appear to have any CRP that I can find.      Thank you very much for the consultation.  I will follow the patient with you.         SHUBHAM MCPHERSON MD             D: 01/10/2019   T: 01/10/2019   MT: [email or web address]      Name:     PREM ANNA   MRN:      [id number]        Account:       [id number]   :      1930           Consult Date:  01/10/2019      Document: [id number]       cc: Yonatan Hurtado MD

## 2019-01-11 ENCOUNTER — OFFICE VISIT (OUTPATIENT)
Dept: INTERPRETER SERVICES | Facility: CLINIC | Age: 84
End: 2019-01-11
Payer: COMMERCIAL

## 2019-01-11 ENCOUNTER — APPOINTMENT (OUTPATIENT)
Dept: SPEECH THERAPY | Facility: CLINIC | Age: 84
DRG: 698 | End: 2019-01-11
Attending: UROLOGY
Payer: COMMERCIAL

## 2019-01-11 ENCOUNTER — APPOINTMENT (OUTPATIENT)
Dept: ULTRASOUND IMAGING | Facility: CLINIC | Age: 84
DRG: 698 | End: 2019-01-11
Attending: UROLOGY
Payer: COMMERCIAL

## 2019-01-11 PROCEDURE — T1013 SIGN LANG/ORAL INTERPRETER: HCPCS | Mod: U3

## 2019-01-11 PROCEDURE — 25000132 ZZH RX MED GY IP 250 OP 250 PS 637: Performed by: HOSPITALIST

## 2019-01-11 PROCEDURE — 25000128 H RX IP 250 OP 636: Performed by: HOSPITALIST

## 2019-01-11 PROCEDURE — 25000132 ZZH RX MED GY IP 250 OP 250 PS 637: Performed by: INTERNAL MEDICINE

## 2019-01-11 PROCEDURE — 25000128 H RX IP 250 OP 636: Performed by: INTERNAL MEDICINE

## 2019-01-11 PROCEDURE — 40000915 ZZH STATISTIC SITTER, EVENING HOURS

## 2019-01-11 PROCEDURE — 76770 US EXAM ABDO BACK WALL COMP: CPT

## 2019-01-11 PROCEDURE — 99232 SBSQ HOSP IP/OBS MODERATE 35: CPT | Performed by: INTERNAL MEDICINE

## 2019-01-11 PROCEDURE — 12000000 ZZH R&B MED SURG/OB

## 2019-01-11 PROCEDURE — 40000225 ZZH STATISTIC SLP WARD VISIT

## 2019-01-11 PROCEDURE — 40000914 ZZH STATISTIC SITTER, DAY HOURS

## 2019-01-11 PROCEDURE — 92610 EVALUATE SWALLOWING FUNCTION: CPT | Mod: GN

## 2019-01-11 RX ADMIN — ACETAMINOPHEN 650 MG: 325 TABLET, FILM COATED ORAL at 01:38

## 2019-01-11 RX ADMIN — TAZOBACTAM SODIUM AND PIPERACILLIN SODIUM 2.25 G: 250; 2 INJECTION, SOLUTION INTRAVENOUS at 02:44

## 2019-01-11 RX ADMIN — SODIUM CHLORIDE: 9 INJECTION, SOLUTION INTRAVENOUS at 06:51

## 2019-01-11 RX ADMIN — MELATONIN TAB 3 MG 3 MG: 3 TAB at 22:36

## 2019-01-11 RX ADMIN — TAZOBACTAM SODIUM AND PIPERACILLIN SODIUM 2.25 G: 250; 2 INJECTION, SOLUTION INTRAVENOUS at 20:10

## 2019-01-11 RX ADMIN — TAZOBACTAM SODIUM AND PIPERACILLIN SODIUM 2.25 G: 250; 2 INJECTION, SOLUTION INTRAVENOUS at 15:00

## 2019-01-11 RX ADMIN — FINASTERIDE 5 MG: 5 TABLET, FILM COATED ORAL at 08:42

## 2019-01-11 RX ADMIN — CIPROFLOXACIN 400 MG: 2 INJECTION, SOLUTION INTRAVENOUS at 18:02

## 2019-01-11 RX ADMIN — ACETAMINOPHEN 650 MG: 325 TABLET, FILM COATED ORAL at 18:04

## 2019-01-11 RX ADMIN — CARVEDILOL 12.5 MG: 12.5 TABLET, FILM COATED ORAL at 08:42

## 2019-01-11 RX ADMIN — ACETAMINOPHEN 650 MG: 325 TABLET, FILM COATED ORAL at 08:53

## 2019-01-11 RX ADMIN — TAZOBACTAM SODIUM AND PIPERACILLIN SODIUM 2.25 G: 250; 2 INJECTION, SOLUTION INTRAVENOUS at 08:42

## 2019-01-11 RX ADMIN — ACETAMINOPHEN 650 MG: 325 TABLET, FILM COATED ORAL at 22:36

## 2019-01-11 RX ADMIN — CARVEDILOL 12.5 MG: 12.5 TABLET, FILM COATED ORAL at 18:04

## 2019-01-11 ASSESSMENT — ACTIVITIES OF DAILY LIVING (ADL)
ADLS_ACUITY_SCORE: 17
TOILETING: 0-->INDEPENDENT
ADLS_ACUITY_SCORE: 17
DRESS: 0-->INDEPENDENT
FALL_HISTORY_WITHIN_LAST_SIX_MONTHS: NO
COGNITION: 1 - ATTENTION OR MEMORY DEFICITS
RETIRED_EATING: 0-->INDEPENDENT
ADLS_ACUITY_SCORE: 17
SWALLOWING: 2-->DIFFICULTY SWALLOWING LIQUIDS/FOODS
RETIRED_COMMUNICATION: 0-->UNDERSTANDS/COMMUNICATES WITHOUT DIFFICULTY
AMBULATION: 0-->INDEPENDENT
ADLS_ACUITY_SCORE: 17
TRANSFERRING: 0-->INDEPENDENT
BATHING: 0-->INDEPENDENT
ADLS_ACUITY_SCORE: 11
ADLS_ACUITY_SCORE: 17

## 2019-01-11 ASSESSMENT — MIFFLIN-ST. JEOR: SCORE: 1379.33

## 2019-01-11 NOTE — PROGRESS NOTES
Infection Prevention:    Contact precautions discontinued, standard precautions sufficient. CRE flagged removed, error in flagging process. Confirmed with IDDL that urine culture from 6/2/18 was not CRE. Please contact Infection Prevention with any questions/concerns at *[phone].    Leanna Lino, ICP

## 2019-01-11 NOTE — PROGRESS NOTES
Discharge Planner   Discharge Plans in progress: Aware of consult for discharge planning.   Barriers to discharge plan: Family and interpretor not present  Left message for family   Follow up plan: Per MD pt, may need IV antibiotics post discharge> Will get benefit check prior to the weekend        Entered by: Corinne C. White 01/11/2019 9:25 AM

## 2019-01-11 NOTE — PROGRESS NOTES
Clinical Swallow Evaluation:      01/11/19 1400   General Information   Onset Date 01/09/19   Start of Care Date 01/11/19   Referring Physician Rashad Acosta MD   Patient/Family Goals Statement confused, unable to state   Swallowing Evaluation Bedside swallow evaluation   Behaviorial Observations Alert;Confused;Distractible;Impulsive   Mode of current nutrition Oral diet   Type of oral diet Regular  (have been thickening some liquids)   Respiratory Status Room air   Comments [facility] and is currently being treated for severe sepsis secondary to UTI/sarmiento catheter infection, chronic CHF and acute renal failure/CKD stage lll. His chest x-ray was clear. Temps have been stable, has not required supplemental 02. Pt does have an extensive hx of dysphagia with all notes in 2014/2015 however per family (son, pt lives with him at baseline), pt has been tolerating a regular solids, nectar thick liquid diet.    Clinical Swallow Evaluation   Oral Musculature generally intact   Structural Abnormalities none present   Dentition upper dentures;lower dentures   Mucosal Quality good   Mandibular Strength and Mobility intact   Oral Labial Strength and Mobility WFL   Lingual Strength and Mobility WFL   Velar Elevation intact   Buccal Strength and Mobility intact   Laryngeal Function Swallow;Voicing initiated;Dry swallow palpated   Additional Documentation Yes   Clinical Swallow Eval: Nectar Thick Liquid Texture Trial   Mode of Presentation, Nectar cup;self-fed   Volume of Nectar Presented 8 oz   Oral Phase, Nectar Premature pharyngeal entry   Pharyngeal Phase, [place] intact   Diagnostic Statement no overt signs/sx aspriation noted   Clinical Swallow Eval: Puree Solid Texture Trial   Mode of Presentation, Puree spoon   Volume of Puree Presented x2 bites   Oral Phase, Puree WFL   Pharyngeal Phase, Puree intact   Diagnostic Statement no overt signs/sx aspiration noted   Clinical Swallow Eval: Solid Food Texture  Trial   Mode of Presentation, Solid self-fed   Volume of Solid Food Presented austin crackchristopher    Oral Phase, Solid (mild prolonged mast, oral residue clearing with liquid)   Pharyngeal Phase, Solid intact   Diagnostic Statement no overt signs/sx aspiration noted   Esophageal Phase of Swallow   Patient reports or presents with symptoms of esophageal dysphagia No   Swallow Eval: Clinical Impressions   Skilled Criteria for Therapy Intervention Current level of function same as previous level of function   Functional Assessment Scale (FAS) 5   Treatment Diagnosis mild oropharyngeal dysphagia   Diet texture recommendations Regular diet;Nectar thick liquids  (chose softer as needed, no straws)   Recommended Feeding/Eating Techniques alternate between small bites and sips of food/liquid;maintain upright posture during/after eating for 30 mins;no straws;small sips/bites   Predicted Duration of Therapy Intervention (days/wks) eval and d/c   Anticipated Discharge Disposition home   Risks and Benefits of Treatment have been explained. Yes   Patient, family and/or staff in agreement with Plan of Care Yes   Clinical Impression Comments Clinical swallow evaluation completed at bedside with in person . Pt alert, cooperative however confused and distractible. Pt currently presents with minimal-mild oropharyngeal dysphagia, baseline diet per son is general solids/nectar thick liquids. Oromotor evaluaiton WFL, dentures in place. Pt assessed with nectar thick liquids, puree solids, general solids. Mastication is mildly prolonged but complete, mild diffuse oral residuals with dry general item however clearing with liquid wash. Suspect mild premature spillage with nectar thick liquids, typical for age. Hyolaryngeal ROM robust to palpation. No overt signs/sx aspiration noted. Recommend: continue general solids (softer as needed) and nectar thick liquids with close supervision with strategies: slow pace, small  bites/sips, liquid washes, upright for all PO. Overall, pt's lungs have been clear, no PNA, clinically tolerating baseline diet - appears to be functioning at baseline - no further speech intervention indicated. Will complete orders. Please reconsult if concerns arise.    Total Evaluation Time   Total Evaluation Time (Minutes) 40

## 2019-01-11 NOTE — PLAN OF CARE
Discharge Planner SLP   Patient plan for discharge: did not discuss  Current status: Clinical swallow evaluation completed at bedside with in person . Pt alert, cooperative however confused and distractible. Pt currently presents with minimal-mild oropharyngeal dysphagia, baseline diet per son is general solids/nectar thick liquids. Oromotor evaluaiton WFL, dentures in place. Pt assessed with nectar thick liquids, puree solids, general solids. Mastication is mildly prolonged but complete, mild diffuse oral residuals with dry general item however clearing with liquid wash. Suspect mild premature spillage with nectar thick liquids, typical for age. Hyolaryngeal ROM robust to palpation. No overt signs/sx aspiration noted.     Recommend: continue general solids (softer as needed) and nectar thick liquids with close supervision with strategies: slow pace, small bites/sips, liquid washes, upright for all PO. Medications whole with nectar thick liquids or puree.     Overall, pt's lungs have been clear, no PNA, clinically tolerating baseline diet - appears to be functioning at baseline - no further speech intervention indicated. Will complete orders. Please reconsult if concerns arise.     Barriers to return to prior living situation: none per speech therapy   Recommendations for discharge: defer to medical team and PT/OT  Rationale for recommendations: discharge swallow intervention as pt functioning at baseline, no acute concerns re: tolerance.        Entered by: Dorothy Manrique 01/11/2019 2:12 PM

## 2019-01-11 NOTE — PROGRESS NOTES
[facility]  Hospitalist Progress Note  Rashad Acosta MD 01/11/2019    Reason for Stay (Diagnosis): Sepsis secondary to urinary tract infection         Assessment and Plan:      Summary of Stay: Barrie Woods is a 88 year old male with history of CVA, dementia, chronic kidney disease, A. fib, cardiomyopathy, congestive heart failure, benign prostatic hypertrophy, renal cell carcinoma, who was scheduled for elective TURP yesterday and found to have urosepsis and admitted to the hospital on 1/9/2019.    Problem List:   1. Severe sepsis secondary to urinary tract infection.  -This is due to indwelling Youssef catheter infection  -Urine culture grew pseudomonas aeruginosa and Enterobacter aerogenes, sensitivity pending,  -Monitor CBC and BMP.  -Patient has previous history of CRE per report, no documentation found.  -Currently on ciprofloxacin and Zosyn.  -Infectious disease and urology consulted.  -The timing of TURP per urology recommendation  -Renal ultrasound ordered to rule out obstruction and hydronephrosis    2. Chronic diastolic congestive heart failure.  Last echo done in 2017 showed EF of 50-55 % with indeterminate left ventricular diastolic function.   -Monitor input and output  -Daily weight.  -Continue Coreg.  -Stop IV fluid as there is risk of fluid overload.  -Monitor respiratory status    3. Acute renal failure on chronic kidney disease stage III.  -Creatinine improved from 2.58 to 2.18, creatinine was 1.25, about 4 months ago  -BMP ordered for tomorrow.  -Continue to hold ACE inhibitor and diuretics for now.  -He has hyperkalemia on presentation now resolved, which is likely due to renal failure and lisinopril.    4. Chronic arterial fibrillation.  -Continue to monitor.  -Continue Coreg  -Patient is not on full anticoagulation  -We will leave this to primary care provider to address after his procedure done.    5. History of left renal mass suspected due to neoplasm.  Urology  "following      DVT Prophylaxis: Pneumatic Compression Devices  Code Status: Full Code  Discharge Dispo: Home  Estimated Disch Date / # of Days until Disch: Expect hospital stay for at least 2-3 more days, on IV antibiotic pending cultures.        Interval History (Subjective):      Patient seen and examined, assumed care today, feels better, used professional  to discuss with the patient, stated he feels better, does not give details.                  Physical Exam:      Last Vital Signs:  /69   Pulse 94   Temp 97.4  F (36.3  C) (Axillary)   Resp 16   Ht 1.676 m (5' 6\")   Wt 76.7 kg (169 lb)   SpO2 96%   BMI 27.28 kg/m      I/O last 3 completed shifts:  In: 2641 [P.O.:420; I.V.:2221]  Out: 800 [Urine:800]  Vitals:    01/07/19 1223 01/09/19 1149 01/09/19 1710 01/10/19 0618   Weight: 64.9 kg (143 lb) 74.4 kg (164 lb) 72 kg (158 lb 11.2 oz) 74.1 kg (163 lb 4.8 oz)    01/11/19 0528   Weight: 76.7 kg (169 lb)     Current Facility-Administered Medications   Medication     acetaminophen (TYLENOL) tablet 650 mg    Or     acetaminophen (TYLENOL) Suppository 650 mg     albuterol (PROVENTIL) neb solution 2.5 mg     bisacodyl (DULCOLAX) Suppository 10 mg     carvedilol (COREG) tablet 12.5 mg     ciprofloxacin (CIPRO) infusion 400 mg     finasteride (PROSCAR) tablet 5 mg     HYDROmorphone (DILAUDID) injection 0.3-0.5 mg     lidocaine (LMX4) cream     lidocaine 1 % 1 mL     melatonin tablet 3 mg     naloxone (NARCAN) injection 0.1-0.4 mg     nicotine (NICOTROL) Inhaler 1 Cartridge     ondansetron (ZOFRAN-ODT) ODT tab 4 mg    Or     ondansetron (ZOFRAN) injection 4 mg     ORAL Pain Medications -  may administer as ordered by surgeon for take home use     piperacillin-tazobactam (ZOSYN) infusion 2.25 g     polyethylene glycol (MIRALAX/GLYCOLAX) Packet 17 g     prochlorperazine (COMPAZINE) injection 5 mg     senna-docusate (SENOKOT-S/PERICOLACE) 8.6-50 MG per tablet 1 tablet    Or     senna-docusate " (SENOKOT-S/PERICOLACE) 8.6-50 MG per tablet 2 tablet     sodium chloride (PF) 0.9% PF flush 3 mL     sodium chloride (PF) 0.9% PF flush 3 mL       Constitutional: Awake, alert, cooperative, no apparent distress   Respiratory: Clear to auscultation bilaterally, no crackles or wheezing   Cardiovascular: Regular rate and rhythm, normal S1 and S2, and no murmur noted   Abdomen: Normal bowel sounds, soft, non-distended, non-tender   Skin: No rashes, no cyanosis, dry to touch   Neuro: Alert and oriented x3, no weakness, numbness, memory loss   Extremities: No edema, normal range of motion   Other(s):HEENT Pink, nonicteric, moist oral mucosa       All other systems: Negative          Medications:      All current medications were reviewed with changes reflected in problem list.         Data:      All new lab and imaging data was reviewed.   Labs:  Recent Labs   Lab 01/09/19  1815 01/09/19  1800   CULT >100,000 colonies/mL  Pseudomonas aeruginosa  Susceptibility testing in progress  *  10,000 to 50,000 colonies/mL  Klebsiella (Enterobacter) aerogenes  Susceptibility testing in progress  *  No growth after 2 days No growth after 2 days     Recent Labs   Lab 01/10/19  0650 01/09/19  1815 01/09/19  1258    141 141   POTASSIUM 4.6 4.6 5.9*   CHLORIDE 111* 108 109   CO2 24 28 27   ANIONGAP 7 5 5   * 115* 149*   BUN 43* 44* 45*   CR 2.18* 2.40* 2.58*   GFRESTIMATED 26* 23* 21*   GFRESTBLACK 30* 27* 24*   JOSE 7.8* 8.8 9.1     Recent Labs   Lab 01/10/19  0650 01/09/19  1815 01/09/19  1258   WBC 20.2* 25.1* 25.1*   HGB 9.4* 11.7* 11.3*   HCT 31.2* 37.3* 36.7*   MCV 92 90 91    226 217     Recent Labs   Lab 01/10/19  0650 01/09/19  1815 01/09/19  1258 01/09/19  1211   * 115* 149*  --    BGM  --   --   --  142*      Imaging:   Results for orders placed or performed during the hospital encounter of 01/09/19   XR Chest 2 Views    Narrative    CHEST TWO VIEWS 1/9/2019 7:57 PM     HISTORY: Fever, evaluate for  PNA.    COMPARISON: January 9, 2019       Impression    IMPRESSION: Multiple rib deformities again noted and stable. No  definite acute infiltrates compared to previous.    NIKKI GUO MD     *Note: Due to a large number of results and/or encounters for the requested time period, some results have not been displayed. A complete set of results can be found in Results Review.

## 2019-01-11 NOTE — PLAN OF CARE
Speech therapy orders received, chart reviewed, pt known to speech therapy services in past. Discussed with RN who reports  is not present, pt recently finished up breakfast and did not want to eat any more. She reports family stated he was on a regular/nectar thick diet and has been tolerated however nursing noted coughing with pills and with thin liquid water prior day warranting speech consult. Will re-attempt later this date when  present, left pager for RN for if family arise too for more information.

## 2019-01-11 NOTE — PLAN OF CARE
Patient up A-1 with a gait belt and walker. LS diminished. Good appetite on regular diet, thick liquids-per family. BS active. Had two loose, watery BMs this shift. Youssef catheter in place. Ouput: 275cc. Scrotal edema.  VPM in place. Receiving IV Cipro and Zosyn. Discharge TBD. Lives with son. Nursing will continue to monitor.    Vital signs:  Temp: 97.8  F (36.6  C) Temp src: Oral BP: 136/47 Pulse: 97 Heart Rate: 100 Resp: 24 SpO2: 100 % O2 Device: None (Room air)

## 2019-01-11 NOTE — CONSULTS
Care Transition Initial Assessment - SW     Met with: Family  Spoke with Son Jose  Active Problems:    Sepsis (H)       DATA  Lives With: child(eden), adult . Per son, pt has family around to care for him all day         Description of Support System: Supportive, Involved  Who is your support system?: Children  Support Assessment: Adequate family and caregiver support, Adequate social supports. Family deny need for support at home  Identified issues/concerns regarding health management: Admitted due to sepsis, needs a urology procedure when infection clears     Resources List: Home Infusion   Per [facility] pt would be covered at 100% if needs home IV  Today pt is still on IV meds every 6 hours. FAMILY Would need       ASSESSMENT  Cognitive Status:  Spoke with family as pt does not speak English and has Dementia   Concerns to be addressed: Pt has a home walker but does not generally use this support. Per family they are there to provide support and care. Jose is aware that pt may need IV antibiotics once home. He thinks they be able to support this.  SW not clear if they would be able to provide support if IV is needed every 6 hours.      PLAN  Once ID/ can define a plan for IV needs at d.c, SW can discuss with family about the amount of support pt would need.   PT has coverage for home Infusion and would also have coverage for TCU if necessary   Family prefer to take pt home

## 2019-01-11 NOTE — PLAN OF CARE
Pt Yak[place] speaking, language barrier.  BP's soft. Tmax 99.8, noted to be moaning & groaning, tylenol given.   On Tele: Afib w/CVR, freq PVC's, couplets (80-90's).   Regular diet, thickened liquids per family.  On VPM monitoring, set off alarms x3.   Appears restless, pointing @ PCD's, removed.  Scrotal & penile edema/discoloration Whitish drainage from meatus. Cares completed, scrotal area elevated/supported, ice applied intermittently.  Youssef patent, urine cloudy, audra.  Up w/1 assist  IVF NS @ 75/hr.  Consults: Urology & ID. PT/OT/SW following.    services scheduled @  0800.

## 2019-01-11 NOTE — PLAN OF CARE
Discharge Planner SLP   Patient plan for discharge: did not discuss  Current status: Clinical swallow evaluation completed at bedside with in person . Pt alert, cooperative however confused and distractible. Pt currently presents with minimal-mild oropharyngeal dysphagia, baseline diet per son is general solids/nectar thick liquids. Oromotor evaluaiton WFL, dentures in place. Pt assessed with nectar thick liquids, puree solids, general solids. Mastication is mildly prolonged but complete, mild diffuse oral residuals with dry general item however clearing with liquid wash. Suspect mild premature spillage with nectar thick liquids, typical for age. Hyolaryngeal ROM robust to palpation. No overt signs/sx aspiration noted.     Recommend: continue general solids (softer as needed) and nectar thick liquids (no straws) with close supervision with strategies: slow pace, small bites/sips, liquid washes, upright for all PO. Medications whole with nectar thick liquids or puree.     Overall, pt's lungs have been clear, no PNA, clinically tolerating baseline diet - appears to be functioning at baseline - no further speech intervention indicated. Will complete orders. Please reconsult if concerns arise.     Barriers to return to prior living situation: none per speech therapy   Recommendations for discharge: defer to medical team and PT/OT  Rationale for recommendations: discharge swallow intervention as pt functioning at baseline, no acute concerns re: tolerance.        Entered by: Dorothy Manrique 01/11/2019 2:15 PM

## 2019-01-11 NOTE — PLAN OF CARE
Pt remains admitted for UTI  A/O to self, confused  Georgian speaking, difficult to understand even with   Pain in scrotum noted,PO tylenol given x1 with relief  No nausea observed  On IV zosyn and cipro  Youssef in place, audra output, discharge noted  Edema in penis and scrotum noted  Pt removed IV, new IV placed  Renal ultrasound performed during shift  Sitter present in room   Incontinent of BM x2 during shift  On tele, afib with CVR and PVCs  Up x1 with walker and gaitbelt  Regular diet with nectar thick liquids per speech  Discharge pending  Will continue to monitor

## 2019-01-11 NOTE — PROGRESS NOTES
"[facility]  Infectious Disease Progress Note          Assessment and Plan:   IMPRESSION:   1.  An 88-year-old male with acute sepsis, presumed urosepsis, positive urine culture for gram-negative cullen, full information to follow, looks improved on antibiotics, chronic Youssef catheter in place.   2.  Prior flagged history of carbapenem-resistant Enterobacteriaceae, but in looking in the cultures I see nothing for that.  It appears to be a false flag and we will have Infection Control investigate this and remove the flag if we are able to.   3 Prior treated latent TB  4 cardiomyopathy  5 renal insuff     REc 1 continue zosyn/cipro adjust to final cx  2 See no CRE in cxs prior, investigate and remove flag if not correct  3 Ongoing renal insuff ? US make sure no abscess/obstruction               Interval History:   no new complaints and doing well; no cp, sob, n/v/d, or abd pain. T down WBc still 20 k, cxs pseudo and enterobacter              Medications:       carvedilol  12.5 mg Oral BID w/meals     ciprofloxacin  400 mg Intravenous Q24H     finasteride  5 mg Oral Daily     piperacillin-tazobactam  2.25 g Intravenous Q6H     sodium chloride (PF)  3 mL Intracatheter Q8H                  Physical Exam:   Blood pressure 132/69, pulse 94, temperature 97.4  F (36.3  C), temperature source Axillary, resp. rate 16, height 1.676 m (5' 6\"), weight 76.7 kg (169 lb), SpO2 96 %.  Wt Readings from Last 2 Encounters:   01/11/19 76.7 kg (169 lb)   12/28/18 64.9 kg (143 lb)     Vital Signs with Ranges  Temp:  [97.4  F (36.3  C)-99.8  F (37.7  C)] 97.4  F (36.3  C)  Pulse:  [94-97] 94  Heart Rate:  [] 93  Resp:  [16] 16  BP: (132-152)/(47-74) 132/69  SpO2:  [96 %-100 %] 96 %    Constitutional: Awake, alert, cooperative, no apparent distress   Lungs: Clear to auscultation bilaterally, no crackles or wheezing   Cardiovascular: Regular rate and rhythm, normal S1 and S2, and no murmur noted   Abdomen: Normal " bowel sounds, soft, non-distended, non-tender   Skin: No rashes, no cyanosis, no edema   Other:           Data:   All microbiology laboratory data reviewed.  Recent Labs   Lab Test 01/10/19  0650 01/09/19  1815 01/09/19  1258   WBC 20.2* 25.1* 25.1*   HGB 9.4* 11.7* 11.3*   HCT 31.2* 37.3* 36.7*   MCV 92 90 91    226 217     Recent Labs   Lab Test 01/10/19  0650 01/09/19  1815 01/09/19  1258   CR 2.18* 2.40* 2.58*     No lab results found.  Recent Labs   Lab Test 01/09/19  1815 01/09/19  1800 07/25/18  1032 06/02/18  2000 04/26/17  1255 04/26/17  1050 04/26/17  1018 09/08/16  1710 08/31/15  1827   CULT >100,000 colonies/mL  Pseudomonas aeruginosa  Susceptibility testing in progress  *  10,000 to 50,000 colonies/mL  Klebsiella (Enterobacter) aerogenes  Susceptibility testing in progress  *  No growth after 2 days No growth after 2 days >100,000 colonies/mL  Enterobacter cloacae complex  * 10,000 to 50,000 colonies/mL  Enterobacter cloacae complex  * <10,000 colonies/mL Coagulase negative Staphylococcus* No growth Cultured on the 1st day of incubation: Staphylococcus epidermidis This isolate   DOES NOT demonstrate inducible clindamycin resistance in vitro. Clindamycin is   susceptible and could be used when indicated, however, erythromycin is   resistant and should not be used.  Critical Value/Significant Value, preliminary result only, called to and read   back by Crys Pina RN at 1435 on 4/27/17 by TISH.  (Note)  POSITIVE for STAPHYLOCOCCUS EPIDERMIDIS and POSITIVE for the mecA  gene (resistant to methicillin) by [employer] multiplex nucleic acid  test. Final identification and antimicrobial susceptibility testing  will be verified by standard methods.    Specimen tested with [employer] multiplex, gram-positive blood culture  nucleic acid test for the following targets: Staph aureus, Staph  epidermidis, Staph lugdunensis, other Staph species, Enterococcus  faecalis, Enterococcus faecium, Streptococcus species,  S. agalactiae,  S. anginosus grp., S. pneumoniae, S. pyogenes, Listeria sp., mecA  (methi cillin resistance) and Melvin/B (vancomycin resistance).    Critical Value/Significant Value called to and read back by Helen Merino rn @3970 4/27/17. ct  * >100,000 colonies/mL mixed urogenital colby  Susceptibility testing not routinely done   <10,000 colonies/mL mixed urogenital colby  Susceptibility testing not routinely done

## 2019-01-12 ENCOUNTER — APPOINTMENT (OUTPATIENT)
Dept: PHYSICAL THERAPY | Facility: CLINIC | Age: 84
DRG: 698 | End: 2019-01-12
Attending: UROLOGY
Payer: COMMERCIAL

## 2019-01-12 LAB
ANION GAP SERPL CALCULATED.3IONS-SCNC: 6 MMOL/L (ref 3–14)
BACTERIA SPEC CULT: ABNORMAL
BACTERIA SPEC CULT: ABNORMAL
BUN SERPL-MCNC: 33 MG/DL (ref 7–30)
CALCIUM SERPL-MCNC: 8 MG/DL (ref 8.5–10.1)
CHLORIDE SERPL-SCNC: 110 MMOL/L (ref 94–109)
CO2 SERPL-SCNC: 23 MMOL/L (ref 20–32)
CREAT SERPL-MCNC: 1.57 MG/DL (ref 0.66–1.25)
ERYTHROCYTE [DISTWIDTH] IN BLOOD BY AUTOMATED COUNT: 15.6 % (ref 10–15)
GFR SERPL CREATININE-BSD FRML MDRD: 39 ML/MIN/{1.73_M2}
GLUCOSE SERPL-MCNC: 124 MG/DL (ref 70–99)
HCT VFR BLD AUTO: 33.2 % (ref 40–53)
HGB BLD-MCNC: 10.1 G/DL (ref 13.3–17.7)
LACTATE BLD-SCNC: 1.2 MMOL/L (ref 0.7–2)
Lab: ABNORMAL
MCH RBC QN AUTO: 27.4 PG (ref 26.5–33)
MCHC RBC AUTO-ENTMCNC: 30.4 G/DL (ref 31.5–36.5)
MCV RBC AUTO: 90 FL (ref 78–100)
PLATELET # BLD AUTO: 214 10E9/L (ref 150–450)
POTASSIUM SERPL-SCNC: 4 MMOL/L (ref 3.4–5.3)
RBC # BLD AUTO: 3.69 10E12/L (ref 4.4–5.9)
SODIUM SERPL-SCNC: 139 MMOL/L (ref 133–144)
SPECIMEN SOURCE: ABNORMAL
WBC # BLD AUTO: 13.4 10E9/L (ref 4–11)

## 2019-01-12 PROCEDURE — 83605 ASSAY OF LACTIC ACID: CPT | Performed by: INTERNAL MEDICINE

## 2019-01-12 PROCEDURE — 25000132 ZZH RX MED GY IP 250 OP 250 PS 637: Performed by: INTERNAL MEDICINE

## 2019-01-12 PROCEDURE — 12000000 ZZH R&B MED SURG/OB

## 2019-01-12 PROCEDURE — 80048 BASIC METABOLIC PNL TOTAL CA: CPT | Performed by: INTERNAL MEDICINE

## 2019-01-12 PROCEDURE — 99232 SBSQ HOSP IP/OBS MODERATE 35: CPT | Performed by: INTERNAL MEDICINE

## 2019-01-12 PROCEDURE — 40000914 ZZH STATISTIC SITTER, DAY HOURS

## 2019-01-12 PROCEDURE — 85027 COMPLETE CBC AUTOMATED: CPT | Performed by: INTERNAL MEDICINE

## 2019-01-12 PROCEDURE — 40000915 ZZH STATISTIC SITTER, EVENING HOURS

## 2019-01-12 PROCEDURE — 40000141 ZZH STATISTIC PERIPHERAL IV START W/O US GUIDANCE

## 2019-01-12 PROCEDURE — 99207 ZZC CDG-MDM COMPONENT: MEETS LOW - DOWN CODED: CPT | Performed by: INTERNAL MEDICINE

## 2019-01-12 PROCEDURE — 97161 PT EVAL LOW COMPLEX 20 MIN: CPT | Mod: GP | Performed by: PHYSICAL THERAPIST

## 2019-01-12 PROCEDURE — 25000128 H RX IP 250 OP 636: Performed by: HOSPITALIST

## 2019-01-12 PROCEDURE — 25000132 ZZH RX MED GY IP 250 OP 250 PS 637: Performed by: HOSPITALIST

## 2019-01-12 PROCEDURE — 36415 COLL VENOUS BLD VENIPUNCTURE: CPT | Performed by: INTERNAL MEDICINE

## 2019-01-12 PROCEDURE — 40000193 ZZH STATISTIC PT WARD VISIT: Performed by: PHYSICAL THERAPIST

## 2019-01-12 RX ORDER — PREGABALIN 100 MG/1
100 CAPSULE ORAL 2 TIMES DAILY
Status: DISCONTINUED | OUTPATIENT
Start: 2019-01-12 | End: 2019-01-17

## 2019-01-12 RX ORDER — HYDROCODONE BITARTRATE AND ACETAMINOPHEN 5; 325 MG/1; MG/1
1 TABLET ORAL EVERY 6 HOURS PRN
Status: DISCONTINUED | OUTPATIENT
Start: 2019-01-12 | End: 2019-01-17

## 2019-01-12 RX ADMIN — TAZOBACTAM SODIUM AND PIPERACILLIN SODIUM 2.25 G: 250; 2 INJECTION, SOLUTION INTRAVENOUS at 10:40

## 2019-01-12 RX ADMIN — FINASTERIDE 5 MG: 5 TABLET, FILM COATED ORAL at 08:57

## 2019-01-12 RX ADMIN — MELATONIN TAB 3 MG 3 MG: 3 TAB at 22:01

## 2019-01-12 RX ADMIN — Medication 12.5 MG: at 20:46

## 2019-01-12 RX ADMIN — NICOTINE 1 CARTRIDGE: 4 INHALANT RESPIRATORY (INHALATION) at 12:42

## 2019-01-12 RX ADMIN — Medication 12.5 MG: at 12:43

## 2019-01-12 RX ADMIN — CIPROFLOXACIN 400 MG: 2 INJECTION, SOLUTION INTRAVENOUS at 20:02

## 2019-01-12 RX ADMIN — ACETAMINOPHEN 650 MG: 325 TABLET, FILM COATED ORAL at 12:42

## 2019-01-12 RX ADMIN — HYDROCODONE BITARTRATE AND ACETAMINOPHEN 1 TABLET: 5; 325 TABLET ORAL at 17:49

## 2019-01-12 RX ADMIN — TAZOBACTAM SODIUM AND PIPERACILLIN SODIUM 2.25 G: 250; 2 INJECTION, SOLUTION INTRAVENOUS at 17:47

## 2019-01-12 RX ADMIN — PREGABALIN 100 MG: 100 CAPSULE ORAL at 20:46

## 2019-01-12 RX ADMIN — TAZOBACTAM SODIUM AND PIPERACILLIN SODIUM 2.25 G: 250; 2 INJECTION, SOLUTION INTRAVENOUS at 03:16

## 2019-01-12 RX ADMIN — CARVEDILOL 12.5 MG: 12.5 TABLET, FILM COATED ORAL at 08:56

## 2019-01-12 RX ADMIN — CARVEDILOL 12.5 MG: 12.5 TABLET, FILM COATED ORAL at 17:48

## 2019-01-12 RX ADMIN — Medication 12.5 MG: at 22:01

## 2019-01-12 RX ADMIN — TAZOBACTAM SODIUM AND PIPERACILLIN SODIUM 2.25 G: 250; 2 INJECTION, SOLUTION INTRAVENOUS at 23:38

## 2019-01-12 ASSESSMENT — ACTIVITIES OF DAILY LIVING (ADL)
ADLS_ACUITY_SCORE: 11
ADLS_ACUITY_SCORE: 16
ADLS_ACUITY_SCORE: 11
ADLS_ACUITY_SCORE: 11
ADLS_ACUITY_SCORE: 16
ADLS_ACUITY_SCORE: 16

## 2019-01-12 ASSESSMENT — MIFFLIN-ST. JEOR: SCORE: 1375.7

## 2019-01-12 NOTE — PROGRESS NOTES
"   01/12/19 0900   Quick Adds   Type of Visit Initial PT Evaluation   Living Environment   Lives With child(eden), adult   Living Environment Comment appears pt resides with family, unclear home set up, no family present.  Per SW, \"Per son, pt has family around to care for him all day \"   Self-Care   Usual Activity Tolerance (unclear)   Current Activity Tolerance fair   Equipment Currently Used at Home none   Activity/Exercise/Self-Care Comment Pt states he does not use a walker at baseline. Appears pt has good family support, they are available to provide support/assistance prn, per SW consult.   Functional Level Prior   Ambulation 0-->independent   Transferring 0-->independent   Cognition 1 - attention or memory deficits   Fall history within last six months no   Which of the above functional risks had a recent onset or change? ambulation;transferring;toileting;bathing;cognition   Prior Functional Level Comment Unclear PLOF   General Information   Onset of Illness/Injury or Date of Surgery - Date 01/09/19   Referring Physician Guzman Caldwell,    Patient/Family Goals Statement per SW, anticipated return home with family support/assistance   Pertinent History of Current Problem (include personal factors and/or comorbidities that impact the POC) Barrie Woods is a 88 year old male with history of CVA, dementia, chronic kidney disease, A. fib, cardiomyopathy, congestive heart failure, benign prostatic hypertrophy, renal cell carcinoma, who was scheduled for elective TURP and found to have urosepsis and admitted to the hospital on 1/9/2019.     Precautions/Limitations fall precautions   General Observations pt resting in bed, sitter at bedside,  present.  Scrotal edema reported by RN.   Cognitive Status Examination   Orientation person   Level of Consciousness confused   Follows Commands and Answers Questions 50% of the time;25% of the time   Personal Safety and Judgment impaired   Memory impaired " "  Cognitive Comment alert to self only, refusing to participate with much of assessment   Pain Assessment   Patient Currently in Pain Yes, see Vital Sign flowsheet  (\"pain all over\", scrotal edema reported by RN)   Integumentary/Edema   Integumentary/Edema Comments scrotal edema reported  by RN, mild edema noted B lower legs at ankles/feet   Posture    Posture Forward head position   Range of Motion (ROM)   ROM Comment WFL BLEs, increased pain reported in bed/hips   Strength   Strength Comments pt does not follow commands for formal MMT testing, but is able to demo antigravity strength BLEs, functional strength available for standing/ambulation skills.  Anticipate generalized weakness/deconditioning given current illness and age.   Bed Mobility   Bed Mobility Comments Needed min A supine > sit, limited by confusion and \"pain all over\"   Transfer Skills   Transfer Comments Sit <> stand transfers min A with hand hold support, limited by confusion and \"pain all over\"   Gait   Gait Comments Amb in room with single hand hold support for balance, CGA x 20ft, pt refused to amb further currently. Limited by confusion and \"pain all over\".  Fair walking stability, refuses to use FWW.   Balance   Balance Comments Fair standing balance/gait stability, CGA for safety.  Refuses to use FWW.   Clinical Impression   Criteria for Skilled Therapeutic Intervention evaluation only;no problems identified which require skilled intervention;no significant expected improvement in functional status;patient/family refuse skilled intervention at this time   PT Diagnosis Impaired functional mobility   Influenced by the following impairments pain, weakness, impaired balance, confusion   Functional limitations due to impairments Impaired gait, transfers, bed mobility   Clinical Presentation Stable/Uncomplicated   Clinical Presentation Rationale medical status, PLOF, dementia   Clinical Decision Making (Complexity) Low complexity   Anticipated " "Discharge Disposition Home with Assist   Risk & Benefits of therapy have been explained Yes   Patient, Family & other staff in agreement with plan of care Yes   Clinical Impression Comments Unclear how close pt is to his baseline functional status, no family present. Minimal need for skilled therapy intervention at this point, encourage staff to assist pt OOB, ambulate, to chair etc to improve strength.  IP PT will sign off, anticipate pt is close to his baseline, and appears family is able to provide increased support/assistance as needed.  Please reconsult PT if mobility barriers arise.   [employer] TM \"6 Clicks\"   2016, Trustees of [employer], under license to [employer].  All rights reserved.   6 Clicks Short Forms Basic Mobility Inpatient Short Form   [facility] AM[facility]  \"6 Clicks\" V.2 Basic Mobility Inpatient Short Form   1. Turning from your back to your side while in a flat bed without using bedrails? 3 - A Little   2. Moving from lying on your back to sitting on the side of a flat bed without using bedrails? 3 - A Little   3. Moving to and from a bed to a chair (including a wheelchair)? 3 - A Little   4. Standing up from a chair using your arms (e.g., wheelchair, or bedside chair)? 3 - A Little   5. To walk in hospital room? 3 - A Little   6. Climbing 3-5 steps with a railing? 3 - A Little   Basic Mobility Raw Score (Score out of 24.Lower scores equate to lower levels of function) 18   Total Evaluation Time   Total Evaluation Time (Minutes) 20     "

## 2019-01-12 NOTE — PROGRESS NOTES
[facility]  Hospitalist Progress Note  Rashad Acosta MD 01/12/2019    Reason for Stay (Diagnosis): Sepsis secondary to urinary tract infection         Assessment and Plan:      Summary of Stay: Barrie Woods is a 88 year old male with history of CVA, dementia, chronic kidney disease, A. fib, cardiomyopathy, congestive heart failure, benign prostatic hypertrophy, renal cell carcinoma, who was scheduled for elective TURP yesterday and found to have urosepsis and admitted to the hospital on 1/9/2019.    Problem List:   1. Severe sepsis secondary to urinary tract infection.  -This is due to indwelling Youssef catheter infection  -Urine culture grew pseudomonas aeruginosa and Enterobacter aerogenes, sensitivity for Pseudomonas pending pending.  Enterobacter is sensitive to ciprofloxacin  -Monitor CBC and BMP.  -Patient has previous history of CRE per report, no documentation found.  -Currently on ciprofloxacin and Zosyn.  -Antibiotic management per infectious disease.  -Urology consulted.  -The timing of TURP per urology recommendation  -Renal ultrasound did not show any sign of obstruction.    2. Chronic diastolic congestive heart failure.  Last echo done in 2017 showed EF of 50-55 % with indeterminate left ventricular diastolic function.   -Monitor input and output  -Daily weight.  -Continue Coreg.  -Stopped IV fluid as there is risk of fluid overload.  -Monitor respiratory status    2. Acute renal failure on chronic kidney disease stage III.  -Creatinine improved from 2.58 to 1.58, it  was 1.25, about 4 months ago  -Creatinine is closer to the baseline.  -Continue to hold ACE inhibitor and diuretics for now.  -He has hyperkalemia on presentation which is currently resolved.  Due to renal failure and lisinopril.    3. Chronic arterial fibrillation.  -Continue to monitor.  -Continue Coreg  -Patient is not on full anticoagulation of bleeding outweighs the benefit at this point  -We will leave this  "to primary care provider to address after his procedure done.    4. History of left renal mass suspected malignancy.  Urology following.  Patient did not want any surgical intervention for this in the past    DVT Prophylaxis: Pneumatic Compression Devices  Code Status: Full Code  Discharge Dispo: Home  Estimated Disch Date / # of Days until Disch: Expect hospital stay for > 2 days, on IV antibiotic pending final culture results, and decision for route and duration of antibiotic and a safe disposition.        Interval History (Subjective):      Patient seen and examined, sitting by the side of the bed, seems to be restless and later on became agitated, used professional  to discuss with the patient, did not give any meaningful history.                  Physical Exam:      Last Vital Signs:  /80 (BP Location: Right arm)   Pulse 96   Temp 98.4  F (36.9  C) (Oral)   Resp 28   Ht 1.676 m (5' 6\")   Wt 76.3 kg (168 lb 3.2 oz)   SpO2 99%   BMI 27.15 kg/m      I/O last 3 completed shifts:  In: 620 [P.O.:620]  Out: 1125 [Urine:1125]  Vitals:    01/09/19 1149 01/09/19 1710 01/10/19 0618 01/11/19 0528   Weight: 74.4 kg (164 lb) 72 kg (158 lb 11.2 oz) 74.1 kg (163 lb 4.8 oz) 76.7 kg (169 lb)    01/12/19 0149   Weight: 76.3 kg (168 lb 3.2 oz)     Current Facility-Administered Medications   Medication     acetaminophen (TYLENOL) tablet 650 mg    Or     acetaminophen (TYLENOL) Suppository 650 mg     albuterol (PROVENTIL) neb solution 2.5 mg     bisacodyl (DULCOLAX) Suppository 10 mg     carvedilol (COREG) tablet 12.5 mg     ciprofloxacin (CIPRO) infusion 400 mg     finasteride (PROSCAR) tablet 5 mg     HYDROmorphone (DILAUDID) injection 0.3-0.5 mg     influenza Vac Split High-Dose (FLUZONE) injection 0.5 mL     lidocaine (LMX4) cream     lidocaine 1 % 1 mL     melatonin tablet 3 mg     naloxone (NARCAN) injection 0.1-0.4 mg     nicotine (NICOTROL) Inhaler 1 Cartridge     ondansetron (ZOFRAN-ODT) ODT tab 4 mg "    Or     ondansetron (ZOFRAN) injection 4 mg     ORAL Pain Medications -  may administer as ordered by surgeon for take home use     piperacillin-tazobactam (ZOSYN) infusion 2.25 g     polyethylene glycol (MIRALAX/GLYCOLAX) Packet 17 g     prochlorperazine (COMPAZINE) injection 5 mg     QUEtiapine (SEROquel) half-tab 12.5 mg     senna-docusate (SENOKOT-S/PERICOLACE) 8.6-50 MG per tablet 1 tablet    Or     senna-docusate (SENOKOT-S/PERICOLACE) 8.6-50 MG per tablet 2 tablet     sodium chloride (PF) 0.9% PF flush 3 mL     sodium chloride (PF) 0.9% PF flush 3 mL       Constitutional: Awake, alert, cooperative, no apparent distress   Respiratory: Clear to auscultation bilaterally, no crackles or wheezing   Cardiovascular: Regular rate and rhythm, normal S1 and S2, and no murmur noted   Abdomen: Normal bowel sounds, soft, non-distended, non-tender   Skin: No rashes, no cyanosis, dry to touch   Neuro: Alert and oriented x3, no weakness, numbness, memory loss   Extremities: No edema, normal range of motion   Other(s):HEENT Pink, nonicteric, moist oral mucosa       All other systems: Negative          Medications:      All current medications were reviewed with changes reflected in problem list.         Data:      All new lab and imaging data was reviewed.   Labs:  Recent Labs   Lab 01/09/19  1815 01/09/19  1800   CULT >100,000 colonies/mL  Pseudomonas aeruginosa  Susceptibility testing in progress  *  10,000 to 50,000 colonies/mL  Klebsiella (Enterobacter) aerogenes  *  No growth after 3 days No growth after 3 days     Recent Labs   Lab 01/12/19  0725 01/10/19  0650 01/09/19  1815    142 141   POTASSIUM 4.0 4.6 4.6   CHLORIDE 110* 111* 108   CO2 23 24 28   ANIONGAP 6 7 5   * 128* 115*   BUN 33* 43* 44*   CR 1.57* 2.18* 2.40*   GFRESTIMATED 39* 26* 23*   GFRESTBLACK 45* 30* 27*   JOSE 8.0* 7.8* 8.8     Recent Labs   Lab 01/12/19  0725 01/10/19  0650 01/09/19  1815   WBC 13.4* 20.2* 25.1*   HGB 10.1* 9.4* 11.7*    HCT 33.2* 31.2* 37.3*   MCV 90 92 90    171 226     Recent Labs   Lab 01/12/19  0725 01/10/19  0650 01/09/19  1815 01/09/19  1258 01/09/19  1211   * 128* 115* 149*  --    BGM  --   --   --   --  142*      Imaging:   Results for orders placed or performed during the hospital encounter of 01/09/19   XR Chest 2 Views    Narrative    CHEST TWO VIEWS 1/9/2019 7:57 PM     HISTORY: Fever, evaluate for PNA.    COMPARISON: January 9, 2019       Impression    IMPRESSION: Multiple rib deformities again noted and stable. No  definite acute infiltrates compared to previous.    NIKKI GUO MD     *Note: Due to a large number of results and/or encounters for the requested time period, some results have not been displayed. A complete set of results can be found in Results Review.

## 2019-01-12 NOTE — PROGRESS NOTES
[facility]    Infectious Disease Progress Note    Date of Service (when I saw the patient): 01/12/2019     Assessment & Plan   Barrie Woods is a 88 year old male who was admitted on 1/9/2019.     IMPRESSION:   1.  An 88-year-old male with acute sepsis, presumed urosepsis, positive urine culture for pseudomonas and klebsiella, full INEZ to follow, looks improved on antibiotics, chronic Youssef catheter in place.   2.  Prior flagged history of carbapenem-resistant Enterobacteriaceae, but in looking in the cultures I see nothing for that.  It appears to be a false flag and we will have Infection Control investigate this and remove the flag if we are able to.   3 Prior treated latent TB  4 cardiomyopathy  5 renal insuff     REc:   1 continue zosyn/cipro adjust to final cx  2 See no CRE in cxs prior, investigate and remove flag if not correct  3 Ongoing renal insuff ? US make sure no abscess/obstruction            Flor Chung MD    Interval History   Was confused, agitated so sitter int he room     Physical Exam   Temp: 98.4  F (36.9  C) Temp src: Oral BP: 156/80 Pulse: 96 Heart Rate: 94 Resp: 28 SpO2: 99 % O2 Device: None (Room air)    Vitals:    01/10/19 0618 01/11/19 0528 01/12/19 0149   Weight: 74.1 kg (163 lb 4.8 oz) 76.7 kg (169 lb) 76.3 kg (168 lb 3.2 oz)     Vital Signs with Ranges  Temp:  [95.6  F (35.3  C)-98.4  F (36.9  C)] 98.4  F (36.9  C)  Pulse:  [] 96  Heart Rate:  [86-99] 94  Resp:  [18-28] 28  BP: (134-157)/(61-80) 156/80  SpO2:  [95 %-99 %] 99 %    Constitutional: Awake, alert, cooperative, no apparent distress  Lungs: Clear to auscultation bilaterally, no crackles or wheezing  Cardiovascular: Regular rate and rhythm, normal S1 and S2, and no murmur noted  Abdomen: Normal bowel sounds, soft, non-distended, non-tender  Skin: No rashes, no cyanosis, no edema  Other:    Medications     - MEDICATION INSTRUCTIONS -         carvedilol  12.5 mg Oral BID w/meals     ciprofloxacin  400 mg  Intravenous Q24H     finasteride  5 mg Oral Daily     influenza Vac Split High-Dose  0.5 mL Intramuscular Prior to discharge     piperacillin-tazobactam  2.25 g Intravenous Q6H     sodium chloride (PF)  3 mL Intracatheter Q8H       Data   All microbiology laboratory data reviewed.  Recent Labs   Lab Test 01/12/19  0725 01/10/19  0650 01/09/19  1815   WBC 13.4* 20.2* 25.1*   HGB 10.1* 9.4* 11.7*   HCT 33.2* 31.2* 37.3*   MCV 90 92 90    171 226     Recent Labs   Lab Test 01/12/19  0725 01/10/19  0650 01/09/19  1815   CR 1.57* 2.18* 2.40*     No lab results found.  Recent Labs   Lab Test 01/09/19  1815 01/09/19  1800 07/25/18  1032 06/02/18  2000 04/26/17  1255 04/26/17  1050 04/26/17  1018 09/08/16  1710 08/31/15  1827   CULT No growth after 3 days  >100,000 colonies/mL  Pseudomonas aeruginosa  Susceptibility testing in progress  *  10,000 to 50,000 colonies/mL  Klebsiella (Enterobacter) aerogenes  Susceptibility testing in progress  * No growth after 3 days >100,000 colonies/mL  Enterobacter cloacae complex  * 10,000 to 50,000 colonies/mL  Enterobacter cloacae complex  * <10,000 colonies/mL Coagulase negative Staphylococcus* No growth Cultured on the 1st day of incubation: Staphylococcus epidermidis This isolate   DOES NOT demonstrate inducible clindamycin resistance in vitro. Clindamycin is   susceptible and could be used when indicated, however, erythromycin is   resistant and should not be used.  Critical Value/Significant Value, preliminary result only, called to and read   back by Crys Pina RN at 1435 on 4/27/17 by TISH.  (Note)  POSITIVE for STAPHYLOCOCCUS EPIDERMIDIS and POSITIVE for the mecA  gene (resistant to methicillin) by OPPRTUNITY multiplex nucleic acid  test. Final identification and antimicrobial susceptibility testing  will be verified by standard methods.    Specimen tested with [employer] multiplex, gram-positive blood culture  nucleic acid test for the following targets: Staph aureus,  Staph  epidermidis, Staph lugdunensis, other Staph species, Enterococcus  faecalis, Enterococcus faecium, Streptococcus species, S. agalactiae,  S. anginosus grp., S. pneumoniae, S. pyogenes, Listeria sp., mecA  (methi cillin resistance) and Melvin/B (vancomycin resistance).    Critical Value/Significant Value called to and read back by Helen Merino rn @4436 4/27/17. ct  * >100,000 colonies/mL mixed urogenital colby  Susceptibility testing not routinely done   <10,000 colonies/mL mixed urogenital colby  Susceptibility testing not routinely done

## 2019-01-12 NOTE — PLAN OF CARE
Discharge Planner OT     Pt is an 88 year old male admitted for UTI and severe sepsis, chronic CHF, acute renal failure, CKD stage III.  Per chart pt lives with son and other family members.  Chart notes states family is able to care for pt 24/7.      Patient plan for discharge: Chart indicates family would prefer to take pt home at discharge  Current status: Pt up in chair,  present, but no family.  Pt oriented to self but unable to answer questions appropriately through the .  Has WFL ROM with both UEs.  Otherwise not cooperating with commands on a consistent basis.  Barriers to return to prior living situation: Current medical condition..  Recommendations for discharge: Home with family assist and home services for medical needs as appropriate.  Pt does not appear appropriate for IP OT at this time.  Rationale for recommendations: Pt not able to consistently work with therapy to address needs.  Family providing 24/7 care per chart and nursing.  Pt does not appear to have much potential for increasing independence with activity.  Will complete order and discontinue from IP OT.       Entered by: Guzman Damian 01/12/2019 8:59 AM

## 2019-01-12 NOTE — PLAN OF CARE
7pm-7am:Pt up Ax1 w/ gait belt & walker. Oriented to self only. Melatonin given for sleep, pt did not sleep much. VSS. Tylenol given for scrotum/penis pain,+2 edema. Youssef had 800 ml audra UA out. BM x5 (small, red streaked). Tele-afib controlled per tele tech. Sitter at bedside.

## 2019-01-12 NOTE — PLAN OF CARE
Ambulatory Status:  Pt up 1 assist with walker and belt. Up in chair most of shift. Bedside sitter in place.  Orientation: dis x4  VS:  VSS  Tele: controlled Afib   Pain:  scrotum/penis- tylenol given   Resp: LS clear. Cough infreq non prod.   GI:  denies nausea.  Fair  appetite, on regular with nectar thick liquid diet. +BS.  Passing flatus.  Last BM 1/11-2 loose stools this shift.  :  sarmiento patent with ROE  Skin:  bruising   Tx:  Zosyn and Cipro  Consults:  ID, urology  Disposition:  tbd

## 2019-01-12 NOTE — PLAN OF CARE
"  PT:  Eval complete. Pt is admitted with severe sepsis secondary to urinary tract infection.  Hx of dementia, Slovak speaking,  present.  Per SW notes, appears pt resides with family and they are available for support/care as needed at time of discharge.    Discharge Planner PT   Patient plan for discharge: none stated  Current status: Functional strength available for mobility skills.  Pt pleasantly confused, alert to self only.  \"pain all over\" reported with movement, which limits his ability/tolerance with bed mobility skills, transfers.  Required min A with bed mobility and sit <> stand transfers. Amb in room x 20ft with hand hold support/CGA, refusing to use FWW.  Refused to mobilize any further. VSS with activity.  Barriers to return to prior living situation: none anticipated, will need assistance from family for mobility and ADLs  Recommendations for discharge: home with 24/7 support with ADLs/mobility, Ax1  Rationale for recommendations: Unclear how close pt is to his baseline functional status, no family present. Minimal need for skilled therapy intervention at this point, encourage staff to assist pt OOB, ambulate, to chair etc to improve strength.  IP PT will sign off, anticipate pt is close to his baseline, and appears family is able to provide increased support/assistance as needed.  Please reconsult PT if mobility barriers arise.       Entered by: Doroteo Mcmullen 01/12/2019 8:57 AM           "

## 2019-01-13 LAB
ANION GAP SERPL CALCULATED.3IONS-SCNC: 8 MMOL/L (ref 3–14)
BUN SERPL-MCNC: 30 MG/DL (ref 7–30)
CALCIUM SERPL-MCNC: 8.1 MG/DL (ref 8.5–10.1)
CHLORIDE SERPL-SCNC: 111 MMOL/L (ref 94–109)
CO2 SERPL-SCNC: 22 MMOL/L (ref 20–32)
CREAT SERPL-MCNC: 1.41 MG/DL (ref 0.66–1.25)
ERYTHROCYTE [DISTWIDTH] IN BLOOD BY AUTOMATED COUNT: 15.7 % (ref 10–15)
GFR SERPL CREATININE-BSD FRML MDRD: 44 ML/MIN/{1.73_M2}
GLUCOSE SERPL-MCNC: 112 MG/DL (ref 70–99)
HCT VFR BLD AUTO: 30.6 % (ref 40–53)
HGB BLD-MCNC: 9.5 G/DL (ref 13.3–17.7)
LACTATE BLD-SCNC: 1 MMOL/L (ref 0.7–2)
MCH RBC QN AUTO: 27.7 PG (ref 26.5–33)
MCHC RBC AUTO-ENTMCNC: 31 G/DL (ref 31.5–36.5)
MCV RBC AUTO: 89 FL (ref 78–100)
PLATELET # BLD AUTO: 230 10E9/L (ref 150–450)
POTASSIUM SERPL-SCNC: 4 MMOL/L (ref 3.4–5.3)
RBC # BLD AUTO: 3.43 10E12/L (ref 4.4–5.9)
SODIUM SERPL-SCNC: 141 MMOL/L (ref 133–144)
WBC # BLD AUTO: 12.5 10E9/L (ref 4–11)

## 2019-01-13 PROCEDURE — 40000914 ZZH STATISTIC SITTER, DAY HOURS

## 2019-01-13 PROCEDURE — 40000915 ZZH STATISTIC SITTER, EVENING HOURS

## 2019-01-13 PROCEDURE — 83605 ASSAY OF LACTIC ACID: CPT | Performed by: INTERNAL MEDICINE

## 2019-01-13 PROCEDURE — 12000000 ZZH R&B MED SURG/OB

## 2019-01-13 PROCEDURE — 85027 COMPLETE CBC AUTOMATED: CPT | Performed by: INTERNAL MEDICINE

## 2019-01-13 PROCEDURE — 36415 COLL VENOUS BLD VENIPUNCTURE: CPT | Performed by: INTERNAL MEDICINE

## 2019-01-13 PROCEDURE — 25000128 H RX IP 250 OP 636: Performed by: HOSPITALIST

## 2019-01-13 PROCEDURE — 80048 BASIC METABOLIC PNL TOTAL CA: CPT | Performed by: INTERNAL MEDICINE

## 2019-01-13 PROCEDURE — 99233 SBSQ HOSP IP/OBS HIGH 50: CPT | Performed by: INTERNAL MEDICINE

## 2019-01-13 PROCEDURE — 25000132 ZZH RX MED GY IP 250 OP 250 PS 637: Performed by: INTERNAL MEDICINE

## 2019-01-13 PROCEDURE — 25000132 ZZH RX MED GY IP 250 OP 250 PS 637: Performed by: HOSPITALIST

## 2019-01-13 PROCEDURE — 25000128 H RX IP 250 OP 636: Performed by: INTERNAL MEDICINE

## 2019-01-13 RX ORDER — LEVOFLOXACIN 5 MG/ML
250 INJECTION, SOLUTION INTRAVENOUS EVERY 24 HOURS
Status: DISCONTINUED | OUTPATIENT
Start: 2019-01-14 | End: 2019-01-19

## 2019-01-13 RX ORDER — LEVOFLOXACIN 500 MG/1
500 TABLET, FILM COATED ORAL ONCE
Status: DISCONTINUED | OUTPATIENT
Start: 2019-01-13 | End: 2019-01-13

## 2019-01-13 RX ORDER — LEVOFLOXACIN 250 MG/1
250 TABLET, FILM COATED ORAL DAILY
Status: DISCONTINUED | OUTPATIENT
Start: 2019-01-14 | End: 2019-01-13

## 2019-01-13 RX ORDER — CIPROFLOXACIN 2 MG/ML
400 INJECTION, SOLUTION INTRAVENOUS EVERY 12 HOURS
Status: DISCONTINUED | OUTPATIENT
Start: 2019-01-13 | End: 2019-01-13 | Stop reason: ALTCHOICE

## 2019-01-13 RX ORDER — LEVOFLOXACIN 5 MG/ML
500 INJECTION, SOLUTION INTRAVENOUS ONCE
Status: COMPLETED | OUTPATIENT
Start: 2019-01-13 | End: 2019-01-13

## 2019-01-13 RX ADMIN — HYDROCODONE BITARTRATE AND ACETAMINOPHEN 1 TABLET: 5; 325 TABLET ORAL at 20:16

## 2019-01-13 RX ADMIN — PREGABALIN 100 MG: 100 CAPSULE ORAL at 08:25

## 2019-01-13 RX ADMIN — PREGABALIN 100 MG: 100 CAPSULE ORAL at 20:16

## 2019-01-13 RX ADMIN — FINASTERIDE 5 MG: 5 TABLET, FILM COATED ORAL at 08:25

## 2019-01-13 RX ADMIN — CARVEDILOL 12.5 MG: 12.5 TABLET, FILM COATED ORAL at 08:25

## 2019-01-13 RX ADMIN — Medication 12.5 MG: at 20:16

## 2019-01-13 RX ADMIN — Medication 12.5 MG: at 08:25

## 2019-01-13 RX ADMIN — TAZOBACTAM SODIUM AND PIPERACILLIN SODIUM 2.25 G: 250; 2 INJECTION, SOLUTION INTRAVENOUS at 04:19

## 2019-01-13 RX ADMIN — LEVOFLOXACIN 500 MG: 5 INJECTION, SOLUTION INTRAVENOUS at 18:15

## 2019-01-13 ASSESSMENT — ACTIVITIES OF DAILY LIVING (ADL)
ADLS_ACUITY_SCORE: 11
ADLS_ACUITY_SCORE: 16

## 2019-01-13 ASSESSMENT — MIFFLIN-ST. JEOR: SCORE: 1398.38

## 2019-01-13 NOTE — PLAN OF CARE
7pm-7am: Pt up Ax1 w/ gait belt. Only oriented to name. Combative this shift, swinging/grabbing at staff. Scheduled Seroquel plus additional Seroquel given. Pt slept most of the night. Scrotum & penis edematous. 500 ml audra UA from sarmiento.Tele afib controlled HR 80s per tele tech. Receiving zosyn & cipro. Sitter at bedside. ID following.

## 2019-01-13 NOTE — PLAN OF CARE
Ambulatory Status:  Pt up 1 assist with walker and gait. Sitter at bedside  Orientation: dis x4  VS:  HR tachy   Pain:  c/o scrotum -Tylenol and Norco given   Resp: LS dim. Cough infreq nonprod.   GI:  denies nausea.  fair appetite, on regular, nectar thick liquid diet. +BS.  Passing flatus.  Last BM 1/12.  :  sarmiento patent with ROE  Tx:  Zosyn and Cipro  Labs:  Cr 1.57, triggered sepsis protocol; lactic 1.2, WBC 13.4  Consults:  Urology and ID   Disposition:  tbd

## 2019-01-13 NOTE — PROVIDER NOTIFICATION
Paged Dave:   FYI: pt had 4 sec Bigeminy, 17 sec Trigeminy and VE run (7 beats) today. None are new, but he is refusing to take his Coreg.    Update: FYI: Per tele tech: pt had 4 sec Bigeminy, 17 sec Trigeminy and VE run (7 beats) today. And now had 5 beat and 4 beat Vtach. Last /68 and . None are new, but he is refusing to take his Coreg.

## 2019-01-13 NOTE — PLAN OF CARE
Ambulatory Status:  Pt up 1 assist with belt. Not OOB this shift.  Orientation: unable to assess. Refusing to answer questions through   VS:  VSS  Pain:  ALVIN  Resp: LS clear.   GI:  Poor appetite-ate 25% of dinner, on regular nectar thick liquid diet. +BS. Last BM 1/12.  :  sarmiento patent with AU. Scrotal and penile edema with pain.   Tx:  IV Levaquin   Labs:  Triggered sepsis Lactic 1.0, WBC 12.5, HGB 9.5, Cr 1.41  Consults:  ID   Disposition:  tbd     Pt slept most of shift, when awake pt combative and aggressive with staff with any cares. Refusing sarmiento cares. Current IV painful to flush, but able to flush this morning. Rochelle RN tried unsuccessfully to start new IV d/t pt refusing, became aggressive with staff. ID switched IV antibiotics to PO antibiotics. This writer along with Turkish speaking LPN tried unsuccessfully to give PO Levaquin. Again, pt became combative with staff and refusing to take crushed Levaquin with applesauce.     -update: Pt agreeable to have new IV started, antibiotics switched to IV.

## 2019-01-13 NOTE — PROVIDER NOTIFICATION
"Admitting hospitalist paged \" 524 is agitated can we get additional Seroquel?\"    -Dr Mohamud put additional dose in  "

## 2019-01-13 NOTE — PROGRESS NOTES
[facility]  Hospitalist Progress Note  Rashad Acosta MD 01/13/2019    Reason for Stay (Diagnosis): Sepsis secondary to urinary tract infection         Assessment and Plan:      Summary of Stay: Barrie Woods is a 88 year old male with history of CVA, dementia, chronic kidney disease, A. fib, cardiomyopathy, congestive heart failure, benign prostatic hypertrophy, renal cell carcinoma, who was scheduled for elective TURP on the day of admission,  found to have purulent discharge in the Urinary catheter, found to have sepsis due to urinary tract infection and admitted to the hospital on 1/9/2019.    Problem List:   1. Severe sepsis secondary to urinary tract infection.  -This is due to indwelling Youssef catheter related infection.  -Urine culture grew pseudomonas aeruginosa and Enterobacter aerogenes, sensitivity for Pseudomonas pending pending.  Enterobacter is sensitive to ciprofloxacin  -Monitor CBC and BMP.  -Patient has previous history of CRE per report, no documentation found.  -Patient has been on Ciprofloxacin and Zosyn from 01/09 to 01/13.  -Patient pulled IV line, did not cooperate to place another one, I did change antibiotic to Levaquin(01/13)  -Antibiotic management per infectious disease, input appreciated.  -Urology consulted, will follow up as to the timing of TURP.  It was noted that patient was renal mass suspected to be cancer, declined surgical intervention in the past.  -Renal ultrasound did not show any sign of urinary obstruction.    2.   Septic encephalopathy  -Patient was agitated, requiring more dose of Seroquel  -Pulled IV line, refused blood draw earlier but later on managed to do it.  -Has a sitter due to risk of fall.    3.  Chronic diastolic congestive heart failure.  Last echo done in 2017 showed EF of 50-55 % with indeterminate left ventricular diastolic function.   -Monitor input and output  -Daily weight.  -Continue Coreg.  -Stopped IV fluid as there is risk  "of fluid overload.  -Monitor respiratory status    4.  Acute renal failure on chronic kidney disease stage III.  -Creatinine improved from 2.58 to 1.41.  Baseline creatinine was 1.25, about 4 months ago  -Creatinine is closer to the baseline.  -Continue to hold ACE inhibitor and diuretics for now.  -He has hyperkalemia on presentation which is currently resolved.  Due to renal failure and lisinopril.    5.  Chronic arterial fibrillation.  -Continue to monitor.  -Continue Coreg  -Patient is not on full anticoagulation of bleeding outweighs the benefit at this point  -We will leave this to primary care provider to address after his procedure done, risk of bleeding is high.    6.  History of left renal mass suspected malignancy.  Urology following.  Patient did not want any surgical intervention for this in the past.    DVT Prophylaxis: Pneumatic Compression Devices  Code Status: Full Code  Discharge Dispo: TCU/LTC.  Estimated Disch Date / # of Days until Disch: Expect hospital stay for > 2 days, and placement is arranged for the patient likely TCU.        Interval History (Subjective):      Patient seen and examined, sleeping today, noted he was agitated earlier and last night, requiring sitter, pulled IV line, declined to have another one placed, there is episode of combativeness aspirin, used professional  at bedside but did not get any meaningful history from the patient, no family available at bedside.                  Physical Exam:      Last Vital Signs:  /71 (BP Location: Right arm)   Pulse 96   Temp 96.4  F (35.8  C) (Axillary)   Resp 26   Ht 1.676 m (5' 6\")   Wt 78.6 kg (173 lb 3.2 oz)   SpO2 98%   BMI 27.96 kg/m      I/O last 3 completed shifts:  In: 863 [P.O.:766; I.V.:97]  Out: 800 [Urine:800]  Vitals:    01/09/19 1710 01/10/19 0618 01/11/19 0528 01/12/19 0149   Weight: 72 kg (158 lb 11.2 oz) 74.1 kg (163 lb 4.8 oz) 76.7 kg (169 lb) 76.3 kg (168 lb 3.2 oz)    01/13/19 0528   Weight: " 78.6 kg (173 lb 3.2 oz)     Current Facility-Administered Medications   Medication     acetaminophen (TYLENOL) tablet 650 mg    Or     acetaminophen (TYLENOL) Suppository 650 mg     albuterol (PROVENTIL) neb solution 2.5 mg     bisacodyl (DULCOLAX) Suppository 10 mg     carvedilol (COREG) tablet 12.5 mg     finasteride (PROSCAR) tablet 5 mg     HYDROcodone-acetaminophen (NORCO) 5-325 MG per tablet 1 tablet     influenza Vac Split High-Dose (FLUZONE) injection 0.5 mL     [START ON 1/14/2019] levofloxacin (LEVAQUIN) tablet 250 mg     levofloxacin (LEVAQUIN) tablet 500 mg     lidocaine (LMX4) cream     melatonin tablet 3 mg     naloxone (NARCAN) injection 0.1-0.4 mg     nicotine (NICOTROL) Inhaler 1 Cartridge     ondansetron (ZOFRAN-ODT) ODT tab 4 mg    Or     ondansetron (ZOFRAN) injection 4 mg     polyethylene glycol (MIRALAX/GLYCOLAX) Packet 17 g     pregabalin (LYRICA) capsule 100 mg     prochlorperazine (COMPAZINE) injection 5 mg     QUEtiapine (SEROquel) half-tab 12.5 mg     senna-docusate (SENOKOT-S/PERICOLACE) 8.6-50 MG per tablet 1 tablet    Or     senna-docusate (SENOKOT-S/PERICOLACE) 8.6-50 MG per tablet 2 tablet     sodium chloride (PF) 0.9% PF flush 3 mL     sodium chloride (PF) 0.9% PF flush 3 mL       Constitutional:  Sleeping this time, was agitated earlier,  no apparent distress   Respiratory:  Good air entry bilaterally, no crackles or wheezing   Cardiovascular: Regular rate and rhythm, normal S1 and S2, and no murmur noted   Abdomen: Normal bowel sounds, soft, non-distended, non-tender   Skin: No rashes, no cyanosis, dry to touch   Neuro: Alert and oriented x3, no weakness, numbness, memory loss   Extremities: No edema, normal range of motion   Other(s):HEENT Pink, nonicteric, moist oral mucosa       All other systems: Negative          Medications:      All current medications were reviewed with changes reflected in problem list.         Data:      All new lab and imaging data was reviewed.    Labs:  Recent Labs   Lab 01/09/19  1815 01/09/19  1800   CULT No growth after 4 days  >100,000 colonies/mL  Pseudomonas aeruginosa  *  10,000 to 50,000 colonies/mL  Klebsiella (Enterobacter) aerogenes  * No growth after 4 days     Recent Labs   Lab 01/13/19  0719 01/12/19  0725 01/10/19  0650    139 142   POTASSIUM 4.0 4.0 4.6   CHLORIDE 111* 110* 111*   CO2 22 23 24   ANIONGAP 8 6 7   * 124* 128*   BUN 30 33* 43*   CR 1.41* 1.57* 2.18*   GFRESTIMATED 44* 39* 26*   GFRESTBLACK 51* 45* 30*   JOSE 8.1* 8.0* 7.8*     Recent Labs   Lab 01/13/19  0719 01/12/19  0725 01/10/19  0650   WBC 12.5* 13.4* 20.2*   HGB 9.5* 10.1* 9.4*   HCT 30.6* 33.2* 31.2*   MCV 89 90 92    214 171     Recent Labs   Lab 01/13/19  0719 01/12/19  0725 01/10/19  0650 01/09/19  1815 01/09/19  1258 01/09/19  1211   * 124* 128* 115* 149*  --    BGM  --   --   --   --   --  142*      Imaging:   Results for orders placed or performed during the hospital encounter of 01/09/19   XR Chest 2 Views    Narrative    CHEST TWO VIEWS 1/9/2019 7:57 PM     HISTORY: Fever, evaluate for PNA.    COMPARISON: January 9, 2019       Impression    IMPRESSION: Multiple rib deformities again noted and stable. No  definite acute infiltrates compared to previous.    NIKKI GUO MD     *Note: Due to a large number of results and/or encounters for the requested time period, some results have not been displayed. A complete set of results can be found in Results Review.

## 2019-01-13 NOTE — PROGRESS NOTES
Patient refused oral antibiotics, IV line is in.  -Antibiotic changed to oral Levaquin earlier by ID.  -Changed Levaquin to IV route.

## 2019-01-13 NOTE — PROGRESS NOTES
[facility]    Infectious Disease Progress Note    Date of Service (when I saw the patient): 01/13/2019     Assessment & Plan   Barrie Woods is a 88 year old male who was admitted on 1/9/2019.     IMPRESSION:   1.  An 88-year-old male with acute sepsis, presumed urosepsis, positive urine culture for pseudomonas and klebsiella, full INEZ to follow, looks improved on antibiotics, chronic Youssef catheter in place.   2.  Prior flagged history of carbapenem-resistant Enterobacteriaceae, but in looking in the cultures I see nothing for that.  It appears to be a false flag and we will have Infection Control investigate this and remove the flag if we are able to.   3 Prior treated latent TB  4 cardiomyopathy  5 renal insuff     REc:   1. Both Kleb and pseudomonas susceptible to Levaquin switch to orals.           Flor Chung MD    Interval History   Was confused, agitated so sitter int he room     Physical Exam   Temp: 96.4  F (35.8  C) Temp src: Axillary BP: 151/71   Heart Rate: 90 Resp: 26 SpO2: 98 % O2 Device: None (Room air)    Vitals:    01/11/19 0528 01/12/19 0149 01/13/19 0528   Weight: 76.7 kg (169 lb) 76.3 kg (168 lb 3.2 oz) 78.6 kg (173 lb 3.2 oz)     Vital Signs with Ranges  Temp:  [96.4  F (35.8  C)] 96.4  F (35.8  C)  Heart Rate:  [] 90  Resp:  [24-28] 26  BP: (137-162)/(58-73) 151/71  SpO2:  [96 %-99 %] 98 %    Constitutional: Awake, alert, cooperative, no apparent distress  Lungs: Clear to auscultation bilaterally, no crackles or wheezing  Cardiovascular: Regular rate and rhythm, normal S1 and S2, and no murmur noted  Abdomen: Normal bowel sounds, soft, non-distended, non-tender  Skin: No rashes, no cyanosis, no edema  Other:    Medications       carvedilol  12.5 mg Oral BID w/meals     finasteride  5 mg Oral Daily     influenza Vac Split High-Dose  0.5 mL Intramuscular Prior to discharge     [START ON 1/14/2019] levofloxacin  250 mg Oral Daily     levofloxacin  500 mg Oral Once      pregabalin  100 mg Oral BID     QUEtiapine  12.5 mg Oral BID     sodium chloride (PF)  3 mL Intracatheter Q8H       Data   All microbiology laboratory data reviewed.  Recent Labs   Lab Test 01/13/19  0719 01/12/19  0725 01/10/19  0650   WBC 12.5* 13.4* 20.2*   HGB 9.5* 10.1* 9.4*   HCT 30.6* 33.2* 31.2*   MCV 89 90 92    214 171     Recent Labs   Lab Test 01/13/19  0719 01/12/19  0725 01/10/19  0650   CR 1.41* 1.57* 2.18*     No lab results found.  Recent Labs   Lab Test 01/09/19  1815 01/09/19  1800 07/25/18  1032 06/02/18  2000 04/26/17  1255 04/26/17  1050 04/26/17  1018 09/08/16  1710 08/31/15  1827   CULT No growth after 4 days  >100,000 colonies/mL  Pseudomonas aeruginosa  *  10,000 to 50,000 colonies/mL  Klebsiella (Enterobacter) aerogenes  * No growth after 4 days >100,000 colonies/mL  Enterobacter cloacae complex  * 10,000 to 50,000 colonies/mL  Enterobacter cloacae complex  * <10,000 colonies/mL Coagulase negative Staphylococcus* No growth Cultured on the 1st day of incubation: Staphylococcus epidermidis This isolate   DOES NOT demonstrate inducible clindamycin resistance in vitro. Clindamycin is   susceptible and could be used when indicated, however, erythromycin is   resistant and should not be used.  Critical Value/Significant Value, preliminary result only, called to and read   back by Crys Pina RN at 1435 on 4/27/17 by TISH.  (Note)  POSITIVE for STAPHYLOCOCCUS EPIDERMIDIS and POSITIVE for the mecA  gene (resistant to methicillin) by [employer] multiplex nucleic acid  test. Final identification and antimicrobial susceptibility testing  will be verified by standard methods.    Specimen tested with [employer] multiplex, gram-positive blood culture  nucleic acid test for the following targets: Staph aureus, Staph  epidermidis, Staph lugdunensis, other Staph species, Enterococcus  faecalis, Enterococcus faecium, Streptococcus species, S. agalactiae,  S. anginosus grp., S. pneumoniae, S. pyogenes,  Listeria sp., mecA  (methi cillin resistance) and Melvin/B (vancomycin resistance).    Critical Value/Significant Value called to and read back by Helen Merino rn @1647 4/27/17. ct  * >100,000 colonies/mL mixed urogenital colby  Susceptibility testing not routinely done   <10,000 colonies/mL mixed urogenital colby  Susceptibility testing not routinely done

## 2019-01-14 ENCOUNTER — APPOINTMENT (OUTPATIENT)
Dept: GENERAL RADIOLOGY | Facility: CLINIC | Age: 84
DRG: 698 | End: 2019-01-14
Attending: UROLOGY
Payer: COMMERCIAL

## 2019-01-14 ENCOUNTER — OFFICE VISIT (OUTPATIENT)
Dept: INTERPRETER SERVICES | Facility: CLINIC | Age: 84
End: 2019-01-14
Payer: COMMERCIAL

## 2019-01-14 ENCOUNTER — TELEPHONE (OUTPATIENT)
Dept: FAMILY MEDICINE | Facility: CLINIC | Age: 84
End: 2019-01-14

## 2019-01-14 LAB
ANION GAP SERPL CALCULATED.3IONS-SCNC: 6 MMOL/L (ref 3–14)
BUN SERPL-MCNC: 23 MG/DL (ref 7–30)
CALCIUM SERPL-MCNC: 8.6 MG/DL (ref 8.5–10.1)
CHLORIDE SERPL-SCNC: 112 MMOL/L (ref 94–109)
CO2 SERPL-SCNC: 25 MMOL/L (ref 20–32)
CREAT SERPL-MCNC: 1.23 MG/DL (ref 0.66–1.25)
GFR SERPL CREATININE-BSD FRML MDRD: 52 ML/MIN/{1.73_M2}
GLUCOSE SERPL-MCNC: 115 MG/DL (ref 70–99)
MAGNESIUM SERPL-MCNC: 2.2 MG/DL (ref 1.6–2.3)
POTASSIUM SERPL-SCNC: 4 MMOL/L (ref 3.4–5.3)
SODIUM SERPL-SCNC: 143 MMOL/L (ref 133–144)

## 2019-01-14 PROCEDURE — 25000132 ZZH RX MED GY IP 250 OP 250 PS 637: Performed by: INTERNAL MEDICINE

## 2019-01-14 PROCEDURE — 36415 COLL VENOUS BLD VENIPUNCTURE: CPT | Performed by: INTERNAL MEDICINE

## 2019-01-14 PROCEDURE — T1013 SIGN LANG/ORAL INTERPRETER: HCPCS | Mod: U3

## 2019-01-14 PROCEDURE — 99223 1ST HOSP IP/OBS HIGH 75: CPT | Performed by: NURSE PRACTITIONER

## 2019-01-14 PROCEDURE — 25000132 ZZH RX MED GY IP 250 OP 250 PS 637: Performed by: HOSPITALIST

## 2019-01-14 PROCEDURE — 25000128 H RX IP 250 OP 636: Performed by: INTERNAL MEDICINE

## 2019-01-14 PROCEDURE — 74019 RADEX ABDOMEN 2 VIEWS: CPT

## 2019-01-14 PROCEDURE — 12000000 ZZH R&B MED SURG/OB

## 2019-01-14 PROCEDURE — 25000128 H RX IP 250 OP 636: Performed by: HOSPITALIST

## 2019-01-14 PROCEDURE — 99233 SBSQ HOSP IP/OBS HIGH 50: CPT | Performed by: HOSPITALIST

## 2019-01-14 PROCEDURE — 80048 BASIC METABOLIC PNL TOTAL CA: CPT | Performed by: INTERNAL MEDICINE

## 2019-01-14 PROCEDURE — 83735 ASSAY OF MAGNESIUM: CPT | Performed by: INTERNAL MEDICINE

## 2019-01-14 RX ORDER — SODIUM CHLORIDE AND POTASSIUM CHLORIDE 150; 900 MG/100ML; MG/100ML
INJECTION, SOLUTION INTRAVENOUS CONTINUOUS
Status: DISCONTINUED | OUTPATIENT
Start: 2019-01-14 | End: 2019-01-15

## 2019-01-14 RX ORDER — SODIUM CHLORIDE AND POTASSIUM CHLORIDE 150; 900 MG/100ML; MG/100ML
INJECTION, SOLUTION INTRAVENOUS CONTINUOUS
Status: DISCONTINUED | OUTPATIENT
Start: 2019-01-14 | End: 2019-01-14

## 2019-01-14 RX ADMIN — PREGABALIN 100 MG: 100 CAPSULE ORAL at 20:46

## 2019-01-14 RX ADMIN — FINASTERIDE 5 MG: 5 TABLET, FILM COATED ORAL at 08:37

## 2019-01-14 RX ADMIN — CARVEDILOL 12.5 MG: 12.5 TABLET, FILM COATED ORAL at 08:35

## 2019-01-14 RX ADMIN — Medication 12.5 MG: at 08:34

## 2019-01-14 RX ADMIN — MELATONIN TAB 3 MG 3 MG: 3 TAB at 20:46

## 2019-01-14 RX ADMIN — POTASSIUM CHLORIDE AND SODIUM CHLORIDE: 900; 150 INJECTION, SOLUTION INTRAVENOUS at 16:00

## 2019-01-14 RX ADMIN — HYDROCODONE BITARTRATE AND ACETAMINOPHEN 1 TABLET: 5; 325 TABLET ORAL at 20:46

## 2019-01-14 RX ADMIN — DEXTRAN 70, AND HYPROMELLOSE 2910 2 DROP: 1; 3 SOLUTION/ DROPS OPHTHALMIC at 13:35

## 2019-01-14 RX ADMIN — CARVEDILOL 12.5 MG: 12.5 TABLET, FILM COATED ORAL at 18:27

## 2019-01-14 RX ADMIN — PREGABALIN 100 MG: 100 CAPSULE ORAL at 08:39

## 2019-01-14 RX ADMIN — Medication 12.5 MG: at 20:46

## 2019-01-14 RX ADMIN — ACETAMINOPHEN 650 MG: 325 TABLET, FILM COATED ORAL at 14:27

## 2019-01-14 RX ADMIN — HYDROCODONE BITARTRATE AND ACETAMINOPHEN 1 TABLET: 5; 325 TABLET ORAL at 04:41

## 2019-01-14 RX ADMIN — LEVOFLOXACIN 250 MG: 5 INJECTION, SOLUTION INTRAVENOUS at 14:31

## 2019-01-14 ASSESSMENT — MIFFLIN-ST. JEOR: SCORE: 1360.73

## 2019-01-14 ASSESSMENT — ACTIVITIES OF DAILY LIVING (ADL)
ADLS_ACUITY_SCORE: 16
ADLS_ACUITY_SCORE: 16
ADLS_ACUITY_SCORE: 11
ADLS_ACUITY_SCORE: 16
ADLS_ACUITY_SCORE: 11
ADLS_ACUITY_SCORE: 11

## 2019-01-14 NOTE — PLAN OF CARE
[id number]. VSS. Afebrile. LS diminished. BS hypoactive. Pt reporting abdominal pain, also tender when palpated, received Narco x2. 2+ scrotal and BLE edema. Pt denies nausea. Regular diet with Nectar thick liquids. A1 with gait belt to ambulate. Youssef in place, good output. PIV - SL. Getting IV Levaquin. Telemetry monitoring. WBC 12.5, Hbg 9.5, BC pending. Sitter at bedside. Slept well tonight and was cooperative with nursing staff. Discharge pending.

## 2019-01-14 NOTE — PROGRESS NOTES
[facility]  Hospitalist Progress Note  Kavita Mckeon MD 01/14/2019    Reason for Stay (Diagnosis): Sepsis secondary to urinary tract infection    Summary of Stay: Barrie Woods is a 88 year old male with history of CVA, dementia, chronic kidney disease, A. fib, cardiomyopathy, congestive heart failure, benign prostatic hypertrophy, renal cell carcinoma, who was scheduled for elective TURP on the day of admission,  found to have purulent discharge in the Urinary catheter, found to have sepsis due to urinary tract infection and admitted to the hospital on 1/9/2019.  Urine culture grew out Pseudomonas patient was initially on IV Zosyn and then transition to levofloxacin per ID recommendations however patient has been refusing to take oral antibiotics.  Plan to complete 10-day course of antibiotics while in the hospital unless patient is able to take p.o.  On 1/14/2019 patient was noted to have abdominal pain and distention, abdominal x-ray was suggestive of ileus-?  Due to underlying illness.  Patient is being treated conservatively-if no improvement, will need further workup.       Assessment and Plan:      1. Severe sepsis secondary to catheter associated urinary tract infection.-This is due to indwelling Youssef catheter related infection.  -Urine culture grew pseudomonas aeruginosa and Enterobacter aerogenes, patient has been transitioned from IV Zosyn and Cipro to levofloxacin.  Enterobacter is sensitive to fluoroquinolone as well.  -Monitor CBC and BMP.  -Patient has previous history of CRE per report, no documentation found.  -Patient has been on Ciprofloxacin and Zosyn from 01/09 to 01/13.  -Patient pulled IV line, did not cooperate to place another one, I did change antibiotic to Levaquin(01/13)  -Antibiotic management per infectious disease, input appreciated.  -Urology consulted, will follow up as to the timing of TURP.  It was noted that patient was renal mass suspected to be cancer,  declined surgical intervention in the past.  -Renal ultrasound did not show any sign of urinary obstruction.    2.   Septic encephalopathy  -Patient was agitated, requiring more dose of Seroquel  -Pulled IV line, refused blood draw earlier but later on managed to do it.  -Has a sitter due to risk of fall.    3.  Chronic diastolic congestive heart failure.  Last echo done in 2017 showed EF of 50-55 % with indeterminate left ventricular diastolic function.   -Monitor input and output  -Daily weight.  -Continue Coreg.  -Stopped IV fluid as there is risk of fluid overload.  -Monitor respiratory status    4.  Acute renal failure on chronic kidney disease stage III.  -Creatinine improved from 2.58 to 1.41.  Baseline creatinine was 1.25, about 4 months ago  -Creatinine is closer to the baseline.  -Continue to hold ACE inhibitor and diuretics for now.  -He has hyperkalemia on presentation which is currently resolved.  Due to renal failure and lisinopril.    5.  Chronic arterial fibrillation.  -Continue to monitor.  -Continue Coreg  -Patient is not on full anticoagulation of bleeding outweighs the benefit at this point  -We will leave this to primary care provider to address after his procedure done, risk of bleeding is high.    6.  History of left renal mass suspected malignancy.  Urology following.  Patient did not want any surgical intervention for this in the past.    7.  Abdominal pain and distention: on 1/14/2019 patient was noted to have abdominal pain and distention, abdominal x-ray was suggestive of ileus-?  Due to underlying illness.  Patient is being treated conservatively-if no improvement, will need further workup in terms of imaging.  --Consider CT abdomen tomorrow if no improvement in symptoms    DVT Prophylaxis: Pneumatic Compression Devices  Code Status: Full Code  Discharge Dispo: TCU/LTC.  Estimated Disch Date / # of Days until Disch: Expect hospital stay for > 2 days, and placement is arranged for the  "patient likely TCU.        Interval History (Subjective):      Assumed care today.  Patient seen and examined,more alert and less agitated, used professional  at bedside but did not get any meaningful history from the patient, no family available at bedside.  Complains of mid abdominal pain.  Denies any nausea vomiting                  Physical Exam:      Last Vital Signs:  /72 (BP Location: Right arm)   Pulse 96   Temp 95.7  F (35.4  C) (Axillary)   Resp 20   Ht 1.676 m (5' 6\")   Wt 74.8 kg (164 lb 14.4 oz)   SpO2 96%   BMI 26.62 kg/m      I/O last 3 completed shifts:  In: 938 [P.O.:938]  Out: 950 [Urine:950]  Vitals:    01/10/19 0618 01/11/19 0528 01/12/19 0149 01/13/19 0528   Weight: 74.1 kg (163 lb 4.8 oz) 76.7 kg (169 lb) 76.3 kg (168 lb 3.2 oz) 78.6 kg (173 lb 3.2 oz)    01/14/19 0854   Weight: 74.8 kg (164 lb 14.4 oz)     Current Facility-Administered Medications   Medication     0.9% sodium chloride + KCl 20 mEq/L infusion     acetaminophen (TYLENOL) tablet 650 mg    Or     acetaminophen (TYLENOL) Suppository 650 mg     albuterol (PROVENTIL) neb solution 2.5 mg     bisacodyl (DULCOLAX) Suppository 10 mg     carvedilol (COREG) tablet 12.5 mg     finasteride (PROSCAR) tablet 5 mg     HYDROcodone-acetaminophen (NORCO) 5-325 MG per tablet 1 tablet     hypromellose-dextran (ARTIFICAL TEARS) 0.1-0.3 % ophthalmic solution 2-3 drop     influenza Vac Split High-Dose (FLUZONE) injection 0.5 mL     levofloxacin (LEVAQUIN) infusion 250 mg     lidocaine (LMX4) cream     melatonin tablet 3 mg     naloxone (NARCAN) injection 0.1-0.4 mg     nicotine (NICOTROL) Inhaler 1 Cartridge     ondansetron (ZOFRAN-ODT) ODT tab 4 mg    Or     ondansetron (ZOFRAN) injection 4 mg     polyethylene glycol (MIRALAX/GLYCOLAX) Packet 17 g     pregabalin (LYRICA) capsule 100 mg     prochlorperazine (COMPAZINE) injection 5 mg     QUEtiapine (SEROquel) half-tab 12.5 mg     senna-docusate (SENOKOT-S/PERICOLACE) 8.6-50 MG " per tablet 1 tablet    Or     senna-docusate (SENOKOT-S/PERICOLACE) 8.6-50 MG per tablet 2 tablet     sodium chloride (PF) 0.9% PF flush 3 mL     sodium chloride (PF) 0.9% PF flush 3 mL       Constitutional:  Sleeping this time, was agitated earlier,  no apparent distress   Respiratory:  Good air entry bilaterally, no crackles or wheezing   Cardiovascular: Regular rate and rhythm, normal S1 and S2, and no murmur noted   Abdomen: soft, mildly-distended, tender in the midabdomen, decreased bowel sounds   Skin: No rashes, no cyanosis, dry to touch   Neuro: Alert and oriented x3, no weakness, numbness, memory loss   Extremities: No edema, normal range of motion   Other(s):HEENT Pink, nonicteric, moist oral mucosa       All other systems: Negative          Medications:      All current medications were reviewed with changes reflected in problem list.         Data:      All new lab and imaging data was reviewed.   Labs:  Recent Labs   Lab 01/09/19 1815 01/09/19  1800   CULT No growth after 5 days  >100,000 colonies/mL  Pseudomonas aeruginosa  *  10,000 to 50,000 colonies/mL  Klebsiella (Enterobacter) aerogenes  * No growth after 5 days     Recent Labs   Lab 01/14/19  0744 01/13/19  0719 01/12/19  0725    141 139   POTASSIUM 4.0 4.0 4.0   CHLORIDE 112* 111* 110*   CO2 25 22 23   ANIONGAP 6 8 6   * 112* 124*   BUN 23 30 33*   CR 1.23 1.41* 1.57*   GFRESTIMATED 52* 44* 39*   GFRESTBLACK 60* 51* 45*   JOSE 8.6 8.1* 8.0*     Recent Labs   Lab 01/13/19  0719 01/12/19  0725 01/10/19  0650   WBC 12.5* 13.4* 20.2*   HGB 9.5* 10.1* 9.4*   HCT 30.6* 33.2* 31.2*   MCV 89 90 92    214 171     Recent Labs   Lab 01/14/19  0744 01/13/19  0719 01/12/19  0725 01/10/19  0650 01/09/19  1815  01/09/19  1211   * 112* 124* 128* 115*   < >  --    BGM  --   --   --   --   --   --  142*    < > = values in this interval not displayed.      Imaging:   Results for orders placed or performed during the hospital encounter  of 01/09/19   XR Chest 2 Views    Narrative    CHEST TWO VIEWS 1/9/2019 7:57 PM     HISTORY: Fever, evaluate for PNA.    COMPARISON: January 9, 2019       Impression    IMPRESSION: Multiple rib deformities again noted and stable. No  definite acute infiltrates compared to previous.    NIKKI GUO MD     *Note: Due to a large number of results and/or encounters for the requested time period, some results have not been displayed. A complete set of results can be found in Results Review.

## 2019-01-14 NOTE — PLAN OF CARE
OT/PT: New orders received. Upon chart review, OT/PT documented recommendations on 1/12, please defer to those notes for rec. Per RN, no changes today, team just seeking in put for discharge and will review notes from 1/12, as they had not been reviewed at this time.

## 2019-01-14 NOTE — PROGRESS NOTES
[facility]    Infectious Disease Progress Note    Date of Service (when I saw the patient): 01/14/2019     Assessment & Plan   Barrie Woods is a 88 year old male who was admitted on 1/9/2019.     IMPRESSION:   1.  An 88-year-old male with acute sepsis, presumed urosepsis, positive urine culture for pseudomonas and klebsiella, full INEZ to follow, looks improved on antibiotics, chronic Youssef catheter in place.   2.  Prior flagged history of carbapenem-resistant Enterobacteriaceae, but in looking in the cultures I see nothing for that.  It appears to be a false flag and we will have Infection Control investigate this and remove the flag if we are able to.   3 Prior treated latent TB  4 cardiomyopathy  5 renal insuff     REc:   1 continue lev po as planned  2 See no CRE in cxs prior, investigate and none found remove flag              Candido Kelly MD    Interval History   Was confused, agitated so sitter int he room     Physical Exam   Temp: 95.7  F (35.4  C) Temp src: Axillary BP: 166/72   Heart Rate: 92 Resp: 20 SpO2: 96 % O2 Device: None (Room air)    Vitals:    01/12/19 0149 01/13/19 0528 01/14/19 0854   Weight: 76.3 kg (168 lb 3.2 oz) 78.6 kg (173 lb 3.2 oz) 74.8 kg (164 lb 14.4 oz)     Vital Signs with Ranges  Temp:  [95.7  F (35.4  C)-98.7  F (37.1  C)] 95.7  F (35.4  C)  Heart Rate:  [] 92  Resp:  [20-22] 20  BP: (147-166)/(68-81) 166/72  SpO2:  [96 %] 96 %    Constitutional: Awake, alert, cooperative, no apparent distress  Lungs: Clear to auscultation bilaterally, no crackles or wheezing  Cardiovascular: Regular rate and rhythm, normal S1 and S2, and no murmur noted  Abdomen: Normal bowel sounds, soft, non-distended, non-tender  Skin: No rashes, no cyanosis, no edema  Other:    Medications     0.9% sodium chloride + KCl 20 mEq/L 100 mL/hr at 01/14/19 1600       carvedilol  12.5 mg Oral BID w/meals     finasteride  5 mg Oral Daily     influenza Vac Split High-Dose  0.5 mL Intramuscular  Prior to discharge     levofloxacin  250 mg Intravenous Q24H     pregabalin  100 mg Oral BID     QUEtiapine  12.5 mg Oral BID     sodium chloride (PF)  3 mL Intracatheter Q8H       Data   All microbiology laboratory data reviewed.  Recent Labs   Lab Test 01/13/19  0719 01/12/19  0725 01/10/19  0650   WBC 12.5* 13.4* 20.2*   HGB 9.5* 10.1* 9.4*   HCT 30.6* 33.2* 31.2*   MCV 89 90 92    214 171     Recent Labs   Lab Test 01/14/19  0744 01/13/19  0719 01/12/19  0725   CR 1.23 1.41* 1.57*     No lab results found.  Recent Labs   Lab Test 01/09/19  1815 01/09/19  1800 07/25/18  1032 06/02/18  2000 04/26/17  1255 04/26/17  1050 04/26/17  1018 09/08/16  1710 08/31/15  1827   CULT No growth after 5 days  >100,000 colonies/mL  Pseudomonas aeruginosa  *  10,000 to 50,000 colonies/mL  Klebsiella (Enterobacter) aerogenes  * No growth after 5 days >100,000 colonies/mL  Enterobacter cloacae complex  * 10,000 to 50,000 colonies/mL  Enterobacter cloacae complex  * <10,000 colonies/mL Coagulase negative Staphylococcus* No growth Cultured on the 1st day of incubation: Staphylococcus epidermidis This isolate   DOES NOT demonstrate inducible clindamycin resistance in vitro. Clindamycin is   susceptible and could be used when indicated, however, erythromycin is   resistant and should not be used.  Critical Value/Significant Value, preliminary result only, called to and read   back by Crys Pina RN at 1435 on 4/27/17 by TISH.  (Note)  POSITIVE for STAPHYLOCOCCUS EPIDERMIDIS and POSITIVE for the mecA  gene (resistant to methicillin) by [employer] multiplex nucleic acid  test. Final identification and antimicrobial susceptibility testing  will be verified by standard methods.    Specimen tested with [employer] multiplex, gram-positive blood culture  nucleic acid test for the following targets: Staph aureus, Staph  epidermidis, Staph lugdunensis, other Staph species, Enterococcus  faecalis, Enterococcus faecium, Streptococcus species,  S. agalactiae,  S. anginosus grp., S. pneumoniae, S. pyogenes, Listeria sp., mecA  (methi cillin resistance) and Melvin/B (vancomycin resistance).    Critical Value/Significant Value called to and read back by Helen Merino rn @9455 4/27/17. ct  * >100,000 colonies/mL mixed urogenital colby  Susceptibility testing not routinely done   <10,000 colonies/mL mixed urogenital colby  Susceptibility testing not routinely done

## 2019-01-14 NOTE — TELEPHONE ENCOUNTER
Jennie Partida from [facility] called regarding a possible Advanced care directive. Please call Jennie at [phone] if Dr. Hurtado knows anything specific that can be helpful.      Yolanda Taylor, [facility]

## 2019-01-14 NOTE — TELEPHONE ENCOUNTER
9/8/16 last discussion.Also discussed at hospitalization 6/27/14,  Son has verbally chosen to avoid nephrectomy, treatment of prostate cancer. Attempts to get written documents except for consent to communicate (2/8/13, 10/5/15) have universally failed  Yonatan Hurtado

## 2019-01-15 ENCOUNTER — OFFICE VISIT (OUTPATIENT)
Dept: INTERPRETER SERVICES | Facility: CLINIC | Age: 84
End: 2019-01-15
Payer: COMMERCIAL

## 2019-01-15 LAB
ANION GAP SERPL CALCULATED.3IONS-SCNC: 5 MMOL/L (ref 3–14)
BACTERIA SPEC CULT: NO GROWTH
BACTERIA SPEC CULT: NO GROWTH
BUN SERPL-MCNC: 20 MG/DL (ref 7–30)
CALCIUM SERPL-MCNC: 8.2 MG/DL (ref 8.5–10.1)
CHLORIDE SERPL-SCNC: 115 MMOL/L (ref 94–109)
CO2 SERPL-SCNC: 24 MMOL/L (ref 20–32)
CREAT SERPL-MCNC: 1.1 MG/DL (ref 0.66–1.25)
ERYTHROCYTE [DISTWIDTH] IN BLOOD BY AUTOMATED COUNT: 16.1 % (ref 10–15)
GFR SERPL CREATININE-BSD FRML MDRD: 59 ML/MIN/{1.73_M2}
GLUCOSE SERPL-MCNC: 110 MG/DL (ref 70–99)
HCT VFR BLD AUTO: 32.8 % (ref 40–53)
HGB BLD-MCNC: 9.9 G/DL (ref 13.3–17.7)
Lab: NORMAL
Lab: NORMAL
MCH RBC QN AUTO: 27.8 PG (ref 26.5–33)
MCHC RBC AUTO-ENTMCNC: 30.2 G/DL (ref 31.5–36.5)
MCV RBC AUTO: 92 FL (ref 78–100)
PLATELET # BLD AUTO: 281 10E9/L (ref 150–450)
POTASSIUM SERPL-SCNC: 4.4 MMOL/L (ref 3.4–5.3)
RBC # BLD AUTO: 3.56 10E12/L (ref 4.4–5.9)
SODIUM SERPL-SCNC: 144 MMOL/L (ref 133–144)
SPECIMEN SOURCE: NORMAL
SPECIMEN SOURCE: NORMAL
WBC # BLD AUTO: 10.5 10E9/L (ref 4–11)

## 2019-01-15 PROCEDURE — 25000132 ZZH RX MED GY IP 250 OP 250 PS 637: Performed by: HOSPITALIST

## 2019-01-15 PROCEDURE — 25000132 ZZH RX MED GY IP 250 OP 250 PS 637: Performed by: INTERNAL MEDICINE

## 2019-01-15 PROCEDURE — 99232 SBSQ HOSP IP/OBS MODERATE 35: CPT | Performed by: INTERNAL MEDICINE

## 2019-01-15 PROCEDURE — 36415 COLL VENOUS BLD VENIPUNCTURE: CPT | Performed by: HOSPITALIST

## 2019-01-15 PROCEDURE — 80048 BASIC METABOLIC PNL TOTAL CA: CPT | Performed by: HOSPITALIST

## 2019-01-15 PROCEDURE — 25000128 H RX IP 250 OP 636: Performed by: INTERNAL MEDICINE

## 2019-01-15 PROCEDURE — T1013 SIGN LANG/ORAL INTERPRETER: HCPCS | Mod: U3

## 2019-01-15 PROCEDURE — 25000128 H RX IP 250 OP 636: Performed by: HOSPITALIST

## 2019-01-15 PROCEDURE — 12000000 ZZH R&B MED SURG/OB

## 2019-01-15 PROCEDURE — 25800025 ZZH RX 258: Performed by: INTERNAL MEDICINE

## 2019-01-15 PROCEDURE — 99207 ZZC CDG-MDM COMPONENT: MEETS LOW - DOWN CODED: CPT | Performed by: INTERNAL MEDICINE

## 2019-01-15 PROCEDURE — 85027 COMPLETE CBC AUTOMATED: CPT | Performed by: HOSPITALIST

## 2019-01-15 RX ORDER — DEXTROSE MONOHYDRATE, SODIUM CHLORIDE, AND POTASSIUM CHLORIDE 50; 1.49; 4.5 G/1000ML; G/1000ML; G/1000ML
INJECTION, SOLUTION INTRAVENOUS CONTINUOUS
Status: DISCONTINUED | OUTPATIENT
Start: 2019-01-15 | End: 2019-01-17

## 2019-01-15 RX ADMIN — Medication 12.5 MG: at 09:27

## 2019-01-15 RX ADMIN — CARVEDILOL 12.5 MG: 12.5 TABLET, FILM COATED ORAL at 09:27

## 2019-01-15 RX ADMIN — MELATONIN TAB 3 MG 3 MG: 3 TAB at 23:40

## 2019-01-15 RX ADMIN — BISACODYL 10 MG: 10 SUPPOSITORY RECTAL at 15:54

## 2019-01-15 RX ADMIN — PREGABALIN 100 MG: 100 CAPSULE ORAL at 09:27

## 2019-01-15 RX ADMIN — LEVOFLOXACIN 250 MG: 5 INJECTION, SOLUTION INTRAVENOUS at 13:21

## 2019-01-15 RX ADMIN — PREGABALIN 100 MG: 100 CAPSULE ORAL at 21:05

## 2019-01-15 RX ADMIN — CARVEDILOL 12.5 MG: 12.5 TABLET, FILM COATED ORAL at 18:20

## 2019-01-15 RX ADMIN — POTASSIUM CHLORIDE, DEXTROSE MONOHYDRATE AND SODIUM CHLORIDE: 150; 5; 450 INJECTION, SOLUTION INTRAVENOUS at 10:11

## 2019-01-15 RX ADMIN — DEXTRAN 70, AND HYPROMELLOSE 2910 2 DROP: 1; 3 SOLUTION/ DROPS OPHTHALMIC at 14:22

## 2019-01-15 RX ADMIN — ACETAMINOPHEN 650 MG: 325 TABLET, FILM COATED ORAL at 03:03

## 2019-01-15 RX ADMIN — FINASTERIDE 5 MG: 5 TABLET, FILM COATED ORAL at 10:07

## 2019-01-15 RX ADMIN — Medication 12.5 MG: at 21:05

## 2019-01-15 RX ADMIN — HYDROCODONE BITARTRATE AND ACETAMINOPHEN 1 TABLET: 5; 325 TABLET ORAL at 23:40

## 2019-01-15 RX ADMIN — POTASSIUM CHLORIDE AND SODIUM CHLORIDE: 900; 150 INJECTION, SOLUTION INTRAVENOUS at 00:51

## 2019-01-15 RX ADMIN — ACETAMINOPHEN 650 MG: 325 TABLET, FILM COATED ORAL at 14:31

## 2019-01-15 ASSESSMENT — ACTIVITIES OF DAILY LIVING (ADL)
ADLS_ACUITY_SCORE: 11

## 2019-01-15 ASSESSMENT — MIFFLIN-ST. JEOR: SCORE: 1355.74

## 2019-01-15 NOTE — PLAN OF CARE
Pt admitted with UTI, VSS.  Pt disoriented to place, time, and time. Pt states SOB at times, nonproductive cough. Some expiratory wheezes present. C/o of abdominal pain, given tylenol x1. Continue NPO.  Attempted to give patient suppository, pt refused.  Youssef in place, output 350 ml. Patient has some scrotal edema and edema present in lower legs. Ambulated in [place] x2 with assist of 2 people.  Palliative and ID following.

## 2019-01-15 NOTE — PLAN OF CARE
Pt admitted with UTI, VSS.  Pt disoriented x4. Pt states has some SOB at times, nonproductive cough.  C/o of abdominal pain, given tylenol x1.  Abdominal xray ordered, ileus present, diet changed to NPO.  Youssef in place with minimal output, 250 mls for 8 hour shift.  IVF started. Patient has some scrotal edema and edema in lower legs.  Weak, assist of 1-2 with ambulation.  Palliative, ID following.

## 2019-01-15 NOTE — PROGRESS NOTES
[facility]    Infectious Disease Progress Note    Date of Service (when I saw the patient): 01/15/2019     Assessment & Plan   Barrie Woods is a 88 year old male who was admitted on 1/9/2019.     IMPRESSION:   1.  An 88-year-old male with acute sepsis, presumed urosepsis, positive urine culture for pseudomonas and klebsiella, full INEZ to follow, looks improved on antibiotics, chronic Youssef catheter in place.   2.  Prior flagged history of carbapenem-resistant Enterobacteriaceae, but in looking in the cultures I see nothing for that.  It appears to be a false flag and we will have Infection Control investigate this and remove the flag if we are able to.   3 Prior treated latent TB  4 cardiomyopathy  5 renal insuff     REc:   1 continue lev, IV for now due to ileus, po anytime when GI tract allows, about 1 week  2  no CRE in cxs prior, investigate and none found remove flag              Candido Kelly MD    Interval History   Was confused, agitated so sitter int he room     Physical Exam   Temp: 96.7  F (35.9  C) Temp src: Oral BP: 162/83 Pulse: 83 Heart Rate: 73 Resp: 16 SpO2: 96 % O2 Device: None (Room air)    Vitals:    01/13/19 0528 01/14/19 0854 01/15/19 0628   Weight: 78.6 kg (173 lb 3.2 oz) 74.8 kg (164 lb 14.4 oz) 74.3 kg (163 lb 12.8 oz)     Vital Signs with Ranges  Temp:  [95.7  F (35.4  C)-99.1  F (37.3  C)] 96.7  F (35.9  C)  Pulse:  [83] 83  Heart Rate:  [73-95] 73  Resp:  [16-20] 16  BP: (157-170)/(68-83) 162/83  SpO2:  [95 %-97 %] 96 %    Constitutional: Awake, alert, cooperative, no apparent distress  Lungs: Clear to auscultation bilaterally, no crackles or wheezing  Cardiovascular: Regular rate and rhythm, normal S1 and S2, and no murmur noted  Abdomen: Normal bowel sounds, soft, non-distended, non-tender  Skin: No rashes, no cyanosis, no edema  Other:    Medications     dextrose 5% and 0.45% NaCl + KCl 20 mEq/L 50 mL/hr at 01/15/19 1011       carvedilol  12.5 mg Oral BID w/meals      finasteride  5 mg Oral Daily     influenza Vac Split High-Dose  0.5 mL Intramuscular Prior to discharge     levofloxacin  250 mg Intravenous Q24H     pregabalin  100 mg Oral BID     QUEtiapine  12.5 mg Oral BID     sodium chloride (PF)  3 mL Intracatheter Q8H       Data   All microbiology laboratory data reviewed.  Recent Labs   Lab Test 01/15/19  0825 01/13/19  0719 01/12/19  0725   WBC 10.5 12.5* 13.4*   HGB 9.9* 9.5* 10.1*   HCT 32.8* 30.6* 33.2*   MCV 92 89 90    230 214     Recent Labs   Lab Test 01/15/19  0825 01/14/19  0744 01/13/19  0719   CR 1.10 1.23 1.41*     No lab results found.  Recent Labs   Lab Test 01/09/19  1815 01/09/19  1800 07/25/18  1032 06/02/18  2000 04/26/17  1255 04/26/17  1050 04/26/17  1018 09/08/16  1710 08/31/15  1827   CULT No growth  >100,000 colonies/mL  Pseudomonas aeruginosa  *  10,000 to 50,000 colonies/mL  Klebsiella (Enterobacter) aerogenes  * No growth >100,000 colonies/mL  Enterobacter cloacae complex  * 10,000 to 50,000 colonies/mL  Enterobacter cloacae complex  * <10,000 colonies/mL Coagulase negative Staphylococcus* No growth Cultured on the 1st day of incubation: Staphylococcus epidermidis This isolate   DOES NOT demonstrate inducible clindamycin resistance in vitro. Clindamycin is   susceptible and could be used when indicated, however, erythromycin is   resistant and should not be used.  Critical Value/Significant Value, preliminary result only, called to and read   back by Crys Pina RN at 1435 on 4/27/17 by TISH.  (Note)  POSITIVE for STAPHYLOCOCCUS EPIDERMIDIS and POSITIVE for the mecA  gene (resistant to methicillin) by [employer] multiplex nucleic acid  test. Final identification and antimicrobial susceptibility testing  will be verified by standard methods.    Specimen tested with [employer] multiplex, gram-positive blood culture  nucleic acid test for the following targets: Staph aureus, Staph  epidermidis, Staph lugdunensis, other Staph species,  Enterococcus  faecalis, Enterococcus faecium, Streptococcus species, S. agalactiae,  S. anginosus grp., S. pneumoniae, S. pyogenes, Listeria sp., mecA  (methi cillin resistance) and Melvin/B (vancomycin resistance).    Critical Value/Significant Value called to and read back by Helen Merino rn @8194 4/27/17. ct  * >100,000 colonies/mL mixed urogenital colby  Susceptibility testing not routinely done   <10,000 colonies/mL mixed urogenital colby  Susceptibility testing not routinely done

## 2019-01-15 NOTE — PLAN OF CARE
Vitals stable, afebrile. Lungs diminished, occasional cough. Youssef catheter patent. NPO excep meds due to possible ilius. IVF infusing. Norco and tylenol given for generalized pain. Patient did not sleep well despite pain meds and melatonin. Sitter at bedside for safety.

## 2019-01-16 ENCOUNTER — INTERPRETER (OUTPATIENT)
Dept: INTERPRETER SERVICES | Facility: CLINIC | Age: 84
End: 2019-01-16
Payer: COMMERCIAL

## 2019-01-16 PROCEDURE — 99207 ZZC CDG-MDM COMPONENT: MEETS LOW - DOWN CODED: CPT | Performed by: INTERNAL MEDICINE

## 2019-01-16 PROCEDURE — T1013 SIGN LANG/ORAL INTERPRETER: HCPCS | Mod: U3

## 2019-01-16 PROCEDURE — 25000132 ZZH RX MED GY IP 250 OP 250 PS 637: Performed by: HOSPITALIST

## 2019-01-16 PROCEDURE — 25000128 H RX IP 250 OP 636: Performed by: INTERNAL MEDICINE

## 2019-01-16 PROCEDURE — 25000132 ZZH RX MED GY IP 250 OP 250 PS 637: Performed by: INTERNAL MEDICINE

## 2019-01-16 PROCEDURE — 25800025 ZZH RX 258: Performed by: INTERNAL MEDICINE

## 2019-01-16 PROCEDURE — 12000000 ZZH R&B MED SURG/OB

## 2019-01-16 PROCEDURE — 99232 SBSQ HOSP IP/OBS MODERATE 35: CPT | Performed by: INTERNAL MEDICINE

## 2019-01-16 RX ORDER — CARVEDILOL 25 MG/1
25 TABLET ORAL 2 TIMES DAILY WITH MEALS
Status: DISCONTINUED | OUTPATIENT
Start: 2019-01-16 | End: 2019-01-27 | Stop reason: HOSPADM

## 2019-01-16 RX ORDER — CARVEDILOL 12.5 MG/1
12.5 TABLET ORAL ONCE
Status: COMPLETED | OUTPATIENT
Start: 2019-01-16 | End: 2019-01-16

## 2019-01-16 RX ADMIN — PREGABALIN 100 MG: 100 CAPSULE ORAL at 21:57

## 2019-01-16 RX ADMIN — Medication 12.5 MG: at 08:01

## 2019-01-16 RX ADMIN — FINASTERIDE 5 MG: 5 TABLET, FILM COATED ORAL at 08:03

## 2019-01-16 RX ADMIN — HYDROCODONE BITARTRATE AND ACETAMINOPHEN 1 TABLET: 5; 325 TABLET ORAL at 21:57

## 2019-01-16 RX ADMIN — LEVOFLOXACIN 250 MG: 5 INJECTION, SOLUTION INTRAVENOUS at 14:26

## 2019-01-16 RX ADMIN — Medication 12.5 MG: at 21:57

## 2019-01-16 RX ADMIN — CARVEDILOL 25 MG: 25 TABLET, FILM COATED ORAL at 18:21

## 2019-01-16 RX ADMIN — CARVEDILOL 12.5 MG: 12.5 TABLET, FILM COATED ORAL at 08:01

## 2019-01-16 RX ADMIN — CARVEDILOL 12.5 MG: 12.5 TABLET, FILM COATED ORAL at 14:21

## 2019-01-16 RX ADMIN — HYDROCODONE BITARTRATE AND ACETAMINOPHEN 1 TABLET: 5; 325 TABLET ORAL at 06:05

## 2019-01-16 RX ADMIN — PREGABALIN 100 MG: 100 CAPSULE ORAL at 08:03

## 2019-01-16 RX ADMIN — POTASSIUM CHLORIDE, DEXTROSE MONOHYDRATE AND SODIUM CHLORIDE: 150; 5; 450 INJECTION, SOLUTION INTRAVENOUS at 07:58

## 2019-01-16 ASSESSMENT — ACTIVITIES OF DAILY LIVING (ADL)
ADLS_ACUITY_SCORE: 11
ADLS_ACUITY_SCORE: 11
ADLS_ACUITY_SCORE: 16
ADLS_ACUITY_SCORE: 11

## 2019-01-16 NOTE — CONSULTS
"CLINICAL NUTRITION SERVICES  -  ASSESSMENT NOTE      MALNUTRITION:  % Weight Loss: Unable to determine --> fluid shifts  % Intake:  Decreased intake does not meet criteria for malnutrition --> will meet tomorrow   Subcutaneous Fat Loss:  None observed in face  Muscle Loss:  Temporal region moderate depletion --> unable to obtain further with patient sleeping/do not disturb per RN  Fluid Retention:  None noted    Malnutrition Diagnosis: Patient does not meet two of the above criteria necessary for diagnosing malnutrition, is at risk for decline        REASON FOR ASSESSMENT  Barrie Woods is a 88 year old male seen by Registered Dietitian for [facility].      NUTRITION HISTORY  - Information obtained from chart and nurse as patient requires in-person interpretor d/t hx and no family present.  - Patient with a h/o CVA, dementia, CKA, cardiomyopathy, RCC.   - Admitted d/t sepsis 2/2 UTI.   - Family reported regular diet with NTL to SLP.  - Unable to obtain diet recall or recent oral intake trending.  - NKFA.      CURRENT NUTRITION ORDERS  Diet Order:     Clears   Not appropriate for room service    Current Intake/Tolerance:  SLP consulted d/t patient coughing with pills and on thin liquids during beginning of admit.   Bedside swallow evaluation 1/11 - regular with NTL recommended.  Was previously on a regular diet.  Able to self-feed with set-up assist.  Variable oral intakes of bites-100% when with diet order.  On 1/14 pt began c/o abdominal pain, had abdominal distention.   Downgraded to NPO, found to have ileus.  Advanced to clears this AM.  Suspect meeting<75% needs x 7 days since admission.  Unclear trending PTA.        PHYSICAL FINDINGS  Observed  Edentulous   Obtained from Chart/Interdisciplinary Team  Full upper and lower dentures per SLP note  Palliative following: Full code/restorative   Multiple BMs over past 24 hrs    ANTHROPOMETRICS  Height: 5' 6\"  Weight: 74.3 kg (163#)  Body mass index is 26.44 kg/m .  Weight " Status:  Overweight BMI 25-29.9  IBW: 59.1 kg (130#)  % IBW: 126%  Weight History:  Wt Readings from Last 10 Encounters:   01/15/19 74.3 kg (163 lb 12.8 oz)   12/28/18 64.9 kg (143 lb)   10/29/18 73.5 kg (162 lb)   09/07/18 73.5 kg (162 lb)   08/17/18 73.9 kg (163 lb)   08/03/18 73.9 kg (163 lb)   07/06/18 74.1 kg (163 lb 7 oz)   06/04/18 75.8 kg (167 lb)   05/29/18 78.9 kg (174 lb)   05/07/18 78 kg (172 lb)   - ?Fluid shifts    LABS  Labs reviewed    MEDICATIONS  Medications reviewed      ASSESSED NUTRITION NEEDS PER APPROVED PRACTICE GUIDELINES:    Dosing Weight 62.9 kg - adjusted weight   Estimated Energy Needs: 0845-5023 kcals (25-30 Kcal/Kg)  Justification: maintenance  Estimated Protein Needs: 75-94 grams protein (1.2-1.5 g pro/Kg)  Justification: preservation of lean body mass  Estimated Fluid Needs: per MD  Justification: per MD    MALNUTRITION:  % Weight Loss: Unable to determine --> fluid shifts  % Intake:  Decreased intake does not meet criteria for malnutrition --> will meet tomorrow   Subcutaneous Fat Loss:  None observed in face  Muscle Loss:  Temporal region moderate depletion --> unable to obtain further with patient sleeping/do not disturb per RN  Fluid Retention:  None noted    Malnutrition Diagnosis: Patient does not meet two of the above criteria necessary for diagnosing malnutrition, is at risk for decline    NUTRITION DIAGNOSIS:  Inadequate oral intake related to ?decreased appetite with underlying dementia, abdominal pain with ileus as evidenced by meeting <75% needs x 7 days since admission.      NUTRITION INTERVENTIONS  Recommendations / Nutrition Prescription  Diet per MD/SLP.  RN paging to have MD update diet order to clears/nectar-thick.  With further diet advancement and when intake improved, would recommend 2 gram Na combination.    Addition of oral supplements when diet advances to fulls - Magic Cup with meals.        Implementation  Nutrition education: Not appropriate at this time  due to patient condition.    Collaboration and Referral of Nutrition care: Discussed POC with RN and team during rounds.      Nutrition Goals  Diet advancement past clears w/in 24-48 hrs.       MONITORING AND EVALUATION:  Progress towards goals will be monitored and evaluated per protocol and Practice Guidelines        Kelsey Humphreys RD, LD  Clinical Dietitian  3rd floor/ICU: [phone]  All other floors: [phone]  Weekend/holiday: [phone]

## 2019-01-16 NOTE — PLAN OF CARE
Pt restless at times, VPM effective in alerting staff when pt attempts to get OOB. Bowel sounds hypoactive, no stools this shift, is passing some flatus. Youssef patent good urine output. Norco given x1 for signs of pain.

## 2019-01-16 NOTE — PROGRESS NOTES
[facility]  Hospitalist Progress Note  Name: Barrie Woods    MRN: [id number]  YOB: 1930    Age: 88 year old  Date of admission: 1/9/2019  Primary care provider: Yonatan Hurtado      Reason for Stay (Diagnosis): Severe sepsis secondary to catheter related urinary tract infection         Assessment and Plan:      Summary of Stay:  Barrie Woods is a 88 year old male with history of CVA, dementia, chronic kidney disease, A. fib, cardiomyopathy, congestive heart failure, benign prostatic hypertrophy, renal cell carcinoma, who was scheduled for elective TURP on the day of admission,  found to have purulent discharge in the Urinary catheter, found to have sepsis due to urinary tract infection and admitted to the hospital on 1/9/2019.  Urine culture grew out Pseudomonas patient was initially on IV Zosyn and then transition to levofloxacin per ID recommendations however as patient has been refusing to take oral antibiotics.  Plan to complete 10-day course of antibiotics while in the hospital unless patient is able to take p.o.    On 1/14/2019 patient was noted to have abdominal pain and distention, abdominal x-ray was suggestive of ileus-?  Due to underlying illness.  Patient is being treated conservatively.         Assessment and Plan:      1. Severe sepsis secondary to catheter associated urinary tract infection.-This is due to indwelling Youssef catheter related infection. Urine culture grew pseudomonas aeruginosa and Enterobacter aerogenes, patient had been on Ciprofloxacin and Zosyn from 01/09 to 01/13 and now has been transitioned to levofloxacin.  Enterobacter is sensitive to fluoroquinolone as well.  -Patient has previous history of CRE (carbapenem resistant enterococcus) per report, no documentation found.  -Continue IV Levaquin as patient does have an ileus  -Antibiotic management per infectious disease, input appreciated.  -Urology consulted, will follow up as to the  timing of TURP.  It was noted that patient was renal mass suspected to be cancer but has declined surgical intervention in the past. Renal ultrasound did not show any sign of urinary obstruction.     2.   Septic/infectious encephalopathy: At baseline, do suspect patient has underlying dementia and does tend to wander.  -Continue Seroquel     3.  Chronic diastolic congestive heart failure.  Last echo done in 2017 showed EF of 50-55 % with indeterminate left ventricular diastolic function.   -Monitor input and output  -Daily weight.  -Increase Coreg to 25mg po bid.     4.  Acute kidney injury on chronic kidney disease stage III.  -Creatinine continues to improve and is at baseline. Baseline creatinine was 1.25, about 4 months ago  -Continue to hold ACE inhibitor and diuretics for now. Consider re-adding ace-I tomorrow if K+ isnl  -He had hyperkalemia on presentation which is currently resolved.  Due to renal failure and lisinopril.     5.  Chronic arterial fibrillation. appears to be in nsr on exam  -Continue Coreg  -Patient is not on full anticoagulation of bleeding outweighs the benefit at this point  -We will leave this to primary care provider to address after his procedure done, risk of bleeding and falls are high.     6.  History of left renal mass suspected malignancy.  Urology following.  Patient did not want any surgical intervention for this in the past.     7.  Abdominal pain and distention secondary to ileus: on 1/14/2019 patient was noted to have abdominal pain and distention, abdominal x-ray is consistent with an ileus.  This was also reviewed personally by myself.  Suspect secondary to bedrest and acute illness. Trial of clears. Encouraged ambulation.       DVT Prophylaxis: Pneumatic Compression Devices  Code Status: Full Code  Discharge Dispo: Likely home with family  Estimated Disch Date / # of Days until Disch: To be determined based on recovery of ileus and compliance with taking oral  "antibiotics  Time spent: Greater than 35 minutes      Interval History (Subjective):      History is assisted with professional  at the bedside.  Limited historian secondary to dementia and language barrier. On small bm last night p suppository and subjectively says yes to flatus         Physical Exam:      Vital signs:  Temp: 96.5  F (35.8  C) Temp src: Oral BP: 171/82 Pulse: 87 Heart Rate: 89 Resp: 20 SpO2: 93 % O2 Device: None (Room air)   Height: 167.6 cm (5' 6\") Weight: 74.3 kg (163 lb 12.8 oz)  Estimated body mass index is 26.44 kg/m  as calculated from the following:    Height as of this encounter: 1.676 m (5' 6\").    Weight as of this encounter: 74.3 kg (163 lb 12.8 oz).    I/O last 3 completed shifts:  In: 1110 [I.V.:1110]  Out: 875 [Urine:875]  Vitals:    01/11/19 0528 01/12/19 0149 01/13/19 0528 01/14/19 0854   Weight: 76.7 kg (169 lb) 76.3 kg (168 lb 3.2 oz) 78.6 kg (173 lb 3.2 oz) 74.8 kg (164 lb 14.4 oz)    01/15/19 0628   Weight: 74.3 kg (163 lb 12.8 oz)       Constitutional: Awake, alert but pleasantly confused, cooperative, no apparent distress   Respiratory: Nl work of breathing.  Diminished breath sounds at the base   Cardiovascular: Regular rate and rhythm, normal S1 and S2, and no murmur noted   Abdomen:  Hypoactive bowel sounds, soft, mildly-distended, reports tenderness all over but no rebound or guarding.   Skin: No rashes, no cyanosis, dry to touch   Neuro: CN 2-12 intact, no localizing weakness   Extremities: No edema, normal range of motion   HEENT Normocephalic, atraumatic, normal nasal turbinates; oropharynx clear   Neck Supple; nl inspection; trachea midline; no thryomegaly   Psychiatric:  Awake and alert.  Normal affect          Medications:      All current medications were reviewed with changes reflected in problem list.         Data:      All new lab and imaging data was reviewed.   Labs:  Recent Labs   Lab 01/09/19  1815 01/09/19  1800   CULT No growth  >100,000 " colonies/mL  Pseudomonas aeruginosa  *  10,000 to 50,000 colonies/mL  Klebsiella (Enterobacter) aerogenes  * No growth     Recent Labs   Lab 01/15/19  0825 01/13/19 0719 01/12/19 0725   WBC 10.5 12.5* 13.4*   HGB 9.9* 9.5* 10.1*   HCT 32.8* 30.6* 33.2*   MCV 92 89 90    230 214     Recent Labs   Lab 01/15/19  0825 01/14/19  0744 01/13/19  0719    143 141   POTASSIUM 4.4 4.0 4.0   CHLORIDE 115* 112* 111*   CO2 24 25 22   ANIONGAP 5 6 8   * 115* 112*   BUN 20 23 30   CR 1.10 1.23 1.41*   GFRESTIMATED 59* 52* 44*   GFRESTBLACK 69 60* 51*   JOSE 8.2* 8.6 8.1*   MAG  --  2.2  --       Imaging:   No results found for this or any previous visit (from the past 24 hour(s)).    Emanuel Barcenas [phone]

## 2019-01-16 NOTE — PROGRESS NOTES
[facility]    Infectious Disease Progress Note    Date of Service (when I saw the patient): 01/16/2019     Assessment & Plan   Barrie Woods is a 88 year old male who was admitted on 1/9/2019.     IMPRESSION:   1.  An 88-year-old male with acute sepsis, presumed urosepsis, positive urine culture for pseudomonas and klebsiella, full INEZ to follow, looks improved on antibiotics, chronic Youssef catheter in place.   2.  Prior flagged history of carbapenem-resistant Enterobacteriaceae, but in looking in the cultures I see nothing for that.  It appears to be a false flag and we will have Infection Control investigate this and remove the flag if we are able to.   3 Prior treated latent TB  4 cardiomyopathy  5 renal insuff     REc:   1 continue lev, IV for now due to ileus, po anytime when GI tract allows, about 5 days more, day 8 total now  2  no CRE in cxs prior, investigate and none found remove flag              Candido Kelly MD    Interval History   Was confused, agitated so sitter int he room     Physical Exam   Temp: 96.5  F (35.8  C) Temp src: Oral BP: 171/82 Pulse: 87 Heart Rate: 89 Resp: 20 SpO2: 93 % O2 Device: None (Room air)    Vitals:    01/13/19 0528 01/14/19 0854 01/15/19 0628   Weight: 78.6 kg (173 lb 3.2 oz) 74.8 kg (164 lb 14.4 oz) 74.3 kg (163 lb 12.8 oz)     Vital Signs with Ranges  Temp:  [96.5  F (35.8  C)-97.4  F (36.3  C)] 96.5  F (35.8  C)  Pulse:  [87] 87  Heart Rate:  [77-95] 89  Resp:  [16-22] 20  BP: (157-171)/(72-82) 171/82  SpO2:  [93 %-96 %] 93 %    Constitutional: Awake, alert, cooperative, no apparent distress  Lungs: Clear to auscultation bilaterally, no crackles or wheezing  Cardiovascular: Regular rate and rhythm, normal S1 and S2, and no murmur noted  Abdomen: Normal bowel sounds, soft, non-distended, non-tender  Skin: No rashes, no cyanosis, no edema  Other:    Medications     dextrose 5% and 0.45% NaCl + KCl 20 mEq/L 50 mL/hr at 01/16/19 0758       carvedilol  12.5 mg  Oral Once     carvedilol  25 mg Oral BID w/meals     finasteride  5 mg Oral Daily     influenza Vac Split High-Dose  0.5 mL Intramuscular Prior to discharge     levofloxacin  250 mg Intravenous Q24H     pregabalin  100 mg Oral BID     QUEtiapine  12.5 mg Oral BID     sodium chloride (PF)  3 mL Intracatheter Q8H       Data   All microbiology laboratory data reviewed.  Recent Labs   Lab Test 01/15/19  0825 01/13/19  0719 01/12/19  0725   WBC 10.5 12.5* 13.4*   HGB 9.9* 9.5* 10.1*   HCT 32.8* 30.6* 33.2*   MCV 92 89 90    230 214     Recent Labs   Lab Test 01/15/19  0825 01/14/19  0744 01/13/19  0719   CR 1.10 1.23 1.41*     No lab results found.  Recent Labs   Lab Test 01/09/19  1815 01/09/19  1800 07/25/18  1032 06/02/18  2000 04/26/17  1255 04/26/17  1050 04/26/17  1018 09/08/16  1710 08/31/15  1827   CULT No growth  >100,000 colonies/mL  Pseudomonas aeruginosa  *  10,000 to 50,000 colonies/mL  Klebsiella (Enterobacter) aerogenes  * No growth >100,000 colonies/mL  Enterobacter cloacae complex  * 10,000 to 50,000 colonies/mL  Enterobacter cloacae complex  * <10,000 colonies/mL Coagulase negative Staphylococcus* No growth Cultured on the 1st day of incubation: Staphylococcus epidermidis This isolate   DOES NOT demonstrate inducible clindamycin resistance in vitro. Clindamycin is   susceptible and could be used when indicated, however, erythromycin is   resistant and should not be used.  Critical Value/Significant Value, preliminary result only, called to and read   back by Crys Pina RN at 1435 on 4/27/17 by TISH.  (Note)  POSITIVE for STAPHYLOCOCCUS EPIDERMIDIS and POSITIVE for the mecA  gene (resistant to methicillin) by [employer] multiplex nucleic acid  test. Final identification and antimicrobial susceptibility testing  will be verified by standard methods.    Specimen tested with [employer] multiplex, gram-positive blood culture  nucleic acid test for the following targets: Staph aureus, Staph  epidermidis,  Staph lugdunensis, other Staph species, Enterococcus  faecalis, Enterococcus faecium, Streptococcus species, S. agalactiae,  S. anginosus grp., S. pneumoniae, S. pyogenes, Listeria sp., mecA  (methi cillin resistance) and Melvin/B (vancomycin resistance).    Critical Value/Significant Value called to and read back by Helen Merino rn @1148 4/27/17. ct  * >100,000 colonies/mL mixed urogenital colby  Susceptibility testing not routinely done   <10,000 colonies/mL mixed urogenital colby  Susceptibility testing not routinely done

## 2019-01-16 NOTE — PLAN OF CARE
Cared for patient from [id number]. Suppository administered with small results. Passing gas. Remains NPO with IVF. Tylenol administered for generalized pain. Ambulated in halls x1 with assist of one and walker.  utilized for communication.

## 2019-01-16 NOTE — PROGRESS NOTES
[facility]  Hospitalist Progress Note  Name: Barrie Woods    MRN: [id number]  YOB: 1930    Age: 88 year old  Date of admission: 1/9/2019  Primary care provider: Yonatan Hurtado      Reason for Stay (Diagnosis): Severe sepsis secondary to catheter related urinary tract infection         Assessment and Plan:      Summary of Stay:  Barrie Woods is a 88 year old male with history of CVA, dementia, chronic kidney disease, A. fib, cardiomyopathy, congestive heart failure, benign prostatic hypertrophy, renal cell carcinoma, who was scheduled for elective TURP on the day of admission,  found to have purulent discharge in the Urinary catheter, found to have sepsis due to urinary tract infection and admitted to the hospital on 1/9/2019.  Urine culture grew out Pseudomonas patient was initially on IV Zosyn and then transition to levofloxacin per ID recommendations however as patient has been refusing to take oral antibiotics.  Plan to complete 10-day course of antibiotics while in the hospital unless patient is able to take p.o.    On 1/14/2019 patient was noted to have abdominal pain and distention, abdominal x-ray was suggestive of ileus-?  Due to underlying illness.  Patient is being treated conservatively-if no improvement, will need further workup.         Assessment and Plan:      1. Severe sepsis secondary to catheter associated urinary tract infection.-This is due to indwelling Youssef catheter related infection. Urine culture grew pseudomonas aeruginosa and Enterobacter aerogenes, patient had been on Ciprofloxacin and Zosyn from 01/09 to 01/13 and now has been transitioned to levofloxacin.  Enterobacter is sensitive to fluoroquinolone as well.  -Patient has previous history of CRE (carbapenem resistant enterococcus) per report, no documentation found.  -Continue IV Levaquin as patient does have an ileus  -Antibiotic management per infectious disease, input  appreciated.  -Urology consulted, will follow up as to the timing of TURP.  It was noted that patient was renal mass suspected to be cancer but has declined surgical intervention in the past. Renal ultrasound did not show any sign of urinary obstruction.     2.   Septic/infectious encephalopathy: At baseline, do suspect patient has underlying dementia and does tend to wander.  -Continue Seroquel  -Has a sitter due to risk of fall.     3.  Chronic diastolic congestive heart failure.  Last echo done in 2017 showed EF of 50-55 % with indeterminate left ventricular diastolic function.   -Monitor input and output  -Daily weight.  -Continue Coreg.     4.  Acute kidney injury on chronic kidney disease stage III.  -Creatinine continues to improve and is at baseline. Baseline creatinine was 1.25, about 4 months ago  -Continue to hold ACE inhibitor and diuretics for now.  -He has hyperkalemia on presentation which is currently resolved.  Due to renal failure and lisinopril.     5.  Chronic arterial fibrillation.  -Continue Coreg  -Patient is not on full anticoagulation of bleeding outweighs the benefit at this point  -We will leave this to primary care provider to address after his procedure done, risk of bleeding and falls are high.     6.  History of left renal mass suspected malignancy.  Urology following.  Patient did not want any surgical intervention for this in the past.     7.  Abdominal pain and distention secondary to ileus: on 1/14/2019 patient was noted to have abdominal pain and distention, abdominal x-ray is consistent with an ileus.  This was also reviewed personally by myself.  Suspect secondary to bedrest and acute illness.  Continue n.p.o. with the exception of ice chips and medications.  Encouraged ambulation.  Potassium levels are normal.      DVT Prophylaxis: Pneumatic Compression Devices  Code Status: Full Code  Discharge Dispo: Likely home with family  Estimated Disch Date / # of Days until Disch: To be  "determined based on recovery of ileus and compliance with taking oral antibiotics  Time spent: Greater than 35 minutes      Interval History (Subjective):      History is assisted with professional  at the bedside.  Limited historian secondary to dementia and language barrier.  Earlier did say no to pain but on my interview, he said yes to abdominal pain chest pain and leg pain.         Physical Exam:      Vital signs:  Temp: 97.3  F (36.3  C) Temp src: Oral BP: 164/72 Pulse: 83 Heart Rate: 77 Resp: 16 SpO2: 96 % O2 Device: None (Room air)   Height: 167.6 cm (5' 6\") Weight: 74.3 kg (163 lb 12.8 oz)  Estimated body mass index is 26.44 kg/m  as calculated from the following:    Height as of this encounter: 1.676 m (5' 6\").    Weight as of this encounter: 74.3 kg (163 lb 12.8 oz).    I/O last 3 completed shifts:  In: 1991 [I.V.:1991]  Out: 900 [Urine:900]  Vitals:    01/11/19 0528 01/12/19 0149 01/13/19 0528 01/14/19 0854   Weight: 76.7 kg (169 lb) 76.3 kg (168 lb 3.2 oz) 78.6 kg (173 lb 3.2 oz) 74.8 kg (164 lb 14.4 oz)    01/15/19 0628   Weight: 74.3 kg (163 lb 12.8 oz)       Constitutional: Awake, alert but pleasantly confused, cooperative, no apparent distress   Respiratory: Nl work of breathing.  Diminished breath sounds at the base   Cardiovascular: Regular rate and rhythm, normal S1 and S2, and no murmur noted   Abdomen:  Hypoactive bowel sounds, soft, mildly-distended, reports tenderness all over but no rebound or guarding.   Skin: No rashes, no cyanosis, dry to touch   Neuro: CN 2-12 intact, no localizing weakness   Extremities: No edema, normal range of motion   HEENT Normocephalic, atraumatic, normal nasal turbinates; oropharynx clear   Neck Supple; nl inspection; trachea midline; no thryomegaly   Psychiatric:  Awake and alert.  Normal affect          Medications:      All current medications were reviewed with changes reflected in problem list.         Data:      All new lab and imaging data was " reviewed.   Labs:  Recent Labs   Lab 01/09/19  1815 01/09/19  1800   CULT No growth  >100,000 colonies/mL  Pseudomonas aeruginosa  *  10,000 to 50,000 colonies/mL  Klebsiella (Enterobacter) aerogenes  * No growth     Recent Labs   Lab 01/15/19  0825 01/13/19  0719 01/12/19  0725   WBC 10.5 12.5* 13.4*   HGB 9.9* 9.5* 10.1*   HCT 32.8* 30.6* 33.2*   MCV 92 89 90    230 214     Recent Labs   Lab 01/15/19  0825 01/14/19  0744 01/13/19  0719    143 141   POTASSIUM 4.4 4.0 4.0   CHLORIDE 115* 112* 111*   CO2 24 25 22   ANIONGAP 5 6 8   * 115* 112*   BUN 20 23 30   CR 1.10 1.23 1.41*   GFRESTIMATED 59* 52* 44*   GFRESTBLACK 69 60* 51*   JOSE 8.2* 8.6 8.1*   MAG  --  2.2  --       Imaging:   No results found for this or any previous visit (from the past 24 hour(s)).    Emanuel Barcenas -[phone]

## 2019-01-17 ENCOUNTER — APPOINTMENT (OUTPATIENT)
Dept: GENERAL RADIOLOGY | Facility: CLINIC | Age: 84
DRG: 698 | End: 2019-01-17
Attending: UROLOGY
Payer: COMMERCIAL

## 2019-01-17 ENCOUNTER — OFFICE VISIT (OUTPATIENT)
Dept: INTERPRETER SERVICES | Facility: CLINIC | Age: 84
End: 2019-01-17
Payer: COMMERCIAL

## 2019-01-17 LAB
ANION GAP SERPL CALCULATED.3IONS-SCNC: 4 MMOL/L (ref 3–14)
BUN SERPL-MCNC: 16 MG/DL (ref 7–30)
CALCIUM SERPL-MCNC: 8.2 MG/DL (ref 8.5–10.1)
CHLORIDE SERPL-SCNC: 113 MMOL/L (ref 94–109)
CO2 SERPL-SCNC: 26 MMOL/L (ref 20–32)
CREAT SERPL-MCNC: 1.09 MG/DL (ref 0.66–1.25)
ERYTHROCYTE [DISTWIDTH] IN BLOOD BY AUTOMATED COUNT: 16.1 % (ref 10–15)
GFR SERPL CREATININE-BSD FRML MDRD: 60 ML/MIN/{1.73_M2}
GLUCOSE SERPL-MCNC: 105 MG/DL (ref 70–99)
HCT VFR BLD AUTO: 33.1 % (ref 40–53)
HGB BLD-MCNC: 10.1 G/DL (ref 13.3–17.7)
MAGNESIUM SERPL-MCNC: 1.8 MG/DL (ref 1.6–2.3)
MCH RBC QN AUTO: 28 PG (ref 26.5–33)
MCHC RBC AUTO-ENTMCNC: 30.5 G/DL (ref 31.5–36.5)
MCV RBC AUTO: 92 FL (ref 78–100)
PLATELET # BLD AUTO: 290 10E9/L (ref 150–450)
POTASSIUM SERPL-SCNC: 4.3 MMOL/L (ref 3.4–5.3)
RBC # BLD AUTO: 3.61 10E12/L (ref 4.4–5.9)
SODIUM SERPL-SCNC: 142 MMOL/L (ref 133–144)
WBC # BLD AUTO: 10.3 10E9/L (ref 4–11)

## 2019-01-17 PROCEDURE — 25000128 H RX IP 250 OP 636: Performed by: INTERNAL MEDICINE

## 2019-01-17 PROCEDURE — 25000132 ZZH RX MED GY IP 250 OP 250 PS 637: Performed by: INTERNAL MEDICINE

## 2019-01-17 PROCEDURE — 12000000 ZZH R&B MED SURG/OB

## 2019-01-17 PROCEDURE — 25000132 ZZH RX MED GY IP 250 OP 250 PS 637: Performed by: NURSE PRACTITIONER

## 2019-01-17 PROCEDURE — 99233 SBSQ HOSP IP/OBS HIGH 50: CPT | Performed by: NURSE PRACTITIONER

## 2019-01-17 PROCEDURE — 99232 SBSQ HOSP IP/OBS MODERATE 35: CPT | Performed by: INTERNAL MEDICINE

## 2019-01-17 PROCEDURE — 36415 COLL VENOUS BLD VENIPUNCTURE: CPT | Performed by: INTERNAL MEDICINE

## 2019-01-17 PROCEDURE — 99207 ZZC CDG-MDM COMPONENT: MEETS LOW - DOWN CODED: CPT | Performed by: INTERNAL MEDICINE

## 2019-01-17 PROCEDURE — 74019 RADEX ABDOMEN 2 VIEWS: CPT

## 2019-01-17 PROCEDURE — T1013 SIGN LANG/ORAL INTERPRETER: HCPCS | Mod: U3

## 2019-01-17 PROCEDURE — 83735 ASSAY OF MAGNESIUM: CPT | Performed by: INTERNAL MEDICINE

## 2019-01-17 PROCEDURE — 25000132 ZZH RX MED GY IP 250 OP 250 PS 637: Performed by: HOSPITALIST

## 2019-01-17 PROCEDURE — 85027 COMPLETE CBC AUTOMATED: CPT | Performed by: INTERNAL MEDICINE

## 2019-01-17 PROCEDURE — 25800025 ZZH RX 258: Performed by: INTERNAL MEDICINE

## 2019-01-17 PROCEDURE — 80048 BASIC METABOLIC PNL TOTAL CA: CPT | Performed by: INTERNAL MEDICINE

## 2019-01-17 RX ORDER — ACETAMINOPHEN 325 MG/1
650 TABLET ORAL EVERY 8 HOURS
Status: DISCONTINUED | OUTPATIENT
Start: 2019-01-17 | End: 2019-01-27 | Stop reason: HOSPADM

## 2019-01-17 RX ORDER — LISINOPRIL 10 MG/1
10 TABLET ORAL DAILY
Status: DISCONTINUED | OUTPATIENT
Start: 2019-01-17 | End: 2019-01-22

## 2019-01-17 RX ORDER — PREGABALIN 75 MG/1
150 CAPSULE ORAL 2 TIMES DAILY
Status: DISCONTINUED | OUTPATIENT
Start: 2019-01-17 | End: 2019-01-27 | Stop reason: HOSPADM

## 2019-01-17 RX ADMIN — POTASSIUM CHLORIDE, DEXTROSE MONOHYDRATE AND SODIUM CHLORIDE: 150; 5; 450 INJECTION, SOLUTION INTRAVENOUS at 04:21

## 2019-01-17 RX ADMIN — Medication 12.5 MG: at 22:01

## 2019-01-17 RX ADMIN — CARVEDILOL 25 MG: 25 TABLET, FILM COATED ORAL at 17:47

## 2019-01-17 RX ADMIN — LISINOPRIL 10 MG: 10 TABLET ORAL at 16:55

## 2019-01-17 RX ADMIN — PREGABALIN 150 MG: 75 CAPSULE ORAL at 22:01

## 2019-01-17 RX ADMIN — ACETAMINOPHEN 650 MG: 325 TABLET, FILM COATED ORAL at 08:25

## 2019-01-17 RX ADMIN — ACETAMINOPHEN 650 MG: 325 TABLET, FILM COATED ORAL at 16:54

## 2019-01-17 RX ADMIN — LEVOFLOXACIN 250 MG: 5 INJECTION, SOLUTION INTRAVENOUS at 14:06

## 2019-01-17 RX ADMIN — Medication 12.5 MG: at 08:25

## 2019-01-17 RX ADMIN — TRAMADOL HYDROCHLORIDE 25 MG: 50 TABLET, FILM COATED ORAL at 22:19

## 2019-01-17 RX ADMIN — FINASTERIDE 5 MG: 5 TABLET, FILM COATED ORAL at 08:25

## 2019-01-17 RX ADMIN — CARVEDILOL 25 MG: 25 TABLET, FILM COATED ORAL at 08:25

## 2019-01-17 RX ADMIN — PREGABALIN 100 MG: 100 CAPSULE ORAL at 08:25

## 2019-01-17 ASSESSMENT — ACTIVITIES OF DAILY LIVING (ADL)
ADLS_ACUITY_SCORE: 16

## 2019-01-17 ASSESSMENT — MIFFLIN-ST. JEOR: SCORE: 1320.81

## 2019-01-17 NOTE — PLAN OF CARE
Pt admitted with UTI, VSS.  Pt disoriented to place, time, and time. Pt states SOB at times, nonproductive cough.  Denies pain.  Bowel sounds more active than yesterday, started on clear liquids. No stool today.   Youssef in place. Patient has some scrotal edema and edema present in lower legs. Ambulated in [place] x2 with assist of 2 people.  Palliative and ID following.

## 2019-01-17 NOTE — PROGRESS NOTES
[facility]  Hospitalist Progress Note  Name: Barrie Woods    MRN: [id number]  YOB: 1930    Age: 88 year old  Date of admission: 1/9/2019  Primary care provider: Yonatan Hurtado      Reason for Stay (Diagnosis): Severe sepsis secondary to catheter related urinary tract infection         Assessment and Plan:      Summary of Stay:  Barrie Woods is a 88 year old male with history of CVA, dementia, chronic kidney disease, A. fib, cardiomyopathy, congestive heart failure, benign prostatic hypertrophy, renal cell carcinoma, who was scheduled for elective TURP on the day of admission,  found to have purulent discharge in the Urinary catheter, found to have sepsis due to urinary tract infection and admitted to the hospital on 1/9/2019.  Urine culture grew out Pseudomonas patient was initially on IV Zosyn and then transition to levofloxacin per ID recommendations however as patient has been refusing to take oral antibiotics so was transition to IV, but with development of ileus, will continue IV Levaquin to ensure that he gets adequate antibiotic treatment.      On 1/14/2019 patient was noted to have abdominal pain and distention, abdominal x-ray was suggestive of ileus- ? Due to underlying illness.  Patient is being treated conservatively.         Assessment and Plan:      1. Severe sepsis secondary to catheter associated urinary tract infection.-This is due to indwelling Youssef catheter related infection. Urine culture grew pseudomonas aeruginosa and Enterobacter aerogenes, patient had been on Ciprofloxacin and Zosyn from 01/09 to 01/13 and now has been transitioned to levofloxacin.  Enterobacter is sensitive to fluoroquinolone as well.  -Patient has previous history of CRE (carbapenem resistant enterococcus) per report, no documentation found.  Contact precautions removed.  -Continue IV Levaquin as patient does have an ileus, but could potentially transition to p.o. Levaquin  in 1-2 days as he is tolerating other oral medications but want to ensure adequate antibiotic concentration.  Per ID, suspect may be 4 more days.  -Antibiotic management per infectious disease input appreciated.  -Urology consulted this admission and will follow up as outpatient to determine the timing of TURP.  It was noted that patient has a renal mass suspected to be cancer but has declined surgical intervention in the past. Renal ultrasound did not show any sign of urinary obstruction.     2.   Septic/infectious encephalopathy: At baseline, do suspect patient has underlying dementia and does tend to wander.  -Continue Seroquel     3.  Chronic diastolic congestive heart failure.  Last echo done in 2017 showed EF of 50-55 % with indeterminate left ventricular diastolic function.   -Monitor input and output  -Daily weight.  -Continue Coreg to 25mg po bid.     4.  Acute kidney injury on chronic kidney disease stage III.  -Creatinine continues to improve and is at baseline. Baseline creatinine was 1.25, about 4 months ago  -ACE inhibitor was placed on hold but will restart lisinopril 10 mg p.o. daily as creatinine is stable and at baseline.  -He did have some hyperkalemia on presentation which is currently resolved.    This was felt to be due to renal failure and lisinopril.     5.  Chronic arterial fibrillation. appears to be in nsr on exam  -Continue Coreg  -Patient is not on full anticoagulation of bleeding outweighs the benefit at this point  -We will leave this to primary care provider to address after his procedure done, risk of bleeding and falls are high.     6.  History of left renal mass suspected malignancy.  Urology following.  Patient did not want any surgical intervention for this in the past.     7.  Abdominal pain and distention secondary to ileus: on 1/14/2019 patient was noted to have abdominal pain and distention, abdominal x-ray is consistent with an ileus.  This was also reviewed personally by  "myself.  Suspect secondary to bedrest and acute illness. Encouraged ambulation.  Will advance to a full liquid diet with nectar thickened liquids.      DVT Prophylaxis: Pneumatic Compression Devices  Code Status: Full Code  Discharge Dispo: Likely home with family  Estimated Disch Date / # of Days until Disch: To be determined based on recovery of ileus and compliance with taking oral antibiotics.  Hopefully in the next 1-2 days.  Time spent: Greater than 35 minutes      Interval History (Subjective):      History is assisted with professional  at the bedside.  Limited historian secondary to dementia and language barrier.  Reports passing flatus but not reliable historian.         Physical Exam:      Vital signs:  Temp: 98.4  F (36.9  C) Temp src: Oral BP: 148/65 Pulse: 78 Heart Rate: 78 Resp: 24 SpO2: 93 % O2 Device: None (Room air)   Height: 167.6 cm (5' 6\") Weight: 70.8 kg (156 lb 1.6 oz)  Estimated body mass index is 25.2 kg/m  as calculated from the following:    Height as of this encounter: 1.676 m (5' 6\").    Weight as of this encounter: 70.8 kg (156 lb 1.6 oz).    I/O last 3 completed shifts:  In: 575 [P.O.:240; I.V.:335]  Out: 775 [Urine:775]  Vitals:    01/12/19 0149 01/13/19 0528 01/14/19 0854 01/15/19 0628   Weight: 76.3 kg (168 lb 3.2 oz) 78.6 kg (173 lb 3.2 oz) 74.8 kg (164 lb 14.4 oz) 74.3 kg (163 lb 12.8 oz)    01/17/19 0848   Weight: 70.8 kg (156 lb 1.6 oz)       Constitutional: Awake, alert but pleasantly confused, cooperative, no apparent distress   Respiratory: Nl work of breathing.  Diminished breath sounds at the base   Cardiovascular: Regular rate and rhythm, normal S1 and S2, and no murmur noted   Abdomen:  Hypoactive bowel sounds, soft, mildly-distended, reports tenderness all over but no rebound or guarding.   Skin: No rashes, no cyanosis, dry to touch   Neuro: CN 2-12 intact, no localizing weakness   Extremities: No edema, normal range of motion   HEENT Normocephalic, " atraumatic, normal nasal turbinates; oropharynx clear   Neck Supple; nl inspection; trachea midline; no thryomegaly   Psychiatric:  Awake and alert.  Normal affect          Medications:      All current medications were reviewed with changes reflected in problem list.         Data:      All new lab and imaging data was reviewed.   Labs:  No results for input(s): CULT in the last 168 hours.  Recent Labs   Lab 01/17/19  0720 01/15/19  0825 01/13/19  0719   WBC 10.3 10.5 12.5*   HGB 10.1* 9.9* 9.5*   HCT 33.1* 32.8* 30.6*   MCV 92 92 89    281 230     Recent Labs   Lab 01/17/19  0720 01/15/19  0825 01/14/19  0744    144 143   POTASSIUM 4.3 4.4 4.0   CHLORIDE 113* 115* 112*   CO2 26 24 25   ANIONGAP 4 5 6   * 110* 115*   BUN 16 20 23   CR 1.09 1.10 1.23   GFRESTIMATED 60* 59* 52*   GFRESTBLACK 69 69 60*   JOSE 8.2* 8.2* 8.6   MAG 1.8  --  2.2      Imaging:   No results found for this or any previous visit (from the past 24 hour(s)).    Emanuel Barcenas -[phone]

## 2019-01-17 NOTE — PROGRESS NOTES
[facility]    Infectious Disease Progress Note    Date of Service (when I saw the patient): 01/17/2019     Assessment & Plan   Barrie Woods is a 88 year old male who was admitted on 1/9/2019.     IMPRESSION:   1.  An 88-year-old male with acute sepsis, presumed urosepsis, positive urine culture for pseudomonas and klebsiella, full INEZ to follow, looks improved on antibiotics, chronic Youssef catheter in place.   2.  Prior flagged history of carbapenem-resistant Enterobacteriaceae, but in looking in the cultures I see nothing for that.  It appears to be a false flag and we will have Infection Control investigate this and remove the flag if we are able to.   3 Prior treated latent TB  4 cardiomyopathy  5 renal insuff     REc:   1 continue lev, IV for now due to ileus, po Ok anytime when GI tract allows, about 4 days more, day 9 total now  2  no CRE in cxs prior, investigate and none found remove flag              Candido Kelly MD    Interval History    confused, ileus, pain  No fever     Physical Exam   Temp: 98.4  F (36.9  C) Temp src: Oral BP: 148/65 Pulse: 78 Heart Rate: 78 Resp: 24 SpO2: 93 % O2 Device: None (Room air)    Vitals:    01/14/19 0854 01/15/19 0628 01/17/19 0848   Weight: 74.8 kg (164 lb 14.4 oz) 74.3 kg (163 lb 12.8 oz) 70.8 kg (156 lb 1.6 oz)     Vital Signs with Ranges  Temp:  [96.5  F (35.8  C)-99.2  F (37.3  C)] 98.4  F (36.9  C)  Pulse:  [78] 78  Heart Rate:  [77-83] 78  Resp:  [20-24] 24  BP: (148-185)/(65-82) 148/65  SpO2:  [93 %-95 %] 93 %    Constitutional: Awake, alert, cooperative, no apparent distress  Lungs: Clear to auscultation bilaterally, no crackles or wheezing  Cardiovascular: Regular rate and rhythm, normal S1 and S2, and no murmur noted  Abdomen: Normal bowel sounds, soft, non-distended, non-tender  Skin: No rashes, no cyanosis, no edema  Other:    Medications       carvedilol  25 mg Oral BID w/meals     finasteride  5 mg Oral Daily     influenza Vac Split  High-Dose  0.5 mL Intramuscular Prior to discharge     levofloxacin  250 mg Intravenous Q24H     pregabalin  100 mg Oral BID     QUEtiapine  12.5 mg Oral BID     sodium chloride (PF)  3 mL Intracatheter Q8H       Data   All microbiology laboratory data reviewed.  Recent Labs   Lab Test 01/17/19  0720 01/15/19  0825 01/13/19  0719   WBC 10.3 10.5 12.5*   HGB 10.1* 9.9* 9.5*   HCT 33.1* 32.8* 30.6*   MCV 92 92 89    281 230     Recent Labs   Lab Test 01/17/19  0720 01/15/19  0825 01/14/19  0744   CR 1.09 1.10 1.23     No lab results found.  Recent Labs   Lab Test 01/09/19  1815 01/09/19  1800 07/25/18  1032 06/02/18  2000 04/26/17  1255 04/26/17  1050 04/26/17  1018 09/08/16  1710 08/31/15  1827   CULT No growth  >100,000 colonies/mL  Pseudomonas aeruginosa  *  10,000 to 50,000 colonies/mL  Klebsiella (Enterobacter) aerogenes  * No growth >100,000 colonies/mL  Enterobacter cloacae complex  * 10,000 to 50,000 colonies/mL  Enterobacter cloacae complex  * <10,000 colonies/mL Coagulase negative Staphylococcus* No growth Cultured on the 1st day of incubation: Staphylococcus epidermidis This isolate   DOES NOT demonstrate inducible clindamycin resistance in vitro. Clindamycin is   susceptible and could be used when indicated, however, erythromycin is   resistant and should not be used.  Critical Value/Significant Value, preliminary result only, called to and read   back by Crys Pina RN at 1435 on 4/27/17 by TISH.  (Note)  POSITIVE for STAPHYLOCOCCUS EPIDERMIDIS and POSITIVE for the mecA  gene (resistant to methicillin) by [employer] multiplex nucleic acid  test. Final identification and antimicrobial susceptibility testing  will be verified by standard methods.    Specimen tested with [employer] multiplex, gram-positive blood culture  nucleic acid test for the following targets: Staph aureus, Staph  epidermidis, Staph lugdunensis, other Staph species, Enterococcus  faecalis, Enterococcus faecium, Streptococcus species,  S. agalactiae,  S. anginosus grp., S. pneumoniae, S. pyogenes, Listeria sp., mecA  (methi cillin resistance) and Melvin/B (vancomycin resistance).    Critical Value/Significant Value called to and read back by Helen Merino rn @4771 4/27/17. ct  * >100,000 colonies/mL mixed urogenital colby  Susceptibility testing not routinely done   <10,000 colonies/mL mixed urogenital colby  Susceptibility testing not routinely done

## 2019-01-17 NOTE — PLAN OF CARE
Arouses easily but slept throughout night. Had [place] at HS for pain in abdomin  and upper thighs. Youssef patent.

## 2019-01-17 NOTE — PLAN OF CARE
RR 24, otherwise vss. Flacc of 5, schedule lyrica and prn tylenol provided. Disoriented x3 during assessment.  utilized for assessment. Abd rounded, passing some gas w/ small bm, loose/watery. Bowel sounds hypoactive. Tolerated clears, advanced to fulls, tolerating well, 25% intake. Youssef patent, cares complete. Continues to have 2+ BLE edema and 2+ scrotal edema. IV fluids discontinued. Up w/ 1 assist and walker, walked in halls x1, and in room x2. Voiced chest pain during assessment, md aware, no new orders, since, pt has not voiced chest discomfort. Plan for palliative meeting 1/18 at 1600. Abd xray completed.

## 2019-01-17 NOTE — PROGRESS NOTES
"[facility]  Palliative Care Progress Note  Text Page   met with patient and interpretor at 15:30. Still c/o abdominal pain. Seems more generalized.  Feels he is going to die sooner. He would like \"Horchata\" ( mexican rice blend drfnk)  Ok with warm rice milk or cream of rice. When asked how he felt about this statement, he began to cry. Told patient  We are nearing discharge.  He wants to stay here until his pain is gone. His participation in the conversation was brief. It seems he would benefit from other pain management strategies.  Seeshanae bernstein for changes. Patient  Agreeable to meet palliative provide, son and interpretor tomorrow at 4 pm.      Assessment & Plan   Recommendations:  1.Decisional Capacity -  Unreliable. Patient does not have an advance directive on file. Per  informed consent policy next of kin should be involved if she becomes unable.  2. Pain- Chronic low back pain on Lyrica and Vicodin (#1 tab daily as prescribed) per Primary Dr. Hurtado. Currently mild lower abdominal pain, noted ileus on xray, clearing.  Increase Lyrica to 150 mg bid.  Tolerating full liquids, limit opioids as able,    Tylenol 650 mg every 8 hours trial of low dose tramadol 25 mg every 6 hours  Increase Lyrica to 150 mg bid watch for edema.   3. Insomnia? per patient as initial complaint in setting of dementia, nurse and nursing notes indicate restful night. Avoid  Sedatives, at risk for delirium.  4. Depression  Consider antidepressant may start at outpt   4. UroSepsis, scrotal swelling with pain        Appreciate input from ID total of 9 day course of antibiotics, no plans for IV antibiotics     5. Spiritual Care- Oriented to Spiritual Health as part of Palliative Care team.  Patient is listed as Confucianism.   6. Care Planning- Appreciate SW support, planning for return to [facility] home upon discharge. SW following for possible new support as needed.   Nearing discharge may be one-two days  Out patient  " Cystoscopy/TURP 2/6/19 12 noon   No f/u scheduled with PCP .  Son will arrive 4 pm 1/18 for family meeting.       Goal of Care:Full Code,restorative this hospitalization, reviewed by phone with son today.  Known evidence of renal cell CA has been addressed several times by my team during different hospitalizations, first in 2014.  A overall palliative approach to this without surgical biopsy or treatment has been planned.  Son confirmed this via phone today.  He has no other noted evidence of decline and was last hospitalized in 4/2017.       Findings & plan of care discussed with: Yehuda Rawls RN Rut Patel  Follow-up plan from palliative team: Call out to Dr. Hurtado patient's primary with initial consult on 1/14 for any further background information regarding advanced care planning.  Son to come in by 4 pm 1/18/19 to discuss overall plan of care.      Thank you for involving us in the patient's care.     Kelsey SIEGEL, CNP  Pain Management and Palliative Care  [facility]  Pgr: [phone]    Time Spent on this Encounter   I spent  15 minutes (10:15-10:30, 15:30-15:45) in assessment of the patient, counseling and discussion with the patient and family as documented in sections below. Another 15 minutes in review of chart, documentation, coordination of care and discussion with the health care team.    Interval History   Confused difficult for patient to follow instruction in English and with verbal cues  Denies pain with asked in Uzbek, winced and motioned to spot with light palpation of scrotum and abdomen  Up walking with assist   Up in chair  Video monitoring     Course of Hospitalization Discussions Data   Discussed on January 14, 2019 with Jennie SIEGEL, CNS, CNP:   Patient unable, nonsensical speech, unable to follow conversation per professional , spoke with patients mary Garcia via speaker phone with professional .  Son notes  patient has been in good helth and function over the past few years since we have last spoken other than current issue with prostate. He eats 2 meals a day and has not lost weight.  He agrees that renal cell cancer is not a current concern and with previous decisions not to have it worked up.  We discussed patent's current ileus and NPO with plan for CT if does not improve.  Jose will have difficulty meeting duringthe day, but will attempt to come if we need to conference about patient's care. He will call with further questions or concerns.       Patient has decision-making capacity Unreliable  Patient has no known legal document designating a decision maker. Per policy next of kin is the designated decision maker. See System Informed Consent policy for guidance.  There is no POLST (Physician orders for life-sustaining treatment) form on file for this patient    Code Status:            Full code    Review of Systems    CONSTITUTIONAL: NEGATIVE for fever, chills, change in weight  ENT/MOUTH: NEGATIVE for ear, mouth and throat problems  RESP: NEGATIVE for significant cough or SOB  CV: NEGATIVE for chest pain, palpitations or peripheral edema    Palliative Symptom Review (0=no symptom/no concern, 1=mild, 2=moderate, 3=severe):      Pain: 2-moderate      Fatigue: 1-mild      Nausea: 0-none      Constipation: 1-mild , watery stool 1/17      Diarrhea: 0-none      Depressive Symptoms: unable to assess      Anxiety: 0-none      Drowsiness: 0-none      Poor Appetite: 1-mild      Shortness of Breath: 0-none      Insomnia: 0-none      Other:  Scrotum edema 1-mild      Overall (0 good/no concerns, 3 very poor):  2    Physical Exam   Temp:  [96.5  F (35.8  C)-99.2  F (37.3  C)] 98.4  F (36.9  C)  Pulse:  [78] 78  Heart Rate:  [77-83] 78  Resp:  [20-24] 24  BP: (148-185)/(65-82) 148/65  SpO2:  [93 %-95 %] 93 %  156 lbs 1.6 oz  GEN:  Alert, oriented x 1?, appears comfortable, NAD.  HEENT:  Normocephalic/atraumatic, no scleral  icterus, no nasal discharge, mouth moist.  CV:  RRR, S1, S2; no murmurs or other irregularities noted.  +3 DP/PT pulses bilatererally; no edema BLE.  RESP:  Clear to auscultation bilaterally without rales/rhonchi/wheezing/retractions.  Symmetric chest rise on inhalation noted.  Normal respiratory effort.  ABD:  Rounded, soft, non-tender/non-distended.  +BS  EXT:  Edema & pulses as noted above.  CMS intact x 4.     M/S:   Tender to palpation abdomen and scrotum, SHAH X 4 .    SKIN:  Dry to touch, no exanthems noted in the visualized areas.    NEURO: Symmetric strength.  Sensation to touch intact all extremities.   There is no area of allodynia or hyperesthesia.  PAIN BEHAVIOR: Cooperative  Psych:  Normal affect.  Calm, cooperative, conversant appropriately.    Medications       carvedilol  25 mg Oral BID w/meals     finasteride  5 mg Oral Daily     influenza Vac Split High-Dose  0.5 mL Intramuscular Prior to discharge     levofloxacin  250 mg Intravenous Q24H     pregabalin  150 mg Oral BID     QUEtiapine  12.5 mg Oral BID     sodium chloride (PF)  3 mL Intracatheter Q8H       Data   Results for orders placed or performed during the hospital encounter of 01/09/19 (from the past 24 hour(s))   Basic metabolic panel   Result Value Ref Range    Sodium 142 133 - 144 mmol/L    Potassium 4.3 3.4 - 5.3 mmol/L    Chloride 113 (H) 94 - 109 mmol/L    Carbon Dioxide 26 20 - 32 mmol/L    Anion Gap 4 3 - 14 mmol/L    Glucose 105 (H) 70 - 99 mg/dL    Urea Nitrogen 16 7 - 30 mg/dL    Creatinine 1.09 0.66 - 1.25 mg/dL    GFR Estimate 60 (L) >60 mL/min/[1.73_m2]    GFR Estimate If Black 69 >60 mL/min/[1.73_m2]    Calcium 8.2 (L) 8.5 - 10.1 mg/dL   Magnesium   Result Value Ref Range    Magnesium 1.8 1.6 - 2.3 mg/dL   CBC with platelets   Result Value Ref Range    WBC 10.3 4.0 - 11.0 10e9/L    RBC Count 3.61 (L) 4.4 - 5.9 10e12/L    Hemoglobin 10.1 (L) 13.3 - 17.7 g/dL    Hematocrit 33.1 (L) 40.0 - 53.0 %    MCV 92 78 - 100 fl    MCH  28.0 26.5 - 33.0 pg    MCHC 30.5 (L) 31.5 - 36.5 g/dL    RDW 16.1 (H) 10.0 - 15.0 %    Platelet Count 290 150 - 450 10e9/L     *Note: Due to a large number of results and/or encounters for the requested time period, some results have not been displayed. A complete set of results can be found in Results Review.

## 2019-01-18 ENCOUNTER — OFFICE VISIT (OUTPATIENT)
Dept: INTERPRETER SERVICES | Facility: CLINIC | Age: 84
End: 2019-01-18
Payer: COMMERCIAL

## 2019-01-18 PROCEDURE — T1013 SIGN LANG/ORAL INTERPRETER: HCPCS | Mod: U3

## 2019-01-18 PROCEDURE — 25000128 H RX IP 250 OP 636: Performed by: INTERNAL MEDICINE

## 2019-01-18 PROCEDURE — 25000132 ZZH RX MED GY IP 250 OP 250 PS 637: Performed by: NURSE PRACTITIONER

## 2019-01-18 PROCEDURE — 25000132 ZZH RX MED GY IP 250 OP 250 PS 637: Performed by: HOSPITALIST

## 2019-01-18 PROCEDURE — 99232 SBSQ HOSP IP/OBS MODERATE 35: CPT | Performed by: INTERNAL MEDICINE

## 2019-01-18 PROCEDURE — 12000000 ZZH R&B MED SURG/OB

## 2019-01-18 PROCEDURE — 99233 SBSQ HOSP IP/OBS HIGH 50: CPT | Performed by: NURSE PRACTITIONER

## 2019-01-18 PROCEDURE — 25000132 ZZH RX MED GY IP 250 OP 250 PS 637: Performed by: INTERNAL MEDICINE

## 2019-01-18 PROCEDURE — 99207 ZZC CDG-MDM COMPONENT: MEETS LOW - DOWN CODED: CPT | Performed by: INTERNAL MEDICINE

## 2019-01-18 RX ADMIN — CARVEDILOL 25 MG: 25 TABLET, FILM COATED ORAL at 08:50

## 2019-01-18 RX ADMIN — LEVOFLOXACIN 250 MG: 5 INJECTION, SOLUTION INTRAVENOUS at 13:46

## 2019-01-18 RX ADMIN — CARVEDILOL 25 MG: 25 TABLET, FILM COATED ORAL at 18:34

## 2019-01-18 RX ADMIN — FINASTERIDE 5 MG: 5 TABLET, FILM COATED ORAL at 08:50

## 2019-01-18 RX ADMIN — Medication 12.5 MG: at 21:40

## 2019-01-18 RX ADMIN — ACETAMINOPHEN 650 MG: 325 TABLET, FILM COATED ORAL at 08:50

## 2019-01-18 RX ADMIN — PREGABALIN 150 MG: 75 CAPSULE ORAL at 08:50

## 2019-01-18 RX ADMIN — LISINOPRIL 10 MG: 10 TABLET ORAL at 08:50

## 2019-01-18 RX ADMIN — Medication 12.5 MG: at 08:50

## 2019-01-18 RX ADMIN — ACETAMINOPHEN 650 MG: 325 TABLET, FILM COATED ORAL at 17:05

## 2019-01-18 RX ADMIN — PREGABALIN 150 MG: 75 CAPSULE ORAL at 21:40

## 2019-01-18 ASSESSMENT — ACTIVITIES OF DAILY LIVING (ADL)
ADLS_ACUITY_SCORE: 16

## 2019-01-18 ASSESSMENT — MIFFLIN-ST. JEOR: SCORE: 1320.36

## 2019-01-18 NOTE — PROGRESS NOTES
[facility]  Referral sent to intake at [facility]. Anticipate need for SN, PT, OT, SW, HHA. Will follow along for final discharge disposition.

## 2019-01-18 NOTE — PLAN OF CARE
Alert and orientated to self  Attempted to use IPAD , patient did not respond to   Ambulatory Status:  Pt up Ax1  VSS  Pain: denies  Resp: LS clear, diminished  GI: Full liquid diet with nectar thick liquids  BS hypoactive  Passing flatus ALVIN  Last BM 1/17, no stools this shift  :sarmiento draining clear Pauline urine  Scrotal edema,+2 LE edema  Tx: IV Levaquin  Consults: Palliative Care  Disposition: TBD, Palliative care conference today at 1600

## 2019-01-18 NOTE — PLAN OF CARE
Pt given Ultram for abdominal pain. Hypoactive bowel sounds. Two loose bloody stools this shift. Tolerated full liquid dinner. Assist with feeding. Afebrile. Declines to walk in [place]. Ambulates to bathroom with walker, gait belt and assist of one. Oriented to self only.

## 2019-01-18 NOTE — PROGRESS NOTES
[facility]    Infectious Disease Progress Note    Date of Service (when I saw the patient): 01/18/2019     Assessment & Plan   Barrie Woods is a 88 year old male who was admitted on 1/9/2019.     IMPRESSION:   1.  An 88-year-old male with acute sepsis, presumed urosepsis, positive urine culture for pseudomonas and klebsiella, full INEZ to follow, looks improved on antibiotics, chronic Youssef catheter in place.   2.  Prior flagged history of carbapenem-resistant Enterobacteriaceae, but in looking in the cultures I see nothing for that.  It appears to be a false flag and we will have Infection Control investigate this and remove the flag if we are able to.   3 Prior treated latent TB  4 cardiomyopathy  5 renal insuff     REc:   1 continue lev, IV for now due to ileus, but po Ok anytime when GI tract allows, about 3 days more, day 9 total now  2  no CRE in cxs prior, investigate and none found remove flag  3 Call if issues this WE              Candido Kelly MD    Interval History    confused, ileus, pain  No fever     Physical Exam   Temp: 97.1  F (36.2  C) Temp src: Oral BP: 167/86 Pulse: 97 Heart Rate: 85 Resp: 20 SpO2: 90 % O2 Device: Nasal cannula    Vitals:    01/15/19 0628 01/17/19 0848 01/18/19 0524   Weight: 74.3 kg (163 lb 12.8 oz) 70.8 kg (156 lb 1.6 oz) 70.8 kg (156 lb)     Vital Signs with Ranges  Temp:  [97.1  F (36.2  C)-98  F (36.7  C)] 97.1  F (36.2  C)  Pulse:  [97] 97  Heart Rate:  [79-86] 85  Resp:  [20-24] 20  BP: (120-167)/(49-86) 167/86  SpO2:  [90 %-95 %] 90 %    Constitutional: Awake, alert, cooperative, no apparent distress  Lungs: Clear to auscultation bilaterally, no crackles or wheezing  Cardiovascular: Regular rate and rhythm, normal S1 and S2, and no murmur noted  Abdomen: Normal bowel sounds, soft, non-distended, non-tender  Skin: No rashes, no cyanosis, no edema  Other:    Medications       acetaminophen  650 mg Oral Q8H     carvedilol  25 mg Oral BID w/meals      finasteride  5 mg Oral Daily     influenza Vac Split High-Dose  0.5 mL Intramuscular Prior to discharge     levofloxacin  250 mg Intravenous Q24H     lisinopril  10 mg Oral Daily     pregabalin  150 mg Oral BID     QUEtiapine  12.5 mg Oral BID     sodium chloride (PF)  3 mL Intracatheter Q8H       Data   All microbiology laboratory data reviewed.  Recent Labs   Lab Test 01/17/19  0720 01/15/19  0825 01/13/19  0719   WBC 10.3 10.5 12.5*   HGB 10.1* 9.9* 9.5*   HCT 33.1* 32.8* 30.6*   MCV 92 92 89    281 230     Recent Labs   Lab Test 01/17/19  0720 01/15/19  0825 01/14/19  0744   CR 1.09 1.10 1.23     No lab results found.  Recent Labs   Lab Test 01/09/19  1815 01/09/19  1800 07/25/18  1032 06/02/18  2000 04/26/17  1255 04/26/17  1050 04/26/17  1018 09/08/16  1710 08/31/15  1827   CULT No growth  >100,000 colonies/mL  Pseudomonas aeruginosa  *  10,000 to 50,000 colonies/mL  Klebsiella (Enterobacter) aerogenes  * No growth >100,000 colonies/mL  Enterobacter cloacae complex  * 10,000 to 50,000 colonies/mL  Enterobacter cloacae complex  * <10,000 colonies/mL Coagulase negative Staphylococcus* No growth Cultured on the 1st day of incubation: Staphylococcus epidermidis This isolate   DOES NOT demonstrate inducible clindamycin resistance in vitro. Clindamycin is   susceptible and could be used when indicated, however, erythromycin is   resistant and should not be used.  Critical Value/Significant Value, preliminary result only, called to and read   back by Crys Pina RN at 1435 on 4/27/17 by TISH.  (Note)  POSITIVE for STAPHYLOCOCCUS EPIDERMIDIS and POSITIVE for the mecA  gene (resistant to methicillin) by Arteris multiplex nucleic acid  test. Final identification and antimicrobial susceptibility testing  will be verified by standard methods.    Specimen tested with CellCentricigene multiplex, gram-positive blood culture  nucleic acid test for the following targets: Staph aureus, Staph  epidermidis, Staph lugdunensis,  other Staph species, Enterococcus  faecalis, Enterococcus faecium, Streptococcus species, S. agalactiae,  S. anginosus grp., S. pneumoniae, S. pyogenes, Listeria sp., mecA  (methi cillin resistance) and Melvin/B (vancomycin resistance).    Critical Value/Significant Value called to and read back by Helen Merino rn @4616 4/27/17. ct  * >100,000 colonies/mL mixed urogenital colby  Susceptibility testing not routinely done   <10,000 colonies/mL mixed urogenital colby  Susceptibility testing not routinely done

## 2019-01-18 NOTE — PROGRESS NOTES
"[facility]  Palliative Care Progress Note  Text Page    Assessment & Plan   Recommendations:  1.Decisional Capacity -  Unreliable. Patient does not have an advance directive on file. Per  informed consent policy next of kin should be involved if she becomes unable.  2. Pain- Chronic low back pain on Lyrica and Vicodin (#1 tab daily as prescribed) per Primary Dr. Hurtado. Currently mild lower abdominal pain, noted ileus on xray, clearing.  Increased Lyrica to 150 mg bid which is baseline on 1/17.  Tolerating full liquids, limit opioids as able    Tylenol 650 mg every 8 hours trial of low dose tramadol 25 mg every 6 hours   3. Insomnia- per patient as initial complaint in setting of dementia, nurse and nursing notes indicate restful night. Avoid  Sedatives, at risk for delirium.    4. Spiritual Care- Oriented to Spiritual Health as part of Palliative Care team.  Patient is listed as Bahai.   5. Care Planning- Appreciate SW support, previously planning for return to sons home upon discharge. SW following for possible new support as needed. Reconsulted as son now concerned they will not be able to take him home with current weakness.  Noted not concerned with level of confusion as it is at baseline and patient did well despite confusion at home prior.  There are 3 faily caregivers they are not there around the clock, he is left home alone.  At baseline he eats and goes to the bathroom by himself, :even makes his bed.\"  Son plans to be present at therapy session as patient was not able to follow instruction with therapy previously.        Goal of Care:Full Code,restorative this hospitalization, See care conference below.  Known evidence of renal cell CA has been addressed several times by my team during different hospitalizations, first in 2014.  A overall palliative approach to this without surgical biopsy or treatment has been planned.  He has no other noted evidence of decline and was last " "hospitalized in 4/2017.       Findings & plan of care discussed with: Blank Montalvo RN  Follow-up plan from palliative team:Dr. Hurtado patient's primary had called back with information comfirming above plan for no further work-up treatment of CA.       Thank you for involving us in the patient's care.     Jennie SIEGEL, CNS, CNP  Pain Management and Palliative Care  [facility]  Pgr: [phone]    Time Spent on this Encounter   I spent 60 minutes total, >50%  in assessment of the patient, counseling and discussion with the patient and family as documented in this note as well as coordination of care and discussion with the health care team.    Interval History   Confused difficult for patient to follow instruction in Nepali and with verbal cues  Continues to deny pain with asked with professional , winced and motioned to spot with light palpation of lower abdomen  Up walking with assist   Up in chair  Eating, passing gas, BM's documented, water and red.  See care conference note below.     Course of Hospitalization Discussions Data   Discussed on January 18, 2019 with Jennie SIEGEL, CNS, CNP:   Met with patient, Son Jose and Jose's Spouse at bedside with professional .  Reviewed course of hospitalization.  Jose had many questions and felt as if he had not had good communication about Barrie's care.  Jose is most concerned about Barrie's weakness.  He states, Barrie is usually confused and this is no different than at home, but he is stronger and able to care for himself, get food, go tot he bathroom, get dressed and even make his bed.\"  There are three family caregivers and patient is left alone without problem at times when they are at work.  We discussed the ileus and its current status with eating, passing gas and BM's.  Jose and his spouse want to make sure patient is not discharged before his ileus is fully resolved and infection " is treated.  We discussed role of TCU for further rehab, but hospitalization as long as he still has medical needs.  Jose would like to be present with PT assessment as it was noted to be difficult with patient previously.  Requested this with consult.  We discussed concern that patient may not recover to his previous baseline.  Jose states he has recovered from worse and feels he will recover fine if he is able to do therapy.    Discussed on January 14, 2019 with Jennie Partida APRN, CNS, CNP:   Patient unable, nonsensical speech, unable to follow conversation per professional , spoke with patients son Jose via speaker phone with professional .  Son notes patient has been in good helth and function over the past few years since we have last spoken other than current issue with prostate. He eats 2 meals a day and has not lost weight.  He agrees that renal cell cancer is not a current concern and with previous decisions not to have it worked up.  We discussed patent's current ileus and NPO with plan for CT if does not improve.  Jose will have difficulty meeting duringthe day, but will attempt to come if we need to conference about patient's care. He will call with further questions or concerns.       Patient has decision-making capacity Unreliable  Patient has no known legal document designating a decision maker. Per policy next of kin is the designated decision maker. See System Informed Consent policy for guidance.  There is no POLST (Physician orders for life-sustaining treatment) form on file for this patient    Code Status:            Full code    Review of Systems    CONSTITUTIONAL: NEGATIVE for fever, chills, change in weight  ENT/MOUTH: NEGATIVE for ear, mouth and throat problems  RESP: NEGATIVE for significant cough or SOB  CV: NEGATIVE for chest pain, palpitations or peripheral edema    Palliative Symptom Review (0=no symptom/no concern, 1=mild, 2=moderate, 3=severe):       Pain: 2-moderate per exam, denies.       Fatigue: 1-mild      Nausea: 0-none      Constipation: 1-mild , watery stool 1/17      Diarrhea: 0-none      Depressive Symptoms: unable to assess      Anxiety: 0-none      Drowsiness: 0-none      Poor Appetite: 1-mild      Shortness of Breath: 0-none      Insomnia: 0-none      Other:  Scrotum edema 1-mild      Overall (0 good/no concerns, 3 very poor):  2    Physical Exam   Temp:  [96.5  F (35.8  C)-98  F (36.7  C)] 96.5  F (35.8  C)  Pulse:  [97] 97  Heart Rate:  [79-86] 86  Resp:  [20-24] 20  BP: (120-167)/(49-86) 154/70  SpO2:  [90 %-98 %] 98 %  156 lbs 0 oz  GEN:  Alert, oriented x 1?, appears comfortable, NAD.  HEENT:  Normocephalic/atraumatic, no scleral icterus, no nasal discharge, mouth moist.  CV:  RRR, S1, S2; no murmurs or other irregularities noted.  +3 DP/PT pulses bilatererally; no edema BLE.  RESP:  Clear to auscultation bilaterally without rales/rhonchi/wheezing/retractions.  Symmetric chest rise on inhalation noted.  Normal respiratory effort.  ABD:  Rounded, soft,  Tender to light palpation generalized /non-distended.  +BS  EXT:  Edema & pulses as noted above.  CMS intact x 4.    SKIN:  Dry to touch, no exanthems noted in the visualized areas.    NEURO: Symmetric strength.  Sensation to touch intact all extremities.   There is focal deficts noted. PAIN BEHAVIOR: Cooperative  Psych:  Normal affect.  Calm, cooperative, conversant confused responses.     Medications       acetaminophen  650 mg Oral Q8H     carvedilol  25 mg Oral BID w/meals     finasteride  5 mg Oral Daily     influenza Vac Split High-Dose  0.5 mL Intramuscular Prior to discharge     levofloxacin  250 mg Intravenous Q24H     lisinopril  10 mg Oral Daily     pregabalin  150 mg Oral BID     QUEtiapine  12.5 mg Oral BID     sodium chloride (PF)  3 mL Intracatheter Q8H       Data   No results found. However, due to the size of the patient record, not all encounters were searched. Please check  Results Review for a complete set of results.

## 2019-01-18 NOTE — PROVIDER NOTIFICATION
Admission pager notified that pt had a small red loose stool     Reply back from Dr De Jesus, we will continue to monitor for more stools. No other orders.

## 2019-01-18 NOTE — PROGRESS NOTES
[facility]    Hospitalist Progress Note    Assessment & Plan      Summary of Stay:  Barrie Woods is a 88 year old male with history of CVA, dementia, chronic kidney disease, A. fib, cardiomyopathy, congestive heart failure, benign prostatic hypertrophy, renal cell carcinoma, who was scheduled for elective TURP on the day of admission,  found to have purulent discharge in the Urinary catheter, found to have sepsis due to urinary tract infection and admitted to the hospital on 1/9/2019.  Urine culture grew out Pseudomonas patient was initially on IV Zosyn and then transition to levofloxacin per ID recommendations however as patient has been refusing to take oral antibiotics so was transition to IV, but with development of ileus, will continue IV Levaquin to ensure that he gets adequate antibiotic treatment.       On 1/14/2019 patient was noted to have abdominal pain and distention, abdominal x-ray was suggestive of ileus- ? Due to underlying illness.  Patient is being treated conservatively.          Assessment and Plan:      1. Severe sepsis secondary to catheter associated urinary tract infection.-This is due to indwelling Youssef catheter related infection. Urine culture grew pseudomonas aeruginosa and Enterobacter aerogenes, patient had been on Ciprofloxacin and Zosyn from 01/09 to 01/13 and now has been transitioned to levofloxacin.  Enterobacter is sensitive to fluoroquinolone as well.  -Patient has previous history of CRE (carbapenem resistant enterococcus) per report, no documentation found.  Contact precautions removed.  -Continue IV Levaquin as patient does have an ileus, but could potentially transition to p.o. Levaquin in 1-2 days as he is tolerating other oral medications but want to ensure adequate antibiotic concentration.  Per ID, suspect may be 4 more days.  -Antibiotic management per infectious disease input appreciated.  -Urology consulted this admission and will follow up as  outpatient to determine the timing of TURP.  It was noted that patient has a renal mass suspected to be cancer but has declined surgical intervention in the past. Renal ultrasound did not show any sign of urinary obstruction.     2.   Septic/infectious encephalopathy: At baseline, do suspect patient has underlying dementia and does tend to wander.  -Continue Seroquel     3.  Chronic diastolic congestive heart failure.  Last echo done in 2017 showed EF of 50-55 % with indeterminate left ventricular diastolic function.   -Monitor input and output  -Daily weight.  -Continue Coreg to 25mg po bid.     4.  Acute kidney injury on chronic kidney disease stage III.  -Creatinine continues to improve and is at baseline. Baseline creatinine was 1.25, about 4 months ago  -ACE inhibitor was placed on hold but will restart lisinopril 10 mg p.o. daily as creatinine is stable and at baseline.  -He did have some hyperkalemia on presentation which is currently resolved.    This was felt to be due to renal failure and lisinopril.     5.  Chronic arterial fibrillation. appears to be in nsr on exam  -Continue Coreg  -Patient is not on full anticoagulation of bleeding outweighs the benefit at this point  -We will leave this to primary care provider to address after his procedure done, risk of bleeding and falls are high.     6.  History of left renal mass suspected malignancy.  Urology following.  Patient did not want any surgical intervention for this in the past.     7.  Abdominal pain and distention secondary to ileus: on 1/14/2019 patient was noted to have abdominal pain and distention, abdominal x-ray is consistent with an ileus.  This was also reviewed personally by myself.  Suspect secondary to bedrest and acute illness. Encouraged ambulation.  Will advance to a full liquid diet with nectar thickened liquids.        DVT Prophylaxis: Pneumatic Compression Devices  Code Status: Full Code  Discharge Dispo: Likely home with  family  Estimated Disch Date / # of Days until Disch: To be determined based on recovery of ileus and compliance with taking oral antibiotics.  Hopefully in the next 1-2 days.    Mesfin rTuong MD   Text Page (7am to 6pm)    Interval History   Denies pain.     -Data reviewed today: I reviewed all new labs and imaging results over the last 24 hours. I personally reviewed no images or EKG's today.    Physical Exam   Temp: 98  F (36.7  C) Temp src: Oral BP: 124/52 Pulse: 97 Heart Rate: 79 Resp: 24 SpO2: 93 % O2 Device: None (Room air)    Vitals:    01/15/19 0628 01/17/19 0848 01/18/19 0524   Weight: 74.3 kg (163 lb 12.8 oz) 70.8 kg (156 lb 1.6 oz) 70.8 kg (156 lb)     Vital Signs with Ranges  Temp:  [97.4  F (36.3  C)-98  F (36.7  C)] 98  F (36.7  C)  Pulse:  [97] 97  Heart Rate:  [79-86] 79  Resp:  [20-24] 24  BP: (120-155)/(49-76) 124/52  SpO2:  [93 %-95 %] 93 %  I/O last 3 completed shifts:  In: 1285 [P.O.:740; I.V.:545]  Out: 950 [Urine:950]    Abdomen:  Hypoactive bowel sounds, soft, mildly-distended, reports tenderness all over but no rebound or guarding.   Skin: No rashes, no cyanosis, dry to touch   Neuro: CN 2-12 intact, no localizing weakness   Extremities: No edema, normal range of motion   HEENT Normocephalic, atraumatic, normal nasal turbinates; oropharynx clear   Neck Supple; nl inspection; trachea midline; no thryomegaly   Psychiatric:  Awake and alert.  Normal affect           Medications       acetaminophen  650 mg Oral Q8H     carvedilol  25 mg Oral BID w/meals     finasteride  5 mg Oral Daily     influenza Vac Split High-Dose  0.5 mL Intramuscular Prior to discharge     levofloxacin  250 mg Intravenous Q24H     lisinopril  10 mg Oral Daily     pregabalin  150 mg Oral BID     QUEtiapine  12.5 mg Oral BID     sodium chloride (PF)  3 mL Intracatheter Q8H       Data   Recent Labs   Lab 01/17/19  0720 01/15/19  0825 01/14/19  0744 01/13/19  0719   WBC 10.3 10.5  --  12.5*   HGB 10.1* 9.9*  --  9.5*   MCV 92  92  --  89    281  --  230    144 143 141   POTASSIUM 4.3 4.4 4.0 4.0   CHLORIDE 113* 115* 112* 111*   CO2 26 24 25 22   BUN 16 20 23 30   CR 1.09 1.10 1.23 1.41*   ANIONGAP 4 5 6 8   JOSE 8.2* 8.2* 8.6 8.1*   * 110* 115* 112*       Imaging:   Recent Results (from the past 24 hour(s))   XR Abdomen 2 Views    Narrative    XR ABDOMEN 2 VW 1/17/2019 12:51 PM    COMPARISON: 1/14/2019    HISTORY: Ileus follow-up.      Impression    IMPRESSION: No gas-filled loops of distended large or small bowel.  Moderate amount of stool in the colon.    DOROTHY DIXON MD

## 2019-01-18 NOTE — PLAN OF CARE
Patient remains hospitalized for UTI. IV levaquin abx. Chronic sarmiento in place, catheter cares done. 500 mls out this shift. Full liquid diet nectar thickened, poor appetite. BLE and scrotal edema, encouraging elevation. VPM in place. Palliative conference this afternoon. Nursing will continue to monitor.

## 2019-01-19 ENCOUNTER — APPOINTMENT (OUTPATIENT)
Dept: PHYSICAL THERAPY | Facility: CLINIC | Age: 84
DRG: 698 | End: 2019-01-19
Attending: UROLOGY
Payer: COMMERCIAL

## 2019-01-19 LAB
ALBUMIN SERPL-MCNC: 2.1 G/DL (ref 3.4–5)
ALP SERPL-CCNC: 309 U/L (ref 40–150)
ALT SERPL W P-5'-P-CCNC: 18 U/L (ref 0–70)
ANION GAP SERPL CALCULATED.3IONS-SCNC: 5 MMOL/L (ref 3–14)
AST SERPL W P-5'-P-CCNC: 43 U/L (ref 0–45)
BILIRUB SERPL-MCNC: 0.5 MG/DL (ref 0.2–1.3)
BUN SERPL-MCNC: 17 MG/DL (ref 7–30)
CALCIUM SERPL-MCNC: 8.7 MG/DL (ref 8.5–10.1)
CHLORIDE SERPL-SCNC: 112 MMOL/L (ref 94–109)
CO2 SERPL-SCNC: 28 MMOL/L (ref 20–32)
CREAT SERPL-MCNC: 1.07 MG/DL (ref 0.66–1.25)
ERYTHROCYTE [DISTWIDTH] IN BLOOD BY AUTOMATED COUNT: 16 % (ref 10–15)
GFR SERPL CREATININE-BSD FRML MDRD: 61 ML/MIN/{1.73_M2}
GLUCOSE SERPL-MCNC: 99 MG/DL (ref 70–99)
HCT VFR BLD AUTO: 34.3 % (ref 40–53)
HGB BLD-MCNC: 10.3 G/DL (ref 13.3–17.7)
MCH RBC QN AUTO: 27.3 PG (ref 26.5–33)
MCHC RBC AUTO-ENTMCNC: 30 G/DL (ref 31.5–36.5)
MCV RBC AUTO: 91 FL (ref 78–100)
PLATELET # BLD AUTO: 301 10E9/L (ref 150–450)
POTASSIUM SERPL-SCNC: 4.1 MMOL/L (ref 3.4–5.3)
PROT SERPL-MCNC: 6.5 G/DL (ref 6.8–8.8)
RBC # BLD AUTO: 3.77 10E12/L (ref 4.4–5.9)
SODIUM SERPL-SCNC: 145 MMOL/L (ref 133–144)
WBC # BLD AUTO: 10.3 10E9/L (ref 4–11)

## 2019-01-19 PROCEDURE — 99232 SBSQ HOSP IP/OBS MODERATE 35: CPT | Performed by: INTERNAL MEDICINE

## 2019-01-19 PROCEDURE — 97164 PT RE-EVAL EST PLAN CARE: CPT | Mod: GP | Performed by: PHYSICAL THERAPIST

## 2019-01-19 PROCEDURE — 40000193 ZZH STATISTIC PT WARD VISIT: Performed by: PHYSICAL THERAPIST

## 2019-01-19 PROCEDURE — 12000000 ZZH R&B MED SURG/OB

## 2019-01-19 PROCEDURE — 85027 COMPLETE CBC AUTOMATED: CPT | Performed by: INTERNAL MEDICINE

## 2019-01-19 PROCEDURE — 36415 COLL VENOUS BLD VENIPUNCTURE: CPT | Performed by: INTERNAL MEDICINE

## 2019-01-19 PROCEDURE — 25000132 ZZH RX MED GY IP 250 OP 250 PS 637: Performed by: NURSE PRACTITIONER

## 2019-01-19 PROCEDURE — 25000128 H RX IP 250 OP 636: Performed by: INTERNAL MEDICINE

## 2019-01-19 PROCEDURE — 80053 COMPREHEN METABOLIC PANEL: CPT | Performed by: INTERNAL MEDICINE

## 2019-01-19 PROCEDURE — 99207 ZZC CDG-MDM COMPONENT: MEETS LOW - DOWN CODED: CPT | Performed by: INTERNAL MEDICINE

## 2019-01-19 PROCEDURE — 25000132 ZZH RX MED GY IP 250 OP 250 PS 637: Performed by: HOSPITALIST

## 2019-01-19 PROCEDURE — 25000132 ZZH RX MED GY IP 250 OP 250 PS 637: Performed by: INTERNAL MEDICINE

## 2019-01-19 RX ORDER — LEVOFLOXACIN 5 MG/ML
500 INJECTION, SOLUTION INTRAVENOUS EVERY 24 HOURS
Status: DISCONTINUED | OUTPATIENT
Start: 2019-01-20 | End: 2019-01-20

## 2019-01-19 RX ADMIN — ACETAMINOPHEN 650 MG: 325 TABLET, FILM COATED ORAL at 01:09

## 2019-01-19 RX ADMIN — CARVEDILOL 25 MG: 25 TABLET, FILM COATED ORAL at 17:33

## 2019-01-19 RX ADMIN — FINASTERIDE 5 MG: 5 TABLET, FILM COATED ORAL at 09:34

## 2019-01-19 RX ADMIN — CARVEDILOL 25 MG: 25 TABLET, FILM COATED ORAL at 09:34

## 2019-01-19 RX ADMIN — PREGABALIN 150 MG: 75 CAPSULE ORAL at 09:34

## 2019-01-19 RX ADMIN — Medication 12.5 MG: at 20:47

## 2019-01-19 RX ADMIN — LISINOPRIL 10 MG: 10 TABLET ORAL at 09:34

## 2019-01-19 RX ADMIN — ACETAMINOPHEN 650 MG: 325 TABLET, FILM COATED ORAL at 16:20

## 2019-01-19 RX ADMIN — PREGABALIN 150 MG: 75 CAPSULE ORAL at 20:48

## 2019-01-19 RX ADMIN — LEVOFLOXACIN 250 MG: 5 INJECTION, SOLUTION INTRAVENOUS at 14:11

## 2019-01-19 RX ADMIN — ACETAMINOPHEN 650 MG: 325 TABLET, FILM COATED ORAL at 09:34

## 2019-01-19 RX ADMIN — Medication 12.5 MG: at 09:34

## 2019-01-19 ASSESSMENT — ACTIVITIES OF DAILY LIVING (ADL)
ADLS_ACUITY_SCORE: 18
ADLS_ACUITY_SCORE: 16
ADLS_ACUITY_SCORE: 18
ADLS_ACUITY_SCORE: 18
ADLS_ACUITY_SCORE: 16
ADLS_ACUITY_SCORE: 16

## 2019-01-19 ASSESSMENT — MIFFLIN-ST. JEOR: SCORE: 1353.47

## 2019-01-19 NOTE — CONSULTS
Discharge Planner   Discharge Plans in progress: Consult received stating that family is not able to care for him?  I have met with pt,  and PT who was working with pt when I entered the room, PT does not feel pt needs TCU since he is moving around fine.  I have tried to contact his son via phone to discuss concerns of family not being able to care for pt when notes indicate that family is able to care for pt 24 hrs/day.    Follow up plan: Will plan on f/u with family to discuss their concerns since it seems like we are getting conflicting feedback from family regarding discharge planning and pt returning home.       Entered by: Carol Javier 01/19/2019 9:22 AM           ADDENDUM:  Pt sitting up in his chair, no family present.  PT rec's are home with family support.  It looks like per note on 1/18 [facility] has been notified for RN/PT/OT/SW/HHA.  Family was not present as of yet and have not received a return to call to discuss discharge plans.

## 2019-01-19 NOTE — PROGRESS NOTES
01/19/19 0934   Quick Adds   Type of Visit PT Re-evaluation   Living Environment   Lives With child(eden), adult   Living Environment Comment Pt unable to provide specifics regarding home set up. Lives with adult children who all work.    Self-Care   Usual Activity Tolerance moderate   Current Activity Tolerance moderate   Equipment Currently Used at Home walker, rolling   Functional Level Prior   Ambulation 1-->assistive equipment   Transferring 1-->assistive equipment   Toileting 0-->independent   Bathing 0-->independent   Cognition 1 - attention or memory deficits   Which of the above functional risks had a recent onset or change? none   General Information   Onset of Illness/Injury or Date of Surgery - Date 01/09/19   Referring Physician Helga, Jennie Gottlieb, CHRISTLA CNS   Patient/Family Goals Statement Home versus TCU   Pertinent History of Current Problem (include personal factors and/or comorbidities that impact the POC) Barrie Woods is a 88 year old male with history of CVA, dementia, chronic kidney disease, A. fib, cardiomyopathy, congestive heart failure, benign prostatic hypertrophy, renal cell carcinoma, who was scheduled for elective TURP and found to have urosepsis and admitted to the hospital on 1/9/2019.     Precautions/Limitations fall precautions   Weight-Bearing Status - LLE full weight-bearing   Weight-Bearing Status - RLE full weight-bearing   General Observations Pt sitting in bedside chair at initiation of session.    Cognitive Status Examination   Orientation person   Level of Consciousness confused   Follows Commands and Answers Questions 50% of the time   Personal Safety and Judgment impaired   Memory impaired   Integumentary/Edema   Integumentary/Edema no deficits were identifed   Posture    Posture Forward head position;Protracted shoulders   Range of Motion (ROM)   ROM Comment LE WNL   Strength   Strength Comments Unable to complete formal MMT 2/2 cognition. Antigravity strength  "demonstrated.    Bed Mobility   Bed Mobility Comments sit<>supine not assessed as pt refusing to get in/out of bed during session.    Transfer Skills   Transfer Comments sit<>stand SBA   Gait   Gait Comments SBA with FWW   Balance   Balance Comments Benefits from FWW for mobility, able to demonstrate functional mobility in room without walker.    Sensory Examination   Sensory Perception Comments unable to assess   Coordination   Coordination no deficits were identified   Muscle Tone   Muscle Tone no deficits were identified   Clinical Impression   Criteria for Skilled Therapeutic Intervention evaluation only;no problems identified which require skilled intervention;current level of function same as previous level of function   PT Diagnosis Impaired functional mobility   Influenced by the following impairments generalized weakness, confusion   Functional limitations due to impairments Impaired activity tolerance   Clinical Presentation Stable/Uncomplicated   Clinical Presentation Rationale progressing medically   Clinical Decision Making (Complexity) Low complexity   Therapy Frequency` other (see comments)  (Eval only)   Predicted Duration of Therapy Intervention (days/wks) Eval only   Anticipated Discharge Disposition Home with Assist;Home with Home Therapy   Risk & Benefits of therapy have been explained Yes   Patient, Family & other staff in agreement with plan of care Yes   [facility] [employer] \"6 Clicks\"   2016, Trustees of [facility], under license to [employer].  All rights reserved.   6 Clicks Short Forms Basic Mobility Inpatient Short Form   [facility] [employer]  \"6 Clicks\" V.2 Basic Mobility Inpatient Short Form   1. Turning from your back to your side while in a flat bed without using bedrails? 3 - A Little   2. Moving from lying on your back to sitting on the side of a flat bed without using bedrails? 3 - A Little   3. Moving to and from a bed to a chair (including a wheelchair)? 4 - " None   4. Standing up from a chair using your arms (e.g., wheelchair, or bedside chair)? 4 - None   5. To walk in hospital room? 4 - None   6. Climbing 3-5 steps with a railing? 3 - A Little   Basic Mobility Raw Score (Score out of 24.Lower scores equate to lower levels of function) 21   Total Evaluation Time   Total Evaluation Time (Minutes) 16

## 2019-01-19 NOTE — PLAN OF CARE
Pt remains hospitalized for UTI  Vital signs stable, on RA, denies pain.   PIV saline locked, continues IV Levaquin.   Up with assist x 1 with walker and gait belt.   Pt restless most of the shift, currently up in chair.   Youssef with 300mL output, cloudy.   Scheduled Tylenol given   Will continue to monitor.

## 2019-01-19 NOTE — PLAN OF CARE
Afebrile. Up to chair for dinner but refusing ambulation in griggs. Poor appetite tonight. Denies nausea. Denies pain. Hypoactive bowel sounds. Pt unable to report if passing flatus. Last BM yesterday evening.Abdomen remains distended. Alert to person only. Alarms on bed and chair as well as VPM for safety.

## 2019-01-19 NOTE — PLAN OF CARE
Patient remains hospitalized for UTI. Youssef in place, cares done. Nectar thick full liquids, poor apptetite. IV levaquin abx. Ambulated in griggs x1, Ax1. Nursing will continue to monitor.

## 2019-01-19 NOTE — PROGRESS NOTES
[facility]    Medicine Progress Note - Hospitalist Service       Date of Admission:  1/9/2019  Assessment & Plan         1. Severe sepsis secondary to catheter associated urinary tract infection due to pseudomonas aeruginosa and Enterobacter aerogenes-This is due to indwelling Youssef catheter related infection. Patient had been on Ciprofloxacin and Zosyn from 01/09 to 01/13 and now has been transitioned to levofloxacin as per ID team.  -Patient has previous history of CRE (carbapenem resistant enterococcus) per report, no documentation found.  Contact precautions removed.  -Continue IV Levaquin as patient does have an ileus, but could potentially transition to p.o. Levaquin in 1 day as he is tolerating other oral medications but want to ensure adequate antibiotic concentration.  Per ID, suspect may be 3 more days.  -Antibiotic management per infectious disease input appreciated.  -Urology consulted this admission and will follow up as outpatient to determine the timing of TURP.  It was noted that patient has a renal mass suspected to be cancer but has declined surgical intervention in the past. Renal ultrasound did not show any sign of urinary obstruction.     2.   Septic/infectious encephalopathy: At baseline, do suspect patient has underlying dementia and does tend to wander.  -Continue Seroquel     3.  Chronic diastolic congestive heart failure.  Last echo done in 2017 showed EF of 50-55 % with indeterminate left ventricular diastolic function.   -Monitor input and output  -Daily weight.  -Continue Coreg to 25mg po bid.     4.  Acute kidney injury on chronic kidney disease stage III.  -Creatinine continues to improve and is at baseline. Baseline creatinine was 1.25, about 4 months ago  -ACE inhibitor was placed on hold but will restart lisinopril 10 mg p.o. daily as creatinine is stable and at baseline.  -He did have some hyperkalemia on presentation which is currently resolved.    This was felt to  be due to renal failure and lisinopril.     5.  Chronic arterial fibrillation.   -Continue Coreg  -Patient is not on full anticoagulation of bleeding outweighs the benefit at this point  -We will leave this to primary care provider to address after his procedure done, risk of bleeding and falls are high.     6.  History of left renal mass suspected malignancy.  Urology following.  Patient did not want any surgical intervention for this in the past. Elevated alkaline phosphate may related to cancer.     7.  Abdominal pain and distention secondary to ileus: on 1/14/2019 patient was noted to have abdominal pain and distention, abdominal x-ray is consistent with an ileus.  This was also reviewed personally by myself.  Suspect secondary to bedrest and acute illness. Encouraged ambulation.  Will advance to a full liquid diet with nectar thickened liquids.    8. Advanced dementia: Fall and delirium prevention.      Diet: Room Service  Full Liquid Diet Nectar Thickened Liquids (pre-thickened or use instant food thickener)    DVT Prophylaxis: Pneumatic Compression Devices  Youssef Catheter: in place, indication: Retention  Code Status: Full Code      Disposition Plan   Expected discharge: 2 - 3 days, recommended to transitional care unit after discussion with family once antibiotic plan established and SIRS/Sepsis treated.  Entered: Ramon Bustos MD 01/19/2019, 12:31 PM       The patient's care was discussed with the Bedside Nurse.    Ramon Bustos MD  Hospitalist Service  [facility]    ______________________________________________________________________    Interval History   Confused. Poor historian due to dementia, interviewed through interpretor.     Data reviewed today: I reviewed all medications, new labs and imaging results over the last 24 hours. I personally reviewed no images or EKG's today.    Physical Exam   Vital Signs: Temp: 96.6  F (35.9  C) Temp src: Oral BP: 160/67   Heart Rate: 82  Resp: 22 SpO2: 96 % O2 Device: None (Room air)    Weight: 163 lbs 4.8 oz    PHYSICAL EXAMINATION:  GENERAL:  No acute distress.  SKIN:  Dry and warm.  There is no rash, lesions, ulcers or juandice at area of skin examined.  HEENT:  Head without trauma.  Pupils round, reactive. Exam of conjunctiva and lids are normal. Sclera without icterus. There is no oral thrush.  NECK:  Supple.  There is no cervical adenopathy, no thyromegaly. No jugular venous distension.  CHEST: No reproducible chest tenderness.   LUNGS:  Normal respiratory effort. Lungs reveal decreased breath sounds at bases. No rales or wheezes.  HEART:  Irregular rate and rhythm.  No murmur, gallops or rubs auscultated.  ABDOMEN:  Soft, bowel sounds positive.  There is no tenderness or guarding.   EXTREMITIES: No edema. Normal range of motion. No calf swelling or tenderness.  NEUROLOGIC:  Alert and oriented x3. Moving all ext. Gait not tested.  PSYCH: Recent and remote memory is impaired.       Data   Recent Labs   Lab 01/19/19  0720 01/17/19  0720 01/15/19  0825   WBC 10.3 10.3 10.5   HGB 10.3* 10.1* 9.9*   MCV 91 92 92    290 281   * 142 144   POTASSIUM 4.1 4.3 4.4   CHLORIDE 112* 113* 115*   CO2 28 26 24   BUN 17 16 20   CR 1.07 1.09 1.10   ANIONGAP 5 4 5   JOSE 8.7 8.2* 8.2*   GLC 99 105* 110*   ALBUMIN 2.1*  --   --    PROTTOTAL 6.5*  --   --    BILITOTAL 0.5  --   --    ALKPHOS 309*  --   --    ALT 18  --   --    AST 43  --   --      No results found for this or any previous visit (from the past 24 hour(s)).  Medications       acetaminophen  650 mg Oral Q8H     carvedilol  25 mg Oral BID w/meals     finasteride  5 mg Oral Daily     influenza Vac Split High-Dose  0.5 mL Intramuscular Prior to discharge     levofloxacin  250 mg Intravenous Q24H     lisinopril  10 mg Oral Daily     pregabalin  150 mg Oral BID     QUEtiapine  12.5 mg Oral BID     sodium chloride (PF)  3 mL Intracatheter Q8H

## 2019-01-19 NOTE — PLAN OF CARE
PT:  Re-Eval completed. Pt is admitted with severe sepsis secondary to urinary tract infection.  Hx of dementia, Kyrgyz speaking,  present.  Per new order, pt concerned taking pt home 2/2 weakness, but comfortable with pt's baseline confusion.     Discharge Planner PT   Patient plan for discharge: none stated  Current status: Pt completes sit<>stand SBA/Osbaldo. Pt ambulates with a FWW x 200' with use of SBA/Osbaldo. Pt occasionally requires cuing for finding room, but no skilled therapy intervention required. Bed mobility unable to be assessed as pt refusing during session.   Barriers to return to prior living situation: none anticipated from a mobility standpoint.   Recommendations for discharge: home with supervision 2/2 cognition  Rationale for recommendations: Compared to previous PT evaluation dated 1/12, the pts mobility has improved. Occasional SBA required 2/2 cognition, but not weakness. No barriers for discharge home from mobility point of view. No skilled PT needs.        Entered by: Lucy Ward 01/19/2019 9:42 AM

## 2019-01-20 LAB — GLUCOSE BLDC GLUCOMTR-MCNC: 112 MG/DL (ref 70–99)

## 2019-01-20 PROCEDURE — 99232 SBSQ HOSP IP/OBS MODERATE 35: CPT | Performed by: INTERNAL MEDICINE

## 2019-01-20 PROCEDURE — 00000146 ZZHCL STATISTIC GLUCOSE BY METER IP

## 2019-01-20 PROCEDURE — 25000132 ZZH RX MED GY IP 250 OP 250 PS 637: Performed by: INTERNAL MEDICINE

## 2019-01-20 PROCEDURE — 25000132 ZZH RX MED GY IP 250 OP 250 PS 637: Performed by: HOSPITALIST

## 2019-01-20 PROCEDURE — 12000000 ZZH R&B MED SURG/OB

## 2019-01-20 PROCEDURE — 25000132 ZZH RX MED GY IP 250 OP 250 PS 637: Performed by: NURSE PRACTITIONER

## 2019-01-20 PROCEDURE — 25000128 H RX IP 250 OP 636: Performed by: INTERNAL MEDICINE

## 2019-01-20 RX ORDER — LEVOFLOXACIN 500 MG/1
500 TABLET, FILM COATED ORAL DAILY
Status: DISCONTINUED | OUTPATIENT
Start: 2019-01-20 | End: 2019-01-23

## 2019-01-20 RX ADMIN — LEVOFLOXACIN 500 MG: 500 TABLET, FILM COATED ORAL at 16:59

## 2019-01-20 RX ADMIN — ACETAMINOPHEN 650 MG: 325 TABLET, FILM COATED ORAL at 08:52

## 2019-01-20 RX ADMIN — Medication 12.5 MG: at 08:52

## 2019-01-20 RX ADMIN — CARVEDILOL 25 MG: 25 TABLET, FILM COATED ORAL at 08:52

## 2019-01-20 RX ADMIN — PREGABALIN 150 MG: 75 CAPSULE ORAL at 08:52

## 2019-01-20 RX ADMIN — ACETAMINOPHEN 650 MG: 325 TABLET, FILM COATED ORAL at 00:07

## 2019-01-20 RX ADMIN — LISINOPRIL 10 MG: 10 TABLET ORAL at 08:52

## 2019-01-20 RX ADMIN — CARVEDILOL 25 MG: 25 TABLET, FILM COATED ORAL at 17:00

## 2019-01-20 RX ADMIN — TRAMADOL HYDROCHLORIDE 25 MG: 50 TABLET, FILM COATED ORAL at 19:03

## 2019-01-20 RX ADMIN — TRAMADOL HYDROCHLORIDE 25 MG: 50 TABLET, FILM COATED ORAL at 05:30

## 2019-01-20 RX ADMIN — Medication 12.5 MG: at 21:14

## 2019-01-20 RX ADMIN — PREGABALIN 150 MG: 75 CAPSULE ORAL at 21:13

## 2019-01-20 RX ADMIN — ACETAMINOPHEN 650 MG: 325 TABLET, FILM COATED ORAL at 16:05

## 2019-01-20 RX ADMIN — FINASTERIDE 5 MG: 5 TABLET, FILM COATED ORAL at 08:52

## 2019-01-20 ASSESSMENT — ACTIVITIES OF DAILY LIVING (ADL)
ADLS_ACUITY_SCORE: 18

## 2019-01-20 ASSESSMENT — MIFFLIN-ST. JEOR: SCORE: 1372.53

## 2019-01-20 NOTE — PLAN OF CARE
Patient remains hospitalized for UTI. IV levaquin for abx. Awaiting new IV to hang abx. 2 formed soft BMs this shift. Patient more alert this shift. VPM discontinued. Full liquid diet nectar thick. Nursing will continue to monitor.

## 2019-01-20 NOTE — PLAN OF CARE
Pt up walking in griggs and sitting up in chair with assist of one and walker with gait belt. Good appetite tonight with feeding assist. Denies pain. Hypoactive bowel sounds, pt unable to report if passing flatus secondary to confusion. Last BM two days ago. Confused to person, place, time and situation. Afebrile.

## 2019-01-20 NOTE — PLAN OF CARE
Patient alert to self only. Slept good this shift. Pain this AM, PO pain medications given. Writer tried to communicate using  phone, but patient would not use. VPM in place for safety. Youssef with good output. Continuing IV antibiotics. Discharge pending.

## 2019-01-20 NOTE — PROGRESS NOTES
[facility]    Medicine Progress Note - Hospitalist Service       Date of Admission:  1/9/2019  Assessment & Plan       1. Severe sepsis secondary to catheter associated urinary tract infection due to pseudomonas aeruginosa and Enterobacter aerogenes. This is due to indwelling Youssef catheter related infection. Patient had been on Ciprofloxacin and Zosyn from 01/09 to 01/13 and now has been transitioned to levofloxacin as per ID team.  -Patient has previous history of CRE (carbapenem resistant enterococcus) per report, no documentation found.  Contact precautions removed.  -Continue IV Levaquin as patient does have an ileus, but could potentially transition to p.o. Levaquin tomorrow as he is tolerating other oral medications but want to ensure adequate antibiotic concentration.  Per ID, suspect may be 3 more days.  -Antibiotic management per infectious disease input appreciated.  -Urology consulted this admission and will follow up as outpatient to determine the timing of TURP.  It was noted that patient has a renal mass suspected to be cancer but has declined surgical intervention in the past. Renal ultrasound did not show any sign of urinary obstruction.     2. Septic/infectious encephalopathy: At baseline, do suspect patient has underlying dementia and does tend to wander.  -Continue Seroquel     3.  Chronic diastolic congestive heart failure.  Last echo done in 2017 showed EF of 50-55 % with indeterminate left ventricular diastolic function.   -Monitor input and output  -Daily weight.  -Continue Coreg to 25mg po bid.     4.  Acute kidney injury on chronic kidney disease stage III.  -Creatinine continues to improve and is at baseline. Baseline creatinine was 1.25, about 4 months ago  -ACE inhibitor was placed on hold but will restart lisinopril 10 mg p.o. daily as creatinine is stable and at baseline.  -He did have some hyperkalemia on presentation which is currently resolved.    This was felt to be  due to renal failure and lisinopril.     5.  Chronic arterial fibrillation.   -Continue Coreg  -Patient is not on full anticoagulation of bleeding outweighs the benefit at this point  -We will leave this to primary care provider to address after his procedure done, risk of bleeding and falls are high.     6.  History of left renal mass suspected malignancy.  Urology following.  Patient did not want any surgical intervention for this in the past. Elevated alkaline phosphate may related to cancer.     7.  Abdominal pain and distention secondary to ileus: on 1/14/2019 patient was noted to have abdominal pain and distention, abdominal x-ray is consistent with an ileus.  This was also reviewed personally by myself.  Suspect secondary to bedrest and acute illness. Encouraged ambulation.  Will advance to a full liquid diet with nectar thickened liquids.    8. Advanced dementia: Fall and delirium prevention.    9. Dysphagia, continue current dysphagia diet.    10. Deconditioning, continue PT rehab.      Diet: Room Service  Full Liquid Diet Nectar Thickened Liquids (pre-thickened or use instant food thickener)    DVT Prophylaxis: Pneumatic Compression Devices  Youssef Catheter: in place, indication: Retention  Code Status: Full Code      Disposition Plan   Expected discharge: 2 - 3 days, recommended to transitional care unit after discussion with family once antibiotic plan established and SIRS/Sepsis treated and whether surgery is done on this admission or schedule in another day.  Entered: Ramon Bustos MD 01/20/2019, 11:07 AM       The patient's care was discussed with the Bedside Nurse.    Ramon Bustos MD  Hospitalist Service  [facility]    ______________________________________________________________________    Interval History   More alert today. Poor historian due to dementia, interviewed through interpretor.     Data reviewed today: I reviewed all medications, new labs and imaging results  over the last 24 hours. I personally reviewed no images or EKG's today.    Physical Exam   Vital Signs: Temp: 97.8  F (36.6  C) Temp src: Oral BP: 143/55 Pulse: 88 Heart Rate: 84 Resp: 18 SpO2: 95 % O2 Device: None (Room air)    Weight: 167 lbs 8 oz    PHYSICAL EXAMINATION:  GENERAL:  No acute distress.  SKIN:  Dry and warm.  There is no rash, lesions, ulcers or juandice at area of skin examined.  HEENT:  Head without trauma.  Pupils round, reactive. Exam of conjunctiva and lids are normal. Sclera without icterus. There is no oral thrush.  NECK:  Supple.  There is no cervical adenopathy, no thyromegaly. No jugular venous distension.  CHEST: No reproducible chest tenderness.   LUNGS:  Normal respiratory effort. Lungs reveal decreased breath sounds at bases. No rales or wheezes.  HEART:  Irregular rate and rhythm.  No murmur, gallops or rubs auscultated.  ABDOMEN:  Soft, bowel sounds positive.  There is no tenderness or guarding.   EXTREMITIES: No edema. Normal range of motion. No calf swelling or tenderness.  NEUROLOGIC:  Alert and oriented x3. Moving all ext. Gait not tested.  PSYCH: Recent and remote memory is impaired.       Data   Recent Labs   Lab 01/19/19  0720 01/17/19  0720 01/15/19  0825   WBC 10.3 10.3 10.5   HGB 10.3* 10.1* 9.9*   MCV 91 92 92    290 281   * 142 144   POTASSIUM 4.1 4.3 4.4   CHLORIDE 112* 113* 115*   CO2 28 26 24   BUN 17 16 20   CR 1.07 1.09 1.10   ANIONGAP 5 4 5   JOSE 8.7 8.2* 8.2*   GLC 99 105* 110*   ALBUMIN 2.1*  --   --    PROTTOTAL 6.5*  --   --    BILITOTAL 0.5  --   --    ALKPHOS 309*  --   --    ALT 18  --   --    AST 43  --   --      No results found for this or any previous visit (from the past 24 hour(s)).  Medications       acetaminophen  650 mg Oral Q8H     carvedilol  25 mg Oral BID w/meals     finasteride  5 mg Oral Daily     influenza Vac Split High-Dose  0.5 mL Intramuscular Prior to discharge     levofloxacin  500 mg Intravenous Q24H     lisinopril  10 mg  Oral Daily     pregabalin  150 mg Oral BID     QUEtiapine  12.5 mg Oral BID     sodium chloride (PF)  3 mL Intracatheter Q8H

## 2019-01-20 NOTE — PROGRESS NOTES
LORRAINE met with patient and his son. Patient is having a same day surgery in February. He wants his dad to go home. He says there is help at the house. LORRAINE advised him to call his [facility]  to discuss what supports can be set up at home. Informed him that if he goes home and it becomes too much he has 30 days from discharge to be eligible for a TCU.       MORELIA Paredes  [phone]

## 2019-01-20 NOTE — PROGRESS NOTES
Discharge Planner   Discharge Plans in progress: home  Barriers to discharge plan: none  Follow up plan: SW to monitor        Entered by: Nyla Morales 01/20/2019 4:42 PM

## 2019-01-21 ENCOUNTER — OFFICE VISIT (OUTPATIENT)
Dept: INTERPRETER SERVICES | Facility: CLINIC | Age: 84
End: 2019-01-21
Payer: COMMERCIAL

## 2019-01-21 LAB
ANION GAP SERPL CALCULATED.3IONS-SCNC: 4 MMOL/L (ref 3–14)
BUN SERPL-MCNC: 15 MG/DL (ref 7–30)
CALCIUM SERPL-MCNC: 8.8 MG/DL (ref 8.5–10.1)
CHLORIDE SERPL-SCNC: 112 MMOL/L (ref 94–109)
CO2 SERPL-SCNC: 30 MMOL/L (ref 20–32)
CREAT SERPL-MCNC: 1.02 MG/DL (ref 0.66–1.25)
GFR SERPL CREATININE-BSD FRML MDRD: 65 ML/MIN/{1.73_M2}
GLUCOSE SERPL-MCNC: 87 MG/DL (ref 70–99)
POTASSIUM SERPL-SCNC: 3.8 MMOL/L (ref 3.4–5.3)
SODIUM SERPL-SCNC: 146 MMOL/L (ref 133–144)

## 2019-01-21 PROCEDURE — 25000132 ZZH RX MED GY IP 250 OP 250 PS 637: Performed by: INTERNAL MEDICINE

## 2019-01-21 PROCEDURE — 25000132 ZZH RX MED GY IP 250 OP 250 PS 637: Performed by: HOSPITALIST

## 2019-01-21 PROCEDURE — 80048 BASIC METABOLIC PNL TOTAL CA: CPT | Performed by: INTERNAL MEDICINE

## 2019-01-21 PROCEDURE — T1013 SIGN LANG/ORAL INTERPRETER: HCPCS | Mod: U3

## 2019-01-21 PROCEDURE — 12000000 ZZH R&B MED SURG/OB

## 2019-01-21 PROCEDURE — 36415 COLL VENOUS BLD VENIPUNCTURE: CPT | Performed by: INTERNAL MEDICINE

## 2019-01-21 PROCEDURE — 99233 SBSQ HOSP IP/OBS HIGH 50: CPT | Performed by: STUDENT IN AN ORGANIZED HEALTH CARE EDUCATION/TRAINING PROGRAM

## 2019-01-21 PROCEDURE — 25000132 ZZH RX MED GY IP 250 OP 250 PS 637: Performed by: NURSE PRACTITIONER

## 2019-01-21 RX ADMIN — LEVOFLOXACIN 500 MG: 500 TABLET, FILM COATED ORAL at 17:40

## 2019-01-21 RX ADMIN — Medication 12.5 MG: at 20:44

## 2019-01-21 RX ADMIN — Medication 12.5 MG: at 09:56

## 2019-01-21 RX ADMIN — LISINOPRIL 10 MG: 10 TABLET ORAL at 09:56

## 2019-01-21 RX ADMIN — PREGABALIN 150 MG: 75 CAPSULE ORAL at 20:44

## 2019-01-21 RX ADMIN — PREGABALIN 150 MG: 75 CAPSULE ORAL at 09:56

## 2019-01-21 RX ADMIN — ACETAMINOPHEN 650 MG: 325 TABLET, FILM COATED ORAL at 17:40

## 2019-01-21 RX ADMIN — FINASTERIDE 5 MG: 5 TABLET, FILM COATED ORAL at 09:55

## 2019-01-21 RX ADMIN — ACETAMINOPHEN 650 MG: 325 TABLET, FILM COATED ORAL at 09:55

## 2019-01-21 RX ADMIN — CARVEDILOL 25 MG: 25 TABLET, FILM COATED ORAL at 17:40

## 2019-01-21 RX ADMIN — TRAMADOL HYDROCHLORIDE 25 MG: 50 TABLET, FILM COATED ORAL at 17:41

## 2019-01-21 RX ADMIN — TRAMADOL HYDROCHLORIDE 25 MG: 50 TABLET, FILM COATED ORAL at 03:38

## 2019-01-21 RX ADMIN — CARVEDILOL 25 MG: 25 TABLET, FILM COATED ORAL at 09:55

## 2019-01-21 ASSESSMENT — MIFFLIN-ST. JEOR: SCORE: 1371.16

## 2019-01-21 ASSESSMENT — ACTIVITIES OF DAILY LIVING (ADL)
ADLS_ACUITY_SCORE: 18

## 2019-01-21 NOTE — PLAN OF CARE
Afebrile. Pt up in chair and bathroom with assist of one and walker with gait belt. Oriented to self  Per report pt had two formed BMs on day shift. One loose bloody BM on this shift.Communication note left for MD regarding blood in stool. Complains of generalized pain, Tramadol given for comfort.

## 2019-01-21 NOTE — DISCHARGE INSTRUCTIONS
Your home care referral was sent to [facility].  If you haven't heard from them within the next 24-48 hours,  Please call them at [phone]

## 2019-01-21 NOTE — PROGRESS NOTES
CLINICAL NUTRITION SERVICES - REASSESSMENT NOTE      Recommendations Ordered by Registered Dietitian (RD):   Magic Cup TID with all meals   Malnutrition: (1/21)  % Weight Loss: Unable to determine --> fluid shifts   % Intake:  <75% for > 7 days (non-severe malnutrition) (at least)   Subcutaneous Fat Loss:  Upper arm region moderate depletion  Muscle Loss: Temporal region moderate depletion,  Acromion bone region moderate depletion (?chronic losses)  Fluid Retention:  None noted    Malnutrition Diagnosis: Severe malnutrition  In Context of:  Chronic illness or disease     1/19: Received Nutrition Consult: Gald[place] less than 3     EVALUATION OF PROGRESS TOWARD GOALS   Diet:  Full Liquid, Nectar thick (advanced from CL on 1/17)     Intake:   Attempted to visit patient today. Pt is non-english speaking and no  present.   Flow sheets indicate variable % meal consumption TID  One bloody, loose stool recorded yesterday    Vitals:    01/17/19 0848 01/18/19 0524 01/19/19 0340 01/20/19 0528   Weight: 70.8 kg (156 lb 1.6 oz) 70.8 kg (156 lb) 74.1 kg (163 lb 4.8 oz) 76 kg (167 lb 8 oz)    01/21/19 0656   Weight: 75.8 kg (167 lb 3.2 oz)       NEW FINDINGS:   Discharge pending to home     Previous Goals (1/16):   Diet advancement past clears w/in 24-48 hrs.   Evaluation: Met    Previous Nutrition Diagnosis (1/16):   Inadequate oral intake related to ?decreased appetite with underlying dementia, abdominal pain with ileus as evidenced by meeting <75% needs x 7 days since admission.  Evaluation: No change      UPDATED MALNUTRITION 1/21  % Weight Loss: Unable to determine --> fluid shifts   % Intake:  <75% for > 7 days (non-severe malnutrition) (at least)   Subcutaneous Fat Loss:  Upper arm region moderate-severe depletion  Muscle Loss: Temporal region moderate depletion,  Acromion bone region moderate depletion (?chronic losses)  Fluid Retention:  None noted    Malnutrition Diagnosis: Severe malnutrition  In Context  of:  Chronic illness or disease    CURRENT NUTRITION DIAGNOSIS  Inadequate oral intake related to ?decreased appetite with underlying dementia, abdominal pain with ileus as evidenced by meeting <75% needs x >7 days since admission with e/o moderate-severe fat and muscle wasting.     INTERVENTIONS  Recommendations / Nutrition Prescription  Diet per SLP, encouraging PO  Magic Cup supplements TID    Implementation  Medical Food Supplement: as above   Collaboration and Referral of Nutrition care: discussed pt POC at interdisciplinary rounds    Goals  Pt to consume >50% meals and supplements BID      MONITORING AND EVALUATION:  Progress towards goals will be monitored and evaluated per protocol and Practice Guidelines      Keila De La Garza RD, LD  Clinical Dietitian

## 2019-01-21 NOTE — PROGRESS NOTES
[facility]    Medicine Progress Note - Hospitalist Service       Date of Admission:  1/9/2019  Date of Service: 01/21/2019    Assessment & Plan       1. Severe sepsis secondary to catheter associated urinary tract infection due to pseudomonas aeruginosa and Enterobacter aerogenes. This is due to indwelling Youssef catheter related infection. Patient had been on Ciprofloxacin and Zosyn from 01/09 to 01/13 and now has been transitioned to levofloxacin as per ID team. Patient has previous history of CRE (carbapenem resistant enterococcus) per report, no documentation found.  Contact precautions removed.  Plan:  -Continue  p.o. Levaquin tomorrow as he is tolerating other oral medications but want to ensure adequate antibiotic concentration.  Per ID, one more day and would stop.  -Antibiotic management per infectious disease input appreciated.  -Urology consulted this admission and will follow up as outpatient to determine the timing of TURP.  It was noted that patient has a renal mass suspected to be cancer but has declined surgical intervention in the past. Renal ultrasound did not show any sign of urinary obstruction.  - Unfortunately patient's son who is his primary caregiver at home has been hospitalized here at [facility]. And his son reports that patient should not be discharged home as he would be unable to care for himself. Thus will need short term TCU stay. SW consulted     2. Dementia: At baseline, do suspect patient has underlying dementia and does tend to wander.  -Continue Seroquel as needed     3.  Chronic diastolic congestive heart failure.  Last echo done in 2017 showed EF of 50-55 % with indeterminate left ventricular diastolic function.   -Monitor input and output  -Daily weight.  -Continue Coreg to 25mg po bid.     4.  Acute kidney injury on chronic kidney disease stage III.  -Creatinine continues to improve and is at baseline. Baseline creatinine was 1.25, about 4 months ago  -ACE inhibitor  was placed on hold but will restart lisinopril 10 mg p.o. daily as creatinine is stable and at baseline.  -He did have some hyperkalemia on presentation which is currently resolved.    This was felt to be due to renal failure and lisinopril.     5.  Chronic arterial fibrillation.   -Continue Coreg  -Patient is not on full anticoagulation of bleeding outweighs the benefit at this point  -We will leave this to primary care provider to address after his procedure done, risk of bleeding and falls are high.     6.  History of left renal mass suspected malignancy.  Urology following.  Patient did not want any surgical intervention for this in the past. Elevated alkaline phosphate may related to cancer.     7.  Abdominal pain and distention secondary to ileus: on 1/14/2019 patient was noted to have abdominal pain and distention, abdominal x-ray is consistent with an ileus.  This was also reviewed personally by myself.  Suspect secondary to bedrest and acute illness. Encouraged ambulation. Advanced to a full liquid diet with nectar thickened liquids.    8. Dysphagia, continue current dysphagia diet. SLP evaluated.    10. Deconditioning, continue PT rehab.      Diet: Room Service  Full Liquid Diet Nectar Thickened Liquids (pre-thickened or use instant food thickener)  Diet  Snacks/Supplements Adult: Magic Cup; With Meals    DVT Prophylaxis: Pneumatic Compression Devices  Youssef Catheter: in place, indication: Retention  Code Status: Full Code      Disposition Plan   Expected discharge: 2 - 3 days, recommended to transitional care unit after discussion with family once safe disposition plan/ TCU bed available   Entered: Germain Hooks MD 01/21/2019, 3:22 PM       The patient's care was discussed with the Bedside Nurse, Patient and Patient's Family.    Patient, family, interdisciplinary team involved in care and agrees with plan.  Total time - Greater than 35 min. More than 50% of time spent in direct patient care, care  coordination, patient/caregiver counseling, and formalizing plan of care.       Germain Hooks MD  Hospitalist Service  [facility]    ______________________________________________________________________    Interval History   More alert today. Poor historian due to dementia, interviewed through interpretor.     Data reviewed today: I reviewed all medications, new labs and imaging results over the last 24 hours. I personally reviewed no images or EKG's today.    Physical Exam   Vital Signs: Temp: 98  F (36.7  C) Temp src: Oral BP: 135/72 Pulse: 86 Heart Rate: 78 Resp: 20 SpO2: 94 % O2 Device: None (Room air)    Weight: 167 lbs 3.2 oz    PHYSICAL EXAMINATION:    GENERAL:  No acute distress.  NECK:  Supple.  There is no cervical adenopathy, no thyromegaly. No jugular venous distension.  LUNGS:  Normal respiratory effort. Lungs reveal decreased breath sounds at bases. No rales or wheezes.  HEART:  Irregular rate and rhythm.  No murmur, gallops or rubs auscultated.  ABDOMEN:  Soft, bowel sounds positive.  There is no tenderness or guarding.   EXTREMITIES: No edema. Normal range of motion. No calf swelling or tenderness.  NEUROLOGIC:  Alert and oriented x3. Moving all ext. Gait not tested.  PSYCH: Recent and remote memory is impaired.       Data   Recent Labs   Lab 01/21/19  0817 01/19/19  0720 01/17/19  0720 01/15/19  0825   WBC  --  10.3 10.3 10.5   HGB  --  10.3* 10.1* 9.9*   MCV  --  91 92 92   PLT  --  301 290 281   * 145* 142 144   POTASSIUM 3.8 4.1 4.3 4.4   CHLORIDE 112* 112* 113* 115*   CO2 30 28 26 24   BUN 15 17 16 20   CR 1.02 1.07 1.09 1.10   ANIONGAP 4 5 4 5   JOSE 8.8 8.7 8.2* 8.2*   GLC 87 99 105* 110*   ALBUMIN  --  2.1*  --   --    PROTTOTAL  --  6.5*  --   --    BILITOTAL  --  0.5  --   --    ALKPHOS  --  309*  --   --    ALT  --  18  --   --    AST  --  43  --   --      No results found for this or any previous visit (from the past 24 hour(s)).  Medications       acetaminophen  650 mg  Oral Q8H     carvedilol  25 mg Oral BID w/meals     finasteride  5 mg Oral Daily     influenza Vac Split High-Dose  0.5 mL Intramuscular Prior to discharge     levofloxacin  500 mg Oral Daily     lisinopril  10 mg Oral Daily     pregabalin  150 mg Oral BID     QUEtiapine  12.5 mg Oral BID     sodium chloride (PF)  3 mL Intracatheter Q8H      No

## 2019-01-21 NOTE — PLAN OF CARE
Pt remains hospitalized for UTI.   Vital signs stable, on RA  Up with assist x 1 with walker, alarms on for safety  No IV access, on PO Levaquin  Youssef with adequate output.   Will continue to monitor.

## 2019-01-21 NOTE — PROGRESS NOTES
[facility]    Infectious Disease Progress Note    Date of Service (when I saw the patient): 01/21/2019     Assessment & Plan   Barrie Woods is a 88 year old male who was admitted on 1/9/2019.     IMPRESSION:   1.  An 88-year-old male with acute sepsis, presumed urosepsis, positive urine culture for pseudomonas and klebsiella, full INEZ to follow, looks improved on antibiotics, chronic Youssef catheter in place.   2.  Prior flagged history of carbapenem-resistant Enterobacteriaceae, but in looking in the cultures I see nothing for that.  It appears to be a false flag and we will have Infection Control investigate this and remove the flag if we are able to.   3 Prior treated latent TB  4 cardiomyopathy  5 renal insuff     REc:   1 continue lev,po now day 12 today  2  no CRE in cxs prior, investigate and none found remove flag  3 1 day more and then stop should be adequately treated              Candido Kelly MD    Interval History    confused, ileus, pain  No fever     Physical Exam   Temp: 98  F (36.7  C) Temp src: Oral BP: 135/72 Pulse: 86 Heart Rate: 78 Resp: 20 SpO2: 94 % O2 Device: None (Room air)    Vitals:    01/19/19 0340 01/20/19 0528 01/21/19 0656   Weight: 74.1 kg (163 lb 4.8 oz) 76 kg (167 lb 8 oz) 75.8 kg (167 lb 3.2 oz)     Vital Signs with Ranges  Temp:  [97.4  F (36.3  C)-98  F (36.7  C)] 98  F (36.7  C)  Pulse:  [86] 86  Heart Rate:  [77-82] 78  Resp:  [20] 20  BP: (135-169)/() 135/72  SpO2:  [93 %-96 %] 94 %    Constitutional: Awake, alert, cooperative, no apparent distress  Lungs: Clear to auscultation bilaterally, no crackles or wheezing  Cardiovascular: Regular rate and rhythm, normal S1 and S2, and no murmur noted  Abdomen: Normal bowel sounds, soft, non-distended, non-tender  Skin: No rashes, no cyanosis, no edema  Other:    Medications       acetaminophen  650 mg Oral Q8H     carvedilol  25 mg Oral BID w/meals     finasteride  5 mg Oral Daily     influenza Vac Split  High-Dose  0.5 mL Intramuscular Prior to discharge     levofloxacin  500 mg Oral Daily     lisinopril  10 mg Oral Daily     pregabalin  150 mg Oral BID     QUEtiapine  12.5 mg Oral BID     sodium chloride (PF)  3 mL Intracatheter Q8H       Data   All microbiology laboratory data reviewed.  Recent Labs   Lab Test 01/19/19  0720 01/17/19  0720 01/15/19  0825   WBC 10.3 10.3 10.5   HGB 10.3* 10.1* 9.9*   HCT 34.3* 33.1* 32.8*   MCV 91 92 92    290 281     Recent Labs   Lab Test 01/21/19  0817 01/19/19  0720 01/17/19  0720   CR 1.02 1.07 1.09     No lab results found.  Recent Labs   Lab Test 01/09/19  1815 01/09/19  1800 07/25/18  1032 06/02/18  2000 04/26/17  1255 04/26/17  1050 04/26/17  1018 09/08/16  1710 08/31/15  1827   CULT No growth  >100,000 colonies/mL  Pseudomonas aeruginosa  *  10,000 to 50,000 colonies/mL  Klebsiella (Enterobacter) aerogenes  * No growth >100,000 colonies/mL  Enterobacter cloacae complex  * 10,000 to 50,000 colonies/mL  Enterobacter cloacae complex  * <10,000 colonies/mL Coagulase negative Staphylococcus* No growth Cultured on the 1st day of incubation: Staphylococcus epidermidis This isolate   DOES NOT demonstrate inducible clindamycin resistance in vitro. Clindamycin is   susceptible and could be used when indicated, however, erythromycin is   resistant and should not be used.  Critical Value/Significant Value, preliminary result only, called to and read   back by Crys Pina RN at 1435 on 4/27/17 by TISH.  (Note)  POSITIVE for STAPHYLOCOCCUS EPIDERMIDIS and POSITIVE for the mecA  gene (resistant to methicillin) by [employer] multiplex nucleic acid  test. Final identification and antimicrobial susceptibility testing  will be verified by standard methods.    Specimen tested with Yotomoigene multiplex, gram-positive blood culture  nucleic acid test for the following targets: Staph aureus, Staph  epidermidis, Staph lugdunensis, other Staph species, Enterococcus  faecalis, Enterococcus  faecium, Streptococcus species, S. agalactiae,  S. anginosus grp., S. pneumoniae, S. pyogenes, Listeria sp., mecA  (methi cillin resistance) and Melvin/B (vancomycin resistance).    Critical Value/Significant Value called to and read back by Helen Merino rn @4827 4/27/17. ct  * >100,000 colonies/mL mixed urogenital colby  Susceptibility testing not routinely done   <10,000 colonies/mL mixed urogenital colby  Susceptibility testing not routinely done

## 2019-01-22 ENCOUNTER — OFFICE VISIT (OUTPATIENT)
Dept: INTERPRETER SERVICES | Facility: CLINIC | Age: 84
End: 2019-01-22
Payer: COMMERCIAL

## 2019-01-22 PROCEDURE — 25000132 ZZH RX MED GY IP 250 OP 250 PS 637: Performed by: NURSE PRACTITIONER

## 2019-01-22 PROCEDURE — 25000132 ZZH RX MED GY IP 250 OP 250 PS 637: Performed by: INTERNAL MEDICINE

## 2019-01-22 PROCEDURE — 99232 SBSQ HOSP IP/OBS MODERATE 35: CPT | Performed by: INTERNAL MEDICINE

## 2019-01-22 PROCEDURE — 12000000 ZZH R&B MED SURG/OB

## 2019-01-22 PROCEDURE — 25000132 ZZH RX MED GY IP 250 OP 250 PS 637: Performed by: HOSPITALIST

## 2019-01-22 PROCEDURE — T1013 SIGN LANG/ORAL INTERPRETER: HCPCS | Mod: U3

## 2019-01-22 RX ORDER — LISINOPRIL 10 MG/1
10 TABLET ORAL ONCE
Status: COMPLETED | OUTPATIENT
Start: 2019-01-22 | End: 2019-01-22

## 2019-01-22 RX ORDER — LISINOPRIL 20 MG/1
20 TABLET ORAL DAILY
Status: DISCONTINUED | OUTPATIENT
Start: 2019-01-23 | End: 2019-01-27 | Stop reason: HOSPADM

## 2019-01-22 RX ADMIN — CARVEDILOL 25 MG: 25 TABLET, FILM COATED ORAL at 09:34

## 2019-01-22 RX ADMIN — Medication 12.5 MG: at 21:29

## 2019-01-22 RX ADMIN — CARVEDILOL 25 MG: 25 TABLET, FILM COATED ORAL at 18:25

## 2019-01-22 RX ADMIN — PREGABALIN 150 MG: 75 CAPSULE ORAL at 21:29

## 2019-01-22 RX ADMIN — LISINOPRIL 10 MG: 10 TABLET ORAL at 09:34

## 2019-01-22 RX ADMIN — LEVOFLOXACIN 500 MG: 500 TABLET, FILM COATED ORAL at 16:19

## 2019-01-22 RX ADMIN — FINASTERIDE 5 MG: 5 TABLET, FILM COATED ORAL at 09:34

## 2019-01-22 RX ADMIN — ACETAMINOPHEN 650 MG: 325 TABLET, FILM COATED ORAL at 21:35

## 2019-01-22 RX ADMIN — Medication 12.5 MG: at 09:34

## 2019-01-22 RX ADMIN — PREGABALIN 150 MG: 75 CAPSULE ORAL at 09:33

## 2019-01-22 RX ADMIN — ACETAMINOPHEN 650 MG: 325 TABLET, FILM COATED ORAL at 16:19

## 2019-01-22 RX ADMIN — LISINOPRIL 10 MG: 10 TABLET ORAL at 12:26

## 2019-01-22 RX ADMIN — ACETAMINOPHEN 650 MG: 325 TABLET, FILM COATED ORAL at 09:34

## 2019-01-22 ASSESSMENT — ACTIVITIES OF DAILY LIVING (ADL)
ADLS_ACUITY_SCORE: 18

## 2019-01-22 ASSESSMENT — MIFFLIN-ST. JEOR: SCORE: 1342.59

## 2019-01-22 NOTE — PROGRESS NOTES
[facility] Care Coordination Contact    CC reviewed Epic, noted client still hospitalized and will require TCU at discharge as his son is also currently hospitalized.  CC will continue to follow via Epic for discharge details.    MORELIA Blum  [facility] Care Coordinator  [phone]

## 2019-01-22 NOTE — PROGRESS NOTES
[facility]  Hospitalist Progress Note  Patient Name: Barrie Woods    MRN: [id number]  Provider: Ramon Mcguire MD  01/22/19    Initial presenting complaint/issue to hospital (Diagnosis): sepsis         Assessment and Plan:      Summary:  Barrie Woods is an 88 year old male with history of CVA, dementia, chronic kidney disease, A. fib, cardiomyopathy, congestive heart failure, benign prostatic hypertrophy, chronic sarmiento catheter, and renal cell carcinoma.  He was scheduled for elective TURP on 1/9/19.  He presented for this procedure and was noted to be feeling poorly.  He was found to have purulent discharge in his rrinary catheter.  CBC showed WBC of 25.1 and BMP showed hyperkalemia (5.9) and acute on chronic renal failure with BUN of 45 and creatinine of 2.58. His surgery was cancelled and direct admission from preop was arranged.  He was admitted with suspected sepsis due to UTI. UA showed pyuria (WBC > 182, large LE, negative nitrite). He developed fever to 102.6.  He was treated with Zosyn.  Urine culture grew Pseudomonas aeruginosa and Enterobacter aerogenes.  ID followed and antibiotic was changed to Levofloxacin.  Hospital course was complicated by Ileus.  This was treated conservatively and seems to be improving.  Hospital course has also been complicated by physical deconditioning.         Problem List:   1. Severe sepsis secondary to catheter associated urinary tract infection due to pseudomonas aeruginosa and Enterobacter aerogenes. ID has followed.  Plan to complete course of Levaquin today. Urology was consulted this admission and will follow up as outpatient to determine the timing of TURP (he presented initially for elective TURP).     2.  Acute renal failure due to sepsis, on chronic kidney disease stage III.  Acute renal failure has resolved.  Continue Lisinopril but increase dose from PTA dose of 10 mg daily to 20 mg daily.     3.  Ileus.  Resolved.  Advance diet to regular  "with nectar thick liquids.     4.  Hypertension.  BP has been a bit on the high side.  Continue Coreg 25 mg BID (PTA was 12.5 mg BID). Increase PTA Lisinopril 10 mg daily to 20 mg daily.      5. Dementia: At baseline, do suspect patient has underlying dementia and does tend to wander. Continue Seroquel as needed     6.  Chronic diastolic congestive heart failure, without acute decompensation.  Last echo was done in 2017 and showed EF of 50-55 % with indeterminate left ventricular diastolic function. Monitor input/output and Daily weight. Continue PTA Coreg 25mg po bid.     7.  Chronic arterial fibrillation, not on chronic anticoagulation due to fall risk. Continue Coreg     8.  History of left renal mass suspected to be malignancy.  Urology following.  Patient has not wanted any surgical intervention for this in the past. Elevated alkaline phosphate may related to cancer.     9. Dysphagia.  SLP consult appreciated- nectar thick liquids recommended.      10. Deconditioning.  PT consulted for discharge planning.     11. Care plan. Unfortunately patient's son (who is his primary caregiver at home) has been hospitalized here at [facility] and is expected to be in the hospital for a little while with some restrictions after discharge.  Patient will require TCU.  PT consult for discharge planning.     DVT Prophylaxis:  -  PCD's  Code Status: Full Code  Discharge Dispo: TCU  Estimated Disch Date / # of Days until Discharge: 1 day if bed found and tolerating regular diet with nectar thick liquids.         Interval History:      Tolerating full liquid diet. Stooling.  Feels ok.                   Physical Exam:      Last Vital Signs:  /77 (BP Location: Right arm)   Pulse 72   Temp 98.2  F (36.8  C) (Oral)   Resp 24   Ht 1.676 m (5' 6\")   Wt 73 kg (160 lb 14.4 oz)   SpO2 94%   BMI 25.97 kg/m      Intake/Output Summary (Last 24 hours) at 1/22/2019 1033  Last data filed at 1/22/2019 0846  Gross per 24 hour   Intake 240 " ml   Output 1225 ml   Net -985 ml       GENERAL:  Comfortable. Cooperative.  PSYCH: Flat affect. No acute distress.  EYES: PERRLA, Normal conjunctiva.  HEART:  Regular rate and rhythm. No JVD. Pulses normal. No edema.  LUNGS:  Clear to auscultation, normal Respiratory effort.  ABDOMEN:  Soft, no hepatosplenomegaly, normal bowel sounds.  EXTREMETIES: No clubbing, cyanosis or ischemia  SKIN:  Dry to touch, No rash.           Medications:      All current medications were reviewed.         Data:      All new lab and imaging data was reviewed.   Labs:  No results for input(s): CULT in the last 168 hours.       Lab Results   Component Value Date     01/21/2019     01/19/2019     01/17/2019    Lab Results   Component Value Date    CHLORIDE 112 01/21/2019    CHLORIDE 112 01/19/2019    CHLORIDE 113 01/17/2019    Lab Results   Component Value Date    BUN 15 01/21/2019    BUN 17 01/19/2019    BUN 16 01/17/2019      Lab Results   Component Value Date    POTASSIUM 3.8 01/21/2019    POTASSIUM 4.1 01/19/2019    POTASSIUM 4.3 01/17/2019    Lab Results   Component Value Date    CO2 30 01/21/2019    CO2 28 01/19/2019    CO2 26 01/17/2019    Lab Results   Component Value Date    CR 1.02 01/21/2019    CR 1.07 01/19/2019    CR 1.09 01/17/2019        Recent Labs   Lab 01/19/19  0720 01/17/19  0720   WBC 10.3 10.3   HGB 10.3* 10.1*   HCT 34.3* 33.1*   MCV 91 92    290

## 2019-01-22 NOTE — PLAN OF CARE
Pt alert to self only. Azeri speaking. Does not understand any English.   Pt has hallucinations.   Assist of 1 with walker and GB.   Chronic sarmiento. No BM today.   Crush pills in applesauce or pudding.   Total feed. Full liquid, nectar thick. Prefers sweet things per family. Needs reassessment from Speech and nutrition.   Son was admitted on third floor 1/21.   Pt lives with son. So there is a delay on where and when pt can discharge home.

## 2019-01-22 NOTE — PROGRESS NOTES
[facility]    Infectious Disease Progress Note    Date of Service (when I saw the patient): 01/22/2019     Assessment & Plan   Barrie Woods is a 88 year old male who was admitted on 1/9/2019.     IMPRESSION:   1.  An 88-year-old male with acute sepsis, presumed urosepsis, positive urine culture for pseudomonas and klebsiella, full INEZ to follow, looks improved on antibiotics, chronic Youssef catheter in place.   2.  Prior flagged history of carbapenem-resistant Enterobacteriaceae, but in looking in the cultures I see nothing for that.  It appears to be a false flag and we will have Infection Control investigate this and remove the flag if we are able to.   3 Prior treated latent TB  4 cardiomyopathy  5 renal insuff     REc:   1 completes lev,po    today, stp after today  2  no CRE in cxs prior, investigate and none found remove flag  3 some risk recurrence but extended abx not indicated               Candido Kelly MD    Interval History    confused, ileus, pain  No fever     Physical Exam   Temp: 96.7  F (35.9  C) Temp src: Oral BP: 151/51 Pulse: 72 Heart Rate: 64 Resp: 20 SpO2: 95 % O2 Device: None (Room air)    Vitals:    01/20/19 0528 01/21/19 0656 01/22/19 0508   Weight: 76 kg (167 lb 8 oz) 75.8 kg (167 lb 3.2 oz) 73 kg (160 lb 14.4 oz)     Vital Signs with Ranges  Temp:  [96.7  F (35.9  C)-98.4  F (36.9  C)] 96.7  F (35.9  C)  Pulse:  [62-72] 72  Heart Rate:  [63-64] 64  Resp:  [20-24] 20  BP: (144-157)/(51-77) 151/51  SpO2:  [93 %-96 %] 95 %    Constitutional: Awake, alert, cooperative, no apparent distress  Lungs: Clear to auscultation bilaterally, no crackles or wheezing  Cardiovascular: Regular rate and rhythm, normal S1 and S2, and no murmur noted  Abdomen: Normal bowel sounds, soft, non-distended, non-tender  Skin: No rashes, no cyanosis, no edema  Other:    Medications       acetaminophen  650 mg Oral Q8H     carvedilol  25 mg Oral BID w/meals     finasteride  5 mg Oral Daily      influenza Vac Split High-Dose  0.5 mL Intramuscular Prior to discharge     levofloxacin  500 mg Oral Daily     [START ON 1/23/2019] lisinopril  20 mg Oral Daily     pregabalin  150 mg Oral BID     QUEtiapine  12.5 mg Oral BID     sodium chloride (PF)  3 mL Intracatheter Q8H       Data   All microbiology laboratory data reviewed.  Recent Labs   Lab Test 01/19/19  0720 01/17/19  0720 01/15/19  0825   WBC 10.3 10.3 10.5   HGB 10.3* 10.1* 9.9*   HCT 34.3* 33.1* 32.8*   MCV 91 92 92    290 281     Recent Labs   Lab Test 01/21/19  0817 01/19/19  0720 01/17/19  0720   CR 1.02 1.07 1.09     No lab results found.  Recent Labs   Lab Test 01/09/19  1815 01/09/19  1800 07/25/18  1032 06/02/18  2000 04/26/17  1255 04/26/17  1050 04/26/17  1018 09/08/16  1710 08/31/15  1827   CULT No growth  >100,000 colonies/mL  Pseudomonas aeruginosa  *  10,000 to 50,000 colonies/mL  Klebsiella (Enterobacter) aerogenes  * No growth >100,000 colonies/mL  Enterobacter cloacae complex  * 10,000 to 50,000 colonies/mL  Enterobacter cloacae complex  * <10,000 colonies/mL Coagulase negative Staphylococcus* No growth Cultured on the 1st day of incubation: Staphylococcus epidermidis This isolate   DOES NOT demonstrate inducible clindamycin resistance in vitro. Clindamycin is   susceptible and could be used when indicated, however, erythromycin is   resistant and should not be used.  Critical Value/Significant Value, preliminary result only, called to and read   back by Crys Pina RN at 1435 on 4/27/17 by TISH.  (Note)  POSITIVE for STAPHYLOCOCCUS EPIDERMIDIS and POSITIVE for the mecA  gene (resistant to methicillin) by "[employer] multiplex nucleic acid  test. Final identification and antimicrobial susceptibility testing  will be verified by standard methods.    Specimen tested with [employer] multiplex, gram-positive blood culture  nucleic acid test for the following targets: Staph aureus, Staph  epidermidis, Staph lugdunensis, other Staph species,  Enterococcus  faecalis, Enterococcus faecium, Streptococcus species, S. agalactiae,  S. anginosus grp., S. pneumoniae, S. pyogenes, Listeria sp., mecA  (methi cillin resistance) and Melvin/B (vancomycin resistance).    Critical Value/Significant Value called to and read back by Helen Merino rn @8265 4/27/17. ct  * >100,000 colonies/mL mixed urogenital colby  Susceptibility testing not routinely done   <10,000 colonies/mL mixed urogenital colby  Susceptibility testing not routinely done

## 2019-01-22 NOTE — PLAN OF CARE
Alert/Orientated to self  No IV access  Ambulatory Status:  Pt up Ax1 walker, gait belt  VSS  Pain: denies  Resp: LS diminished  GI: denies nausea. Full liquid diet with nectar thick liquids BS hypoactive  Passing flatus ALVIN  : sarmiento catheter draining audra urine  Tx: PO Levaquin  Disposition: TBD,possibly home with family

## 2019-01-22 NOTE — PLAN OF CARE
[id number]: Pt resting  comfortably. Confused A&O to self. Assessment done with  phone- pt giving minimal responses. Denies pain- does not appear to be in pain. Nectar thick liquids- total feed. Transfers with Ax1 with MIKHAIL Youssef has 275 out. Will continue to monitor.

## 2019-01-22 NOTE — PLAN OF CARE
Ambulatory Status:  Pt up 1 assist with belt, refusing to use walker.  Orientation: ALVIN  VS:  VSS  Pain:  ALVIN  Resp: LS dim.   GI:  denies nausea.  good appetite, on regular, nectar thick liquid diet. +BS.  Passing flatus.  Last BM 1/22  :  sarmiento patent with AU  Skin:  Right lower buttocks wound, barrier cream applied   Tx:  PO Levaquin  Consults:  Palliative, Urology, ID and PT   Disposition:  tbd

## 2019-01-23 ENCOUNTER — OFFICE VISIT (OUTPATIENT)
Dept: INTERPRETER SERVICES | Facility: CLINIC | Age: 84
End: 2019-01-23
Payer: COMMERCIAL

## 2019-01-23 LAB
ANION GAP SERPL CALCULATED.3IONS-SCNC: 5 MMOL/L (ref 3–14)
BUN SERPL-MCNC: 25 MG/DL (ref 7–30)
CALCIUM SERPL-MCNC: 8.8 MG/DL (ref 8.5–10.1)
CHLORIDE SERPL-SCNC: 111 MMOL/L (ref 94–109)
CO2 SERPL-SCNC: 29 MMOL/L (ref 20–32)
CREAT SERPL-MCNC: 1.19 MG/DL (ref 0.66–1.25)
GFR SERPL CREATININE-BSD FRML MDRD: 54 ML/MIN/{1.73_M2}
GLUCOSE SERPL-MCNC: 88 MG/DL (ref 70–99)
POTASSIUM SERPL-SCNC: 3.8 MMOL/L (ref 3.4–5.3)
SODIUM SERPL-SCNC: 145 MMOL/L (ref 133–144)

## 2019-01-23 PROCEDURE — 12000000 ZZH R&B MED SURG/OB

## 2019-01-23 PROCEDURE — 99233 SBSQ HOSP IP/OBS HIGH 50: CPT | Performed by: NURSE PRACTITIONER

## 2019-01-23 PROCEDURE — 25000132 ZZH RX MED GY IP 250 OP 250 PS 637: Performed by: NURSE PRACTITIONER

## 2019-01-23 PROCEDURE — T1013 SIGN LANG/ORAL INTERPRETER: HCPCS | Mod: U3

## 2019-01-23 PROCEDURE — 80048 BASIC METABOLIC PNL TOTAL CA: CPT | Performed by: INTERNAL MEDICINE

## 2019-01-23 PROCEDURE — 25000132 ZZH RX MED GY IP 250 OP 250 PS 637: Performed by: INTERNAL MEDICINE

## 2019-01-23 PROCEDURE — 99232 SBSQ HOSP IP/OBS MODERATE 35: CPT | Performed by: INTERNAL MEDICINE

## 2019-01-23 PROCEDURE — 25000132 ZZH RX MED GY IP 250 OP 250 PS 637: Performed by: HOSPITALIST

## 2019-01-23 PROCEDURE — 40000893 ZZH STATISTIC PT IP EVAL DEFER

## 2019-01-23 PROCEDURE — 36415 COLL VENOUS BLD VENIPUNCTURE: CPT | Performed by: INTERNAL MEDICINE

## 2019-01-23 RX ORDER — CITALOPRAM HYDROBROMIDE 10 MG/1
10 TABLET ORAL DAILY
Status: DISCONTINUED | OUTPATIENT
Start: 2019-01-23 | End: 2019-01-27 | Stop reason: HOSPADM

## 2019-01-23 RX ADMIN — TRAMADOL HYDROCHLORIDE 25 MG: 50 TABLET, FILM COATED ORAL at 10:28

## 2019-01-23 RX ADMIN — ACETAMINOPHEN 650 MG: 325 TABLET, FILM COATED ORAL at 08:46

## 2019-01-23 RX ADMIN — PREGABALIN 150 MG: 75 CAPSULE ORAL at 08:46

## 2019-01-23 RX ADMIN — PREGABALIN 150 MG: 75 CAPSULE ORAL at 21:26

## 2019-01-23 RX ADMIN — CITALOPRAM HYDROBROMIDE 10 MG: 10 TABLET ORAL at 10:28

## 2019-01-23 RX ADMIN — Medication 12.5 MG: at 21:26

## 2019-01-23 RX ADMIN — ACETAMINOPHEN 650 MG: 325 TABLET, FILM COATED ORAL at 01:22

## 2019-01-23 RX ADMIN — CARVEDILOL 25 MG: 25 TABLET, FILM COATED ORAL at 08:46

## 2019-01-23 RX ADMIN — ACETAMINOPHEN 650 MG: 325 TABLET, FILM COATED ORAL at 17:47

## 2019-01-23 RX ADMIN — CARVEDILOL 25 MG: 25 TABLET, FILM COATED ORAL at 17:47

## 2019-01-23 RX ADMIN — Medication 12.5 MG: at 08:45

## 2019-01-23 RX ADMIN — ACETAMINOPHEN 650 MG: 325 TABLET, FILM COATED ORAL at 12:46

## 2019-01-23 RX ADMIN — FINASTERIDE 5 MG: 5 TABLET, FILM COATED ORAL at 08:45

## 2019-01-23 RX ADMIN — LISINOPRIL 20 MG: 20 TABLET ORAL at 08:46

## 2019-01-23 ASSESSMENT — ACTIVITIES OF DAILY LIVING (ADL)
ADLS_ACUITY_SCORE: 18

## 2019-01-23 ASSESSMENT — MIFFLIN-ST. JEOR: SCORE: 1338.96

## 2019-01-23 NOTE — PLAN OF CARE
Ambulatory Status:  Pt up A1  VS:  htn  Pain:  Tylenol scheduled  Resp: LS clear on RA  GI:  regular diet, nectar thick liquids.  BS active.   Last BM 1/22.  :  chronic sarmiento in place  Tx:  Levaquin  Consults:  PT, urology, ID  Disposition:  tbd

## 2019-01-23 NOTE — PLAN OF CARE
Ambulatory Status:  Pt up 1 assist with belt, refusing to use walker. Up in chair. Alarms on for safety.  Orientation: ALVIN  VS:  VSS  Pain:  ALVIN-tramadol given per pain team recommendation.   Resp: LS clear.  GI:  denies nausea.  poor appetite, on regular, nectar thick liquid diet. Required assistance with feeding for breakfast, refused lunch. Hypo BS.  Passing flatus.  Last BM 1/23-blood/red streaked stool   :  sarmiento patent with  AU  Skin:  Right lower buttocks wound- barrier cream applied   Consults:  PT, Palliative and ID   Disposition:  tbd

## 2019-01-23 NOTE — PROGRESS NOTES
[facility]  Hospitalist Progress Note  Patient Name: Barrie Woods    MRN: [id number]  Provider: Ramon Mcguire MD  01/23/19    Initial presenting complaint/issue to hospital (Diagnosis): sepsis         Assessment and Plan:      quoc Woods is an 88 year old male with history of CVA, dementia, chronic kidney disease, A. fib, cardiomyopathy, congestive heart failure, benign prostatic hypertrophy, chronic sarmiento catheter, and renal cell carcinoma.  He was scheduled for elective TURP on 1/9/19.  He presented for this procedure and was noted to be feeling poorly.  He was found to have purulent discharge in his rrinary catheter.  CBC showed WBC of 25.1 and BMP showed hyperkalemia (5.9) and acute on chronic renal failure with BUN of 45 and creatinine of 2.58. His surgery was cancelled and direct admission from preop was arranged.  He was admitted with suspected sepsis due to UTI. UA showed pyuria (WBC > 182, large LE, negative nitrite). He developed fever to 102.6.  He was treated with Zosyn.  Urine culture grew Pseudomonas aeruginosa and Enterobacter aerogenes.  ID followed and antibiotic was changed to Levofloxacin.  Hospital course was complicated by Ileus.  This was treated conservatively and seems to be improving.  Hospital course has also been complicated by physical deconditioning.           Problem List:   1. Severe sepsis secondary to catheter associated urinary tract infection due to pseudomonas aeruginosa and Enterobacter aerogenes. ID has followed.  Course of Levaquin was completed on 1/22/19. Urology was consulted this admission and will follow up as outpatient to determine the timing of TURP (he presented initially for elective TURP).      2.  Acute renal failure due to sepsis, on chronic kidney disease stage III.  Acute renal failure has resolved.  Continue Lisinopril at 20 mg daily (PTA dose was 10 mg daily).      3.  Ileus.  Resolved. Tolerating regular diet fairly well.      4.   "Hypertension.  BP has been a bit on the high side.  Continue Coreg 25 mg BID (PTA was 12.5 mg BID). I increased PTA Lisinopril 10 mg daily to 20 mg daily on 1/22/19- monitor.       5. Dementia: At baseline, do suspect patient has underlying dementia and does tend to wander. Continue Seroquel as needed.    6.  Possible depression. Patient seems flat and reports feeling vaguely poor.  Trial of antidepressant (Celexa 10 mg daily)     7.  Chronic diastolic congestive heart failure, without acute decompensation.  Last echo was done in 2017 and showed EF of 50-55 % with indeterminate left ventricular diastolic function. Monitor input/output and Daily weight. Continue PTA Coreg 25mg po bid.     8.  Chronic arterial fibrillation, not on chronic anticoagulation due to fall risk. Continue Coreg     9.  History of left renal mass suspected to be malignancy.  Urology following.  Patient has not wanted any surgical intervention for this in the past. Elevated alkaline phosphate may related to cancer.     10. Dysphagia.  SLP consult appreciated- nectar thick liquids recommended.      11. Deconditioning.  PT consulted for discharge planning.      12. Care plan. Unfortunately patient's son (who is his primary caregiver at home) has been hospitalized here at [facility] and is expected to be in the hospital for a little while with some restrictions after discharge.  Patient will require TCU or other plan.  PT consult for discharge planning.          DVT Prophylaxis:  -  PCD's  Code Status: Full Code  Discharge Dispo:  TCU versus other placement  Estimated Disch Date / # of Days until Discharge: soon        Interval History:      No new problems.  He reports not feeling well- \"ugly\" per .  No pain, nausea, or vomiting.  Tired                Physical Exam:      Last Vital Signs:  /51 (BP Location: Left arm)   Pulse 72   Temp 96.5  F (35.8  C) (Axillary)   Resp 18   Ht 1.676 m (5' 6\")   Wt 72.6 kg (160 lb 1.6 oz)   " SpO2 93%   BMI 25.84 kg/m      Intake/Output Summary (Last 24 hours) at 1/23/2019 1022  Last data filed at 1/23/2019 0633  Gross per 24 hour   Intake 240 ml   Output 1650 ml   Net -1410 ml       GENERAL:  Comfortable. Cooperative.   PSYCH:  No acute distress. Flat affect  EYES: PERRLA, Normal conjunctiva.  HEART:  Regular rate and rhythm. No JVD. Pulses normal. No edema.  LUNGS:  Clear to auscultation, normal Respiratory effort.  ABDOMEN:  Soft, no hepatosplenomegaly, normal bowel sounds.  EXTREMETIES: No clubbing, cyanosis or ischemia  SKIN:  Dry to touch, No rash.           Medications:      All current medications were reviewed.         Data:      All new lab and imaging data was reviewed.   Labs:       Lab Results   Component Value Date     01/23/2019     01/21/2019     01/19/2019    Lab Results   Component Value Date    CHLORIDE 111 01/23/2019    CHLORIDE 112 01/21/2019    CHLORIDE 112 01/19/2019    Lab Results   Component Value Date    BUN 25 01/23/2019    BUN 15 01/21/2019    BUN 17 01/19/2019      Lab Results   Component Value Date    POTASSIUM 3.8 01/23/2019    POTASSIUM 3.8 01/21/2019    POTASSIUM 4.1 01/19/2019    Lab Results   Component Value Date    CO2 29 01/23/2019    CO2 30 01/21/2019    CO2 28 01/19/2019    Lab Results   Component Value Date    CR 1.19 01/23/2019    CR 1.02 01/21/2019    CR 1.07 01/19/2019        Recent Labs   Lab 01/19/19  0720 01/17/19  0720   WBC 10.3 10.3   HGB 10.3* 10.1*   HCT 34.3* 33.1*   MCV 91 92    290

## 2019-01-23 NOTE — PLAN OF CARE
Alert, disoriented/confused, language barrier but also difficult to understand w/  present.   VSS, 97% on RA.    LS clear,  depth/pattern regular.   BS active x4, has small soft bm.   Youssef positional, 700 ml clear audra urine.   Moaning and c/o pain this evening, tylenol x1, scheduled lyrica.   Will continue to monitor.

## 2019-01-23 NOTE — PROGRESS NOTES
Discharge Planner   Discharge Plans in progress: Called Both son and DIL and left VM message.about D.C planning. Plan was to d.c pt home with family support.   Barriers to discharge plan: Son who is caregiver to pt had MI and at [facility],    Follow up plan: Called [facility] and [facility] to check on possible LTC bed for respite stay. Neither facility have bed. Waiting on Family to call to discuss options for support at home VS another facility        Entered by: Corinne C. White 01/23/2019 9:58 AM         CM: Called Nyla at [facility]. They are still assessing and will have an update tomorrow morning.

## 2019-01-23 NOTE — PROGRESS NOTES
[facility]    Infectious Disease Progress Note    Date of Service (when I saw the patient): 01/23/2019     Assessment & Plan   Barrie Woods is a 88 year old male who was admitted on 1/9/2019.     IMPRESSION:   1.  An 88-year-old male with acute sepsis, presumed urosepsis, positive urine culture for pseudomonas and klebsiella, full INEZ to follow, looks improved on antibiotics, chronic Youssef catheter in place.   2.  Prior flagged history of carbapenem-resistant Enterobacteriaceae, but in looking in the cultures I see nothing for that.  It appears to be a false flag and we will have Infection Control investigate this and remove the flag if we are able to.   3 Prior treated latent TB  4 cardiomyopathy  5 renal insuff     REc:   1 completed lev  Even though still here stop and watch  2  no CRE in cxs prior, investigate and none found remove flag  3 some risk recurrence but extended abx not indicated               Candido Kelly MD    Interval History    confused, ileus, pain  No fever     Physical Exam   Temp: 96.5  F (35.8  C) Temp src: Axillary BP: 141/51   Heart Rate: 75 Resp: 18 SpO2: 93 % O2 Device: None (Room air)    Vitals:    01/21/19 0656 01/22/19 0508 01/23/19 0558   Weight: 75.8 kg (167 lb 3.2 oz) 73 kg (160 lb 14.4 oz) 72.6 kg (160 lb 1.6 oz)     Vital Signs with Ranges  Temp:  [96.4  F (35.8  C)-97.4  F (36.3  C)] 96.5  F (35.8  C)  Heart Rate:  [64-80] 75  Resp:  [18-20] 18  BP: (126-165)/(46-68) 141/51  SpO2:  [93 %-97 %] 93 %    Constitutional: Awake, alert, cooperative, no apparent distress  Lungs: Clear to auscultation bilaterally, no crackles or wheezing  Cardiovascular: Regular rate and rhythm, normal S1 and S2, and no murmur noted  Abdomen: Normal bowel sounds, soft, non-distended, non-tender  Skin: No rashes, no cyanosis, no edema  Other:    Medications       acetaminophen  650 mg Oral Q8H     carvedilol  25 mg Oral BID w/meals     citalopram  10 mg Oral Daily     finasteride  5 mg  Oral Daily     influenza Vac Split High-Dose  0.5 mL Intramuscular Prior to discharge     lisinopril  20 mg Oral Daily     pregabalin  150 mg Oral BID     QUEtiapine  12.5 mg Oral BID     sodium chloride (PF)  3 mL Intracatheter Q8H       Data   All microbiology laboratory data reviewed.  Recent Labs   Lab Test 01/19/19  0720 01/17/19  0720 01/15/19  0825   WBC 10.3 10.3 10.5   HGB 10.3* 10.1* 9.9*   HCT 34.3* 33.1* 32.8*   MCV 91 92 92    290 281     Recent Labs   Lab Test 01/23/19  0739 01/21/19  0817 01/19/19  0720   CR 1.19 1.02 1.07     No lab results found.  Recent Labs   Lab Test 01/09/19  1815 01/09/19  1800 07/25/18  1032 06/02/18  2000 04/26/17  1255 04/26/17  1050 04/26/17  1018 09/08/16  1710 08/31/15  1827   CULT No growth  >100,000 colonies/mL  Pseudomonas aeruginosa  *  10,000 to 50,000 colonies/mL  Klebsiella (Enterobacter) aerogenes  * No growth >100,000 colonies/mL  Enterobacter cloacae complex  * 10,000 to 50,000 colonies/mL  Enterobacter cloacae complex  * <10,000 colonies/mL Coagulase negative Staphylococcus* No growth Cultured on the 1st day of incubation: Staphylococcus epidermidis This isolate   DOES NOT demonstrate inducible clindamycin resistance in vitro. Clindamycin is   susceptible and could be used when indicated, however, erythromycin is   resistant and should not be used.  Critical Value/Significant Value, preliminary result only, called to and read   back by Crys Pina RN at 1435 on 4/27/17 by TISH.  (Note)  POSITIVE for STAPHYLOCOCCUS EPIDERMIDIS and POSITIVE for the mecA  gene (resistant to methicillin) by [employer] multiplex nucleic acid  test. Final identification and antimicrobial susceptibility testing  will be verified by standard methods.    Specimen tested with [employer] multiplex, gram-positive blood culture  nucleic acid test for the following targets: Staph aureus, Staph  epidermidis, Staph lugdunensis, other Staph species, Enterococcus  faecalis, Enterococcus  faecium, Streptococcus species, S. agalactiae,  S. anginosus grp., S. pneumoniae, S. pyogenes, Listeria sp., mecA  (methi cillin resistance) and Melvin/B (vancomycin resistance).    Critical Value/Significant Value called to and read back by Helen Merino rn @2869 4/27/17. ct  * >100,000 colonies/mL mixed urogenital colby  Susceptibility testing not routinely done   <10,000 colonies/mL mixed urogenital colby  Susceptibility testing not routinely done

## 2019-01-23 NOTE — PLAN OF CARE
Discharge Planner PT     Orders received, chart reviewed, eval attempted. Barrie Woods is a 88 year old male with history of CVA, dementia, chronic kidney disease, A. fib, cardiomyopathy, congestive heart failure, benign prostatic hypertrophy, renal cell carcinoma, who was scheduled for elective TURP and found to have urosepsis and admitted to the hospital on 1/9/2019.    Patient plan for discharge: Home prior to son's hospital admission  Current status: This is the pt's fourth PT evaluation this admission. Pt refuses formal eval at this time, states he is anxious and is in/out of arousal during attempt. Demonstrates antigravity strength and has been SBA for mobility, refusing walker recommendation. Will complete order as no change in status and pt's mobility well documented.  Barriers to return to prior living situation: Son recently hospitalized, none from mobility standpoint  Recommendations for discharge: Home with 24/7 assist if able due to cognition  Rationale for recommendations: Discussed with previously evaluating PT and nursing. No apparent changes in mobility status/rehab need. Mobility impairments are a result of cognition and pt remains a poor rehab candidate due to confusion, recommendation refusal, and baseline mobility. If 24/7 not available, may need some form of assisted living.       Entered by: Kyaw Thomas 01/23/2019 3:06 PM

## 2019-01-23 NOTE — PROGRESS NOTES
"[facility]  Palliative Care Progress Note  Text Page    Assessment & Plan   Recommendations:  1.Decisional Capacity -  Unreliable. per , less confused, but still intermittently Patient does not have an advance directive on file. Per  informed consent policy next of kin should be involved if she becomes unable.  2. Pain- Chronic low back pain on Lyrica and Vicodin (#1 tab daily as prescribed) per Primary Dr. Hurtado. Currently intermittent mild lower abdominal pain, noted ileus on xray,now resolved. Continue Lyrica  150 mg bid  Tolerating diet, limit opioids as able    Tylenol 650 mg every 8 hours.  Low dose tramadol 25 mg every 6 hours,prn. Suspect underlying discomfort which he states is \"all over\" .  Suspect depression as well. Discussed with nurse to offer tramadol,   3. Insomnia- per patient as initial complaint in setting of dementia, nurse and nursing notes indicate restful night overnight. Avoid  Sedatives, at risk for delirium.    4. Spiritual Care- Oriented to Spiritual Health as part of Palliative Care team.  Patient is listed as Jain.   5. Care Planning- Appreciate SW support, previously planning for return to sons home upon discharge, however son now in patient at [facility].   SW following for possible new support as needed. Reconsulted Physical Therapy  For TCU vs short stay LTC.   shared with me today,that although confusion is somewhat better,she is doubtful with   as TCU he would benefit from subacute rehab.    In past encounters note son is not concerned with level of confusion as it is at baseline and patient did well despite confusion at home prior.  There are 3 faily caregivers they are not there around the clock, he is left home alone. There is concern that time alone at home may be more prolong.  At baseline he eats and goes to the bathroom by himself,(today he was fed by nurse) :even makes his bed.\"    Goal of Care:Full Code,restorative this " "hospitalization, See care conference below.  Known evidence of renal cell CA has been addressed several times by my team during different hospitalizations, first in 2014.  A overall palliative approach to this without surgical biopsy or treatment has been planned.  He has no other noted evidence of decline and was last hospitalized in 4/2017.       Findings & plan of care discussed with:   Tia Duke RN, Ramon Mcguire MD, LORRAINE Kirkpatrick  Follow-up plan from palliative team:Dr. Hurtado patient's primary had called back with information comfirming above plan for no further work-up treatment of CA.       Thank you for involving us in the patient's care.     Kelsey SIEGEL, CNS, CNP  Pain Management and Palliative Care  [facility]  Pgr: [phone]    Time Spent on this Encounter   I spent 35 minutes total, >50%  in assessment of the patient, counseling and discussion with the patient and family as documented in this note as well as coordination of care and discussion with the health care team.    Interval History   Less confused, checks out and closes eyes  Interview with   Continues to deny pain, but feel bad all over  In bed   Fed today, ate well Eating,  BM's documented ;last evening.     Course of Hospitalization Discussions Data   Discussed on January 18, 2019 with Jennie SIEGEL, CNS, CNP:   Met with patient, Son Jose and Jose's Spouse at bedside with professional .  Reviewed course of hospitalization.  Jose had many questions and felt as if he had not had good communication about Barrie's care.  Jose is most concerned about Barrie's weakness.  He states, Barrie is usually confused and this is no different than at home, but he is stronger and able to care for himself, get food, go tot he bathroom, get dressed and even make his bed.\"  There are three family caregivers and patient is left alone without problem at times when they " are at work.  We discussed the ileus and its current status with eating, passing gas and BM's.  Jose and his spouse want to make sure patient is not discharged before his ileus is fully resolved and infection is treated.  We discussed role of TCU for further rehab, but hospitalization as long as he still has medical needs.  Jose would like to be present with PT assessment as it was noted to be difficult with patient previously.  Requested this with consult.  We discussed concern that patient may not recover to his previous baseline.  Jose states he has recovered from worse and feels he will recover fine if he is able to do therapy.    Discussed on January 14, 2019 with Jennie Partida APRN, CNS, CNP:   Patient unable, nonsensical speech, unable to follow conversation per professional , spoke with patients son Jose via speaker phone with professional .  Son notes patient has been in good helth and function over the past few years since we have last spoken other than current issue with prostate. He eats 2 meals a day and has not lost weight.  He agrees that renal cell cancer is not a current concern and with previous decisions not to have it worked up.  We discussed patent's current ileus and NPO with plan for CT if does not improve.  Jose will have difficulty meeting duringthe day, but will attempt to come if we need to conference about patient's care. He will call with further questions or concerns.       Patient has decision-making capacity Unreliable  Patient has no known legal document designating a decision maker. Per policy next of kin is the designated decision maker. See System Informed Consent policy for guidance.  There is no POLST (Physician orders for life-sustaining treatment) form on file for this patient    Code Status:            Full code    Review of Systems    CONSTITUTIONAL: NEGATIVE for fever, chills, change in weight. Feel bad all over  ENT/MOUTH: NEGATIVE for  ear, mouth and throat problems  RESP: NEGATIVE for significant cough or SOB  CV: NEGATIVE for chest pain, palpitations or peripheral edema    Palliative Symptom Review (0=no symptom/no concern, 1=mild, 2=moderate, 3=severe):      Pain: 0 none      Fatigue: 1-mild      Nausea: 0-none      Constipation: 1-mild , 1/22      Diarrhea: 0-none      Depressive Symptoms: unable to assess      Anxiety: 0-none      Drowsiness: 0-none      Poor Appetite: 1-mild      Shortness of Breath: 0-none      Insomnia: 0-none      Other:  Scrotum edema 1-mild      Overall (0 good/no concerns, 3 very poor):  2    Physical Exam   Temp:  [96.4  F (35.8  C)-97.4  F (36.3  C)] 96.5  F (35.8  C)  Heart Rate:  [64-80] 75  Resp:  [18-20] 18  BP: (126-165)/(46-68) 141/51  SpO2:  [93 %-97 %] 93 %  160 lbs 1.6 oz  GEN:  Alert, oriented x 1, appears comfortable, NAD.  HEENT:  Normocephalic/atraumatic, no scleral icterus, no nasal discharge, mouth moist.  CV:  RRR, S1, S2; no murmurs or other irregularities noted.  +3 DP/PT pulses bilatererally; no edema BLE.  RESP:  Clear to auscultation bilaterally without rales/rhonchi/wheezing/retractions.  Symmetric chest rise on inhalation noted.  Normal respiratory effort.  ABD:  Rounded, soft,  Tender to light palpation generalized /non-distended.  +BS  EXT:  Edema & pulses as noted above.  CMS intact x 4.    SKIN:  Dry to touch, no exanthems noted in the visualized areas.    NEURO: Symmetric strength.  Sensation to touch intact all extremities.   There is focal deficts noted.   PAIN BEHAVIOR: Cooperative  Psych:  Poor eye contact, sad affect.  Calm, cooperative, conversant confused responses.     Medications       acetaminophen  650 mg Oral Q8H     carvedilol  25 mg Oral BID w/meals     citalopram  10 mg Oral Daily     finasteride  5 mg Oral Daily     influenza Vac Split High-Dose  0.5 mL Intramuscular Prior to discharge     levofloxacin  500 mg Oral Daily     lisinopril  20 mg Oral Daily     pregabalin  150  mg Oral BID     QUEtiapine  12.5 mg Oral BID     sodium chloride (PF)  3 mL Intracatheter Q8H       Data   Results for orders placed or performed during the hospital encounter of 01/09/19 (from the past 24 hour(s))   Basic metabolic panel   Result Value Ref Range    Sodium 145 (H) 133 - 144 mmol/L    Potassium 3.8 3.4 - 5.3 mmol/L    Chloride 111 (H) 94 - 109 mmol/L    Carbon Dioxide 29 20 - 32 mmol/L    Anion Gap 5 3 - 14 mmol/L    Glucose 88 70 - 99 mg/dL    Urea Nitrogen 25 7 - 30 mg/dL    Creatinine 1.19 0.66 - 1.25 mg/dL    GFR Estimate 54 (L) >60 mL/min/[1.73_m2]    GFR Estimate If Black 62 >60 mL/min/[1.73_m2]    Calcium 8.8 8.5 - 10.1 mg/dL     *Note: Due to a large number of results and/or encounters for the requested time period, some results have not been displayed. A complete set of results can be found in Results Review.

## 2019-01-24 ENCOUNTER — OFFICE VISIT (OUTPATIENT)
Dept: INTERPRETER SERVICES | Facility: CLINIC | Age: 84
End: 2019-01-24
Payer: COMMERCIAL

## 2019-01-24 PROCEDURE — 25000132 ZZH RX MED GY IP 250 OP 250 PS 637: Performed by: HOSPITALIST

## 2019-01-24 PROCEDURE — 90662 IIV NO PRSV INCREASED AG IM: CPT | Performed by: INTERNAL MEDICINE

## 2019-01-24 PROCEDURE — 25000132 ZZH RX MED GY IP 250 OP 250 PS 637: Performed by: INTERNAL MEDICINE

## 2019-01-24 PROCEDURE — T1013 SIGN LANG/ORAL INTERPRETER: HCPCS | Mod: U3

## 2019-01-24 PROCEDURE — 25000132 ZZH RX MED GY IP 250 OP 250 PS 637: Performed by: NURSE PRACTITIONER

## 2019-01-24 PROCEDURE — 99232 SBSQ HOSP IP/OBS MODERATE 35: CPT | Performed by: INTERNAL MEDICINE

## 2019-01-24 PROCEDURE — 12000000 ZZH R&B MED SURG/OB

## 2019-01-24 PROCEDURE — 25000128 H RX IP 250 OP 636: Performed by: INTERNAL MEDICINE

## 2019-01-24 RX ADMIN — ACETAMINOPHEN 650 MG: 325 TABLET, FILM COATED ORAL at 17:03

## 2019-01-24 RX ADMIN — LISINOPRIL 20 MG: 20 TABLET ORAL at 08:31

## 2019-01-24 RX ADMIN — PREGABALIN 150 MG: 75 CAPSULE ORAL at 08:30

## 2019-01-24 RX ADMIN — CARVEDILOL 25 MG: 25 TABLET, FILM COATED ORAL at 08:30

## 2019-01-24 RX ADMIN — Medication 12.5 MG: at 08:31

## 2019-01-24 RX ADMIN — CARVEDILOL 25 MG: 25 TABLET, FILM COATED ORAL at 17:11

## 2019-01-24 RX ADMIN — ACETAMINOPHEN 650 MG: 325 TABLET, FILM COATED ORAL at 00:55

## 2019-01-24 RX ADMIN — PREGABALIN 150 MG: 75 CAPSULE ORAL at 21:06

## 2019-01-24 RX ADMIN — CITALOPRAM HYDROBROMIDE 10 MG: 10 TABLET ORAL at 08:31

## 2019-01-24 RX ADMIN — FINASTERIDE 5 MG: 5 TABLET, FILM COATED ORAL at 08:31

## 2019-01-24 RX ADMIN — Medication 12.5 MG: at 21:06

## 2019-01-24 RX ADMIN — ACETAMINOPHEN 650 MG: 325 TABLET, FILM COATED ORAL at 08:31

## 2019-01-24 RX ADMIN — INFLUENZA A VIRUS A/MICHIGAN/45/2015 X-275 (H1N1) ANTIGEN (FORMALDEHYDE INACTIVATED), INFLUENZA A VIRUS A/SINGAPORE/INFIMH-16-0019/2016 IVR-186 (H3N2) ANTIGEN (FORMALDEHYDE INACTIVATED), AND INFLUENZA B VIRUS B/MARYLAND/15/2016 BX-69A (A B/COLORADO/6/2017-LIKE VIRUS) ANTIGEN (FORMALDEHYDE INACTIVATED) 0.5 ML: 60; 60; 60 INJECTION, SUSPENSION INTRAMUSCULAR at 12:22

## 2019-01-24 ASSESSMENT — ACTIVITIES OF DAILY LIVING (ADL)
ADLS_ACUITY_SCORE: 18

## 2019-01-24 ASSESSMENT — MIFFLIN-ST. JEOR: SCORE: 1340.77

## 2019-01-24 NOTE — PROGRESS NOTES
[facility]  Hospitalist Progress Note  Patient Name: Barrie Woods    MRN: [id number]  Provider: Rashad Acosta MD. 01/24/19    Initial presenting complaint/issue to hospital (Diagnosis): Sepsis.         Assessment and Plan:      quoc Woods is an 88 year old male with history of CVA, dementia, chronic kidney disease, A. fib, cardiomyopathy, congestive heart failure, benign prostatic hypertrophy, chronic sarmiento catheter, and renal cell carcinoma.  He was scheduled for elective TURP on 1/9/19.  He presented for this procedure and was noted to be feeling poorly.  He was found to have purulent discharge in his rrinary catheter.  CBC showed WBC of 25.1 and BMP showed hyperkalemia (5.9) and acute on chronic renal failure with BUN of 45 and creatinine of 2.58. His surgery was cancelled and direct admission from preop was arranged.  He was admitted with suspected sepsis due to UTI. UA showed pyuria (WBC > 182, large LE, negative nitrite). He developed fever to 102.6.  He was treated with Zosyn.  Urine culture grew Pseudomonas aeruginosa and Enterobacter aerogenes.  ID followed and antibiotic was changed to Levofloxacin.  Hospital course was complicated by Ileus.  This was treated conservatively and seems to be improving.  Hospital course has also been complicated by physical deconditioning.           Problem List:   1. Severe sepsis secondary to catheter associated urinary tract infection due to pseudomonas aeruginosa and Enterobacter aerogenes. ID has followed.  Course of Levaquin was completed on 1/22/19. Urology was consulted this admission and will follow up as outpatient to determine the timing of TURP (he presented initially for elective TURP).      2.  Acute renal failure due to sepsis, on chronic kidney disease stage III.  Acute renal failure has resolved.  Continue Lisinopril at 20 mg daily (PTA dose was 10 mg daily).      3.  Ileus.  Resolved. Tolerating regular diet fairly well.      4.   Hypertension.  BP has been a bit on the high side.  Continue Coreg 25 mg BID (PTA was 12.5 mg BID). I increased PTA Lisinopril 10 mg daily to 20 mg daily on 1/22/19- monitor.       5. Dementia: At baseline, do suspect patient has underlying dementia and does tend to wander. Continue Seroquel as needed.    6.  Possible depression. Patient seems flat and reports feeling vaguely poor.  Trial of antidepressant (Celexa 10 mg daily)     7.  Chronic diastolic congestive heart failure, without acute decompensation.  Last echo was done in 2017 and showed EF of 50-55 % with indeterminate left ventricular diastolic function. Monitor input/output and Daily weight. Continue PTA Coreg 25mg po bid.     8.  Chronic arterial fibrillation, not on chronic anticoagulation due to fall risk. Continue Coreg     9.  History of left renal mass suspected to be malignancy.  Urology following.  Patient has not wanted any surgical intervention for this in the past. Elevated alkaline phosphate may related to cancer.     10. Dysphagia.  SLP consult appreciated- nectar thick liquids recommended.  Sodium is on the high side, needs to be monitored     11. Deconditioning.  PT consulted for discharge planning.      12. Care plan. Unfortunately patient's son (who is his primary caregiver at home) has been hospitalized here at [facility] and is expected to be in the hospital for a little while with some restrictions after discharge.  Patient will require TCU or other plan.  PT consult for discharge planning.        DVT Prophylaxis:-  PCD's  Code Status: Full Code  Discharge Dispo:  TCU versus long-term care placement, awaiting placement to discharge the patient.  Estimated Disch Date / # of Days until Discharge: When placement is available for the patient        Interval History:      Patient seen and examined, known to me earlier during this admission, feels better, no new overnight issues.  No pain, nausea, or vomiting.  Tired                Physical  "Exam:      Last Vital Signs:  /61 (BP Location: Right arm)   Pulse 72   Temp 97.5  F (36.4  C) (Oral)   Resp 24   Ht 1.676 m (5' 6\")   Wt 72.8 kg (160 lb 8 oz)   SpO2 95%   BMI 25.91 kg/m      Intake/Output reviewed.    GENERAL:  Comfortable. Cooperative.   PSYCH:  No acute distress. Flat affect  EYES: PERRLA, Normal conjunctiva.  HEART:  Regular rate and rhythm. No JVD. Pulses normal. No edema.  LUNGS:  Clear to auscultation, normal Respiratory effort.  ABDOMEN:  Soft, no hepatosplenomegaly, normal bowel sounds.  EXTREMETIES: No clubbing, cyanosis or ischemia  SKIN:  Dry to touch, No rash.           Medications:      All current medications were reviewed.         Data:      All new lab and imaging data was reviewed.   Labs:  Recent Labs   Lab 01/19/19  0720   WBC 10.3   HGB 10.3*   HCT 34.3*   MCV 91        Recent Labs   Lab 01/23/19  0739 01/21/19  0817 01/19/19  0720   * 146* 145*   POTASSIUM 3.8 3.8 4.1   CHLORIDE 111* 112* 112*   CO2 29 30 28   ANIONGAP 5 4 5   GLC 88 87 99   BUN 25 15 17   CR 1.19 1.02 1.07   GFRESTIMATED 54* 65 61   GFRESTBLACK 62 75 71   JOSE 8.8 8.8 8.7         Recent Labs   Lab 01/19/19  0720   WBC 10.3   HGB 10.3*   HCT 34.3*   MCV 91                "

## 2019-01-24 NOTE — PROGRESS NOTES
[facility]    Infectious Disease Progress Note    Date of Service (when I saw the patient): 01/24/2019     Assessment & Plan   Barrie Woods is a 88 year old male who was admitted on 1/9/2019.     IMPRESSION:   1.  An 88-year-old male with acute sepsis, presumed urosepsis, positive urine culture for pseudomonas and klebsiella, full INEZ to follow, looks improved on antibiotics, chronic Youssef catheter in place.   2.  Prior flagged history of carbapenem-resistant Enterobacteriaceae, but in looking in the cultures I see nothing for that.  It appears to be a false flag and we will have Infection Control investigate this and remove the flag if we are able to.   3 Prior treated latent TB  4 cardiomyopathy  5 renal insuff     REc:   1 completed lev  Even though still here stop and watch  2  no CRE in cxs prior, investigate and none found remove flag  3 some risk recurrence but extended abx not indicated   4 OK off abx, will sign off, call if issues              Candido Kelly MD    Interval History    confused, ileus, pain  No fever     Physical Exam   Temp: 97.5  F (36.4  C) Temp src: Oral BP: 153/61   Heart Rate: 74 Resp: 24 SpO2: 95 % O2 Device: None (Room air)    Vitals:    01/22/19 0508 01/23/19 0558 01/24/19 0607   Weight: 73 kg (160 lb 14.4 oz) 72.6 kg (160 lb 1.6 oz) 72.8 kg (160 lb 8 oz)     Vital Signs with Ranges  Temp:  [97.5  F (36.4  C)] 97.5  F (36.4  C)  Heart Rate:  [74-82] 74  Resp:  [24] 24  BP: (147-153)/(55-62) 153/61  SpO2:  [92 %-95 %] 95 %    Constitutional: Awake, alert, cooperative, no apparent distress  Lungs: Clear to auscultation bilaterally, no crackles or wheezing  Cardiovascular: Regular rate and rhythm, normal S1 and S2, and no murmur noted  Abdomen: Normal bowel sounds, soft, non-distended, non-tender  Skin: No rashes, no cyanosis, no edema  Other:    Medications       acetaminophen  650 mg Oral Q8H     carvedilol  25 mg Oral BID w/meals     citalopram  10 mg Oral Daily      finasteride  5 mg Oral Daily     lisinopril  20 mg Oral Daily     pregabalin  150 mg Oral BID     QUEtiapine  12.5 mg Oral BID     sodium chloride (PF)  3 mL Intracatheter Q8H       Data   All microbiology laboratory data reviewed.  Recent Labs   Lab Test 01/19/19  0720 01/17/19  0720 01/15/19  0825   WBC 10.3 10.3 10.5   HGB 10.3* 10.1* 9.9*   HCT 34.3* 33.1* 32.8*   MCV 91 92 92    290 281     Recent Labs   Lab Test 01/23/19  0739 01/21/19  0817 01/19/19  0720   CR 1.19 1.02 1.07     No lab results found.  Recent Labs   Lab Test 01/09/19  1815 01/09/19  1800 07/25/18  1032 06/02/18  2000 04/26/17  1255 04/26/17  1050 04/26/17  1018 09/08/16  1710 08/31/15  1827   CULT No growth  >100,000 colonies/mL  Pseudomonas aeruginosa  *  10,000 to 50,000 colonies/mL  Klebsiella (Enterobacter) aerogenes  * No growth >100,000 colonies/mL  Enterobacter cloacae complex  * 10,000 to 50,000 colonies/mL  Enterobacter cloacae complex  * <10,000 colonies/mL Coagulase negative Staphylococcus* No growth Cultured on the 1st day of incubation: Staphylococcus epidermidis This isolate   DOES NOT demonstrate inducible clindamycin resistance in vitro. Clindamycin is   susceptible and could be used when indicated, however, erythromycin is   resistant and should not be used.  Critical Value/Significant Value, preliminary result only, called to and read   back by Crys Pina RN at 1435 on 4/27/17 by TISH.  (Note)  POSITIVE for STAPHYLOCOCCUS EPIDERMIDIS and POSITIVE for the mecA  gene (resistant to methicillin) by [employer] multiplex nucleic acid  test. Final identification and antimicrobial susceptibility testing  will be verified by standard methods.    Specimen tested with Shopzillaigene multiplex, gram-positive blood culture  nucleic acid test for the following targets: Staph aureus, Staph  epidermidis, Staph lugdunensis, other Staph species, Enterococcus  faecalis, Enterococcus faecium, Streptococcus species, S. agalactiae,  S. anginosus  grp., S. pneumoniae, S. pyogenes, Listeria sp., mecA  (methi cillin resistance) and Melvin/B (vancomycin resistance).    Critical Value/Significant Value called to and read back by Helen Merino rn @9874 4/27/17. ct  * >100,000 colonies/mL mixed urogenital colby  Susceptibility testing not routinely done   <10,000 colonies/mL mixed urogenital colby  Susceptibility testing not routinely done

## 2019-01-24 NOTE — PROGRESS NOTES
PAS-RR    D: Per DHS regulation, LORRAINE completed and submitted PAS-RR to [place] [employer] Direct Connect via the Senior LinkAge Line.  PAS-RR confirmation # is : [id number]  P: Further questions may be directed to Senior LinkAge Line at #[phone], option #4 for PAS-RR staff.

## 2019-01-24 NOTE — PLAN OF CARE
Patient admitted on 1/9 for UTI. Completed antibiotic course. Patient confused, unable to assess orientation. Patient combative with staff this afternoon with  present. Patient is now resting comfortably in the chair. Alarms active. Lung sounds diminished. Heart sounds irregular. Bilateral lower extremity edema. Patient tolerating regular diet and nectar thick liquids. Incontinent of stool. Youssef in place. Bp elevated. All other VSS. Up with assist of 1 and walker. Plan to discharge to LTC facility when one becomes available.

## 2019-01-24 NOTE — PLAN OF CARE
Pt denies pain. VSS. Slept well. No IV. Youssef patent. Anticipate TCU when bed available and return for TURP early February. Pt confused even with use of . Bed alarm in place

## 2019-01-24 NOTE — PROGRESS NOTES
Discharge Planner   Discharge Plans in progress: SPoke with Nyla from [facility]   Barriers to discharge plan: They want to do an onsite visit with her and the  later today before they make a decision. They are questioning if they will be able to support pt   Follow up plan: Will keep SW at [facility] apprised of planning to keep son in the loop.        Entered by: Corinne C. White 01/24/2019 10:05 AM

## 2019-01-24 NOTE — PROGRESS NOTES
[facility]    Palliative Care Chart Check or Chart Review    This patient's most recent hospitalist note, most recent consultant notes, medication profile and labs from the past 24 hours have been reviewed.   [place] [facility] database review:  Yes noted for Lyrica and Vicodin 5 mg 1 tablet(s) per day prescribed by PCP Yonatan Hurtado  7.5 mg Daily Morphine Equivalent based on amount dispensed   Tramadol 25 mg=6.2 mg Daily Morphine Equivalent this past 24 hours.   No pain this past 24 hrs.    Recommendation:   It has been determined that no change is necessary to the current plan of care at this time. See last note on 1/23/18  The chart will be reviewed regularly and the patient will be seen if necessary.   If you would like the patient to be seen, please contact the service at [phone] and ask to have the patient seen.  Time Spent 5 minutes, none in direct contact with patient or family.    Thank you!    Kelsey Acevedo   Pain Management and Palliative Care  [facility]  Pgr: [phone]

## 2019-01-24 NOTE — PROGRESS NOTES
SPIRITUAL HEALTH SERVICES Progress Note  [facility]  Med. Surg. 5    Consulted Palliative Nurse and Turkmen Interpretor about pt/family emotional and spiritual needs.  Pt has advanced dementia and family is usually not present during the day.    This author and other chaplains remain available per family request.    Scott Lazo M.Div., [facility]  Staff   Pager [phone]

## 2019-01-25 ENCOUNTER — OFFICE VISIT (OUTPATIENT)
Dept: INTERPRETER SERVICES | Facility: CLINIC | Age: 84
End: 2019-01-25
Payer: COMMERCIAL

## 2019-01-25 PROCEDURE — 25000132 ZZH RX MED GY IP 250 OP 250 PS 637: Performed by: INTERNAL MEDICINE

## 2019-01-25 PROCEDURE — 25000132 ZZH RX MED GY IP 250 OP 250 PS 637: Performed by: HOSPITALIST

## 2019-01-25 PROCEDURE — 25000132 ZZH RX MED GY IP 250 OP 250 PS 637: Performed by: NURSE PRACTITIONER

## 2019-01-25 PROCEDURE — 99232 SBSQ HOSP IP/OBS MODERATE 35: CPT | Performed by: INTERNAL MEDICINE

## 2019-01-25 PROCEDURE — 12000000 ZZH R&B MED SURG/OB

## 2019-01-25 PROCEDURE — T1013 SIGN LANG/ORAL INTERPRETER: HCPCS | Mod: U3

## 2019-01-25 RX ADMIN — PREGABALIN 150 MG: 75 CAPSULE ORAL at 21:23

## 2019-01-25 RX ADMIN — ACETAMINOPHEN 650 MG: 325 TABLET, FILM COATED ORAL at 23:58

## 2019-01-25 RX ADMIN — Medication 12.5 MG: at 09:39

## 2019-01-25 RX ADMIN — CARVEDILOL 25 MG: 25 TABLET, FILM COATED ORAL at 09:39

## 2019-01-25 RX ADMIN — LISINOPRIL 20 MG: 20 TABLET ORAL at 09:39

## 2019-01-25 RX ADMIN — CITALOPRAM HYDROBROMIDE 10 MG: 10 TABLET ORAL at 09:39

## 2019-01-25 RX ADMIN — ACETAMINOPHEN 650 MG: 325 TABLET, FILM COATED ORAL at 16:03

## 2019-01-25 RX ADMIN — PREGABALIN 150 MG: 75 CAPSULE ORAL at 09:39

## 2019-01-25 RX ADMIN — Medication 12.5 MG: at 21:23

## 2019-01-25 RX ADMIN — ACETAMINOPHEN 650 MG: 325 TABLET, FILM COATED ORAL at 09:39

## 2019-01-25 RX ADMIN — CARVEDILOL 25 MG: 25 TABLET, FILM COATED ORAL at 18:09

## 2019-01-25 RX ADMIN — FINASTERIDE 5 MG: 5 TABLET, FILM COATED ORAL at 09:39

## 2019-01-25 ASSESSMENT — ACTIVITIES OF DAILY LIVING (ADL)
ADLS_ACUITY_SCORE: 18

## 2019-01-25 ASSESSMENT — MIFFLIN-ST. JEOR: SCORE: 1316.27

## 2019-01-25 NOTE — PROGRESS NOTES
[facility]  Hospitalist Progress Note  Patient Name: Barrie Woods    MRN: [id number]  Provider: Rashad Acosta MD. 01/25/19    Initial presenting complaint/issue to hospital (Diagnosis): Sepsis.         Assessment and Plan:      quoc Woods is an 88 year old male with history of CVA, dementia, chronic kidney disease, A. fib, cardiomyopathy, congestive heart failure, benign prostatic hypertrophy, chronic sarmiento catheter, and renal cell carcinoma.  He was scheduled for elective TURP on 1/9/19.  He presented for this procedure and was noted to be feeling poorly.  He was found to have purulent discharge in his rrinary catheter.  CBC showed WBC of 25.1 and BMP showed hyperkalemia (5.9) and acute on chronic renal failure with BUN of 45 and creatinine of 2.58. His surgery was cancelled and direct admission from preop was arranged.  He was admitted with suspected sepsis due to UTI. UA showed pyuria (WBC > 182, large LE, negative nitrite). He developed fever to 102.6.  He was treated with Zosyn.  Urine culture grew Pseudomonas aeruginosa and Enterobacter aerogenes.  ID followed and antibiotic was changed to Levofloxacin.  Hospital course was complicated by Ileus.  This was treated conservatively and seems to be improving.  Hospital course has also been complicated by physical deconditioning.           Problem List:   1. Severe sepsis secondary to catheter associated  urinary tract infection due topseudomonas aeruginosa and Enterobacter aerogenes. ID has followed.  Course of Levaquin was completed on 1/22/19. Urology was consulted this admission and will follow up as outpatient to determine the timing of TURP (he presented initially for elective TURP).      2.  Acute renal failure due to sepsis, on chronic kidney disease stage III.  Acute renal failure has resolved.  Continue Lisinopril at 20 mg daily (PTA dose was 10 mg daily).  Check BMP in the morning     3.  Ileus.  Resolved. Tolerating regular diet  fairly well.      4.  Hypertension.  BP has been a bit on the high side.  Continue Coreg 25 mg BID (PTA was 12.5 mg BID). I increased PTA Lisinopril 10 mg daily to 20 mg daily on 1/22/19- monitor.       5. Dementia: At baseline, do suspect patient has underlying dementia and does tend to wander. Continue Seroquel as needed.    6.  Possible depression. Patient seems flat continue Clexa 10 mg daily.     7.  Chronic diastolic congestive heart failure, without acute decompensation. Last echo done in 2017 showed EF of 50-55 % with indeterminate left ventricular diastolic function. Monitor input/output and Daily weight. Continue PTA Coreg 25mg po bid.     8.  Chronic arterial fibrillation, not on chronic anticoagulation due to fall risk. Continue Coreg     9.  History of left renal mass suspected to be malignancy.  Urology following.  Patient has not wanted any surgical intervention for this in the past. Elevated alkaline phosphate may related to cancer.     10. Dysphagia.  SLP consult appreciated- nectar thick liquids recommended.  Sodium is on the high side, needs to be monitored     11. Deconditioning.  PT consulted for discharge planning.      12. Care plan. Unfortunately patient's son (who is his primary caregiver at home) has been hospitalized here at [facility] and is expected to be in the hospital for a little while with some restrictions after discharge.  Patient will require TCU or other plan.  PT consult for discharge planning.      DVT Prophylaxis:-  PCD's  Code Status: Full Code  Discharge Dispo:  TCU versus long-term care placement, awaiting placement to discharge the patient.  Estimated Disch Date / # of Days until Discharge: When placement is available for the patient        Interval History:      Patient seen and examined, used professional  at bedside, no new overnight issues, feels about the same,  No pain, nausea, or vomiting.       Physical Exam:      Last Vital Signs:  /45 (BP  "Location: Left arm)   Pulse 70   Temp 97.7  F (36.5  C) (Oral)   Resp 18   Ht 1.676 m (5' 6\")   Wt 70.9 kg (156 lb 3.2 oz)   SpO2 96%   BMI 25.21 kg/m      Intake/Output reviewed.    GENERAL:  Comfortable. Cooperative.   PSYCH:  No acute distress. Flat affect  EYES: PERRLA, Normal conjunctiva.  HEART:  Regular rate and rhythm. No JVD. Pulses normal. No edema.  LUNGS:  Clear to auscultation, normal Respiratory effort.  ABDOMEN:  Soft, no hepatosplenomegaly, normal bowel sounds.  EXTREMETIES: No clubbing, cyanosis or ischemia  SKIN:  Dry to touch, No rash.           Medications:      All current medications were reviewed.         Data:      All new lab and imaging data was reviewed.   Labs:  Recent Labs   Lab 01/19/19  0720   WBC 10.3   HGB 10.3*   HCT 34.3*   MCV 91        Recent Labs   Lab 01/23/19  0739 01/21/19  0817 01/19/19  0720   * 146* 145*   POTASSIUM 3.8 3.8 4.1   CHLORIDE 111* 112* 112*   CO2 29 30 28   ANIONGAP 5 4 5   GLC 88 87 99   BUN 25 15 17   CR 1.19 1.02 1.07   GFRESTIMATED 54* 65 61   GFRESTBLACK 62 75 71   JOSE 8.8 8.8 8.7         Recent Labs   Lab 01/19/19  0720   WBC 10.3   HGB 10.3*   HCT 34.3*   MCV 91                "

## 2019-01-25 NOTE — PROGRESS NOTES
[facility] Progress Note  FR Med/Surg 5th floor    Per chart review and consultation with IDT team, will follow for family support as pt reported to have advanced dementia and oriented to self only. Currently, son, who is primary caregiver is unavailable due to medical treatment he is receiving and this has complicated discharge. Will f/u early next week to re-assess if SH support may be of benefit to pt/family.     MAXIMO Smith.  Staff    Pager #[phone]

## 2019-01-25 NOTE — PLAN OF CARE
Neuro: Alert. Disorientated to place, time and situation. Calm & cooperative. [place] speaking.   VS:  VSS  Tele: N/A  Respiratory: WDL  GI: Incontinent of bowel. BM this shift.   : Youssef catheter in place.   Pain: denies.   IV:  No PIV access at this time.   Transfer: A1 - SBA  Diet: Regular / necator thick liquids. Tolerating diet well.   Plan: TBD. Hospitalist following. SW following for TCU placement. Discharge when bed available.

## 2019-01-25 NOTE — PROGRESS NOTES
CLINICAL NUTRITION SERVICES - REASSESSMENT NOTE    EVALUATION OF PROGRESS TOWARD GOALS/NEW FINDINGS   Progress towards goals will be monitored and evaluated per protocol and Practice Guidelines    Diet order: Regular, nectar thick liquids  Per flow sheet review, variable intake of 25-75% for documented meals      Delay of discharge - needs LTC placement while son is hospitalized    BM: ileus resolved    I/O last 3 completed shifts:    In: 780 [P.O.:780]    Out: 725 [Urine:725]    Fluid: +1 BLE edema    Weight: 70 kg - trending down from peak of 76 kg (CHF, CKD history)    Meds: reviewed    Labs:   Recent Labs   Lab Test 01/23/19  0739 01/21/19  0817 01/19/19  0720 01/17/19  0720 01/15/19  0825   POTASSIUM 3.8 3.8 4.1 4.3 4.4     Recent Labs   Lab Test 01/11/15  0639   PHOS 3.0     Recent Labs   Lab Test 01/17/19  0720 01/14/19  0744 04/28/17  0700 04/27/17  0630 01/11/15  0639   MAG 1.8 2.2 2.2 2.5* 2.2     Recent Labs   Lab Test 01/23/19  0739 01/21/19  0817 01/19/19  0720 01/17/19  0720 01/15/19  0825   * 146* 145* 142 144     Recent Labs   Lab Test 01/23/19  0739 01/21/19  0817 01/19/19  0720 01/17/19  0720 01/15/19  0825   CR 1.19 1.02 1.07 1.09 1.10     Recent Labs   Lab 01/23/19  0739 01/21/19  0817 01/19/19  0720   GLC 88 87 99     Lab Results   Component Value Date    A1C 5.7 07/09/2016    A1C 5.2 05/19/2006       Previous Goals:   Pt to consume >50% meals and supplements BID  Evaluation: Not consistently met    Previous Nutrition Diagnosis:   Inadequate oral intake related to ?decreased appetite with underlying dementia, abdominal pain with ileus as evidenced by meeting <75% needs x >7 days since admission with e/o moderate-severe fat and muscle wasting.  Evaluation: updated below    MALNUTRITION (Assessed 1/21 + 1/25)  % Weight Loss: Unable to determine --> fluid shifts   % Intake:  <75% for > 7 days (non-severe malnutrition) (at least)   Subcutaneous Fat Loss:  Upper arm region moderate  depletion  Muscle Loss: Temporal region moderate depletion,  Acromion bone region moderate depletion (sarcopenia)  Fluid Retention: Trace (1/25)     Malnutrition Diagnosis: Severe malnutrition  In Context of:  Chronic illness or disease    CURRENT NUTRITION DIAGNOSIS  Inadequate oral intake related to ?decreased appetite with underlying dementia, as evidenced by meeting <75% needs x >7 days since admission with e/o moderate-severe fat and muscle wasting.    INTERVENTIONS  Recommendations / Nutrition Prescription  Continue regular diet + nectar thick liquids as ordered + oral nutrition supplements     Implementation  Collaboration and Referral of care: Discussed patient during interdisciplinary care rounds this morning    Goals  Patient to consistently consume >/=50-75% of meals TID      MONITORING AND EVALUATION:  Progress towards goals will be monitored and evaluated per protocol and Practice Guidelines      Bridget Galicia RDN, LD, CNSC  Pager - 3rd floor/ICU: [phone]  Pager - All other floors: [phone]  Pager - Weekend/holiday: [phone]  Office: [phone]

## 2019-01-25 NOTE — PLAN OF CARE
Pt remain hospitalized for UTI, Ax1 with walker and gait belt, ambulated in griggs x1, Alert to self only, confused but calm and cooperative during the shift. VSS, denies coughing, no fever noted, denies pain. +BS, large BM x1. Youssef in place with adequate output. Reg diet with thickened liquids, pt was able to feed self with staff supervision and tray set up.  Urology and ID following. Plan to discharge to LTC 1-2 days.

## 2019-01-25 NOTE — PROGRESS NOTES
Discharge Planner   Discharge Plans in progress: Spoke with DIL over the phone. She is at [facility] with pt's son who is having Heart valvule replacement .  SW has not been able to find a male LTC bed for pt.   Barriers to discharge plan: [facility] is assessing. Hopefully we will here later today   Follow up plan: Will update Ana who is here now with pt [phone]. She is the sister to pt's DIL       Entered by: Corinne C. White 01/25/2019 2:16 PM     CM: spoke with Winston  at [facility].  THey are still reviewing pt's info and checking his ins.  \   May need to follow up over the weekend .

## 2019-01-26 PROCEDURE — 25000132 ZZH RX MED GY IP 250 OP 250 PS 637: Performed by: INTERNAL MEDICINE

## 2019-01-26 PROCEDURE — 12000000 ZZH R&B MED SURG/OB

## 2019-01-26 PROCEDURE — 25000132 ZZH RX MED GY IP 250 OP 250 PS 637: Performed by: NURSE PRACTITIONER

## 2019-01-26 PROCEDURE — 99232 SBSQ HOSP IP/OBS MODERATE 35: CPT | Performed by: INTERNAL MEDICINE

## 2019-01-26 PROCEDURE — 25000132 ZZH RX MED GY IP 250 OP 250 PS 637: Performed by: HOSPITALIST

## 2019-01-26 RX ADMIN — CITALOPRAM HYDROBROMIDE 10 MG: 10 TABLET ORAL at 09:16

## 2019-01-26 RX ADMIN — PREGABALIN 150 MG: 75 CAPSULE ORAL at 20:19

## 2019-01-26 RX ADMIN — RANITIDINE 150 MG: 150 TABLET ORAL at 20:19

## 2019-01-26 RX ADMIN — FINASTERIDE 5 MG: 5 TABLET, FILM COATED ORAL at 09:17

## 2019-01-26 RX ADMIN — PREGABALIN 150 MG: 75 CAPSULE ORAL at 09:17

## 2019-01-26 RX ADMIN — ACETAMINOPHEN 650 MG: 325 TABLET, FILM COATED ORAL at 09:17

## 2019-01-26 RX ADMIN — CARVEDILOL 25 MG: 25 TABLET, FILM COATED ORAL at 09:17

## 2019-01-26 RX ADMIN — LISINOPRIL 20 MG: 20 TABLET ORAL at 09:17

## 2019-01-26 RX ADMIN — Medication 12.5 MG: at 09:17

## 2019-01-26 RX ADMIN — Medication 12.5 MG: at 20:19

## 2019-01-26 RX ADMIN — CARVEDILOL 25 MG: 25 TABLET, FILM COATED ORAL at 17:00

## 2019-01-26 RX ADMIN — ACETAMINOPHEN 650 MG: 325 TABLET, FILM COATED ORAL at 16:58

## 2019-01-26 ASSESSMENT — ACTIVITIES OF DAILY LIVING (ADL)
ADLS_ACUITY_SCORE: 18

## 2019-01-26 ASSESSMENT — MIFFLIN-ST. JEOR: SCORE: 1333.96

## 2019-01-26 NOTE — PROGRESS NOTES
Writer called [facility] admissions [phone] and left message requesting a return call regarding this pt. Writer called main number and there was no answer. [phone].       Await return call.     Addendum 11:30am:  Received call from Adriana Cope from [facility], [phone], they are wondering if CRE is active and if he is on precautions. No precautions listed in epic or on door, reviewed not from infectious disease, cultures show no active. Writer called Adriana back and left message with this information.     Await return call.     Addendum 2:50pm:    Received call from Adriana, pt has been accepted. They are able to accept pt tomorrow. Writer informed RN. Writer called [facility] and left message requesting return call.     Discuss transportation options with [facility] (family vs HE) and arrange for tomorrow morning. Orders will need to be faxed to [facility] prior to pt's arrival, [phone].

## 2019-01-26 NOTE — PROGRESS NOTES
[facility]  Hospitalist Progress Note  Patient Name: Barrie Woods    MRN: [id number]  Provider: Rashad Acosta MD. 01/26/19    Initial presenting complaint/issue to hospital (Diagnosis): Sepsis.         Assessment and Plan:      quoc Woods is an 88 year old male with history of CVA, dementia, chronic kidney disease, A. fib, cardiomyopathy, congestive heart failure, benign prostatic hypertrophy, chronic sarmiento catheter, and renal cell carcinoma.  He was scheduled for elective TURP on 1/9/19.  He presented for this procedure and was noted to be feeling poorly.  He was found to have purulent discharge in his rrinary catheter.  CBC showed WBC of 25.1 and BMP showed hyperkalemia (5.9) and acute on chronic renal failure with BUN of 45 and creatinine of 2.58. His surgery was cancelled and direct admission from preop was arranged.  He was admitted with suspected sepsis due to UTI. UA showed pyuria (WBC > 182, large LE, negative nitrite). He developed fever to 102.6.  He was treated with Zosyn.  Urine culture grew Pseudomonas aeruginosa and Enterobacter aerogenes.  ID followed and antibiotic was changed to Levofloxacin.  Hospital course was complicated by Ileus.  This was treated conservatively and seems to be improving.  Hospital course has also been complicated by physical deconditioning.           Problem List:   1. Severe sepsis secondary to catheter associated  urinary tract infection due topseudomonas aeruginosa and Enterobacter aerogenes. Resolved.  ID has followed.  Course of Levaquin was completed on 1/22/19. Urology was consulted this admission and will follow up as outpatient to determine the timing of TURP (he presented initially for elective TURP).      2.  Acute renal failure due to sepsis, on chronic kidney disease stage III.  Acute renal failure has resolved.  Continue Lisinopril at 20 mg daily (PTA dose was 10 mg daily).  Check BMP in the morning  Check BMP in the morning     3.  Ileus.   Resolved. Tolerating regular diet fairly well.      4.  Hypertension.  BP has been a bit on the high side.  Continue Coreg 25 mg BID (PTA was 12.5 mg BID). I increased PTA Lisinopril 10 mg daily to 20 mg daily on 1/22/19- monitor.       5. Dementia: At baseline, do suspect patient has underlying dementia and does tend to wander. Continue Seroquel as needed.    6.  Possible depression. Patient seems flat continue Clexa 10 mg daily.     7.  Chronic diastolic congestive heart failure, without acute decompensation. Last echo done in 2017 showed EF of 50-55 % with indeterminate left ventricular diastolic function. Monitor input/output and Daily weight. Continue PTA Coreg 25mg po bid.     8.  Chronic arterial fibrillation, not on chronic anticoagulation due to fall risk. Continue Coreg     9.  History of left renal mass suspected to be malignancy.  Urology following.  Patient has not wanted any surgical intervention for this in the past. Elevated alkaline phosphate may related to cancer.     10. Dysphagia.  SLP consult appreciated- nectar thick liquids recommended.  Sodium is on the high side, needs to be monitored     11. Deconditioning.  PT consulted for discharge planning.      12. Care plan. Unfortunately patient's son (who is his primary caregiver at home) has been hospitalized, with possible restrictions after discharge.  Patient will require TCU or other long-term care after discharge.      DVT Prophylaxis:-  PCD's  Code Status: Full Code  Discharge Dispo:  TCU versus long-term care placement, awaiting placement to discharge the patient.  Estimated Disch Date / # of Days until Discharge: When placement is available for the patient        Interval History:      Patient seen and examined, used professional  at bedside, no new overnight issues, feels about the same,  No pain, nausea, or vomiting.  No meaningful information obtained from patient.       Physical Exam:      Last Vital Signs:  /56  "(BP Location: Left arm)   Pulse 70   Temp 95.2  F (35.1  C) (Axillary)   Resp 20   Ht 1.676 m (5' 6\")   Wt 72.6 kg (160 lb 1.6 oz)   SpO2 95%   BMI 25.84 kg/m      Intake/Output reviewed.    GENERAL:  Comfortable. Cooperative.   PSYCH:  No acute distress. Flat affect  EYES: PERRLA, Normal conjunctiva.  HEART:  Regular rate and rhythm. No JVD. Pulses normal. No edema.  LUNGS:  Clear to auscultation, normal Respiratory effort.  ABDOMEN:  Soft, no hepatosplenomegaly, normal bowel sounds.  EXTREMETIES: No clubbing, cyanosis or ischemia  SKIN:  Dry to touch, No rash.           Medications:      All current medications were reviewed.         Data:      All new lab and imaging data was reviewed.   Labs:  No results for input(s): WBC, HGB, HCT, MCV, PLT in the last 168 hours.  Recent Labs   Lab 01/23/19  0739 01/21/19  0817   * 146*   POTASSIUM 3.8 3.8   CHLORIDE 111* 112*   CO2 29 30   ANIONGAP 5 4   GLC 88 87   BUN 25 15   CR 1.19 1.02   GFRESTIMATED 54* 65   GFRESTBLACK 62 75   JOSE 8.8 8.8         No results for input(s): WBC, HGB, HCT, MCV, PLT in the last 168 hours.          "

## 2019-01-26 NOTE — PLAN OF CARE
Assessment completed with  assist.  Pt lethargic at times, but able to arouse.  Entirely disoriented, illogical responses.   Noncompliant with call light use. Alarms on for safety.  Remains bedridden, refuses activity this shift.  VSS, afebrile & sats maintained on RA. Denies pain.  Regular diet with NTL but refusing all PO intake except for meds.  Youssef patent, slightly low UOP, will continue to encourage fluids.  Discharge plans TBD. SW, nutrition, pain/palliative and ID following.

## 2019-01-26 NOTE — PLAN OF CARE
Patient refused to get out of bed this shift  VSS  Pain: denies  Patient took oral fluids with encouragement  Youssef draining adequately

## 2019-01-26 NOTE — PLAN OF CARE
Neuro: Alert. Disorientated to place, time and situation. Calm & cooperative. Citizen of [place] speaking.   VS:  VSS  Tele: N/A  Respiratory: WDL  GI: Incontinent of bowel.   : Youssef catheter in place.   Pain: denies.   IV:  No PIV access at this time.   Transfer: A1 - SBA  Diet: Regular / necator thick liquids. Tolerating diet well.  Needs assist and cues with eating.   Plan: TBD. Hospitalist following. SW following for TCU placement. Discharge when bed available.

## 2019-01-27 VITALS
RESPIRATION RATE: 22 BRPM | DIASTOLIC BLOOD PRESSURE: 46 MMHG | OXYGEN SATURATION: 94 % | TEMPERATURE: 98.8 F | BODY MASS INDEX: 25.91 KG/M2 | SYSTOLIC BLOOD PRESSURE: 124 MMHG | HEART RATE: 77 BPM | WEIGHT: 161.2 LBS | HEIGHT: 66 IN

## 2019-01-27 PROBLEM — K21.00 GASTROESOPHAGEAL REFLUX DISEASE WITH ESOPHAGITIS: Status: ACTIVE | Noted: 2019-01-27

## 2019-01-27 LAB
ANION GAP SERPL CALCULATED.3IONS-SCNC: 4 MMOL/L (ref 3–14)
BUN SERPL-MCNC: 28 MG/DL (ref 7–30)
CALCIUM SERPL-MCNC: 8.8 MG/DL (ref 8.5–10.1)
CHLORIDE SERPL-SCNC: 111 MMOL/L (ref 94–109)
CO2 SERPL-SCNC: 29 MMOL/L (ref 20–32)
CREAT SERPL-MCNC: 1.13 MG/DL (ref 0.66–1.25)
GFR SERPL CREATININE-BSD FRML MDRD: 57 ML/MIN/{1.73_M2}
GLUCOSE SERPL-MCNC: 106 MG/DL (ref 70–99)
POTASSIUM SERPL-SCNC: 4.1 MMOL/L (ref 3.4–5.3)
SODIUM SERPL-SCNC: 144 MMOL/L (ref 133–144)

## 2019-01-27 PROCEDURE — 25000132 ZZH RX MED GY IP 250 OP 250 PS 637: Performed by: INTERNAL MEDICINE

## 2019-01-27 PROCEDURE — 99239 HOSP IP/OBS DSCHRG MGMT >30: CPT | Performed by: INTERNAL MEDICINE

## 2019-01-27 PROCEDURE — 25000132 ZZH RX MED GY IP 250 OP 250 PS 637: Performed by: NURSE PRACTITIONER

## 2019-01-27 PROCEDURE — 80048 BASIC METABOLIC PNL TOTAL CA: CPT | Performed by: INTERNAL MEDICINE

## 2019-01-27 PROCEDURE — 36415 COLL VENOUS BLD VENIPUNCTURE: CPT | Performed by: INTERNAL MEDICINE

## 2019-01-27 PROCEDURE — 25000132 ZZH RX MED GY IP 250 OP 250 PS 637: Performed by: HOSPITALIST

## 2019-01-27 RX ORDER — HYDROCODONE BITARTRATE AND ACETAMINOPHEN 5; 325 MG/1; MG/1
1 TABLET ORAL EVERY 6 HOURS PRN
Qty: 30 TABLET | Refills: 0 | Status: ON HOLD | OUTPATIENT
Start: 2019-01-27 | End: 2019-02-01

## 2019-01-27 RX ORDER — PREGABALIN 100 MG/1
100 CAPSULE ORAL 2 TIMES DAILY
Qty: 60 CAPSULE | Refills: 0 | Status: SHIPPED | OUTPATIENT
Start: 2019-01-27 | End: 2019-03-04

## 2019-01-27 RX ORDER — LISINOPRIL 20 MG/1
20 TABLET ORAL DAILY
Qty: 30 TABLET | Refills: 0 | DISCHARGE
Start: 2019-01-28 | End: 2019-03-04

## 2019-01-27 RX ORDER — CITALOPRAM HYDROBROMIDE 10 MG/1
10 TABLET ORAL DAILY
Qty: 30 TABLET | Refills: 0 | DISCHARGE
Start: 2019-01-28 | End: 2019-03-04

## 2019-01-27 RX ADMIN — LISINOPRIL 20 MG: 20 TABLET ORAL at 08:29

## 2019-01-27 RX ADMIN — PREGABALIN 150 MG: 75 CAPSULE ORAL at 08:29

## 2019-01-27 RX ADMIN — RANITIDINE 150 MG: 150 TABLET ORAL at 08:29

## 2019-01-27 RX ADMIN — ACETAMINOPHEN 650 MG: 325 TABLET, FILM COATED ORAL at 02:27

## 2019-01-27 RX ADMIN — CARVEDILOL 25 MG: 25 TABLET, FILM COATED ORAL at 08:29

## 2019-01-27 RX ADMIN — FINASTERIDE 5 MG: 5 TABLET, FILM COATED ORAL at 08:29

## 2019-01-27 RX ADMIN — ACETAMINOPHEN 650 MG: 325 TABLET, FILM COATED ORAL at 08:29

## 2019-01-27 RX ADMIN — CITALOPRAM HYDROBROMIDE 10 MG: 10 TABLET ORAL at 08:29

## 2019-01-27 RX ADMIN — Medication 12.5 MG: at 08:29

## 2019-01-27 ASSESSMENT — ACTIVITIES OF DAILY LIVING (ADL)
ADLS_ACUITY_SCORE: 18

## 2019-01-27 ASSESSMENT — MIFFLIN-ST. JEOR: SCORE: 1338.95

## 2019-01-27 NOTE — PROGRESS NOTES
Writer called Kalina and spoke with her. Family is unable to transport pt today or be at hospital to ride with. Pt has a son coming in next week who may be able to take care of him, or transport him. Writer explained that pt is ready for discharge today, so Kalina opted for medical transport through . Writer reviewed cost with [facility] of $70 base rate and $4.50/mile. She questioned coverage for [employer]. Writer explained that this time his insurance is covering it, per [employer] informing writer that insurance was approved. Explained that [facility] would notify pt/family if coverage would not be 100% and pt needed to pay out of pocket for any thing. Encouraged [facility] to connect with [facility] tomorrow for any further questions regarding coverage and to discuss discharge planning.   Writer called  and scheduled W/C transport for 11am. Writer information Kalina, RN, and MD. Writer called [employer] and informed admissions of this.     Will fax orders when available. .      Addendum 10:10am:    Orders faxed to [employer].

## 2019-01-27 NOTE — PROGRESS NOTES
Patient discharging to [facility] in [place] via  with [facility]. Disoriented, [place] speaking, speech garbled. Alarms in place. Denies pain. HR irregular, pulses palpable, no edema. VSS. RA. Lungs clear. BS+, incontinent of stool, takes meds in applesauce, requires feeding assistance, regular diet with nectar thick liquids. Chronic Youssef in place, with adequate output. Up with SBA and gait belt. VSS.

## 2019-01-27 NOTE — DISCHARGE SUMMARY
Admit Date:     01/09/2019   Discharge Date:     01/27/2019      PRIMARY CARE PHYSICIAN:  Yonatan Hurtado.      DISCHARGE DIAGNOSES:   1.  Severe sepsis secondary to catheter-associated urinary tract infection due to Pseudomonas aeruginosa and Enterobacter aerogenes.   2.  Acute renal failure secondary to sepsis on chronic kidney disease, stage III.   3.  Ileus.   4.  Hypertension.   5.  Dementia.   6.  Agitation.   7.  Depression.   8.  Chronic diastolic congestive heart failure.   9.  Chronic atrial fibrillation.   10.  History of left renal mass due to malignancy.   11.  Dysphagia.   12.  Urinary retention and prostate enlargement.      DISPOSITION:  To transitional care unit.      DISCHARGE MEDICATIONS:        Review of your medicines      START taking      Dose / Directions   citalopram 10 MG tablet  Commonly known as:  celeXA  Used for:  Current mild episode of major depressive disorder without prior episode (H)      Dose:  10 mg  Take 1 tablet (10 mg) by mouth daily  Quantity:  30 tablet  Refills:  0     ranitidine 150 MG tablet  Commonly known as:  ZANTAC  Used for:  Gastroesophageal reflux disease with esophagitis      Dose:  150 mg  Take 1 tablet (150 mg) by mouth 2 times daily  Quantity:  60 tablet  Refills:  0        CONTINUE these medicines which may have CHANGED, or have new prescriptions. If we are uncertain of the size of tablets/capsules you have at home, strength may be listed as something that might have changed.      Dose / Directions   lisinopril 20 MG tablet  Commonly known as:  PRINIVIL/ZESTRIL  This may have changed:      medication strength    how much to take  Used for:  HTN, goal below 140/90      Dose:  20 mg  Take 1 tablet (20 mg) by mouth daily  Quantity:  30 tablet  Refills:  0        CONTINUE these medicines which have NOT CHANGED      Dose / Directions   acetaminophen 500 MG tablet  Commonly known as:  TYLENOL  Used for:  Malaise      Dose:  1000 mg  Take 2 tablets (1,000 mg) by mouth  every 6 hours as needed for mild pain  Quantity:  84 tablet  Refills:  5     carvedilol 12.5 MG tablet  Commonly known as:  COREG  Used for:  Chronic combined systolic and diastolic congestive heart failure (H), HTN, goal below 140/90      Dose:  12.5 mg  Take 1 tablet (12.5 mg) by mouth 2 times daily (with meals)  Quantity:  60 tablet  Refills:  5     diclofenac 1 % topical gel  Commonly known as:  VOLTAREN  Used for:  Primary osteoarthritis of both knees      Apply 4 gm to knees qid  Quantity:  100 g  Refills:  11     magnesium oxide 400 (241.3 Mg) MG tablet  Commonly known as:  MAG-OX  Used for:  Routine general medical examination at a health care facility      TAKE 1 TABLET BY MOUTH ONCE DAILY  Quantity:  30 tablet  Refills:  5     MULTILEX Tabs  Used for:  Routine general medical examination at a health care facility      TAKE ONE TABLET BY MOUTH ONCE DAILY  Quantity:  100 tablet  Refills:  3     order for DME  Used for:  Recurrent falls      Equipment being ordered: walker with slides  Quantity:  1 Device  Refills:  0     pregabalin 100 MG capsule  Commonly known as:  LYRICA  Used for:  Midline low back pain without sciatica, unspecified chronicity      Dose:  100 mg  Take 1 capsule (100 mg) by mouth 2 times daily  Quantity:  60 capsule  Refills:  0        STOP taking    amoxicillin-clavulanate 500-125 MG tablet  Commonly known as:  AUGMENTIN        HYDROcodone-acetaminophen 5-325 MG tablet  Commonly known as:  NORCO              Where to get your medicines      Some of these will need a paper prescription and others can be bought over the counter. Ask your nurse if you have questions.    Bring a paper prescription for each of these medications    pregabalin 100 MG capsule       DISCHARGE CONDITION:  Stable.      DISCHARGE VITAL SIGNS:  Blood pressure 124/46, pulse rate 77, temperature 98, oxygen saturation 94% on room air.      DISCHARGE PHYSICAL EXAMINATION:   GENERAL:  Awake, alert, not oriented to place,  person or time, not responding to questions appropriately.   HEENT:  Pink, nonicteric.  Extraocular muscle movement intact.   NECK:  Supple, no JVD, no thyromegaly.   CHEST:  Good air entry bilaterally.  No wheezing, crackles or rales.   CARDIOVASCULAR:  S1, S2 regular, no gallop or murmur.   ABDOMEN:  Soft, nontender, nondistended, positive bowel sounds.   EXTREMITIES:  No edema, cyanosis or clubbing.   NEUROLOGIC:  Awake and alert, not oriented.  Moves all his extremities.  No focal neurologic deficits.      CONSULTATIONS DURING THIS ADMISSION:    Obtained from Infectious Disease, Palliative Care.      DISCHARGE DIET:  Regular.      DISCHARGE ACTIVITY:  As tolerated.      DISCHARGE FOLLOWUP: Nursing home provider as soon as possible.      BRIEF HISTORY AND HOSPITAL COURSE:  Barrie Woods is an 88-year-old male with history of CVA, dementia, chronic kidney disease, atrial fibrillation, cardiomyopathy, congestive heart failure, benign prostatic hypertrophy, chronic Youssef catheter and renal cell carcinoma who was scheduled to have elective transurethral resection of prostate on 01/09/2019.  He came to preop and noted to have been feeling poorly.  He was found to have purulent discharge in his urinary catheter, CBC showed WBC of 12.5, and also BMP showed hyperkalemia and acute renal failure with BUN of 45 and creatinine 2.5.  His surgery was cancelled and directly admitted from preop to the hospital.  The patient was admitted with sepsis secondary to urinary tract infection.  UA was with gross pyuria and also fever and was treated with Zosyn initially.  A urine culture grew Pseudomonas aeruginosa and Enterobacter aerogenes.  ID evaluated the patient, changed his antibiotic to levofloxacin.  Hospital course was complicated by ileus and also confusion and agitation.  The ileus was treated conservatively and resolved.  He was physically deconditioned.  Overall, the patient was treated with antibiotics, IV fluids, and his  medications were adjusted.  Urology recommended to see the patient as an outpatient.  After that, he completed his duration of antibiotic treatment while in the hospital.  For the last several days the patient has been in the hospital awaiting placement.  Today the patient was placed and discharged in relatively stable condition.        Mr. Anna has multiple comorbidities including untreated renal mass which is suspected to be renal cell carcinoma.  The patient did not want any surgical intervention in the past and confirmed that again.  He is at increased risk of readmission given his comorbidities.  He will follow up with urologist and also his goal of care needs to be addressed as an outpatient at one point.  In this hospital, Palliative Care was consulted, evaluated the patient and he is full code and restorative, though there is known evidence of renal cell cancer which is not addressed despite omissions from urologist and other providers.  For this patient, goals in the future need to be discussed as he is not getting treated for the underlying major issues.  The patient discharged with Youssef catheter which will be addressed by urologist.      Total time spent coordinating his discharge was over 40 minutes.         FLORIAN SEE MD             D: 2019   T: 2019   MT: WT      Name:     PREM ANNA   MRN:      [id number]        Account:        [id number]   :      1930           Admit Date:     2019                                  Discharge Date: 2019      Document: [id number]       cc: Yonatan Hurtado MD

## 2019-01-27 NOTE — PLAN OF CARE
Ambulatory Status:  Pt up  SBA  VS:  vss  Resp: LS clear on RA  GI:  Regular diet, Nectar thick liquids.  BS active.  Passing flatus.  Last BM 1/26.  :  chronic sarmiento in place  Consults:  palliative, urology, ID, SW  Disposition:  Today to [facility]

## 2019-01-27 NOTE — PLAN OF CARE
Pt alert, oriented x self only, alarms on for safety.  Episode of restlessness, ambulated in [place] with SBA & walker.  VSS, afebrile & sats maintained on RA.  C/o inconsistent reports of pain, scheduled Tylenol.  Regular diet, poor appetite, NTL only.  Youssef patent, adequate UOP.  Pain/palliative, urology, ID and SW following.  Plan to discharge to [facility] tomorrow.

## 2019-01-28 NOTE — PROGRESS NOTES
[facility] Care Coordination Contact    CC received notification of admission to [facility] on 1/27/19, from [facility].    OBRA 1 right faxed to group home admissions office.   CC contacted Nursing Home LSW, Adriana [phone], and reviewed community POC. CC requested to be notified of concerns, care conference dates and discharge planning.  Client son in at [facility] for heart issues.   Transition log updated.   PCP notified of transition to TCU via EMR.    MORELIA Blum  Community Health Care Coordinator  [phone]

## 2019-01-30 ENCOUNTER — HOSPITAL ENCOUNTER (OUTPATIENT)
Facility: CLINIC | Age: 84
Setting detail: OBSERVATION
Discharge: HOME | End: 2019-02-01
Attending: EMERGENCY MEDICINE | Admitting: INTERNAL MEDICINE
Payer: COMMERCIAL

## 2019-01-30 DIAGNOSIS — K92.2 GASTROINTESTINAL HEMORRHAGE, UNSPECIFIED GASTROINTESTINAL HEMORRHAGE TYPE: ICD-10-CM

## 2019-01-30 DIAGNOSIS — N17.9 AKI (ACUTE KIDNEY INJURY) (H): ICD-10-CM

## 2019-01-30 DIAGNOSIS — R30.0 DYSURIA: Primary | ICD-10-CM

## 2019-01-30 LAB
ALBUMIN SERPL-MCNC: 2.6 G/DL (ref 3.4–5)
ALBUMIN UR-MCNC: 30 MG/DL
ALP SERPL-CCNC: 197 U/L (ref 40–150)
ALT SERPL W P-5'-P-CCNC: 15 U/L (ref 0–70)
ANION GAP SERPL CALCULATED.3IONS-SCNC: 1 MMOL/L (ref 3–14)
APPEARANCE UR: ABNORMAL
AST SERPL W P-5'-P-CCNC: 37 U/L (ref 0–45)
BASOPHILS # BLD AUTO: 0 10E9/L (ref 0–0.2)
BASOPHILS NFR BLD AUTO: 0.2 %
BILIRUB SERPL-MCNC: 0.6 MG/DL (ref 0.2–1.3)
BILIRUB UR QL STRIP: NEGATIVE
BUN SERPL-MCNC: 39 MG/DL (ref 7–30)
CALCIUM SERPL-MCNC: 8.8 MG/DL (ref 8.5–10.1)
CHLORIDE SERPL-SCNC: 107 MMOL/L (ref 94–109)
CO2 SERPL-SCNC: 33 MMOL/L (ref 20–32)
COLOR UR AUTO: ABNORMAL
CREAT SERPL-MCNC: 1.83 MG/DL (ref 0.66–1.25)
DIFFERENTIAL METHOD BLD: ABNORMAL
EOSINOPHIL # BLD AUTO: 0.5 10E9/L (ref 0–0.7)
EOSINOPHIL NFR BLD AUTO: 4.5 %
ERYTHROCYTE [DISTWIDTH] IN BLOOD BY AUTOMATED COUNT: 16.3 % (ref 10–15)
GFR SERPL CREATININE-BSD FRML MDRD: 32 ML/MIN/{1.73_M2}
GLUCOSE SERPL-MCNC: 122 MG/DL (ref 70–99)
GLUCOSE UR STRIP-MCNC: NEGATIVE MG/DL
HCT VFR BLD AUTO: 35.6 % (ref 40–53)
HEMOCCULT STL QL: POSITIVE
HGB BLD-MCNC: 11.1 G/DL (ref 13.3–17.7)
HGB UR QL STRIP: ABNORMAL
HYALINE CASTS #/AREA URNS LPF: 29 /LPF (ref 0–2)
IMM GRANULOCYTES # BLD: 0 10E9/L (ref 0–0.4)
IMM GRANULOCYTES NFR BLD: 0.4 %
INR PPP: 1.1 (ref 0.86–1.14)
KETONES UR STRIP-MCNC: NEGATIVE MG/DL
LEUKOCYTE ESTERASE UR QL STRIP: ABNORMAL
LYMPHOCYTES # BLD AUTO: 1.7 10E9/L (ref 0.8–5.3)
LYMPHOCYTES NFR BLD AUTO: 16.1 %
MCH RBC QN AUTO: 27.3 PG (ref 26.5–33)
MCHC RBC AUTO-ENTMCNC: 31.2 G/DL (ref 31.5–36.5)
MCV RBC AUTO: 88 FL (ref 78–100)
MONOCYTES # BLD AUTO: 1.1 10E9/L (ref 0–1.3)
MONOCYTES NFR BLD AUTO: 10.2 %
NEUTROPHILS # BLD AUTO: 7.3 10E9/L (ref 1.6–8.3)
NEUTROPHILS NFR BLD AUTO: 68.6 %
NITRATE UR QL: NEGATIVE
NRBC # BLD AUTO: 0 10*3/UL
NRBC BLD AUTO-RTO: 0 /100
PH UR STRIP: 6.5 PH (ref 5–7)
PLATELET # BLD AUTO: 225 10E9/L (ref 150–450)
POTASSIUM SERPL-SCNC: 4.4 MMOL/L (ref 3.4–5.3)
PROT SERPL-MCNC: 7.1 G/DL (ref 6.8–8.8)
RBC # BLD AUTO: 4.07 10E12/L (ref 4.4–5.9)
RBC #/AREA URNS AUTO: >182 /HPF (ref 0–2)
SODIUM SERPL-SCNC: 141 MMOL/L (ref 133–144)
SOURCE: ABNORMAL
SP GR UR STRIP: 1.01 (ref 1–1.03)
UROBILINOGEN UR STRIP-MCNC: NORMAL MG/DL (ref 0–2)
WBC # BLD AUTO: 10.6 10E9/L (ref 4–11)
WBC #/AREA URNS AUTO: 30 /HPF (ref 0–5)

## 2019-01-30 PROCEDURE — 25000128 H RX IP 250 OP 636: Performed by: EMERGENCY MEDICINE

## 2019-01-30 PROCEDURE — 12000000 ZZH R&B MED SURG/OB

## 2019-01-30 PROCEDURE — 96361 HYDRATE IV INFUSION ADD-ON: CPT

## 2019-01-30 PROCEDURE — 82272 OCCULT BLD FECES 1-3 TESTS: CPT | Performed by: EMERGENCY MEDICINE

## 2019-01-30 PROCEDURE — 99223 1ST HOSP IP/OBS HIGH 75: CPT | Mod: AI | Performed by: HOSPITALIST

## 2019-01-30 PROCEDURE — 96365 THER/PROPH/DIAG IV INF INIT: CPT

## 2019-01-30 PROCEDURE — 36415 COLL VENOUS BLD VENIPUNCTURE: CPT | Performed by: HOSPITALIST

## 2019-01-30 PROCEDURE — 25000128 H RX IP 250 OP 636: Performed by: HOSPITALIST

## 2019-01-30 PROCEDURE — C9113 INJ PANTOPRAZOLE SODIUM, VIA: HCPCS | Performed by: EMERGENCY MEDICINE

## 2019-01-30 PROCEDURE — 99285 EMERGENCY DEPT VISIT HI MDM: CPT | Mod: 25

## 2019-01-30 PROCEDURE — 85025 COMPLETE CBC W/AUTO DIFF WBC: CPT | Performed by: EMERGENCY MEDICINE

## 2019-01-30 PROCEDURE — 85610 PROTHROMBIN TIME: CPT | Performed by: EMERGENCY MEDICINE

## 2019-01-30 PROCEDURE — 81001 URINALYSIS AUTO W/SCOPE: CPT | Performed by: EMERGENCY MEDICINE

## 2019-01-30 PROCEDURE — 85018 HEMOGLOBIN: CPT | Performed by: HOSPITALIST

## 2019-01-30 PROCEDURE — 25000132 ZZH RX MED GY IP 250 OP 250 PS 637: Performed by: HOSPITALIST

## 2019-01-30 PROCEDURE — 80053 COMPREHEN METABOLIC PANEL: CPT | Performed by: EMERGENCY MEDICINE

## 2019-01-30 PROCEDURE — 96375 TX/PRO/DX INJ NEW DRUG ADDON: CPT

## 2019-01-30 PROCEDURE — 87106 FUNGI IDENTIFICATION YEAST: CPT | Performed by: EMERGENCY MEDICINE

## 2019-01-30 PROCEDURE — 87086 URINE CULTURE/COLONY COUNT: CPT | Performed by: EMERGENCY MEDICINE

## 2019-01-30 RX ORDER — CARVEDILOL 12.5 MG/1
12.5 TABLET ORAL 2 TIMES DAILY WITH MEALS
Status: DISCONTINUED | OUTPATIENT
Start: 2019-01-31 | End: 2019-02-01 | Stop reason: HOSPADM

## 2019-01-30 RX ORDER — LEVOFLOXACIN 5 MG/ML
750 INJECTION, SOLUTION INTRAVENOUS ONCE
Status: COMPLETED | OUTPATIENT
Start: 2019-01-30 | End: 2019-01-30

## 2019-01-30 RX ORDER — PREGABALIN 75 MG/1
75 CAPSULE ORAL 2 TIMES DAILY
Status: DISCONTINUED | OUTPATIENT
Start: 2019-01-30 | End: 2019-01-31

## 2019-01-30 RX ORDER — SODIUM CHLORIDE 9 MG/ML
INJECTION, SOLUTION INTRAVENOUS CONTINUOUS
Status: ACTIVE | OUTPATIENT
Start: 2019-01-30 | End: 2019-01-31

## 2019-01-30 RX ORDER — AMOXICILLIN 250 MG
2 CAPSULE ORAL 2 TIMES DAILY PRN
Status: DISCONTINUED | OUTPATIENT
Start: 2019-01-30 | End: 2019-02-01 | Stop reason: HOSPADM

## 2019-01-30 RX ORDER — ACETAMINOPHEN 325 MG/1
650 TABLET ORAL EVERY 4 HOURS PRN
Status: DISCONTINUED | OUTPATIENT
Start: 2019-01-30 | End: 2019-02-01 | Stop reason: HOSPADM

## 2019-01-30 RX ORDER — CEFTRIAXONE 1 G/1
1 INJECTION, POWDER, FOR SOLUTION INTRAMUSCULAR; INTRAVENOUS ONCE
Status: DISCONTINUED | OUTPATIENT
Start: 2019-01-30 | End: 2019-01-30

## 2019-01-30 RX ORDER — BISACODYL 10 MG
10 SUPPOSITORY, RECTAL RECTAL DAILY PRN
Status: DISCONTINUED | OUTPATIENT
Start: 2019-01-30 | End: 2019-02-01 | Stop reason: HOSPADM

## 2019-01-30 RX ORDER — FINASTERIDE 5 MG/1
5 TABLET, FILM COATED ORAL DAILY
Status: DISCONTINUED | OUTPATIENT
Start: 2019-01-31 | End: 2019-02-01 | Stop reason: HOSPADM

## 2019-01-30 RX ORDER — NALOXONE HYDROCHLORIDE 0.4 MG/ML
.1-.4 INJECTION, SOLUTION INTRAMUSCULAR; INTRAVENOUS; SUBCUTANEOUS
Status: DISCONTINUED | OUTPATIENT
Start: 2019-01-30 | End: 2019-02-01 | Stop reason: HOSPADM

## 2019-01-30 RX ORDER — ONDANSETRON 2 MG/ML
4 INJECTION INTRAMUSCULAR; INTRAVENOUS EVERY 6 HOURS PRN
Status: DISCONTINUED | OUTPATIENT
Start: 2019-01-30 | End: 2019-02-01 | Stop reason: HOSPADM

## 2019-01-30 RX ORDER — AMOXICILLIN 250 MG
1 CAPSULE ORAL 2 TIMES DAILY PRN
Status: DISCONTINUED | OUTPATIENT
Start: 2019-01-30 | End: 2019-02-01 | Stop reason: HOSPADM

## 2019-01-30 RX ORDER — POLYETHYLENE GLYCOL 3350 17 G/17G
17 POWDER, FOR SOLUTION ORAL DAILY PRN
Status: DISCONTINUED | OUTPATIENT
Start: 2019-01-30 | End: 2019-02-01 | Stop reason: HOSPADM

## 2019-01-30 RX ORDER — ACETAMINOPHEN 650 MG/1
650 SUPPOSITORY RECTAL EVERY 4 HOURS PRN
Status: DISCONTINUED | OUTPATIENT
Start: 2019-01-30 | End: 2019-02-01 | Stop reason: HOSPADM

## 2019-01-30 RX ORDER — ONDANSETRON 4 MG/1
4 TABLET, ORALLY DISINTEGRATING ORAL EVERY 6 HOURS PRN
Status: DISCONTINUED | OUTPATIENT
Start: 2019-01-30 | End: 2019-02-01 | Stop reason: HOSPADM

## 2019-01-30 RX ORDER — LIDOCAINE 40 MG/G
CREAM TOPICAL
Status: DISCONTINUED | OUTPATIENT
Start: 2019-01-30 | End: 2019-02-01 | Stop reason: HOSPADM

## 2019-01-30 RX ORDER — PROCHLORPERAZINE MALEATE 5 MG
5 TABLET ORAL EVERY 6 HOURS PRN
Status: DISCONTINUED | OUTPATIENT
Start: 2019-01-30 | End: 2019-02-01 | Stop reason: HOSPADM

## 2019-01-30 RX ORDER — PROCHLORPERAZINE 25 MG
12.5 SUPPOSITORY, RECTAL RECTAL EVERY 12 HOURS PRN
Status: DISCONTINUED | OUTPATIENT
Start: 2019-01-30 | End: 2019-02-01 | Stop reason: HOSPADM

## 2019-01-30 RX ORDER — CITALOPRAM HYDROBROMIDE 10 MG/1
10 TABLET ORAL DAILY
Status: DISCONTINUED | OUTPATIENT
Start: 2019-01-31 | End: 2019-02-01 | Stop reason: HOSPADM

## 2019-01-30 RX ADMIN — DICLOFENAC 4 G: 10 GEL TOPICAL at 21:32

## 2019-01-30 RX ADMIN — PANTOPRAZOLE SODIUM 40 MG: 40 INJECTION, POWDER, FOR SOLUTION INTRAVENOUS at 18:01

## 2019-01-30 RX ADMIN — PREGABALIN 75 MG: 75 CAPSULE ORAL at 21:50

## 2019-01-30 RX ADMIN — SODIUM CHLORIDE 500 ML: 9 INJECTION, SOLUTION INTRAVENOUS at 18:02

## 2019-01-30 RX ADMIN — LEVOFLOXACIN 750 MG: 5 INJECTION, SOLUTION INTRAVENOUS at 19:47

## 2019-01-30 RX ADMIN — SODIUM CHLORIDE: 9 INJECTION, SOLUTION INTRAVENOUS at 20:54

## 2019-01-30 ASSESSMENT — ACTIVITIES OF DAILY LIVING (ADL)
TRANSFERRING: 2-->ASSISTIVE PERSON
BATHING: 2-->ASSISTIVE PERSON
COGNITION: 1 - ATTENTION OR MEMORY DEFICITS
FALL_HISTORY_WITHIN_LAST_SIX_MONTHS: NO
TOILETING: 2-->ASSISTIVE PERSON
AMBULATION: 2-->ASSISTIVE PERSON
RETIRED_EATING: 2-->ASSISTIVE PERSON
SWALLOWING: 2-->DIFFICULTY SWALLOWING LIQUIDS/FOODS
DRESS: 2-->ASSISTIVE PERSON
RETIRED_COMMUNICATION: 2-->DIFFICULTY UNDERSTANDING AND SPEAKING (NOT RELATED TO LANGUAGE BARRIER)

## 2019-01-30 NOTE — ED NOTES
"[facility]  ED Nurse Handoff Report    ED Chief complaint: Rectal Bleeding (pt from nursing home; reports of 2 stools with bright red blood.  Pt has hx of dementia and is Khmer speaking only)      ED Diagnosis:   Final diagnoses:   Gastrointestinal hemorrhage, unspecified gastrointestinal hemorrhage type   MALGORZATA (acute kidney injury) (H)       Code Status: Full Code    Allergies:   Allergies   Allergen Reactions     Nkda [No Known Drug Allergies]        Activity level - Baseline/Home:  Stand with Assist    Activity Level - Current:   Stand with Assist     Needed?: No    Isolation: No  Infection: Not Applicable  Bariatric?: No    Vital Signs:   Vitals:    01/30/19 1544   BP: 134/62   Pulse: 52   Resp: 16   Temp: 97.7  F (36.5  C)   TempSrc: Oral   SpO2: 96%       Cardiac Rhythm: ,        Pain level:      Is this patient confused?: Yes   Does this patient have a guardian?  Yes         If yes, is there guardianship documents in the Epic \"Code/ACP\" activity?  Yes         Guardian Notified?  Yes  [place] - Suicide Severity Rating Scale Completed?  No, secondary to confusion  If yes, what color did the patient score?  N/A    Patient Report: Initial Complaint: rectal bleed  Focused Assessment: 89 year old male with a history of renal cell carcinoma, CHF, hyperlipidemia, atrial fibrillation, latent TB, chronic pain, chronic infection, GERD, diverticulosis, peripheral neuropathy, and demenia who presents to the emergency department for evaluation of rectal bleeding. To note, the patient comes in from a nursing home. He was recently in the hospital to have a prostate resection but was found to have purulent urine and so he was admitted for sepsis and UTI. He was discharged on 1/27/2019. Since then, per nursing home report, the patient has had two stools containing bright red blood. This was concerning to nursing home and so staff called EMS so the patient could seek evaluation here in the " emergency department.     Per phone call with patient's nursing home to inquire about the patient's presentation: The patient is here for small amounts of bright red blood in two of the patient's stools without prior history of rectal bleeding. No recent fevers, diarrhea or vomiting. Nurse mentions the patient has had a sarmiento catheter in place for some time now, which the patient's presents with here today. The nursing home does not communicate with the patient because of the English language barrier. Nursing home does not have other health concerns.   Pt is confused and speaks Palauan only.  Was able to get up to commode with 2 assist and difficulty with following commands.  Brief and sarmiento in place.  Was sitting up on side of bed for a while and restless, now calmly resting.  Needs close watch.      Tests Performed: labs  Abnormal Results:   Results for orders placed or performed during the hospital encounter of 01/30/19   CBC with platelets differential   Result Value Ref Range    WBC 10.6 4.0 - 11.0 10e9/L    RBC Count 4.07 (L) 4.4 - 5.9 10e12/L    Hemoglobin 11.1 (L) 13.3 - 17.7 g/dL    Hematocrit 35.6 (L) 40.0 - 53.0 %    MCV 88 78 - 100 fl    MCH 27.3 26.5 - 33.0 pg    MCHC 31.2 (L) 31.5 - 36.5 g/dL    RDW 16.3 (H) 10.0 - 15.0 %    Platelet Count 225 150 - 450 10e9/L    Diff Method Automated Method     % Neutrophils 68.6 %    % Lymphocytes 16.1 %    % Monocytes 10.2 %    % Eosinophils 4.5 %    % Basophils 0.2 %    % Immature Granulocytes 0.4 %    Nucleated RBCs 0 0 /100    Absolute Neutrophil 7.3 1.6 - 8.3 10e9/L    Absolute Lymphocytes 1.7 0.8 - 5.3 10e9/L    Absolute Monocytes 1.1 0.0 - 1.3 10e9/L    Absolute Eosinophils 0.5 0.0 - 0.7 10e9/L    Absolute Basophils 0.0 0.0 - 0.2 10e9/L    Abs Immature Granulocytes 0.0 0 - 0.4 10e9/L    Absolute Nucleated RBC 0.0    Comprehensive metabolic panel   Result Value Ref Range    Sodium 141 133 - 144 mmol/L    Potassium 4.4 3.4 - 5.3 mmol/L    Chloride 107 94 - 109  mmol/L    Carbon Dioxide 33 (H) 20 - 32 mmol/L    Anion Gap 1 (L) 3 - 14 mmol/L    Glucose 122 (H) 70 - 99 mg/dL    Urea Nitrogen 39 (H) 7 - 30 mg/dL    Creatinine 1.83 (H) 0.66 - 1.25 mg/dL    GFR Estimate 32 (L) >60 mL/min/[1.73_m2]    GFR Estimate If Black 37 (L) >60 mL/min/[1.73_m2]    Calcium 8.8 8.5 - 10.1 mg/dL    Bilirubin Total 0.6 0.2 - 1.3 mg/dL    Albumin 2.6 (L) 3.4 - 5.0 g/dL    Protein Total 7.1 6.8 - 8.8 g/dL    Alkaline Phosphatase 197 (H) 40 - 150 U/L    ALT 15 0 - 70 U/L    AST 37 0 - 45 U/L   INR   Result Value Ref Range    INR 1.10 0.86 - 1.14   Occult blood stool   Result Value Ref Range    Occult Blood Positive (A) NEG^Negative     *Note: Due to a large number of results and/or encounters for the requested time period, some results have not been displayed. A complete set of results can be found in Results Review.       Treatments provided: protonix, bolus, new sarmiento    Family Comments: self     OBS brochure/video discussed/provided to patient/family: N/A              Name of person given brochure if not patient: xx              Relationship to patient: xx    ED Medications:   Medications   pantoprazole (PROTONIX) 40 mg IV push injection (not administered)   0.9% sodium chloride BOLUS (not administered)       Drips infusing?:  No    For the majority of the shift this patient was Green.   Interventions performed were xx.    Severe Sepsis OR Septic Shock Diagnosis Present: No    To be done/followed up on inpatient unit:  xx    ED NURSE PHONE NUMBER: [id number]

## 2019-01-30 NOTE — LETTER
Transition Communication Hand-off for Care Transitions to Next Level of Care Provider    Name: Barrie Woods  : 1930  MRN #: [id number]  Primary Care Provider: Yonatan Hurtado  Primary Care MD Name: Dr Yonatan Hurtado  Primary Clinic: [address] [facility] [address]  Primary Care Clinic Name: [facility]  Reason for Hospitalization:  MALGORZATA (acute kidney injury) (H) [N17.9]  Gastrointestinal hemorrhage, unspecified gastrointestinal hemorrhage type [K92.2]  Admit Date/Time: 2019  3:41 PM  Discharge Date: 18   Payor Source: Payor: MEDICA / Plan: MEDICA [employer] / Product Type: HMO /     Readmission Assessment Measure (CRISTO) Risk Score/category: Elevated         Reason for Communication Hand-off Referral: Other TCU placement  Discharge Plan:  Discharge Plan:      Most Recent Value   Disposition Comments  [facility]        Concern for non-adherence with plan of care:   Y/N No  Discharge Needs Assessment:  Needs      Most Recent Value   # of Referrals Placed by CTS  Post Acute Facilities, Transportation   Other Resources  Transportation Services   Transportation Agency  [facility] [phone]        Follow-up specialty is recommended: Yes    Follow-up plan:    Future Appointments   Date Time Provider Department Center   3/4/2019 11:00 AM [facility] NURSE [facility]       Any outstanding tests or procedures:        Referrals     Future Labs/Procedures    Occupational Therapy Adult Consult     Comments:    Evaluate and treat as clinically indicated.    Reason:  Deconditioning    Physical Therapy Adult Consult     Comments:    Evaluate and treat as clinically indicated.    Reason:  Deconditioning          Supplies     Future Labs/Procedures    Pneumatic Compression Device      Comments:    Bilateral calf. Remove 30 mins BID.      Key Recommendations:  Pt is D[facility] today.  He has TURP  scheduled on Feb 6th.  Thank you.    Alise Romero, RN, BSN  Care Coordinator, [facility]  [phone]    AVS/Discharge Summary is the source of truth; this is a helpful guide for improved communication of patient story

## 2019-01-30 NOTE — ED NOTES
Bed: ED05  Expected date:   Expected time:   Means of arrival:   Comments:  E2  89 M red blood in stool/abd pain  1540

## 2019-01-30 NOTE — ED PROVIDER NOTES
History     Chief Complaint:  Rectal bleeding    The history is provided by the senior living. The history is limited by a language barrier and the condition of the patient (Taiwanese speaking).      Barrie Woods is a 89 year old male with a history of renal cell carcinoma, CHF, hyperlipidemia, atrial fibrillation, latent TB, chronic pain, chronic infection, GERD, diverticulosis, peripheral neuropathy, and dementia who presents to the emergency department for evaluation of rectal bleeding. To note, the patient comes in from a nursing home. He was recently in the hospital to have a prostate resection but was found to have purulent urine and so he was admitted for sepsis and UTI. He was discharged on 1/27/2019. Since then, per nursing home report, the patient has had two stools containing bright red blood. This was concerning to nursing home and so staff called EMS so the patient could seek evaluation here in the emergency department.    Per phone call with patient's nursing home to inquire about the patient's presentation: The patient is here for small amounts of bright red blood in two of the patient's stools without prior history of rectal bleeding. No recent fevers, diarrhea or vomiting. Nurse mentions the patient has had a sarmiento catheter in place for some time now, which the patient's presents with here today. The nursing home does not communicate with the patient because of the Taiwanese language barrier. Nursing home does not have other health concerns.     To note, the patient is not been treated for his history of renal cell carcinoma, per chart review.       Allergies:  NKDA     Medications:    Bumetanide  Carvedilol  Celexa  Diclofenac  Finasteride  Norco  Lisinopril  Magnesium oxide  Lyrica  Ranitidine    Past Medical History:    GERD  Sepsis  Balanitis  Presbycusis of both ears  Atrial fibrillation  UTI  Dementia without behavioral disturbances  Cachexia  Chronic pain  Urinary incontinence  Recurrent  falls   Hyperlipidemia  TB, latent  Anemia  Renal cell carcinoma  Cardiomyopathy  CHF  Chronic infection  Alcohol abuse  Depression  Diaphragmatic hernia   Diverticulosis of colon  BPH  Hereditary and idiopathic peripheral neuropathy  Hearing Loss  Dysphagia     Past Surgical History:    Bilateral cataract surgery    Family History:    No past pertinent family history.    Social History:  Former smoker  Negative for alcohol use.  Negative for drug use.  Patient resides in a nursing home.  Marital Status:        Review of Systems   Unable to perform ROS: Dementia     Physical Exam     Patient Vitals for the past 24 hrs:   BP Temp Temp src Pulse Heart Rate Resp SpO2   01/30/19 1544 134/62 97.7  F (36.5  C) Oral 52 52 16 96 %     Physical Exam    VS: Reviewed per above  HENT: Mucous membranes moist  EYES: sclera anicteric  CV: Rate as noted, regular rhythm.   RESP: Effort normal. Breath sounds are normal bilaterally.  GI: no tenderness, not distended.  Rectal: bright red blood  Noted, no external hemorrhoids noted.  NEURO: Alert, not following commands, not oriented even when spoken to in Finnish,  moving all extremities  MSK: No deformity of the extremities  SKIN: Warm and dry  Emergency Department Course   Laboratory:  INR: 1.10  CBC: WBC: 10.6, HGB: 11.1 9L), PLT: 225  CMP: Glucose 122 (H), CO2 33 (H), Anion gap 1 (L), Urea Nitrogen 39 (H), Creatinine 1.83 (H), GFR 32 (L), Albumin 2.6 (L), Alk phos 197 (H), o/w WNL     UA with micro: blood large, protein albumin 30, Leukocyte Esterase large, WBC 30 (H), RBC >182 (H), hyaline casts 29 (H) o/w negative  Urine Culture: In process     Occult blood stool: Positive     Interventions:  1801 Protonix 40 mg IV  1802 NS 0.5L IV   1933 Levaquin 750 mg IV    Emergency Department Course:  1625 Nursing notes and vitals reviewed.  I performed an exam of the patient as documented above.     Blood drawn. This was sent to the lab for further testing, results above.    1637 I  spoke with the patient's son via phone and inquired about the patient's baseline status. Per son's report, the patient has fairly severe dementia and baseline does not know what is going on.    1705 I rechecked the patient and discussed the results of his workup thus far.     1800  I consulted with Dr. Mckinnon of the hospitalist services. They are in agreement to accept the patient for admission.    Findings and plan explained to the Patient who consents to admission. Discussed the patient with Dr. Mckinnon, who will admit the patient to a medical bed for further monitoring, evaluation, and treatment.  Impression & Plan    Medical Decision Making:  Patient presents to the ER with reports of bright red blood in his stool x2 today.  Initial vital signs are unremarkable.  Exam is notable for soft nontender abdomen, bright red blood on digital rectal exam, no evidence of external hemorrhoids or fissure.  History is quite limited as patient has dementia and is not oriented even when spoken to in Palestinian.  Per discussion with nursing home it sounds as though there is no other concerns aside from the blood noted in his stool today.  A broad differential was considered although given exam findings lower GI bleed was suspected.  Hemoglobin is stable and so there is no evidence of a brisk GI bleed.  He was given a single dose of Protonix although I doubt that this is an upper GI bleed given his stability and color of the blood on digital rectal exam.  Given soft nontender abdomen I have a lower suspicion for mesenteric ischemia or other surgical intra-abdominal process as cause of GI bleed.  Lab work also revealed an acute kidney injury with creatinine of 1.83 up from 1.13, 3 days ago.  I did consider prerenal causes and gave a fluid bolus.  Urinalysis was also obtained after changing his Youssef catheter to evaluate for recurrent UTI.  This was notable for possible infection.  I reviewed the chart and see that for recent UTI,  infectious disease recommended Levaquin and thus this was ordered while pending repeat culture.  Given that Youssef catheter is draining well upon arrival, I have a lower suspicion for post obstructive MALGORZATA.  Pt was admitted to the hospital for further monitoring of GI bleed and evaluation of MALGORZATA.    Diagnosis:    ICD-10-CM    1. Gastrointestinal hemorrhage, unspecified gastrointestinal hemorrhage type K92.2    2. MALGORZATA (acute kidney injury) (H) N17.9        Disposition:  Admitted to Dr. Mckinnon to a medical bed     Scribe Disclosure:  I, Mikki Cavazos, am serving as a scribe on 1/30/2019 at 4:03 PM to personally document services performed by Geovani Cuenca MD based on my observations and the provider's statements to me.     Mikki Cavazos  1/30/2019    EMERGENCY DEPARTMENT       Geovani Cuenca MD  01/30/19 1934

## 2019-01-31 ENCOUNTER — PATIENT OUTREACH (OUTPATIENT)
Dept: GERIATRIC MEDICINE | Facility: CLINIC | Age: 84
End: 2019-01-31

## 2019-01-31 PROBLEM — K92.2 GI BLEED: Status: ACTIVE | Noted: 2019-01-31

## 2019-01-31 LAB
ANION GAP SERPL CALCULATED.3IONS-SCNC: 5 MMOL/L (ref 3–14)
BUN SERPL-MCNC: 33 MG/DL (ref 7–30)
CALCIUM SERPL-MCNC: 8.2 MG/DL (ref 8.5–10.1)
CHLORIDE SERPL-SCNC: 109 MMOL/L (ref 94–109)
CO2 SERPL-SCNC: 30 MMOL/L (ref 20–32)
CREAT SERPL-MCNC: 1.5 MG/DL (ref 0.66–1.25)
ERYTHROCYTE [DISTWIDTH] IN BLOOD BY AUTOMATED COUNT: 16.2 % (ref 10–15)
GFR SERPL CREATININE-BSD FRML MDRD: 41 ML/MIN/{1.73_M2}
GLUCOSE SERPL-MCNC: 108 MG/DL (ref 70–99)
HCT VFR BLD AUTO: 31.4 % (ref 40–53)
HGB BLD-MCNC: 10.2 G/DL (ref 13.3–17.7)
HGB BLD-MCNC: 10.5 G/DL (ref 13.3–17.7)
HGB BLD-MCNC: 11.3 G/DL (ref 13.3–17.7)
HGB BLD-MCNC: 9.9 G/DL (ref 13.3–17.7)
MCH RBC QN AUTO: 27.3 PG (ref 26.5–33)
MCHC RBC AUTO-ENTMCNC: 31.5 G/DL (ref 31.5–36.5)
MCV RBC AUTO: 87 FL (ref 78–100)
PLATELET # BLD AUTO: 173 10E9/L (ref 150–450)
POTASSIUM SERPL-SCNC: 4 MMOL/L (ref 3.4–5.3)
RBC # BLD AUTO: 3.63 10E12/L (ref 4.4–5.9)
SODIUM SERPL-SCNC: 144 MMOL/L (ref 133–144)
WBC # BLD AUTO: 10.3 10E9/L (ref 4–11)

## 2019-01-31 PROCEDURE — 99221 1ST HOSP IP/OBS SF/LOW 40: CPT | Performed by: UROLOGY

## 2019-01-31 PROCEDURE — 25000128 H RX IP 250 OP 636: Performed by: HOSPITALIST

## 2019-01-31 PROCEDURE — 85027 COMPLETE CBC AUTOMATED: CPT | Performed by: HOSPITALIST

## 2019-01-31 PROCEDURE — 99225 ZZC SUBSEQUENT OBSERVATION CARE,LEVEL II: CPT | Performed by: INTERNAL MEDICINE

## 2019-01-31 PROCEDURE — 25000132 ZZH RX MED GY IP 250 OP 250 PS 637: Performed by: INTERNAL MEDICINE

## 2019-01-31 PROCEDURE — 85018 HEMOGLOBIN: CPT | Performed by: HOSPITALIST

## 2019-01-31 PROCEDURE — 36415 COLL VENOUS BLD VENIPUNCTURE: CPT | Performed by: HOSPITALIST

## 2019-01-31 PROCEDURE — 25000132 ZZH RX MED GY IP 250 OP 250 PS 637: Performed by: HOSPITALIST

## 2019-01-31 PROCEDURE — 80048 BASIC METABOLIC PNL TOTAL CA: CPT | Performed by: HOSPITALIST

## 2019-01-31 PROCEDURE — 96361 HYDRATE IV INFUSION ADD-ON: CPT

## 2019-01-31 PROCEDURE — G0378 HOSPITAL OBSERVATION PER HR: HCPCS

## 2019-01-31 RX ORDER — PREGABALIN 100 MG/1
100 CAPSULE ORAL 2 TIMES DAILY
Status: DISCONTINUED | OUTPATIENT
Start: 2019-01-31 | End: 2019-02-01 | Stop reason: HOSPADM

## 2019-01-31 RX ORDER — LEVOFLOXACIN 250 MG/1
250 TABLET, FILM COATED ORAL EVERY 24 HOURS
Status: DISCONTINUED | OUTPATIENT
Start: 2019-01-31 | End: 2019-02-01 | Stop reason: HOSPADM

## 2019-01-31 RX ORDER — SODIUM CHLORIDE 9 MG/ML
INJECTION, SOLUTION INTRAVENOUS CONTINUOUS
Status: DISCONTINUED | OUTPATIENT
Start: 2019-01-31 | End: 2019-02-01 | Stop reason: HOSPADM

## 2019-01-31 RX ADMIN — Medication 1 MG: at 00:55

## 2019-01-31 RX ADMIN — CARVEDILOL 12.5 MG: 12.5 TABLET, FILM COATED ORAL at 18:47

## 2019-01-31 RX ADMIN — RANITIDINE 150 MG: 150 TABLET ORAL at 09:56

## 2019-01-31 RX ADMIN — ACETAMINOPHEN 650 MG: 325 TABLET, FILM COATED ORAL at 18:49

## 2019-01-31 RX ADMIN — DICLOFENAC 4 G: 10 GEL TOPICAL at 17:13

## 2019-01-31 RX ADMIN — PREGABALIN 100 MG: 100 CAPSULE ORAL at 21:37

## 2019-01-31 RX ADMIN — LEVOFLOXACIN 250 MG: 250 TABLET, FILM COATED ORAL at 21:37

## 2019-01-31 RX ADMIN — FINASTERIDE 5 MG: 5 TABLET, FILM COATED ORAL at 09:56

## 2019-01-31 RX ADMIN — DICLOFENAC 4 G: 10 GEL TOPICAL at 21:37

## 2019-01-31 RX ADMIN — PREGABALIN 75 MG: 75 CAPSULE ORAL at 09:54

## 2019-01-31 RX ADMIN — CITALOPRAM HYDROBROMIDE 10 MG: 10 TABLET ORAL at 09:53

## 2019-01-31 RX ADMIN — CARVEDILOL 12.5 MG: 12.5 TABLET, FILM COATED ORAL at 09:54

## 2019-01-31 RX ADMIN — ACETAMINOPHEN 650 MG: 325 TABLET, FILM COATED ORAL at 00:55

## 2019-01-31 RX ADMIN — SODIUM CHLORIDE: 9 INJECTION, SOLUTION INTRAVENOUS at 00:19

## 2019-01-31 ASSESSMENT — ACTIVITIES OF DAILY LIVING (ADL)
ADLS_ACUITY_SCORE: 30

## 2019-01-31 NOTE — PHARMACY-ADMISSION MEDICATION HISTORY
Admission medication history interview status for the 1/30/2019  admission is complete. See EPIC admission navigator for prior to admission medications     Medication history source reliability:Good    Actions taken by pharmacist (provider contacted, etc):Contacted nursing home for MAR    Additional medication history information not noted on PTA med list :None    Medication reconciliation/reorder completed by provider prior to medication history? Yes    Time spent in this activity: 15 min    Prior to Admission medications    Medication Sig Last Dose Taking? Auth Provider   acetaminophen (TYLENOL) 500 MG tablet Take 2 tablets (1,000 mg) by mouth every 6 hours as needed for mild pain Past Week at Unknown time Yes Yonatan Hurtado MD   bumetanide (BUMEX) 1 MG tablet TAKE ONE TABLET BY MOUTH EVERY DAY 1/30/2019 at Unknown time Yes Yonatan Hurtado MD   carvedilol (COREG) 12.5 MG tablet Take 1 tablet (12.5 mg) by mouth 2 times daily (with meals) 1/30/2019 at x2 Yes Yonatan Hurtado MD   citalopram (CELEXA) 10 MG tablet Take 1 tablet (10 mg) by mouth daily 1/30/2019 at Unknown time Yes Rashad Acosta MD   diclofenac (VOLTAREN) 1 % GEL topical gel Apply 4 gm to knees qid 1/30/2019 at x3 Yes Yonatan Hurtado MD   finasteride (PROSCAR) 5 MG tablet TAKE ONE TABLET BY MOUTH EVERY DAY 1/30/2019 at Unknown time Yes Yonatan Hurtado MD   HYDROcodone-acetaminophen (NORCO) 5-325 MG tablet Take 1 tablet by mouth every 6 hours as needed for moderate to severe pain Past Week at Unknown time Yes Ramon Mcguire MD   lisinopril (PRINIVIL/ZESTRIL) 20 MG tablet Take 1 tablet (20 mg) by mouth daily 1/30/2019 at Unknown time Yes Rashad Acosta MD   magnesium oxide (MAG-OX) 400 (241.3 MG) MG tablet TAKE 1 TABLET BY MOUTH ONCE DAILY 1/30/2019 at Unknown time Yes Yonatan Hurtado MD   Multiple Vitamins-Minerals (MULTILEX) TABS TAKE ONE TABLET BY MOUTH ONCE DAILY 1/30/2019 at Unknown time Yes Yonatan Hurtado MD   pregabalin  (LYRICA) 100 MG capsule Take 1 capsule (100 mg) by mouth 2 times daily 1/30/2019 at am Yes Ramon Mcguire MD   ranitidine (ZANTAC) 150 MG tablet Take 1 tablet (150 mg) by mouth 2 times daily 1/30/2019 at x2  Yes Rashad Acosta MD   order for DME Equipment being ordered: walker with slides  Patient not taking: Reported on 12/28/2018   Yonatan Hurtado MD

## 2019-01-31 NOTE — PROGRESS NOTES
RECEIVING UNIT ED HANDOFF REVIEW    ED Nurse Handoff Report was reviewed by: JS SOLIS on January 30, 2019 at 6:30 PM

## 2019-01-31 NOTE — PROGRESS NOTES
[facility] Care Coordination Contact    CC received notification of Hospital admission.  Hospital admission occurred on 1/30/19 at [facility] with Dx of rectal bleeding. Client was transferred from SNF.  CC contacted Hospital /discharge planner (Jocelyn Elliott, [phone]) and reviewed community POC. CC requested to be notified of concerns, care conference dates and discharge planning.   Reviewed and update care plan as needed.  Transition log updated.   PCP notified of hospitalization via EMR.    MORELIA Blum  [facility] Care Coordinator  [phone]    TRANSITIONS OF CARE (HOWARD) LOG   HOWARD tasks should be completed by the CC within one (1) business day of notification of each transition. Follow up contact with member is required after return to their usual care setting.  Note:  If CC finds out about the transitions fifteen (15) days or more after the member has returned to their usual care setting, no HOWARD log is needed. However, the CC should check in with the member to discuss the transition process, any changes needed to the care plan and document it in a case note.    Member Name:  Barrie Woods [facility] Name: Dg O/Health Plan Member ID#: [id number]   Product: [facility] Care Coordinator Contact:  MORELIA Blum Agency/County/Care System: [facility]   Transition Communication Actions from Care Management Contact   Transition #1   Notification Date: 2/4/19 Transition Date:  2/1/19 Transition From: Hospital     Is this the member s usual care setting?               No Transition To: Rehabiliation Facility, [facility]   Transition Type:  PLanned  Reason for Admission/Comments: client to return to bedhold at [facility]       Shared CC contact info, care plan/services with receiving setting--Date completed: 2/4/19   Notified PCP of transition--Date completed:   2/1/19    via  EMR   Transition #2   Transition #3  (if applicable)    Notification Date: 2/12/19       Transition To:  hospital  Transition Date: 2/6/19  Transition Type:    planned  Notified PCP -- Date completed:  2/6/19          Shared CC contact info, care plan/services with receiving setting or, if applicable, home care agency--Date completed:  2/12/19  *Complete additional tasks below, if this transition is a return to usual care setting.      Comments:    Client admitted for TURP. CC did not get Epic notification   Notification Date: 2/12/19        Transition To:  SNF  Transition Date:   2/8/19          Transition Type:   planned  Notified PCP--Date completed:    2/8/19      Shared CC contact info, care plan/services with receiving setting or, if applicable, home care agency--Date completed:    2/12/19  *Complete additional tasks below, if this transition is a return to usual care setting.      Comments:  Client returned to [facility]     *Complete tasks below when the member is discharging TO their usual care setting within one (1) business day of notification.  For situations where the Care Coordinator is notified of the discharge prior to the date of discharge, the Care Coordinator must follow up with the member or designated representative to confirm that discharge actually occurred and discuss required HOWARD tasks as outlined in the HOWARD Instructions.  (This includes situations where it may be a  new  usual care setting for the member. (i.e., a community member who decides upon permanent nursing home placement following hospitalization and rehab).    Date completed:   Communicated with member or their designated representative about the following:  care transition process; about changes to the member s health status; plan of care updates; education about transitions and how to prevent unplanned transitions/readmissions  Four Pillars for Optimal Transition:    Check  Yes  - if the member, family member and/or SNF/facility staff manages the following:    If  No   provide explanation in the comments section.          [x]  Yes     []  No     Does the member have a follow-up appointment scheduled with primary care or specialist? (Mental health hospitalizations--the appt. should be w/in 7 days)   [x]  Yes     []  No     Can the member manage their medications or is there a system in place to manage medications (e.g. home care set-up)?         []  Yes     [x]  No     Can the member verbalize warning signs and symptoms to watch for and how to respond?         [x]  Yes     []  No     Does the member use a Personal Health Care Record?  Check  Yes  if visit summary, discharge summary, and/or healthcare summary are being used as a PHR.                                                                                                                                                                                    [] Yes      [x] No      Have you updated the member s care plan?  If  No  provide explanation in comments.   Comments:  Client in SNF currently.

## 2019-01-31 NOTE — PLAN OF CARE
Patient arrived to floor around 2000 with  present. VSS on room air. Italian speaking. Disoriented x4, baseline dementia, dysphasia. Garbled speech, hard even for  to understand at times. Tylenol given for signs of discomfort. Hgb decreasing - 11.1, 10.5, 9.9. ALVIN mobility, 2A with repo/changes. PIV infusing 0.9% NS at 100 mL/hr. Melatonin given at bedtime. Chronic indwelling sarmiento for BPH, pink/blood-tinged urine, some blood noted around sarmiento site. No BM this shift. Clear nectar thick liquid diet. Continue to monitor

## 2019-01-31 NOTE — PROGRESS NOTES
[facility]    Medicine Progress Note - Hospitalist Service       Date of Admission:  1/30/2019  Assessment & Plan   Barrie Woods is a 89 year old Tamaz[place] speaking male admitted on 1/30/2019.  Past history of dementia, CVA, hypertension, hyperlipidemia, CKD, A. fib, CHF, BPH with indwelling Youssef, probable renal cell carcinoma, chronic anemia, dysphasia who presents with bright red blood per rectum.  Of note, he was discharged on 1/27 for sepsis secondary to CAUTI in addition to acute kidney injury and ileus.     Bright red blood per rectum  Nursing facility reported 2 small episodes of bright red blood per rectum.  Small amount of blood noted upon arrival to the ED.  - He is hemodynamically stable with normal INR.   -Has not had any further bleeding from his bowels and hemoglobin has been stable, hold on GI consult at this time is no further signs of GI bleed and hemoglobin continues to remain stable.   -Diet advanced     Chronic anemia  Baseline hemoglobin 9-10 g/dL.  Admission hemoglobin 11.1.  Serial hemoglobins as above.     MALGORZATA on CKD stage III  Baseline creatinine 1.0-1.1.  Admission creatinine 1.83.  Likely prerenal status secondary to poor p.o. Intake  -Continue to hold prior to admission lisinopril and torsemide.    -Continue hydration continue to avoid nephrotoxin.  Monitor    Hypertension  Chronic systolic CHF  Atrial fibrillation  Most recent TTE from April 2017 shows ejection fraction of 50-55% (history of EF of 30-35%) with wall motion abnormalities.  He is not on anticoagulation for A. fib for unknown reasons.  We will continue prior to admission Coreg with hold parameters.  Hold prior to admission lisinopril and diuretic as above.     History of left MCA CVA  Dementia  Depression  Dysphasia  Son reports he may be oriented to self occasionally, but otherwise is profoundly confused.  Continue prior to admission citalopram.  He is on regular diet with nectar thick liquids at  baseline.     BPH with chronic indwelling Youssef  Recently hospitalized with sepsis secondary to CAUTI and completed antibiotic prior to discharge.  -  Has indwelling Oyussef catheter with future plans for TURP.  Youssef is to be exchanged in the emergency room with repeat UA obtained thereafter which has been dirty, culture pending.  -Reviewed urology note who recommends continuation of Levaquin for 7 days and plan to continue with TURP per urology.  -Was given a dose of Levaquin in the ED, will continue that as per urology recommendation until urine culture report available     Probable renal cell carcinoma  Per chart review, has known renal mass which he previously declined any surgical intervention.    Diet: Combination Diet Clear Liquid    DVT Prophylaxis: Pneumatic Compression Devices  Youssef Catheter: in place, indication:    Code Status: Full Code      Disposition Plan   Expected discharge: Tomorrow, recommended to prior living arrangement once hemoglobin stable and renal function improved.  Entered: Daksha Hernandez MD 01/31/2019, 10:25 AM       The patient's care was discussed with the Bedside Nurse, Patient and Patient son with the help of  over the phone.    Daksha Hernandez MD  Hospitalist Service  [facility]    ______________________________________________________________________    Interval History   Communicated with the help of .  Patient basically nonverbal and only follows command intermittently and responds yes or no to a few questions.  Denies any new complaints.  Discussed with son over the phone and updated and questions answered with the help of .    Data reviewed today: I reviewed all medications, new labs and imaging results over the last 24 hours. I personally reviewed no images or EKG's today.    Physical Exam   Vital Signs: Temp: 97.3  F (36.3  C) Temp src: Oral BP: 131/57 Pulse: 78 Heart Rate: 75 Resp: 16 SpO2: 98 % O2 Device: None (Room air)     Weight: 153 lbs 6.4 oz  Exam:  Constitutional: Awake, alert and no distress. Appears comfortable.  Does not follow command all the time  Head: Normocephalic. No masses, lesions, tenderness or abnormalities  ENT: ENT exam normal, no neck nodes or sinus tenderness  Cardiovascular: RRR.  No murmurs, no rubs or JVD  Respiratory: Normal WOB,b/l equal air entry, no wheezes or crackles   Gastrointestinal: Abdomen soft, non-tender. BS normal. No masses, organomegaly  : Youssef catheter with blood tinged urine   extremities : No edema , no clubbing or cyanosis      Data   Recent Labs   Lab 01/31/19  1331 01/31/19  0538 01/30/19  2355 01/30/19  1710  01/27/19  0650   WBC  --  10.3  --  10.6  --   --    HGB 10.2* 9.9* 10.5* 11.1*   < >  --    MCV  --  87  --  88  --   --    PLT  --  173  --  225  --   --    INR  --   --   --  1.10  --   --    NA  --  144  --  141  --  144   POTASSIUM  --  4.0  --  4.4  --  4.1   CHLORIDE  --  109  --  107  --  111*   CO2  --  30  --  33*  --  29   BUN  --  33*  --  39*  --  28   CR  --  1.50*  --  1.83*  --  1.13   ANIONGAP  --  5  --  1*  --  4   JOSE  --  8.2*  --  8.8  --  8.8   GLC  --  108*  --  122*  --  106*   ALBUMIN  --   --   --  2.6*  --   --    PROTTOTAL  --   --   --  7.1  --   --    BILITOTAL  --   --   --  0.6  --   --    ALKPHOS  --   --   --  197*  --   --    ALT  --   --   --  15  --   --    AST  --   --   --  37  --   --     < > = values in this interval not displayed.     No results found for this or any previous visit (from the past 24 hour(s)).  Medications     sodium chloride 75 mL/hr at 01/31/19 1129       carvedilol  12.5 mg Oral BID w/meals     citalopram  10 mg Oral Daily     diclofenac  4 g Topical 4x Daily     finasteride  5 mg Oral Daily     levofloxacin  500 mg Oral Daily     pregabalin  100 mg Oral BID     ranitidine  150 mg Oral Daily     sodium chloride (PF)  3 mL Intracatheter Q8H

## 2019-01-31 NOTE — UTILIZATION REVIEW
"  Admission Status; Secondary Review Determination         Under the authority of the Utilization Management Committee, the utilization review process indicated a secondary review on the above patient.  The review outcome is based on review of the medical records, discussions with staff, and applying clinical experience noted on the date of the review.          (x) Observation Status Appropriate - This patient does not meet hospital inpatient criteria and is placed in observation status. If this patient's primary payer is Medicare and was admitted as an inpatient, Condition Code 44 should be used and patient status changed to \"observation\".     RATIONALE FOR DETERMINATION   89 year old Scottish speaking male admitted on 1/30/2019.  Past history of dementia, CVA, hypertension, hyperlipidemia, CKD, A. fib, CHF, BPH with indwelling Youssef, probable renal cell carcinoma, chronic anemia, dysphasia who presents with bright red blood per rectum.  Of note, he was discharged on 1/27 for sepsis secondary to CAUTI in addition to acute kidney injury and ileus. Patient was hemodynamically stable, hemoglobin is in the same range of his baseline, this morning hemoglobin is 9.9, his hemoglobin 2 weeks ago was 10.1, no further bleeding in the hospital.  Creatinine was mildly elevated from his baseline, already improved this morning.  No clear indication for prolonged inpatient care.  Discussed with the attending physician who concurred.  The severity of illness, intensity of service provided, expected LOS and risk for adverse outcome make the care appropriate for further observation; however, doesn't meet criteria for hospital inpatient admission. Dr. Hernandez  notified of this determination.    This document was produced using voice recognition software.      The information on this document is developed by the utilization review team in order for the business office to ensure compliance.  This only denotes the appropriateness of proper " admission status and does not reflect the quality of care rendered.         The definitions of Inpatient Status and Observation Status used in making the determination above are those provided in the CMS Coverage Manual, Chapter 1 and Chapter 6, section 70.4.      Sincerely,     JULIANNE SALDIVAR MD    System Medical Director  Utilization [facility].

## 2019-01-31 NOTE — CONSULTS
Consult Date:  01/31/2019      UROLOGY INPATIENT CONSULTATION      REASON FOR CONSULTATION:  Urinary retention.      HISTORY OF PRESENT ILLNESS:  Barrie Woods is an 89-year-old gentleman who is well-known to me.  He had a transurethral resection of the prostate scheduled with me earlier this month but when he came in for the surgery, his catheter had not been changed for nearly 2 months.  His urine was quite infected at the time.  The patient had his catheter changed at that time, and he was admitted to the hospital and treated for a urinary tract infection.  He has his surgery rescheduled with me for Wednesday of next week.  He was to have the catheter changed in our clinic this coming Monday.  However, the patient to the Emergency Department last night with bright red blood per rectum.  Currently, his hemoglobin is stable, and they are holding off on any form of a GI consult at this time.  He also had an elevated creatinine, which was thought to potentially be prerenal in nature, so he was admitted for IV fluid hydration and observation.  His creatinine has now improved from 1.83 last night down to 1.5 today.  His Youssef catheter was changed in the Emergency Department yesterday.      PAST MEDICAL HISTORY:  Dementia, atrial fibrillation, urinary retention, with enlarged prostate.      PAST SURGICAL HISTORY:  No prior urologic surgery.  He has had prior cataract surgeries.      HOME MEDICATIONS:  Amantadine, carvedilol, Celexa, diclofenac, finasteride, Norco, lisinopril, Lyrica, and ranitidine.      ALLERGIES:  NKDA.      SOCIAL HISTORY:  He is a former smoker.      FAMILY HISTORY:  No family history of urologic malignancy or stones.      REVIEW OF SYSTEMS:  It appeared that the review of systems is otherwise negative.  The patient was only sent to the ER for concerns of rectal bleeding.      PHYSICAL EXAMINATION:     GENERAL:  The patient is awake and alert.  He is Macedonian-speaking, and there is no   present.  Right now, his orientation cannot be assessed.   HEENT:  He is normocephalic and atraumatic.   RESPIRATORY:  Normal nonlabored breathing.   ABDOMEN:  Soft, nontender, nondistended.  He has a new Youssef catheter that was placed yesterday that is currently draining clear urine.      LABORATORY STUDIES:  His creatinine is currently 1.5.  White blood count has been normal since admission and currently at 10.3.  Urinalysis showed some red and white blood cells present as would be expected with a catheter in place.  A urine culture is pending at this time.      IMPRESSION AND PLAN:  This is an 89-year-old gentleman with urinary retention who has a transurethral resection of the prostate (TURP) planned for next week.  For now, I will continue with the plan to proceed with the patient's scheduled TURP next week unless any new medical issues should come up in the interim.  It appears that the plan is to cover him with 7 days of Levaquin, and I would agree with this plan.  It would be good to have the patient on antibiotics.  He now needs surgery to prevent development of any new infection.  The patient's catheter was changed in the ER last night, so it will not need to be changed before her surgery next week.  Please do not hesitate to contact me if you have any further questions during the hospitalization.  Otherwise, I will continue the plan for the patient's surgery next week.         ZUHAIR AVILES MD             D: 2019   T: 2019   MT:       Name:     PREM ANNA   MRN:      [id number]        Account:       [id number]   :      1930           Consult Date:  2019      Document: [id number]

## 2019-01-31 NOTE — PROGRESS NOTES
D: LORRAINE following for discharge planning, per protocol. LORRAINE reviewed chart. Pt discharged from [facility] to [facility] TCU 1/27/19.   I: LORRAINE spoke with pt's  Partners KELLY, Shelly Annabelle, [phone]. Pt is typically cared for at home 24/7 by son Jose and family. Per Shelly, Jose has had his own health problems, thus leading to TCU placement for Barrie. LORRAINE updated [facility] who reports pt is on a bed hold.   P: LORRAINE following.    Jocelyn Elliott, MSW, LGSW  [id number]

## 2019-01-31 NOTE — H&P
[facility]    History and Physical - Hospitalist Service       Date of Admission:  1/30/2019    Assessment & Plan   Barrie Woods is a 89 year old Belarusian speaking male admitted on 1/30/2019.  Past history of dementia, CVA, hypertension, hyperlipidemia, CKD, A. fib, CHF, BPH with indwelling Youssef, probable renal cell carcinoma, chronic anemia, dysphasia who presents with bright red blood per rectum.  Of note, he was discharged on 1/27 for sepsis secondary to CAUTI in addition to acute kidney injury and ileus.    Bright red blood per rectum  Nursing facility reported 2 small episodes of bright red blood per rectum.  Small amount of blood noted upon arrival to the ED.  Hemoglobin is actually slightly above baseline, suspect hemoconcentration in setting of concurrent acute kidney injury.  He is not on anticoagulation.  He is hemodynamically stable with normal INR.  Will monitor hemoglobin every 6 hours, and hold on GI consult at this time.   Will place on clear liquids for now, consider advancement in AM if no signs of further bleeding and hgb stable.    Chronic anemia  Baseline hemoglobin 9-10 g/dL.  Admission hemoglobin 11.1.  Serial hemoglobins as above.    MALGORZATA on CKD stage III  Baseline creatinine 1.0-1.1.  Admission creatinine 1.83.  Currently suspect prerenal status.  Received 500 mL normal saline bolus in ED, will continue with normal saline 100 mL/h x 5 hours and repeat BMP in the morning.  Will hold prior to admission lisinopril and torsemide.    Hypertension  Chronic systolic CHF  Atrial fibrillation  Most recent TTE from April 2017 shows ejection fraction of 50-55% (history of EF of 30-35%) with wall motion abnormalities.  He is not on anticoagulation for A. fib for unknown reasons.  We will continue prior to admission Coreg with hold parameters.  Hold prior to admission lisinopril and diuretic as above.    History of left MCA CVA  Dementia  Depression  Dysphasia  Son reports he may be  oriented to self occasionally, but otherwise is profoundly confused.  Continue prior to admission citalopram.  He is on regular diet with nectar thick liquids at baseline.    BPH with chronic indwelling Youssef  Recently hospitalized with sepsis secondary to CAUTI and completed antibiotic prior to discharge.  Has indwelling Youssef catheter with future plans for TURP.  Youssef is to be exchanged in the emergency room with repeat UA obtained thereafter.    Probable renal cell carcinoma  Per chart review, has known renal mass which he previously declined any surgical intervention.     Diet: Clear liquids for now  DVT Prophylaxis: Pneumatic Compression Devices  Youssef Catheter: in place, indication:    Code Status: Full Code per discussion with son    Disposition Plan   Expected discharge: 2 - 3 days, recommended to prior living arrangement once hgb stable, no further signs of bleeding.  Will admit to inpatient given MALGORZATA and multiple comorbidities with recent hospitalization..  Entered: Tereso Mckinnon MD 01/30/2019, 6:32 PM     The patient's care was discussed with the Patient's Family.    Tereso Mckinnon MD  [facility]    ______________________________________________________________________    Chief Complaint   Bright red blood per rectum    History is obtained from chart review, discussion with emergency room provider and discussion with patient's son.  Patient is unable to provide any history secondary to dementia.    History of Present Illness   Barrie Woods is a 89 year old male who presents with bright red blood per rectum.  Per ED physician, nursing facility reported 2 small episodes of bright red blood per rectum this evening and therefore sent him to the emergency room for evaluation.  Had bright red blood in rectal vault upon exam and had small amount of blood (few drops) when up to the commode per emergency room staff.  He is significantly demented and unable to provide any history, including  review of systems.  His son was contacted via phone who reports that he may know his name at baseline, but would otherwise be unable to provide any history.  His son does confirm that he is to remain full code.  He was hospitalized recently, being discharged on 1/27/19 after being hospitalized for sepsis secondary to catheter associated UTI, acute kidney injury and ileus.  All of these issues resolved and he completed course of antibiotics prior to discharge.  It is unclear if he has been taking in adequate oral intake at nursing facility.  Son requests a  be used when calling him for updates.    Review of Systems    Review of systems not obtained due to patient factors - confusion    Past Medical History    Hyperlipidemia  Hypertension  Left MCA CVA  Chronic dementia  Chronic kidney disease  Atrial fibrillation  Chronic systolic congestive heart failure  BPH with indwelling Youssef  Probable renal cell carcinoma  History of latent TB  Chronic anemia  Dysphasia    Past Surgical History   I have reviewed this patient's surgical history and updated it with pertinent information if needed.  Past Surgical History:   Procedure Laterality Date     EYE SURGERY      cataract surgery bilat       Social History   Per chart review, he is a former smoker.  There is no illicit drug or alcohol use.  He is currently residing at nursing facility following his most recent hospitalization.    Family History   No significant family history per chart review.    Prior to Admission Medications   Prior to Admission Medications   Prescriptions Last Dose Informant Patient Reported? Taking?   HYDROcodone-acetaminophen (NORCO) 5-325 MG tablet   No No   Sig: Take 1 tablet by mouth every 6 hours as needed for moderate to severe pain   Multiple Vitamins-Minerals (MULTILEX) TABS   No No   Sig: TAKE ONE TABLET BY MOUTH ONCE DAILY   acetaminophen (TYLENOL) 500 MG tablet   No No   Sig: Take 2 tablets (1,000 mg) by mouth every 6  hours as needed for mild pain   bumetanide (BUMEX) 1 MG tablet   No No   Sig: TAKE ONE TABLET BY MOUTH EVERY DAY   carvedilol (COREG) 12.5 MG tablet   No No   Sig: Take 1 tablet (12.5 mg) by mouth 2 times daily (with meals)   cephALEXin (KEFLEX) 500 MG capsule   No No   Sig: Take 1 capsule (500 mg) by mouth 2 times daily for 7 days   ciprofloxacin (CIPRO) 500 MG tablet   No No   Sig: Take 1 tablet (500 mg) by mouth 2 times daily for 7 days   citalopram (CELEXA) 10 MG tablet   No No   Sig: Take 1 tablet (10 mg) by mouth daily   clotrimazole (LOTRIMIN) 1 % cream   No No   Sig: Apply topically 2 times daily for 15 days (apply to the head of the penis)   clotrimazole (LOTRIMIN) 1 % cream   No No   Sig: Apply topically 2 times daily for 15 days   diclofenac (VOLTAREN) 1 % GEL topical gel   No No   Sig: Apply 4 gm to knees qid   finasteride (PROSCAR) 5 MG tablet   No No   Sig: TAKE ONE TABLET BY MOUTH EVERY DAY   lisinopril (PRINIVIL/ZESTRIL) 20 MG tablet   No No   Sig: Take 1 tablet (20 mg) by mouth daily   magnesium oxide (MAG-OX) 400 (241.3 MG) MG tablet   No No   Sig: TAKE 1 TABLET BY MOUTH ONCE DAILY   order for DME   No No   Sig: Equipment being ordered: walker with slides   Patient not taking: Reported on 12/28/2018   penicillin G benzathine & procaine (BICILLIN C-R) [id number] UNIT/2ML SUSP injection   No No   Sig: Inject 2 mLs (1.2 Million Units) into the muscle once for 1 dose   penicillin G benzathine (BICILLIN L-A) [id number] UNIT/2ML injection   No No   Sig: Inject 2 mLs (1.2 Million Units) into the muscle once for 1 dose   pregabalin (LYRICA) 100 MG capsule   No No   Sig: Take 1 capsule (100 mg) by mouth 2 times daily   ranitidine (ZANTAC) 150 MG tablet   No No   Sig: Take 1 tablet (150 mg) by mouth 2 times daily      Facility-Administered Medications: None     Allergies   Allergies   Allergen Reactions     Nkda [No Known Drug Allergies]        Physical Exam   Vital Signs: Temp: 97.7  F (36.5  C) Temp src: Oral  BP: 134/62 Pulse: 52 Heart Rate: 52 Resp: 16 SpO2: 96 %      Weight: 0 lbs 0 oz    General Appearance: Well-developed, well-nourished, elderly male in no acute distress  Eyes: Pupils equal, round and reactive to light, sclera anicteric  HEENT: Mucous membranes moist, no neck lymphadenopathy  Respiratory: Lungs clear bilaterally without wheezes or crackles, no tachypnea  Cardiovascular: Irregular rhythm, normal rate, normal S1/S2 without murmur, rubs or gallops  GI: Abdomen soft, nontender, nondistended, normal bowel sounds, no hepatosplenomegaly  Lymph/Hematologic: 2+ pitting lower extremity edema bilaterally  Genitourinary: Indwelling Youssef  Skin: No rashes or bruising  Musculoskeletal: Remedies warm and well-perfused  Neurologic: Alert, pleasantly confused, cranial nerves grossly intact, upper extremity strength intact  Psychiatric: Unable to assess    Data   Data reviewed today: I reviewed all medications, new labs and imaging results over the last 24 hours. I personally reviewed no images or EKG's today.    Recent Labs   Lab 01/30/19  1710 01/27/19  0650   WBC 10.6  --    HGB 11.1*  --    MCV 88  --      --    INR 1.10  --     144   POTASSIUM 4.4 4.1   CHLORIDE 107 111*   CO2 33* 29   BUN 39* 28   CR 1.83* 1.13   ANIONGAP 1* 4   JOSE 8.8 8.8   * 106*   ALBUMIN 2.6*  --    PROTTOTAL 7.1  --    BILITOTAL 0.6  --    ALKPHOS 197*  --    ALT 15  --    AST 37  --

## 2019-02-01 VITALS
HEART RATE: 69 BPM | DIASTOLIC BLOOD PRESSURE: 60 MMHG | TEMPERATURE: 98 F | OXYGEN SATURATION: 97 % | RESPIRATION RATE: 16 BRPM | BODY MASS INDEX: 24.86 KG/M2 | SYSTOLIC BLOOD PRESSURE: 164 MMHG | WEIGHT: 154 LBS

## 2019-02-01 LAB
ANION GAP SERPL CALCULATED.3IONS-SCNC: 6 MMOL/L (ref 3–14)
BUN SERPL-MCNC: 24 MG/DL (ref 7–30)
CALCIUM SERPL-MCNC: 8.2 MG/DL (ref 8.5–10.1)
CHLORIDE SERPL-SCNC: 111 MMOL/L (ref 94–109)
CO2 SERPL-SCNC: 26 MMOL/L (ref 20–32)
CREAT SERPL-MCNC: 1.14 MG/DL (ref 0.66–1.25)
ERYTHROCYTE [DISTWIDTH] IN BLOOD BY AUTOMATED COUNT: 16.2 % (ref 10–15)
GFR SERPL CREATININE-BSD FRML MDRD: 57 ML/MIN/{1.73_M2}
GLUCOSE SERPL-MCNC: 99 MG/DL (ref 70–99)
HCT VFR BLD AUTO: 33.9 % (ref 40–53)
HGB BLD-MCNC: 10.6 G/DL (ref 13.3–17.7)
HGB BLD-MCNC: 10.9 G/DL (ref 13.3–17.7)
MCH RBC QN AUTO: 27.2 PG (ref 26.5–33)
MCHC RBC AUTO-ENTMCNC: 31.3 G/DL (ref 31.5–36.5)
MCV RBC AUTO: 87 FL (ref 78–100)
PLATELET # BLD AUTO: 180 10E9/L (ref 150–450)
POTASSIUM SERPL-SCNC: 3.9 MMOL/L (ref 3.4–5.3)
RBC # BLD AUTO: 3.9 10E12/L (ref 4.4–5.9)
SODIUM SERPL-SCNC: 143 MMOL/L (ref 133–144)
WBC # BLD AUTO: 9.2 10E9/L (ref 4–11)

## 2019-02-01 PROCEDURE — 85027 COMPLETE CBC AUTOMATED: CPT | Performed by: INTERNAL MEDICINE

## 2019-02-01 PROCEDURE — 85018 HEMOGLOBIN: CPT | Mod: 91 | Performed by: HOSPITALIST

## 2019-02-01 PROCEDURE — 80048 BASIC METABOLIC PNL TOTAL CA: CPT | Performed by: INTERNAL MEDICINE

## 2019-02-01 PROCEDURE — 36415 COLL VENOUS BLD VENIPUNCTURE: CPT | Performed by: INTERNAL MEDICINE

## 2019-02-01 PROCEDURE — 25000132 ZZH RX MED GY IP 250 OP 250 PS 637: Performed by: HOSPITALIST

## 2019-02-01 PROCEDURE — 36415 COLL VENOUS BLD VENIPUNCTURE: CPT | Performed by: HOSPITALIST

## 2019-02-01 PROCEDURE — 25000128 H RX IP 250 OP 636: Performed by: INTERNAL MEDICINE

## 2019-02-01 PROCEDURE — G0378 HOSPITAL OBSERVATION PER HR: HCPCS

## 2019-02-01 PROCEDURE — 99217 ZZC OBSERVATION CARE DISCHARGE: CPT | Performed by: INTERNAL MEDICINE

## 2019-02-01 PROCEDURE — 96361 HYDRATE IV INFUSION ADD-ON: CPT

## 2019-02-01 RX ORDER — LEVOFLOXACIN 250 MG/1
250 TABLET, FILM COATED ORAL EVERY 24 HOURS
Qty: 7 TABLET | Refills: 0 | Status: ON HOLD | OUTPATIENT
Start: 2019-02-01 | End: 2019-02-06

## 2019-02-01 RX ADMIN — ACETAMINOPHEN 650 MG: 325 TABLET, FILM COATED ORAL at 09:30

## 2019-02-01 RX ADMIN — RANITIDINE 150 MG: 150 TABLET ORAL at 09:30

## 2019-02-01 RX ADMIN — SODIUM CHLORIDE: 9 INJECTION, SOLUTION INTRAVENOUS at 00:29

## 2019-02-01 RX ADMIN — DICLOFENAC 4 G: 10 GEL TOPICAL at 17:17

## 2019-02-01 RX ADMIN — DICLOFENAC 4 G: 10 GEL TOPICAL at 09:31

## 2019-02-01 RX ADMIN — PREGABALIN 100 MG: 100 CAPSULE ORAL at 09:30

## 2019-02-01 RX ADMIN — CARVEDILOL 12.5 MG: 12.5 TABLET, FILM COATED ORAL at 17:16

## 2019-02-01 RX ADMIN — CITALOPRAM HYDROBROMIDE 10 MG: 10 TABLET ORAL at 09:30

## 2019-02-01 RX ADMIN — CARVEDILOL 12.5 MG: 12.5 TABLET, FILM COATED ORAL at 09:30

## 2019-02-01 RX ADMIN — DICLOFENAC 4 G: 10 GEL TOPICAL at 12:08

## 2019-02-01 RX ADMIN — FINASTERIDE 5 MG: 5 TABLET, FILM COATED ORAL at 09:30

## 2019-02-01 NOTE — PROGRESS NOTES
LORRAINE Note:    D/I:  Received discharge orders for patient. Patient is able to return back to [facility] today.  Patient is in need of stretcher transport secondary to dementia/cognitive impairment. Call placed to [facility] to arrange for wheelchair transport at 5:15 for today.  SW placed call to Patient's son Jose and left  regarding patient's discharge time. Updated facility and faxed the orders.  No PAS required as patient is returning back to facility. PCS form completed, faxed to  with facesheet and provided to [facility].    Josué Goss, MSW, LGSW

## 2019-02-01 NOTE — PLAN OF CARE
Pt disoriented x4 baseline dementia, pleasant.  iPad used. Pt denies pain using FACES pain scale. Voltaren gel applied to bilateral knees. VSS on RA. Pt pulled IV out, new IV placed- infusing NS @ 100ml/hr. Hgb Q6 decreased from 11.3 to 10.9.  Chronic sarmiento catheter with AUO. 1 BM small formed blood tinged stools. Dental soft diet + nectar thick liquids, total feed. Discharge date pending.

## 2019-02-01 NOTE — PLAN OF CARE
Disoriented x4, baseline dementia. Malawian speaking,  used to assess patient. VSS on RA. BS active. C/o pain, but unable to verbalize where, points to entire body, PRN Tylenol given and scheduled Voltaren gel applied bilaterally to knees. Youssef patent. Bloody BM x1. Up with SBA/ 1 and GB  walker. IVF infusing. Tolerating mechanical/ dental soft diet with nectar thick liquids, needs encouragement to eat and is a total feed. Up in chair for dinner. Ambulated in halls x1. Continue to monitor.

## 2019-02-01 NOTE — CONSULTS
Verified appt for pt wt Dr Jaramillo for  TURP next week.  The following was updated and entered in AVS.    Follow-up with urology for TURP as previously scheduled on Feb 06, 2019 with Dr Justin Scott at [facility].  Please arrive at the hospital at 10:30 AM (the surgery is at 12:10 PM).  Please call [phone] if you have questions.  Address: [facility] [address] [facility] [id number].

## 2019-02-01 NOTE — PLAN OF CARE
Disoriented x4. Baseline dementia. Pt pleasant/cooperative.  at bedside throughout morning.  iPad otherwise used. VSS on RA. Pt denies pain via . Voltaren gel applied to knees. 2 BMs this shift, no s/s of bleeding. Chronic sarmiento in place, patent. Nectar thick and dental soft diet. Encouraged food and fluids, pt appetite fair. Total feed. Pt to discharge back to memory care facility today. Time not confirmed. Will continue to monitor.

## 2019-02-01 NOTE — DISCHARGE SUMMARY
[facility]  Hospitalist Discharge Summary       Date of Admission:  1/30/2019  Date of Discharge:  2/1/2019  Discharging Provider: Daksha Hernandez MD      Discharge Diagnoses   Bright red blood per rectum, likely from hemorrhoid bleed  Chronic anemia, stable during hospital stay  Acute kidney injury and chronic kidney disease stage III  Hypertension  Chronic systolic CHF  Atrial fibrillation  History of BPH with chronic indwelling Youssef catheter  Probable renal cell carcinoma    Follow-ups Needed After Discharge   Follow-up Appointments     Follow Up and recommended labs and tests      Follow up with care home physician.  The following labs/tests are recommended: CBC/BMP.  Follow-up with urology for TURP as previous schedule               Unresulted Labs Ordered in the Past 30 Days of this Admission     Date and Time Order Name Status Description    1/30/2019 1749 Urine Culture Preliminary           Hospital Course      Barrie Woods is a 89 year old Faroese speaking male admitted on 1/30/2019.  Past history of dementia, CVA, hypertension, hyperlipidemia, CKD, A. fib, CHF, BPH with indwelling Youssef, probable renal cell carcinoma, chronic anemia, dysphasia who presents with bright red blood per rectum.  Of note, he was discharged on 1/27 for sepsis secondary to CAUTI in addition to acute kidney injury and ileus.     Bright red blood per rectum  Nursing facility reported 2 small episodes of bright red blood per rectum.  Small amount of blood noted upon arrival to the ED. he was admitted for further evaluation with serial hemoglobin monitoring.  -After admission on the second day he had formed bowel movement with streaks of blood.  Subsequently he had 2 more bowel movement with no evidence of bleeding.    -His hemoglobin continued to remain stable after initial drop with hydration.   -His bleeding per rectum likely from his hemorrhoids.  No further workup needed at this point  -Diet advanced and he  tolerated it well.     Chronic anemia  Baseline hemoglobin 9-10 g/dL.  Admission hemoglobin 11.1.  Serial hemoglobins as above.     MALGORZATA on CKD stage III  Baseline creatinine 1.0-1.1.  Admission creatinine 1.83.  Likely prerenal secondary to poor p.o. Intake  -Patient need a lot of encouragement and complete assistance with feeding.  At TCU he needs to be closely monitored for poor p.o. intake and encouraged oral intake.  -Renal function improved before discharge    Hypertension  Chronic systolic CHF  Atrial fibrillation  Most recent TTE from April 2017 shows ejection fraction of 50-55% (history of EF of 30-35%) with wall motion abnormalities.  He is not on anticoagulation for A. fib for unknown reasons.   -PTA medication including Coreg, lisinopril and diuretics resumed at the time of discharge    History of left MCA CVA  Dementia  Depression  Dysphasia  Son reports he may be oriented to self occasionally, but otherwise is profoundly confused.  Continue prior to admission citalopram with no changes made.  He is on regular diet with nectar thick liquids at baseline.  He needs complete assistance with feeding.  Even though patient refuses to eat TCU needs to continue feeding him as the nursing staff noted here that even though he refuses to eat, if fed he will continue to eat with no problem  -He did not have any agitation during the hospital stay     BPH with chronic indwelling Youssef  Recently hospitalized with sepsis secondary to CAUTI and completed antibiotic prior to discharge.  -  Has indwelling Youssef catheter with future plans for TURP.  Youssef is to be exchanged in the emergency room with repeat UA obtained thereafter which has been dirty, culture pending at the time of discharge.  Urology consulted per son's request during this hospitalization who recommended continuation of Levaquin for 7 days and plan to continue with TURP per urology.     Probable renal cell carcinoma  Per chart review, has known renal mass  which he previously declined any surgical intervention.    Consultations This Hospital Stay   UROLOGY IP CONSULT  PHYSICAL THERAPY ADULT IP CONSULT  OCCUPATIONAL THERAPY ADULT IP CONSULT    Code Status   Full Code    Time Spent on this Encounter   I, Daksha Hernandez, personally saw the patient today and spent greater than 30 minutes discharging this patient.       Daksha Hernandez MD  [facility]  ______________________________________________________________________    Physical Exam   Vital Signs: Temp: 97.9  F (36.6  C) Temp src: Oral BP: 145/73   Heart Rate: 87 Resp: 16 SpO2: 97 % O2 Device: None (Room air)    Weight: 154 lbs 0 oz  Awake, alert and no distress. Appears comfortable.  Does not follow command all the time  Head: Normocephalic. No masses, lesions, tenderness or abnormalities  ENT: ENT exam normal, no neck nodes or sinus tenderness  Cardiovascular: RRR.  No murmurs, no rubs or JVD  Respiratory: Normal WOB,b/l equal air entry, no wheezes or crackles   Gastrointestinal: Abdomen soft, non-tender. BS normal. No masses, organomegaly  : Youssef catheter with blood tinged urine   extremities : No edema , no clubbing or cyanosis         Primary Care Physician   Yonatan Hurtado    Discharge Disposition   Discharged to short-term care facility  Condition at discharge: Stable    Significant Results and Procedures   Most Recent 3 CBC's:  Recent Labs   Lab Test 02/01/19 0618 02/01/19  0000 01/31/19  1756  01/31/19  0538  01/30/19  1710   WBC 9.2  --   --   --  10.3  --  10.6   HGB 10.6* 10.9* 11.3*   < > 9.9*   < > 11.1*   MCV 87  --   --   --  87  --  88     --   --   --  173  --  225    < > = values in this interval not displayed.     Most Recent 3 BMP's:  Recent Labs   Lab Test 02/01/19  0618 01/31/19  0538 01/30/19  1710    144 141   POTASSIUM 3.9 4.0 4.4   CHLORIDE 111* 109 107   CO2 26 30 33*   BUN 24 33* 39*   CR 1.14 1.50* 1.83*   ANIONGAP 6 5 1*   JOSE 8.2* 8.2* 8.8   GLC 99 108*  122*     Most Recent 2 LFT's:  Recent Labs   Lab Test 01/30/19  1710 01/19/19  0720   AST 37 43   ALT 15 18   ALKPHOS 197* 309*   BILITOTAL 0.6 0.5   ,   Results for orders placed or performed during the hospital encounter of 01/09/19   XR Chest 2 Views    Narrative    CHEST TWO VIEWS 1/9/2019 7:57 PM     HISTORY: Fever, evaluate for PNA.    COMPARISON: January 9, 2019       Impression    IMPRESSION: Multiple rib deformities again noted and stable. No  definite acute infiltrates compared to previous.    NIKKI GUO MD   US Renal Complete    Narrative    ULTRASOUND RETROPERITONEAL COMPLETE 1/11/2019 2:27 PM     HISTORY: UTI, rule out obstruction, hydro/pyonephrosis.    COMPARISON: CT 7/25/2018    FINDINGS: No hydronephrosis of either kidney. Right kidney measures  10.2 x 4.4 x 5.9 cm. No evidence of right renal mass. The left kidney  measures 6.9 cm in length. There is a mass at the inferior pole of the  left kidney that measures 5.2 x 6.1 x 5.8 cm.    The urinary bladder is decompressed by a Youssef catheter.      Impression    IMPRESSION: No evidence of hydronephrosis. A mass at the lower pole of  the left kidney is again demonstrated, suspicious for renal cell  carcinoma.    NAA TURK MD   XR Abdomen 2 Views    Narrative    ABDOMEN TWO VIEWS 1/14/2019 2:18 PM    HISTORY:  88-year-old patient with abdominal distention and pain,  evaluate for ileus.      Impression    IMPRESSION: Since CT exam on July 25, 2018, there remains no  intraperitoneal air. No dilated air-filled loops of small bowel,  though scattered air-fluid levels noted throughout the abdomen.  Irregular calcification in the upper abdomen, at the pancreatic head  on previous CT exam. Given air-fluid levels, this may relate to ileus.    DYLLAN LEARY MD   XR Abdomen 2 Views    Narrative    XR ABDOMEN 2 VW 1/17/2019 12:51 PM    COMPARISON: 1/14/2019    HISTORY: Ileus follow-up.      Impression    IMPRESSION: No gas-filled loops of distended large  or small bowel.  Moderate amount of stool in the colon.    DOROTHY DIXON MD     *Note: Due to a large number of results and/or encounters for the requested time period, some results have not been displayed. A complete set of results can be found in Results Review.       Discharge Orders      General info for SNF    Length of Stay Estimate: Short Term Care: Estimated # of Days <30  Condition at Discharge: Stable  Level of care:board and care  Rehabilitation Potential: Fair  Admission H&P remains valid and up-to-date: Yes  Recent Chemotherapy: N/A  Use Nursing Home Standing Orders: Yes     Mantoux instructions    Give two-step Mantoux (PPD) Per Facility Policy Yes     Reason for your hospital stay    Rectal bleed from hemorrhoidal bleeding     Intake and output    Every shift     Youssef catheter    To straight gravity drainage. Change catheter every 2 weeks and PRN for leaking or decreased uring output with signs of bladder distention. DO NOT change catheter without a specific MD order IF diagnosis of benign prostatic hypertrophy (BPH), neurogenic bladder, or other urological conditions     Follow Up and recommended labs and tests    Follow up with half-way physician.  The following labs/tests are recommended: CBC/BMP.  Follow-up with urology for TURP as previous schedule     Additional Discharge Instructions     Activity - Up with nursing assistance     Full Code     Physical Therapy Adult Consult    Evaluate and treat as clinically indicated.    Reason:  Deconditioning     Occupational Therapy Adult Consult    Evaluate and treat as clinically indicated.    Reason:  Deconditioning     Fall precautions     Pneumatic Compression Device     Bilateral calf. Remove 30 mins BID.     Advance Diet as Tolerated    Follow this diet upon discharge: Orders Placed This Encounter      Room Service      Mechanical/Dental Soft Diet  Please encourage and patient will need full assist with feeding     Discharge Medications    Current Discharge Medication List      START taking these medications    Details   levofloxacin (LEVAQUIN) 250 MG tablet Take 1 tablet (250 mg) by mouth every 24 hours for 7 days  Qty: 7 tablet, Refills: 0    Associated Diagnoses: Dysuria         CONTINUE these medications which have NOT CHANGED    Details   acetaminophen (TYLENOL) 500 MG tablet Take 2 tablets (1,000 mg) by mouth every 6 hours as needed for mild pain  Qty: 84 tablet, Refills: 5    Associated Diagnoses: Malaise      carvedilol (COREG) 12.5 MG tablet Take 1 tablet (12.5 mg) by mouth 2 times daily (with meals)  Qty: 60 tablet, Refills: 5    Comments: profile  Associated Diagnoses: Chronic combined systolic and diastolic congestive heart failure (H); HTN, goal below 140/90      citalopram (CELEXA) 10 MG tablet Take 1 tablet (10 mg) by mouth daily  Qty: 30 tablet, Refills: 0    Associated Diagnoses: Current mild episode of major depressive disorder without prior episode (H)      diclofenac (VOLTAREN) 1 % GEL topical gel Apply 4 gm to knees qid  Qty: 100 g, Refills: 11    Associated Diagnoses: Primary osteoarthritis of both knees      finasteride (PROSCAR) 5 MG tablet TAKE ONE TABLET BY MOUTH EVERY DAY  Qty: 30 tablet, Refills: 5    Associated Diagnoses: Benign prostatic hyperplasia with urinary obstruction      lisinopril (PRINIVIL/ZESTRIL) 20 MG tablet Take 1 tablet (20 mg) by mouth daily  Qty: 30 tablet, Refills: 0    Associated Diagnoses: HTN, goal below 140/90      magnesium oxide (MAG-OX) 400 (241.3 MG) MG tablet TAKE 1 TABLET BY MOUTH ONCE DAILY  Qty: 30 tablet, Refills: 5    Associated Diagnoses: Routine general medical examination at a health care facility      Multiple Vitamins-Minerals (MULTILEX) TABS TAKE ONE TABLET BY MOUTH ONCE DAILY  Qty: 100 tablet, Refills: 3    Associated Diagnoses: Routine general medical examination at a health care facility      pregabalin (LYRICA) 100 MG capsule Take 1 capsule (100 mg) by mouth 2 times daily  Qty:  60 capsule, Refills: 0    Associated Diagnoses: Midline low back pain without sciatica, unspecified chronicity      ranitidine (ZANTAC) 150 MG tablet Take 1 tablet (150 mg) by mouth 2 times daily  Qty: 60 tablet, Refills: 0    Associated Diagnoses: Gastroesophageal reflux disease with esophagitis      order for DME Equipment being ordered: walker with slides  Qty: 1 Device, Refills: 0    Associated Diagnoses: Recurrent falls         STOP taking these medications       bumetanide (BUMEX) 1 MG tablet Comments:   Reason for Stopping:         HYDROcodone-acetaminophen (NORCO) 5-325 MG tablet Comments:   Reason for Stopping:             Allergies   Allergies   Allergen Reactions     Nkda [No Known Drug Allergies]

## 2019-02-02 LAB
BACTERIA SPEC CULT: ABNORMAL
BACTERIA SPEC CULT: ABNORMAL
Lab: ABNORMAL
SPECIMEN SOURCE: ABNORMAL

## 2019-02-02 NOTE — PLAN OF CARE
Patient alert to self, confused.  BP elevated 160's.  Other VSS on room air, lungs clear.  Denies pain.  Youssef patent, passing flatus.  Up with one assist, belt, and walker.  Discharged with all personal belongings via [facility] stretcher back to memory care unit where he resides.

## 2019-02-04 ENCOUNTER — TELEPHONE (OUTPATIENT)
Dept: FAMILY MEDICINE | Facility: CLINIC | Age: 84
End: 2019-02-04

## 2019-02-04 NOTE — PROGRESS NOTES
[facility] Care Coordination Contact    CC notified that client discharge back to [facility] 2/1/19.  Trans log updated.  CC left message for Adriana Cope [phone], SWer at [facility], to remind her to keep me updated on care conference and discharge planning.    MORELIA Blum  [facility] Care Coordinator  [phone]

## 2019-02-04 NOTE — TELEPHONE ENCOUNTER
Please call patient for Inpatient Discharge f/u 02/01/19. Gastrointestinal Hemorrhage, Dysuria. Ruth Behrens.

## 2019-02-06 ENCOUNTER — ANESTHESIA (OUTPATIENT)
Dept: SURGERY | Facility: CLINIC | Age: 84
End: 2019-02-06
Payer: COMMERCIAL

## 2019-02-06 ENCOUNTER — ANESTHESIA EVENT (OUTPATIENT)
Dept: SURGERY | Facility: CLINIC | Age: 84
End: 2019-02-06
Payer: COMMERCIAL

## 2019-02-06 ENCOUNTER — SURGERY (OUTPATIENT)
Age: 84
End: 2019-02-06
Payer: COMMERCIAL

## 2019-02-06 ENCOUNTER — HOSPITAL ENCOUNTER (OUTPATIENT)
Facility: CLINIC | Age: 84
Discharge: NURSING, REHAB OR LONG-TERM CARE | End: 2019-02-08
Attending: UROLOGY | Admitting: UROLOGY
Payer: COMMERCIAL

## 2019-02-06 PROBLEM — N40.0 ENLARGED PROSTATE: Status: ACTIVE | Noted: 2019-02-06

## 2019-02-06 LAB — GLUCOSE BLDC GLUCOMTR-MCNC: 103 MG/DL (ref 70–99)

## 2019-02-06 PROCEDURE — 27210794 ZZH OR GENERAL SUPPLY STERILE: Performed by: UROLOGY

## 2019-02-06 PROCEDURE — 36000056 ZZH SURGERY LEVEL 3 1ST 30 MIN: Performed by: UROLOGY

## 2019-02-06 PROCEDURE — 88342 IMHCHEM/IMCYTCHM 1ST ANTB: CPT | Performed by: UROLOGY

## 2019-02-06 PROCEDURE — 88305 TISSUE EXAM BY PATHOLOGIST: CPT | Mod: 26 | Performed by: UROLOGY

## 2019-02-06 PROCEDURE — 88342 IMHCHEM/IMCYTCHM 1ST ANTB: CPT | Mod: 26 | Performed by: UROLOGY

## 2019-02-06 PROCEDURE — 88305 TISSUE EXAM BY PATHOLOGIST: CPT | Performed by: UROLOGY

## 2019-02-06 PROCEDURE — 25000128 H RX IP 250 OP 636: Performed by: NURSE ANESTHETIST, CERTIFIED REGISTERED

## 2019-02-06 PROCEDURE — 37000008 ZZH ANESTHESIA TECHNICAL FEE, 1ST 30 MIN: Performed by: UROLOGY

## 2019-02-06 PROCEDURE — 82962 GLUCOSE BLOOD TEST: CPT

## 2019-02-06 PROCEDURE — 25000128 H RX IP 250 OP 636: Performed by: UROLOGY

## 2019-02-06 PROCEDURE — 88341 IMHCHEM/IMCYTCHM EA ADD ANTB: CPT | Performed by: UROLOGY

## 2019-02-06 PROCEDURE — 71000014 ZZH RECOVERY PHASE 1 LEVEL 2 FIRST HR: Performed by: UROLOGY

## 2019-02-06 PROCEDURE — 37000009 ZZH ANESTHESIA TECHNICAL FEE, EACH ADDTL 15 MIN: Performed by: UROLOGY

## 2019-02-06 PROCEDURE — 52601 PROSTATECTOMY (TURP): CPT | Performed by: UROLOGY

## 2019-02-06 PROCEDURE — 25000125 ZZHC RX 250: Performed by: NURSE ANESTHETIST, CERTIFIED REGISTERED

## 2019-02-06 PROCEDURE — 36000058 ZZH SURGERY LEVEL 3 EA 15 ADDTL MIN: Performed by: UROLOGY

## 2019-02-06 PROCEDURE — 71000015 ZZH RECOVERY PHASE 1 LEVEL 2 EA ADDTL HR: Performed by: UROLOGY

## 2019-02-06 PROCEDURE — 25000132 ZZH RX MED GY IP 250 OP 250 PS 637: Performed by: UROLOGY

## 2019-02-06 PROCEDURE — 40000306 ZZH STATISTIC PRE PROC ASSESS II: Performed by: UROLOGY

## 2019-02-06 PROCEDURE — 88341 IMHCHEM/IMCYTCHM EA ADD ANTB: CPT | Mod: 26 | Performed by: UROLOGY

## 2019-02-06 RX ORDER — LEVOFLOXACIN 500 MG/1
500 TABLET, FILM COATED ORAL ONCE
Status: COMPLETED | OUTPATIENT
Start: 2019-02-06 | End: 2019-02-06

## 2019-02-06 RX ORDER — LEVOFLOXACIN 250 MG/1
250 TABLET, FILM COATED ORAL DAILY
Status: ON HOLD | COMMUNITY
Start: 2019-02-01 | End: 2019-02-08

## 2019-02-06 RX ORDER — FENTANYL CITRATE 50 UG/ML
25-50 INJECTION, SOLUTION INTRAMUSCULAR; INTRAVENOUS
Status: DISCONTINUED | OUTPATIENT
Start: 2019-02-06 | End: 2019-02-06 | Stop reason: HOSPADM

## 2019-02-06 RX ORDER — DOCUSATE SODIUM 100 MG/1
100 CAPSULE, LIQUID FILLED ORAL 2 TIMES DAILY
Status: DISCONTINUED | OUTPATIENT
Start: 2019-02-06 | End: 2019-02-08 | Stop reason: HOSPADM

## 2019-02-06 RX ORDER — FENTANYL CITRATE 50 UG/ML
INJECTION, SOLUTION INTRAMUSCULAR; INTRAVENOUS PRN
Status: DISCONTINUED | OUTPATIENT
Start: 2019-02-06 | End: 2019-02-06

## 2019-02-06 RX ORDER — CEFAZOLIN SODIUM 1 G/3ML
1 INJECTION, POWDER, FOR SOLUTION INTRAMUSCULAR; INTRAVENOUS SEE ADMIN INSTRUCTIONS
Status: DISCONTINUED | OUTPATIENT
Start: 2019-02-06 | End: 2019-02-06 | Stop reason: HOSPADM

## 2019-02-06 RX ORDER — GLYCOPYRROLATE 0.2 MG/ML
INJECTION, SOLUTION INTRAMUSCULAR; INTRAVENOUS PRN
Status: DISCONTINUED | OUTPATIENT
Start: 2019-02-06 | End: 2019-02-06

## 2019-02-06 RX ORDER — LABETALOL HYDROCHLORIDE 5 MG/ML
10 INJECTION, SOLUTION INTRAVENOUS EVERY 5 MIN PRN
Status: DISCONTINUED | OUTPATIENT
Start: 2019-02-06 | End: 2019-02-06 | Stop reason: HOSPADM

## 2019-02-06 RX ORDER — NALOXONE HYDROCHLORIDE 0.4 MG/ML
.1-.4 INJECTION, SOLUTION INTRAMUSCULAR; INTRAVENOUS; SUBCUTANEOUS
Status: DISCONTINUED | OUTPATIENT
Start: 2019-02-06 | End: 2019-02-08 | Stop reason: HOSPADM

## 2019-02-06 RX ORDER — SODIUM CHLORIDE, SODIUM LACTATE, POTASSIUM CHLORIDE, CALCIUM CHLORIDE 600; 310; 30; 20 MG/100ML; MG/100ML; MG/100ML; MG/100ML
INJECTION, SOLUTION INTRAVENOUS CONTINUOUS
Status: DISCONTINUED | OUTPATIENT
Start: 2019-02-06 | End: 2019-02-06 | Stop reason: HOSPADM

## 2019-02-06 RX ORDER — SODIUM CHLORIDE, SODIUM LACTATE, POTASSIUM CHLORIDE, CALCIUM CHLORIDE 600; 310; 30; 20 MG/100ML; MG/100ML; MG/100ML; MG/100ML
INJECTION, SOLUTION INTRAVENOUS CONTINUOUS
Status: CANCELLED | OUTPATIENT
Start: 2019-02-06

## 2019-02-06 RX ORDER — HYDRALAZINE HYDROCHLORIDE 20 MG/ML
2.5-5 INJECTION INTRAMUSCULAR; INTRAVENOUS EVERY 10 MIN PRN
Status: DISCONTINUED | OUTPATIENT
Start: 2019-02-06 | End: 2019-02-06 | Stop reason: HOSPADM

## 2019-02-06 RX ORDER — ONDANSETRON 4 MG/1
4 TABLET, ORALLY DISINTEGRATING ORAL EVERY 30 MIN PRN
Status: DISCONTINUED | OUTPATIENT
Start: 2019-02-06 | End: 2019-02-06 | Stop reason: HOSPADM

## 2019-02-06 RX ORDER — HYDROMORPHONE HYDROCHLORIDE 1 MG/ML
.3-.5 INJECTION, SOLUTION INTRAMUSCULAR; INTRAVENOUS; SUBCUTANEOUS EVERY 10 MIN PRN
Status: DISCONTINUED | OUTPATIENT
Start: 2019-02-06 | End: 2019-02-06 | Stop reason: HOSPADM

## 2019-02-06 RX ORDER — ONDANSETRON 2 MG/ML
4 INJECTION INTRAMUSCULAR; INTRAVENOUS EVERY 30 MIN PRN
Status: DISCONTINUED | OUTPATIENT
Start: 2019-02-06 | End: 2019-02-06 | Stop reason: HOSPADM

## 2019-02-06 RX ORDER — NALOXONE HYDROCHLORIDE 0.4 MG/ML
.1-.4 INJECTION, SOLUTION INTRAMUSCULAR; INTRAVENOUS; SUBCUTANEOUS
Status: DISCONTINUED | OUTPATIENT
Start: 2019-02-06 | End: 2019-02-06 | Stop reason: HOSPADM

## 2019-02-06 RX ORDER — LIDOCAINE HYDROCHLORIDE 10 MG/ML
INJECTION, SOLUTION INFILTRATION; PERINEURAL PRN
Status: DISCONTINUED | OUTPATIENT
Start: 2019-02-06 | End: 2019-02-06

## 2019-02-06 RX ORDER — DEXAMETHASONE SODIUM PHOSPHATE 4 MG/ML
INJECTION, SOLUTION INTRA-ARTICULAR; INTRALESIONAL; INTRAMUSCULAR; INTRAVENOUS; SOFT TISSUE PRN
Status: DISCONTINUED | OUTPATIENT
Start: 2019-02-06 | End: 2019-02-06

## 2019-02-06 RX ORDER — MEPERIDINE HYDROCHLORIDE 50 MG/ML
12.5 INJECTION INTRAMUSCULAR; INTRAVENOUS; SUBCUTANEOUS
Status: DISCONTINUED | OUTPATIENT
Start: 2019-02-06 | End: 2019-02-06 | Stop reason: HOSPADM

## 2019-02-06 RX ORDER — LIDOCAINE 40 MG/G
CREAM TOPICAL
Status: CANCELLED | OUTPATIENT
Start: 2019-02-06

## 2019-02-06 RX ORDER — ACETAMINOPHEN 325 MG/1
325 TABLET ORAL EVERY 4 HOURS PRN
Status: DISCONTINUED | OUTPATIENT
Start: 2019-02-06 | End: 2019-02-08 | Stop reason: HOSPADM

## 2019-02-06 RX ORDER — SODIUM CHLORIDE, SODIUM LACTATE, POTASSIUM CHLORIDE, CALCIUM CHLORIDE 600; 310; 30; 20 MG/100ML; MG/100ML; MG/100ML; MG/100ML
INJECTION, SOLUTION INTRAVENOUS CONTINUOUS PRN
Status: DISCONTINUED | OUTPATIENT
Start: 2019-02-06 | End: 2019-02-06

## 2019-02-06 RX ORDER — ONDANSETRON 2 MG/ML
INJECTION INTRAMUSCULAR; INTRAVENOUS PRN
Status: DISCONTINUED | OUTPATIENT
Start: 2019-02-06 | End: 2019-02-06

## 2019-02-06 RX ORDER — ALBUTEROL SULFATE 0.83 MG/ML
2.5 SOLUTION RESPIRATORY (INHALATION) EVERY 4 HOURS PRN
Status: DISCONTINUED | OUTPATIENT
Start: 2019-02-06 | End: 2019-02-06 | Stop reason: HOSPADM

## 2019-02-06 RX ORDER — PROPOFOL 10 MG/ML
INJECTION, EMULSION INTRAVENOUS PRN
Status: DISCONTINUED | OUTPATIENT
Start: 2019-02-06 | End: 2019-02-06

## 2019-02-06 RX ORDER — LABETALOL HYDROCHLORIDE 5 MG/ML
10 INJECTION, SOLUTION INTRAVENOUS
Status: DISCONTINUED | OUTPATIENT
Start: 2019-02-06 | End: 2019-02-06 | Stop reason: HOSPADM

## 2019-02-06 RX ORDER — LEVOFLOXACIN 250 MG/1
250 TABLET, FILM COATED ORAL DAILY
Status: DISCONTINUED | OUTPATIENT
Start: 2019-02-07 | End: 2019-02-08 | Stop reason: HOSPADM

## 2019-02-06 RX ORDER — CEFAZOLIN SODIUM 2 G/100ML
2 INJECTION, SOLUTION INTRAVENOUS
Status: COMPLETED | OUTPATIENT
Start: 2019-02-06 | End: 2019-02-06

## 2019-02-06 RX ORDER — OXYCODONE HYDROCHLORIDE 5 MG/1
5 TABLET ORAL EVERY 4 HOURS PRN
Status: DISCONTINUED | OUTPATIENT
Start: 2019-02-06 | End: 2019-02-08 | Stop reason: HOSPADM

## 2019-02-06 RX ADMIN — LEVOFLOXACIN 500 MG: 500 TABLET, FILM COATED ORAL at 18:19

## 2019-02-06 RX ADMIN — LIDOCAINE HYDROCHLORIDE 50 MG: 10 INJECTION, SOLUTION INFILTRATION; PERINEURAL at 12:20

## 2019-02-06 RX ADMIN — SODIUM CHLORIDE, POTASSIUM CHLORIDE, SODIUM LACTATE AND CALCIUM CHLORIDE: 600; 310; 30; 20 INJECTION, SOLUTION INTRAVENOUS at 13:26

## 2019-02-06 RX ADMIN — ONDANSETRON 4 MG: 2 INJECTION INTRAMUSCULAR; INTRAVENOUS at 12:30

## 2019-02-06 RX ADMIN — DEXAMETHASONE SODIUM PHOSPHATE 4 MG: 4 INJECTION, SOLUTION INTRA-ARTICULAR; INTRALESIONAL; INTRAMUSCULAR; INTRAVENOUS; SOFT TISSUE at 12:21

## 2019-02-06 RX ADMIN — DOCUSATE SODIUM 100 MG: 100 CAPSULE, LIQUID FILLED ORAL at 18:19

## 2019-02-06 RX ADMIN — SODIUM CHLORIDE, POTASSIUM CHLORIDE, SODIUM LACTATE AND CALCIUM CHLORIDE: 600; 310; 30; 20 INJECTION, SOLUTION INTRAVENOUS at 12:18

## 2019-02-06 RX ADMIN — OXYCODONE HYDROCHLORIDE 5 MG: 5 TABLET ORAL at 22:46

## 2019-02-06 RX ADMIN — FENTANYL CITRATE 25 MCG: 50 INJECTION, SOLUTION INTRAMUSCULAR; INTRAVENOUS at 12:43

## 2019-02-06 RX ADMIN — FENTANYL CITRATE 100 MCG: 50 INJECTION, SOLUTION INTRAMUSCULAR; INTRAVENOUS at 12:20

## 2019-02-06 RX ADMIN — CEFAZOLIN SODIUM 2 G: 2 INJECTION, SOLUTION INTRAVENOUS at 12:18

## 2019-02-06 RX ADMIN — ACETAMINOPHEN 325 MG: 325 TABLET, FILM COATED ORAL at 22:46

## 2019-02-06 RX ADMIN — GLYCOPYRROLATE 0.2 MG: 0.2 INJECTION, SOLUTION INTRAMUSCULAR; INTRAVENOUS at 12:20

## 2019-02-06 RX ADMIN — PROPOFOL 160 MG: 10 INJECTION, EMULSION INTRAVENOUS at 12:20

## 2019-02-06 ASSESSMENT — ENCOUNTER SYMPTOMS: DYSRHYTHMIAS: 1

## 2019-02-06 NOTE — LETTER
Transition Communication Hand-off for Care Transitions to Next Level of Care Provider    Name: Barrie Woods  : 1930  MRN #: [id number]  Primary Care Provider: Yonatan Hurtado     Primary Clinic: [id number] [place] [id number]     Reason for Hospitalization:  enlarged prostate  Enlarged prostate  Admit Date/Time: 2019 11:17 AM  Discharge Date: 19  Payor Source: Payor: MEDICA / Plan: MEDICA [employer] [facility] / Product Type: HMO /     Readmission Assessment Measure (CRISTO) Risk Score/category: Elevated          Reason for Communication Hand-off Referral: Fragility  Difficulty understanding plan of care    Discharge Plan: [facility]       Concern for non-adherence with plan of care:   No  Discharge Needs Assessment:  Needs      Most Recent Value   # of Referrals Placed by [facility]  Post Acute Facilities, Transportation   Skilled Nursing Facility  [facility] [phone], Fax: [phone]          Already enrolled in Tele-monitoring program and name of program:  NA  Follow-up specialty is recommended: Yes    Follow-up plan:    Future Appointments   Date Time Provider Department Center   3/4/2019 11:00 AM [facility] NURSE [facility]       Any outstanding tests or procedures:    Key Recommendations:  CTS identifies pt as high risk due to Elevated CRISTO. Patient was hospitalized at [facility] from -19 with sepsis and UTI. Patient was discharged to [facility] at that time. Patient was admitted to [facility] on 19 with BRBPR. Patient was admitted to [facility] on 19 with a scheduled TURP. Patient is Grenadian speaking requiring an . Patient has a history of dementia, anemia, possible renal cell carcinoma, Depression, CVA, CHF, and A fib not on anticoagulation. Patient has BPH with a chronic sarmiento catheter.   Recommendations at discharge are for patient to follow up with colorectal surgery.   Patient will need follow up with urology with plan for  catheter removal on 2/11/19. Patient and family will benefit from continued discussion for treatment plan and goals of care.     Shelly Painter    AVS/Discharge Summary is the source of truth; this is a helpful guide for improved communication of patient story

## 2019-02-06 NOTE — ANESTHESIA CARE TRANSFER NOTE
Patient: Barrie Woods    Procedure(s):  Cystoscopy, Bipolar transurethral resection of prostate    Diagnosis: enlarged prostate  Diagnosis Additional Information: No value filed.    Anesthesia Type:   General, LMA     Note:  Airway :Face Mask  Patient transferred to:PACU  Comments: To PACU, oxygen per face mask, report to RN.       Vitals: (Last set prior to Anesthesia Care Transfer)    CRNA VITALS  2/6/2019 1301 - 2/6/2019 1339      2/6/2019             Pulse:  87    SpO2:  99 %                Electronically Signed By: CHRISTAL Dewitt CRNA  February 6, 2019  1:39 PM

## 2019-02-06 NOTE — ANESTHESIA PREPROCEDURE EVALUATION
Anesthesia Pre-Procedure Evaluation    Patient: Barrie Woods   MRN: [id number] : 1930          Preoperative Diagnosis: enlarged prostate    Procedure(s):  Cystoscopy, transurethral resection of prostate    Past Medical History:   Diagnosis Date     Acute, but ill-defined, cerebrovascular disease     left middle cerebral artery infarct     Alcohol abuse      Anemia 2014     Arrhythmia     a fib     Atrial fibrillation (H)     CVA occurred off anticoagulation     Balanitis 2018     Cachexia (H) 2018     Cardiomyopathy (H)     stress tests  and 10/04 negative for ischemia, EF 36-40%     CHF (congestive heart failure) (H) 2009     Chronic infection      Chronic pain 10/12/2015     Dementia without behavioral disturbance, unspecified dementia type 2017     Dementia, without behavioral disturbance 2015     Depressive disorder      Diaphragmatic hernia without mention of obstruction or gangrene     left sided chest pain, hiatal hernia/gastritis     Diverticulosis of colon (without mention of hemorrhage)     sigmoid     Dysphagia 2015     Elevated blood sugar 2016     Esophageal reflux      Hearing loss      Hematuria     negative cystoscopy 3/04     Hereditary and idiopathic peripheral neuropathy     EMG-peripheral polyneuropathy, B12 and VitD deficient, ? alcohol related     Hx of congestive heart failure 2016     Hypertension      Hypertrophy of prostate 2015     Nausea with vomiting 2014     Presbycusis of both ears 2017     Presence of indwelling urethral catheter 2018     PSA elevation 2014     Recurrent falls 2015     Renal cell carcinoma (H) 2014     Sepsis (H) 2015     TB lung, latent 2014     Urinary incontinence 2015     Past Surgical History:   Procedure Laterality Date     EYE SURGERY      cataract surgery bilat     Anesthesia Evaluation     . Pt has had prior anesthetic.            ROS/MED  HX    ENT/Pulmonary:  - neg pulmonary ROS     Neurologic:     (+)dementia, CVA     Cardiovascular:     (+) Dyslipidemia, hypertension----. : . CHF Last EF: 50-55 . . :. dysrhythmias a-fib, .       METS/Exercise Tolerance:     Hematologic:         Musculoskeletal:         GI/Hepatic:     (+) GERD Asymptomatic on medication, Other GI/Hepatic dysphagia      Renal/Genitourinary:     (+) chronic renal disease, type: CRI, BPH,       Endo:  - neg endo ROS       Psychiatric:     (+) psychiatric history depression      Infectious Disease:   (+) Other Infectious Disease latent TB      Malignancy:   (+) Malignancy History of Other          Other:                          Physical Exam  Normal systems: cardiovascular and pulmonary    Airway   Mallampati: II  TM distance: >3 FB  Neck ROM: full    Dental   (+) upper dentures and lower dentures    Cardiovascular       Pulmonary             Lab Results   Component Value Date    WBC 9.2 02/01/2019    HGB 10.6 (L) 02/01/2019    HCT 33.9 (L) 02/01/2019     02/01/2019    SED 4 05/19/2006     02/01/2019    POTASSIUM 3.9 02/01/2019    CHLORIDE 111 (H) 02/01/2019    CO2 26 02/01/2019    BUN 24 02/01/2019    CR 1.14 02/01/2019    GLC 99 02/01/2019    JOSE 8.2 (L) 02/01/2019    PHOS 3.0 01/11/2015    MAG 1.8 01/17/2019    ALBUMIN 2.6 (L) 01/30/2019    PROTTOTAL 7.1 01/30/2019    ALT 15 01/30/2019    AST 37 01/30/2019    ALKPHOS 197 (H) 01/30/2019    BILITOTAL 0.6 01/30/2019    LIPASE 93 07/25/2018    AMYLASE 87 03/05/2004    PTT 44 (H) 02/20/2005    INR 1.10 01/30/2019    TSH 1.02 07/09/2016       Preop Vitals  BP Readings from Last 3 Encounters:   02/01/19 164/60   01/27/19 124/46   12/28/18 152/80    Pulse Readings from Last 3 Encounters:   02/01/19 69   01/27/19 77   12/28/18 68      Resp Readings from Last 3 Encounters:   02/01/19 16   01/27/19 22   09/13/18 20    SpO2 Readings from Last 3 Encounters:   02/01/19 97%   01/27/19 94%   12/28/18 100%      Temp Readings from Last  "1 Encounters:   02/01/19 98  F (36.7  C) (Oral)    Ht Readings from Last 1 Encounters:   01/09/19 1.676 m (5' 6\")      Wt Readings from Last 1 Encounters:   02/01/19 69.9 kg (154 lb)    Estimated body mass index is 24.86 kg/m  as calculated from the following:    Height as of 1/9/19: 1.676 m (5' 6\").    Weight as of 2/1/19: 69.9 kg (154 lb).       Anesthesia Plan      History & Physical Review  History and physical reviewed and following examination; no interval change.    ASA Status:  3 .    NPO Status:  > 8 hours    Plan for General and LMA with Intravenous and Propofol induction. Maintenance will be Balanced.    PONV prophylaxis:  Ondansetron (or other 5HT-3) and Dexamethasone or Solumedrol       Postoperative Care  Postoperative pain management:  IV analgesics and Oral pain medications.      Consents  Anesthetic plan, risks, benefits and alternatives discussed with:  Patient.  Use of blood products discussed: Yes.   Use of blood products discussed with Patient.  Consented to blood products.  .                 Demar Maloeny MD                    .  "

## 2019-02-06 NOTE — ANESTHESIA POSTPROCEDURE EVALUATION
Patient: Barrie Woods    Procedure(s):  Cystoscopy, Bipolar transurethral resection of prostate    Diagnosis:enlarged prostate  Diagnosis Additional Information: enlarged prostate    Anesthesia Type:  General, LMA    Note:  Anesthesia Post Evaluation    Patient location during evaluation: PACU  Patient participation: Able to fully participate in evaluation  Level of consciousness: awake and alert  Pain management: adequate  Airway patency: patent  Cardiovascular status: acceptable  Respiratory status: acceptable  Hydration status: acceptable  PONV: none     Anesthetic complications: None          Last vitals:  Vitals:    02/06/19 1355 02/06/19 1400 02/06/19 1415   BP: 153/90 158/76 155/86   Pulse: 81 80 72   Resp: 13 12 12   Temp:      SpO2: 100% 100% 100%         Electronically Signed By: Nikita Wells MD  February 6, 2019  2:30 PM

## 2019-02-06 NOTE — OR NURSING
Dr. Wells notified that we have been unable to contact the nursing home to verify when the patient last took his medications. Medication list and dosages updated with nursing home medication list but not as to when they were last taken. Dr. Wells verbalized that he is okay to proceed without this information.

## 2019-02-07 ENCOUNTER — OFFICE VISIT (OUTPATIENT)
Dept: INTERPRETER SERVICES | Facility: CLINIC | Age: 84
End: 2019-02-07
Payer: COMMERCIAL

## 2019-02-07 LAB
ANION GAP SERPL CALCULATED.3IONS-SCNC: 4 MMOL/L (ref 3–14)
BUN SERPL-MCNC: 26 MG/DL (ref 7–30)
CALCIUM SERPL-MCNC: 8.2 MG/DL (ref 8.5–10.1)
CHLORIDE SERPL-SCNC: 107 MMOL/L (ref 94–109)
CO2 SERPL-SCNC: 29 MMOL/L (ref 20–32)
CREAT SERPL-MCNC: 1.26 MG/DL (ref 0.66–1.25)
GFR SERPL CREATININE-BSD FRML MDRD: 50 ML/MIN/{1.73_M2}
GLUCOSE SERPL-MCNC: 122 MG/DL (ref 70–99)
HGB BLD-MCNC: 10.3 G/DL (ref 13.3–17.7)
POTASSIUM SERPL-SCNC: 4.7 MMOL/L (ref 3.4–5.3)
SODIUM SERPL-SCNC: 140 MMOL/L (ref 133–144)

## 2019-02-07 PROCEDURE — T1013 SIGN LANG/ORAL INTERPRETER: HCPCS | Mod: U3

## 2019-02-07 PROCEDURE — 36415 COLL VENOUS BLD VENIPUNCTURE: CPT | Performed by: UROLOGY

## 2019-02-07 PROCEDURE — 80048 BASIC METABOLIC PNL TOTAL CA: CPT | Performed by: UROLOGY

## 2019-02-07 PROCEDURE — 25000132 ZZH RX MED GY IP 250 OP 250 PS 637: Performed by: UROLOGY

## 2019-02-07 PROCEDURE — 85018 HEMOGLOBIN: CPT | Performed by: UROLOGY

## 2019-02-07 PROCEDURE — 40000934 ZZH STATISTIC OUTPATIENT (NON-OBS) DAY

## 2019-02-07 RX ORDER — CARVEDILOL 12.5 MG/1
12.5 TABLET ORAL 2 TIMES DAILY WITH MEALS
Status: DISCONTINUED | OUTPATIENT
Start: 2019-02-07 | End: 2019-02-08 | Stop reason: HOSPADM

## 2019-02-07 RX ORDER — PREGABALIN 100 MG/1
100 CAPSULE ORAL 2 TIMES DAILY
Status: DISCONTINUED | OUTPATIENT
Start: 2019-02-07 | End: 2019-02-08 | Stop reason: HOSPADM

## 2019-02-07 RX ORDER — CITALOPRAM HYDROBROMIDE 10 MG/1
10 TABLET ORAL DAILY
Status: DISCONTINUED | OUTPATIENT
Start: 2019-02-07 | End: 2019-02-08 | Stop reason: HOSPADM

## 2019-02-07 RX ORDER — CALCIUM CARBONATE 500 MG/1
1000 TABLET, CHEWABLE ORAL
Status: DISCONTINUED | OUTPATIENT
Start: 2019-02-07 | End: 2019-02-08 | Stop reason: HOSPADM

## 2019-02-07 RX ADMIN — CARVEDILOL 12.5 MG: 12.5 TABLET, FILM COATED ORAL at 10:17

## 2019-02-07 RX ADMIN — RANITIDINE 150 MG: 150 TABLET ORAL at 10:17

## 2019-02-07 RX ADMIN — OXYCODONE HYDROCHLORIDE 5 MG: 5 TABLET ORAL at 16:44

## 2019-02-07 RX ADMIN — CARVEDILOL 12.5 MG: 12.5 TABLET, FILM COATED ORAL at 19:18

## 2019-02-07 RX ADMIN — CITALOPRAM HYDROBROMIDE 10 MG: 10 TABLET ORAL at 10:17

## 2019-02-07 RX ADMIN — ACETAMINOPHEN 325 MG: 325 TABLET, FILM COATED ORAL at 19:18

## 2019-02-07 RX ADMIN — PREGABALIN 100 MG: 100 CAPSULE ORAL at 19:18

## 2019-02-07 RX ADMIN — PREGABALIN 100 MG: 100 CAPSULE ORAL at 10:17

## 2019-02-07 RX ADMIN — OXYCODONE HYDROCHLORIDE 5 MG: 5 TABLET ORAL at 11:44

## 2019-02-07 RX ADMIN — ACETAMINOPHEN 325 MG: 325 TABLET, FILM COATED ORAL at 08:49

## 2019-02-07 RX ADMIN — DOCUSATE SODIUM 100 MG: 100 CAPSULE, LIQUID FILLED ORAL at 19:18

## 2019-02-07 RX ADMIN — LEVOFLOXACIN 250 MG: 250 TABLET, FILM COATED ORAL at 08:49

## 2019-02-07 NOTE — PLAN OF CARE
PRIMARY DIAGNOSIS: Cysto with TURP and CBI   OUTPATIENT/OBSERVATION GOALS TO BE MET BEFORE DISCHARGE:  1. Stable vital signs Yes, Temp: 97.5  F (36.4  C) Temp src: Axillary BP: 138/68 Pulse: 84 Heart Rate: 80 Resp: 20 SpO2: 93 % O2 Device: None (Room air) 1LPM   2. Tolerating diet:Yes, regular diet   3. Pain controlled with oral pain medications:  Pt does not appear to be in pain.    4. Positive bowel sounds:  Hypoactive    5. Voiding without difficulty:  CBI running    6. Able to ambulate:  Has not gotten out of bed.    7. Provider specific discharge goals met:  No    Unable to assess orientation d/t language barrier and dementia. Pt does not appear to be in pain at this time. CBI running moderate rate with light pink output. Pt has not gotten out of bed. Unsure of patient's baseline. Regular diet. Pt is Korean speaking. Bed alarm on. SW consult ordered. Will continue to monitor.     Discharge Planner Nurse   Safe discharge environment identified: No  Barriers to discharge: Yes       Entered by: Rachel Call 02/07/2019 5:58 AM     Please review provider order for any additional goals.   Nurse to notify provider when observation goals have been met and patient is ready for discharge.

## 2019-02-07 NOTE — PLAN OF CARE
PRIMARY DIAGNOSIS: CYSTO/TURP  OUTPATIENT/OBSERVATION GOALS TO BE MET BEFORE DISCHARGE:  1. Stable vital signs Yes  2. Tolerating diet:Yes  3. Pain controlled with oral pain medications:  Yes  4. Positive bowel sounds:  Hypoactive  5. Voiding without difficulty:  CBI  6. Able to ambulate:  CBI  7. Provider specific discharge goals met:  No     Discharge Planner Nurse   Safe discharge environment identified: No  Barriers to discharge: Yes       Entered by: Isabelle Conroy 02/07/2019 11:15 AM    Please review provider order for any additional goals.   Nurse to notify provider when observation goals have been met and patient is ready for discharge.     VSS. Denies pain.  at bedside. Seen by urology, able to discharge when CBI can be clamped. Will discharge with chronic sarmiento. SW to consulting for safe discharge.

## 2019-02-07 NOTE — PLAN OF CARE
CBI clamped x2 and both times moderate amounts of clots and dark red liquid present in tube. Pt showing some signs of pain. Turned back onto moderate irrigation.

## 2019-02-07 NOTE — PROGRESS NOTES
Clamped patients sarmiento from 1:15pm-2:05pm.   Family voiced some pain. Patient groaning.   Patient up with assist of 2 to the commode.   Passing gas. Unsure if the patient is coming from the need to have a bowel movement or his CBI.

## 2019-02-07 NOTE — PHARMACY-ADMISSION MEDICATION HISTORY
Admission medication history interview status for this patient is complete. See [facility] admission navigator for allergy information, prior to admission medications and immunization status.     Medication history interview source(s):Caregiver  Medication history resources (including written lists, pill bottles, clinic record):[facility]  Primary pharmacy:[facility]    Changes made to PTA medication list:  Added: None  Deleted: None  Changed: None    Actions taken by pharmacist (provider contacted, etc):Called [facility] to have the MAR faxed over     Additional medication history information:None    Medication reconciliation/reorder completed by provider prior to medication history? No    Do you take OTC medications (eg tylenol, ibuprofen, fish oil, eye/ear drops, etc)? Y      Prior to Admission medications    Medication Sig Last Dose Taking? Auth Provider   carvedilol (COREG) 12.5 MG tablet Take 1 tablet (12.5 mg) by mouth 2 times daily (with meals) 2/6/2019 at am Yes Yonatan Hurtado MD   citalopram (CELEXA) 10 MG tablet Take 1 tablet (10 mg) by mouth daily 2/6/2019 at am Yes Rashad Acosta MD   diclofenac (VOLTAREN) 1 % GEL topical gel Apply 4 gm to knees qid 2/6/2019 at 0800 Yes Yonatan Hurtado MD   finasteride (PROSCAR) 5 MG tablet TAKE ONE TABLET BY MOUTH EVERY DAY 2/6/2019 at am Yes Yonatan Hurtado MD   levofloxacin (LEVAQUIN) 250 MG tablet Take 250 mg by mouth daily 2/5/2019 at 2000 Yes Unknown, Entered By History   lisinopril (PRINIVIL/ZESTRIL) 20 MG tablet Take 1 tablet (20 mg) by mouth daily 2/6/2019 at am Yes Rashad Acosta MD   magnesium oxide (MAG-OX) 400 (241.3 MG) MG tablet TAKE 1 TABLET BY MOUTH ONCE DAILY 2/6/2019 at am Yes Yonatan Hurtado MD   Multiple Vitamins-Minerals (MULTILEX) TABS TAKE ONE TABLET BY MOUTH ONCE DAILY 2/6/2019 at am Yes Yonatan Hurtado MD   pregabalin (LYRICA) 100 MG capsule Take 1 capsule (100 mg) by mouth 2 times daily 2/6/2019 at am Yes Ramon Mcguire MD    ranitidine (ZANTAC) 150 MG tablet Take 1 tablet (150 mg) by mouth 2 times daily 2/6/2019 at am Yes Rashad Acosta MD   acetaminophen (TYLENOL) 500 MG tablet Take 2 tablets (1,000 mg) by mouth every 6 hours as needed for mild pain Unknown at Unknown time  Yonatan Hurtado MD   order for DME Equipment being ordered: walker with slides   Yonatan Hurtado MD

## 2019-02-07 NOTE — CONSULTS
CTS identifies pt as high risk due to Elevated CRISTO. Patient was hospitalized at [facility] from 1/9-1/27/19 with sepsis and UTI. Patient was discharged to [facility] at that time. Patient was admitted to [facility] on 1/30/19 with BRBPR. Patient was admitted to [facility] on 2/6/19 with a scheduled TURP. Patient is Maori speaking requiring an . Patient has a history of dementia, anemia, possible renal cell carcinoma, Depression, CVA, CHF, and A fib not on anticoagulation. Patient has BPH with a chronic sarmiento catheter.  Hospital follow up appointment was not scheduled for the pt at this time as it is anticipated that patient will return to [facility] upon discharge.      Handoff will be given to PCP clinic at discharge.     CM will continue to follow patient for any additional discharge needs.     Shelly ASHLEY  Care Transition Services  [phone]

## 2019-02-07 NOTE — PLAN OF CARE
Patient No change    PRIMARY DIAGNOSIS: TURP w/ CBI  OUTPATIENT/OBSERVATION GOALS TO BE MET BEFORE DISCHARGE:  1. Stable vital signs Yes  2. Tolerating diet: Yes - has eaten 1 pudding so far w/out problem  3. Pain controlled with oral pain medications:  Has not c/o pain since arriving to floor from PACU   4. Positive bowel sounds:  Yes - hypoactive  5. Voiding without difficulty:  3-way sarmiento w/ CBI running   6. Able to ambulate:  Have not attempted to get oob yet as pt is very sleepy   7. Provider specific discharge goals met:  No    Discharge Planner Nurse   Safe discharge environment identified: Yes - pt comes from Nursing Home  Barriers to discharge: Not once medically cleared            Please review provider order for any additional goals.   Nurse to notify provider when observation goals have been met and patient is ready for discharge.    Puerto Rican speaking patient with dementia.  Very sleepy after returning from PACU.  Came up on 2L NC and is not on O2 at baseline.  Weaned down to 1L now.  Pt has a wheelchair here from [facility] in [place] where he is currently residing.  According to patient's son, Jose, the patient normally ambulates w/out any assistance.  They brought the wheelchair here to help with transport.  CBI running relatively fast.  Gaby with interpretive services will be available at 8:30am on 2/7 to interpret at the bedside, her extension [phone].    Per Gaby in interpretive services, who knows this patient from a prior hospital stay, states that patient does not like to drink anything cold, likes to eat beans and rice and/or meatloaf with gravy for lunch or dinner.  He likes to eat scrambled eggs w/ beans and rice, also the sausage paz for breakfast.  He likes to drink hot tea w/ sugar.  He needs his food cut up and someone at the bedside to encourage him to keep taking bites.  He takes his pills crushed in pudding at room temperature.

## 2019-02-07 NOTE — PLAN OF CARE
PRIMARY DIAGNOSIS: CYSTO/TURP  OUTPATIENT/OBSERVATION GOALS TO BE MET BEFORE DISCHARGE:  1. Stable vital signs Yes  2. Tolerating diet:Yes  3. Pain controlled with oral pain medications:  Yes  4. Positive bowel sounds:  Hypoactive  5. Voiding without difficulty:  CBI  6. Able to ambulate:  CBI  7. Provider specific discharge goals met:  No    Discharge Planner Nurse   Safe discharge environment identified: No  Barriers to discharge: Yes       Entered by: Isabelle Conroy 02/07/2019 8:31 AM     Please review provider order for any additional goals.   Nurse to notify provider when observation goals have been met and patient is ready for discharge.    VSS. Denies pain. SW to consult today for safe discharge.

## 2019-02-07 NOTE — PROGRESS NOTES
Attempting to reach [facility] to verify if patient resides at facility. No answer when calling main line [phone] or admissions [phone].  CM will continue to attempt to reach [facility].    Update: 1040 Received call from Adriana at [facility] [phone] who verified that patient resides at [facility] and patient may return today if medially stable.   CM to contact [facility] when discharge plan is confirmed and to fax orders to [phone].

## 2019-02-07 NOTE — PLAN OF CARE
PRIMARY DIAGNOSIS: Cysto with TURP and CBI   OUTPATIENT/OBSERVATION GOALS TO BE MET BEFORE DISCHARGE:  1. Stable vital signs Yes, Temp: 97.5  F (36.4  C) Temp src: Axillary BP: 147/68 Pulse: 84 Heart Rate: 87 Resp: 22 SpO2: 94 % O2 Device: Nasal cannula 1LPM   Pt refused 0000 blood pressure.   2. Tolerating diet:Yes, regular diet   3. Pain controlled with oral pain medications:  Pt does not appear to be in pain.    4. Positive bowel sounds:  Hypoactive    5. Voiding without difficulty:  CBI running    6. Able to ambulate:  Has not gotten out of bed.    7. Provider specific discharge goals met:  No    Pt starting having increased pain around 11:00pm. Pt unable to communicate to staff what kind of pain he was having d/t dementia and language barrier. Pt given PRN oxycodone and tylenol. Hand irrigation performed and small amount of clots returned. CBI flowing well now.     Unable to assess orientation d/t language barrier and dementia. Pt does not appear to be in pain at this time. CBI running moderate rate with light pink output. Pt has not gotten out of bed. Unsure of patient's baseline. Regular diet. Pt is Rwandan speaking. Bed alarm on. SW consult ordered. Will continue to monitor.     Discharge Planner Nurse   Safe discharge environment identified: No  Barriers to discharge: Yes       Entered by: Rachel Call 02/07/2019 1:26 AM     Please review provider order for any additional goals.   Nurse to notify provider when observation goals have been met and patient is ready for discharge.

## 2019-02-07 NOTE — PROGRESS NOTES
UROLOGY BRIEF PROGRESS NOTE    Patient is POD#1 s/p TURP, doing well overnight.  Catheter had to be irrigated once for small amount of clot.  On exam today it is clear yellow on slow drip.  He denies any pain.      - CBI clamped this AM, if it remains clear he is okay to discharge with catheter in place to home today vs tomorrow.    - Reordered home meds  - Appreciate SW help with discharge planning.     Gómez Power MD  Urology Resident

## 2019-02-07 NOTE — CONSULTS
Discharge Planner   Discharge Plans in progress: Yes, patient admitted with scheduled TURPJuanita Wright at [facility] admissions [phone] verified that patient resides at [facility] and patient may return today if medially stable.   Spoke with mary Garcia on phone to state that patient would be able to return to [facility] today and that nursing staff would call him with a time to pick patient up from hospital.     Barriers to discharge plan: Discontinuation of CBI and medical stability for discharge.   Follow up plan: CM to contact [facility] when discharge plan is confirmed and to fax orders to [phone]. Nursing staff to communicate with mary Garcia for timing of discharge and transportation.       Entered by: Shelly Painter 02/07/2019 11:00 AM       Update: 1545 Updated [facility] that patient will not be discharging today.

## 2019-02-07 NOTE — PLAN OF CARE
PRIMARY DIAGNOSIS: CYSTO/CBI   OUTPATIENT/OBSERVATION GOALS TO BE MET BEFORE DISCHARGE:  1. Stable vital signs Yes  2. Tolerating diet:Yes  3. Pain controlled with oral pain medications:  Yes  4. Positive bowel sounds:  Yes  5. Voiding without difficulty:  CBI in place. Chronic Youssef    6. Able to ambulate:  Assist of 2.    7. Provider specific discharge goals met:  No, CBI running slow/moderate.     Discharge Planner Nurse   Safe discharge environment identified: Yes  Barriers to discharge: Yes       Entered by: Cindy Chairez 02/07/2019 2:13 PM   Pt with CBI running slow to moderate. Will try to clamp to see if patient can tolerate.  needed. Possible discharge today. Will most likely go back to facility.   Please review provider order for any additional goals.   Nurse to notify provider when observation goals have been met and patient is ready for discharge.

## 2019-02-07 NOTE — OP NOTE
Procedure Date: 02/06/2019      DATE OF PROCEDURE:  02/06/2019      SURGEON:  Justin Scott MD.      PREOPERATIVE DIAGNOSIS:  Enlarged prostate and urinary retention.      POSTOPERATIVE DIAGNOSIS:  Enlarged prostate and urinary retention.      PROCEDURE PERFORMED:  Cystoscopy, transurethral resection of the prostate with the Olympus bipolar electrode.      ANESTHESIA:  General.      COMPLICATIONS:  None.      INDICATIONS FOR PROCEDURE:  Barrie Woods is an 89-year-old gentleman with urinary retention and an enlarged prostate.  He now presents for a cystoscopy and transurethral resection of prostate using the bipolar electrode.      DETAILS OF THE PROCEDURE:  The risks and benefits of the procedure were explained in detail to the patient and informed consent was obtained.  The patient was brought to the operating room and placed supine on the operating table, given a general endotracheal anesthetic.  He was then moved down to the dorsal lithotomy position where he was prepped and draped in sterile fashion.      I introduced the 26-St Lucian rigid resectoscope using the visual obturator through the urethra and into the bladder and performed cystoscopy.  The patient had evidence of a prior resection in the prostate.  The median lobe had been mainly resected and the patient had an obliterated verumontanum.  I identified each ureteral orifice.  I began my resection by resecting out any residual median lobe tissue, then I resected the left lateral lobe, followed by the right lateral lobe, followed by the anterior tissue at the end of the case.  Throughout my resection I took care not to resect through ureteral orifices or resect distal to the level where the verumontanum was previously located.  I used the Ellik  to irrigate out prostate chips.  I removed the scope and drained the bladder with the 24 [address] Lucian 3-way Youssef catheter.  I hand irrigated the catheter until the output was clear.  The procedure was  concluded at this point.      The patient tolerated the procedure without complications.  He went to the post-anesthetic care unit in good condition.  He will go to the hospital for further monitoring from there.  He will return to clinic next week for catheter removal in the office.         ZUHAIR AVILES MD             D: 2019   T: 2019   MT: WT      Name:     PREM ANNA   MRN:      [id number]        Account:        [id number]   :      1930           Procedure Date: 2019      Document: [id number]

## 2019-02-07 NOTE — PLAN OF CARE
PRIMARY DIAGNOSIS: Cysto with TURP and CBI   OUTPATIENT/OBSERVATION GOALS TO BE MET BEFORE DISCHARGE:  1. Stable vital signs Yes, Temp: 97.5  F (36.4  C) Temp src: Axillary BP: 147/68 Pulse: 84 Heart Rate: 89 Resp: 16 SpO2: 96 % O2 Device: Nasal cannula 1LPM   2. Tolerating diet:Yes, regular diet   3. Pain controlled with oral pain medications:  Pt does not appear to be in pain.    4. Positive bowel sounds:  Hypoactive    5. Voiding without difficulty:  CBI running    6. Able to ambulate:  Has not gotten out of bed.    7. Provider specific discharge goals met:  No    Unable to assess orientation d/t language barrier and dementia. Pt does not appear to be in pain. CBI running moderate rate with light pink output. Pt has not gotten out of bed. Unsure of patient's baseline. Regular diet. Pt is Anguillan speaking. Bed alarm on. SW consult ordered. Will continue to monitor.     Discharge Planner Nurse   Safe discharge environment identified: No  Barriers to discharge: Yes       Entered by: Rachel Call 02/06/2019 9:46 PM     Please review provider order for any additional goals.   Nurse to notify provider when observation goals have been met and patient is ready for discharge.

## 2019-02-08 ENCOUNTER — TELEPHONE (OUTPATIENT)
Dept: SURGERY | Facility: CLINIC | Age: 84
End: 2019-02-08

## 2019-02-08 ENCOUNTER — TELEPHONE (OUTPATIENT)
Dept: UROLOGY | Facility: CLINIC | Age: 84
End: 2019-02-08

## 2019-02-08 VITALS
OXYGEN SATURATION: 99 % | TEMPERATURE: 97.8 F | RESPIRATION RATE: 20 BRPM | DIASTOLIC BLOOD PRESSURE: 47 MMHG | SYSTOLIC BLOOD PRESSURE: 115 MMHG | HEART RATE: 80 BPM

## 2019-02-08 LAB — COPATH REPORT: NORMAL

## 2019-02-08 PROCEDURE — 25000132 ZZH RX MED GY IP 250 OP 250 PS 637: Performed by: UROLOGY

## 2019-02-08 RX ADMIN — CITALOPRAM HYDROBROMIDE 10 MG: 10 TABLET ORAL at 08:51

## 2019-02-08 RX ADMIN — CARVEDILOL 12.5 MG: 12.5 TABLET, FILM COATED ORAL at 17:56

## 2019-02-08 RX ADMIN — ACETAMINOPHEN 325 MG: 325 TABLET, FILM COATED ORAL at 17:56

## 2019-02-08 RX ADMIN — DOCUSATE SODIUM 100 MG: 100 CAPSULE, LIQUID FILLED ORAL at 08:52

## 2019-02-08 RX ADMIN — LEVOFLOXACIN 250 MG: 250 TABLET, FILM COATED ORAL at 08:51

## 2019-02-08 RX ADMIN — CARVEDILOL 12.5 MG: 12.5 TABLET, FILM COATED ORAL at 08:51

## 2019-02-08 RX ADMIN — PREGABALIN 100 MG: 100 CAPSULE ORAL at 08:52

## 2019-02-08 RX ADMIN — RANITIDINE 150 MG: 150 TABLET ORAL at 08:52

## 2019-02-08 ASSESSMENT — ACTIVITIES OF DAILY LIVING (ADL)
SWALLOWING: 0-->SWALLOWS FOODS/LIQUIDS WITHOUT DIFFICULTY
COGNITION: 1 - ATTENTION OR MEMORY DEFICITS
TRANSFERRING: 2-->ASSISTIVE PERSON
DRESS: 2-->ASSISTIVE PERSON
FALL_HISTORY_WITHIN_LAST_SIX_MONTHS: NO
AMBULATION: 2-->ASSISTIVE PERSON
RETIRED_COMMUNICATION: 2-->DIFFICULTY UNDERSTANDING AND SPEAKING (NOT RELATED TO LANGUAGE BARRIER)
BATHING: 2-->ASSISTIVE PERSON
TOILETING: 2-->ASSISTIVE PERSON
RETIRED_EATING: 2-->ASSISTIVE PERSON

## 2019-02-08 NOTE — PLAN OF CARE
PRIMARY DIAGNOSIS: Cysto with TURP and CBI   OUTPATIENT/OBSERVATION GOALS TO BE MET BEFORE DISCHARGE:  1. Stable vital signs Yes, Temp: 99  F (37.2  C) Temp src: Oral BP: 114/58   Heart Rate: 72 Resp: 16 SpO2: 94 %   2. Tolerating diet:Yes, regular diet   3. Pain controlled with oral pain medications:  Pt does not appear to be in pain.    4. Positive bowel sounds:  Hypoactive    5. Voiding without difficulty:  CBI running    6. Able to ambulate:  A2  7. Provider specific discharge goals met:  No    Unable to assess orientation d/t language barrier and dementia. Pt does not appear to be in pain at this time. CBI running slow rate with light pink output. . Pt would require heavy assist of 2 to get out of bed. Regular diet. Pt is Divehi speaking. Bed alarm on. SW. Will continue to monitor.     Discharge Planner Nurse   Safe discharge environment identified: No  Barriers to discharge: Yes       Entered by: Rachel Call 02/08/2019 1:21 AM     Please review provider order for any additional goals.   Nurse to notify provider when observation goals have been met and patient is ready for discharge.

## 2019-02-08 NOTE — PLAN OF CARE
PRIMARY DIAGNOSIS: TUPR w/CBI    OUTPATIENT/OBSERVATION GOALS TO BE MET BEFORE DISCHARGE:    1. Stable vital signs Yes  2. Tolerating diet:Yes  3. Pain controlled with oral pain medications:  Yes  4. Positive bowel sounds:  Yes  5. Voiding without difficulty:  CBI running slowly, sarmiento still in place   6. Able to ambulate:  Yes, assist x2, weak and unsteady  7. Provider specific discharge goals met:  No, CBI still running    VSS on RA. Oriented only to self. Greek speaking. Denies pain at this time. Pt confused and tries to get out of bed, redirectable. Tolerating regular diet. Plan: monitor CBI    Discharge Planner Nurse   Safe discharge environment identified: Yes, [facility] (nursing home in [place])  Barriers to discharge: Yes, CBI still running     Please review provider order for any additional goals.   Nurse to notify provider when observation goals have been met and patient is ready for discharge.

## 2019-02-08 NOTE — PLAN OF CARE
PRIMARY DIAGNOSIS: Cysto with TURP and CBI   OUTPATIENT/OBSERVATION GOALS TO BE MET BEFORE DISCHARGE:  1. Stable vital signs Yes, Temp: 98.7  F (37.1  C) Temp src: Oral BP: 130/62   Heart Rate: 75 Resp: 14 SpO2: 97 %   2. Tolerating diet:Yes, regular diet   3. Pain controlled with oral pain medications:  Pt does not appear to be in pain.    4. Positive bowel sounds:  Hypoactive    5. Voiding without difficulty:  CBI running    6. Able to ambulate:  A2  7. Provider specific discharge goals met:  No    Unable to assess orientation d/t language barrier and dementia. Pt does not appear to be in pain at this time. CBI running slow rate with light pink output. . Pt would require heavy assist of 2 to get out of bed. Regular diet. Pt is Turkmen speaking. Bed alarm on. SW. Will continue to monitor.     Discharge Planner Nurse   Safe discharge environment identified: No  Barriers to discharge: Yes       Entered by: Rachel Call 02/08/2019 4:29 AM     Please review provider order for any additional goals.   Nurse to notify provider when observation goals have been met and patient is ready for discharge.

## 2019-02-08 NOTE — PLAN OF CARE
Patient Improving    PRIMARY DIAGNOSIS: Cysto/TURP    OUTPATIENT/OBSERVATION GOALS TO BE MET BEFORE DISCHARGE:    1. Stable vital signs: Yes  2. Tolerating diet: Yes  3. Pain controlled with oral pain medications:  N/A   4. Positive bowel sounds: Yes  5. Voiding without difficulty: Catheter to remain in place at discharge.   6. Able to ambulate:  Yes  7. Provider specific discharge goals met: Yes    Temp: (P) 97.8  F (36.6  C) Temp src: (P) Oral BP: (P) 127/61 Pulse: (P) 80 Heart Rate: 76 Resp: (P) 18 SpO2: (P) 94 % O2 Device: (P) None (Room air)     Denies pain when asked. Resting comfortably between cares. CBI clamped at 10 AM. Urine has been audra/pink and concentrated with sediment. Declined lunch. Offered sips of liquids. Awaiting discharge to [facility] care facility via transport at 1800 tonight.      Discharge Planner Nurse   Safe discharge environment identified: Yes  Barriers to discharge: No       Entered by: Nola [place] 02/08/2019     Please review provider order for any additional goals.   Nurse to notify provider when observation goals have been met and patient is ready for discharge.

## 2019-02-08 NOTE — PROGRESS NOTES
Urology    Pain well controlled  Tolerating diet    Urine very light pink    A/P: Doing well  Discharge home today with sarmiento  Returning to clinic Monday for cath removal    Spoke with pathologist, pathology with unusual finding of colorectal carcinoma. Will require referral to colorectal surgery. Likely will have them see him as outpatient  Spoke with patient's family members about this pathology result

## 2019-02-08 NOTE — PROGRESS NOTES
Discussed with Dr. Scott.  He was unable to place SNF discharge orders due to Epic issue.  I placed discharge orders with his recommendations.  I did not see the patient or participate in his care otherwise.    Vanessa Lu MD

## 2019-02-08 NOTE — TELEPHONE ENCOUNTER
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

## 2019-02-08 NOTE — PLAN OF CARE
PRIMARY DIAGNOSIS: TUPR w/CBI  OUTPATIENT/OBSERVATION GOALS TO BE MET BEFORE DISCHARGE:  1. Stable vital signs Yes  2. Tolerating diet:Yes  3. Pain controlled with oral pain medications:  Yes  4. Positive bowel sounds:  Yes  5. Voiding without difficulty:  CBI running slowly, sarmiento still in place   6. Able to ambulate:  Yes, assist x2, weak and unsteady  7. Provider specific discharge goals met:  No, CBI still running    VSS on RA. Oriented only to self. Mauritanian speaking. Reports pain in lower abdomen, unable to rate numerically. Plan: monitor CBI    Discharge Planner Nurse   Safe discharge environment identified: Yes, [facility] (nursing home in [place])  Barriers to discharge: Yes, CBI still running     Please review provider order for any additional goals.   Nurse to notify provider when observation goals have been met and patient is ready for discharge.

## 2019-02-08 NOTE — PLAN OF CARE
Patient Improving  PRIMARY DIAGNOSIS: Cysto/TURP  OUTPATIENT/OBSERVATION GOALS TO BE MET BEFORE DISCHARGE:  1. Stable vital signs: Yes  2. Tolerating diet: Yes  3. Pain controlled with oral pain medications:  N/A   4. Positive bowel sounds: Yes  5. Voiding without difficulty:  Catheter to remain in place at discharge.   6. Able to ambulate:  Yes  7. Provider specific discharge goals met: Yes    Difficult to assess even with assistance from in-person . Disoriented x4. Answers inappropriately. When asked if having abdominal/bladder pain, reported yes and pointed to his mouth. No non-verbal indicators of pain. Youssef/CBI in place with clear urine, some sediment noted in tubing. Tolerating regular diet, but needs assistance with feeding. Hopeful discharge to nursing facility today after urology rounds.     Discharge Planner Nurse   Safe discharge environment identified: Yes  Barriers to discharge: No       Entered by: Nola [place] 02/08/2019      Please review provider order for any additional goals.   Nurse to notify provider when observation goals have been met and patient is ready for discharge.

## 2019-02-08 NOTE — PLAN OF CARE
Patient Improving     PRIMARY DIAGNOSIS: Cysto/TURP     OUTPATIENT/OBSERVATION GOALS TO BE MET BEFORE DISCHARGE:     1. Stable vital signs: Yes  2. Tolerating diet: Yes  3. Pain controlled with oral pain medications:  N/A   4. Positive bowel sounds: Yes  5. Voiding without difficulty: Catheter to remain in place at discharge.   6. Able to ambulate: Ax2, pivot to wheelchair with gait belt/walker.  7. Provider specific discharge goals met: Yes     Appears to be resting comfortably between cares. CBI clamped at 10 AM. Urine red/clear in color. No clots noted in tubing. Up to wheelchair this evening for dinner. Will be discharging to [facility] via [facility] at 1800.     Discharge Planner Nurse   Safe discharge environment identified: Yes  Barriers to discharge: No       Entered by: Nola Clinton 02/08/2019      Please review provider order for any additional goals.   Nurse to notify provider when observation goals have been met and patient is ready for discharge.

## 2019-02-09 NOTE — PLAN OF CARE
"Patient's After Visit Summary was reviewed with multiple family members.  Patient verbalized understanding of After Visit Summary, recommended follow up and was given an opportunity to ask questions.   Discharge medications sent home with patient/family: Not applicable   Discharged with transport [facility].    Family requesting catheter removal appointment on Monday to be later in day. Called urology clinic and they had no later availability in the day, nothing until Thursday or the following Monday. Family is reporting they are not able to transport patient and inquiring if the facility he is going to should provide transportation. Called and spoke with admissions nurse at [facility] and they stated they were not able to arrange transportation \"this late in the day\" (called at approx. 4:20 PM) and they stated that they would not feel comfortable sending the patient on his own \"because of his cognitive deficits.\" Updated family with this information. Family reports they would be able to accommodate bringing patient to his appointment on Monday for catheter removal.     OBSERVATION patient END time: 6:35 PM          "

## 2019-02-11 ENCOUNTER — ALLIED HEALTH/NURSE VISIT (OUTPATIENT)
Dept: UROLOGY | Facility: CLINIC | Age: 84
End: 2019-02-11
Payer: COMMERCIAL

## 2019-02-11 DIAGNOSIS — R33.9 URINARY RETENTION: Primary | ICD-10-CM

## 2019-02-11 PROCEDURE — T1013 SIGN LANG/ORAL INTERPRETER: HCPCS | Mod: U3 | Performed by: UROLOGY

## 2019-02-11 PROCEDURE — 99211 OFF/OP EST MAY X REQ PHY/QHP: CPT

## 2019-02-11 NOTE — PROGRESS NOTES
Barrie Woods comes into clinic today at the request of Dr Scott Ordering Provider for sarmiento removal.          This service provided today was under the supervising provider of the day Dr Scott, who was available if needed.      Barrie arrives in w/c with son. Very frail male unable to communicate with son or . Son state she has become increasing weak over last few days.. Urine is drainage bag slight red in color. Per Dr Scott proceed with UCO and send back to nursing home if unable to void in 8 hours do bladder scan if over 400 ml replace sarmiento. These instructions were written and reviewed with son via . UCO was done he was incontinent of BM procedure.     Jocelyn Nina LPN

## 2019-02-12 ENCOUNTER — PATIENT OUTREACH (OUTPATIENT)
Dept: SURGERY | Facility: CLINIC | Age: 84
End: 2019-02-12

## 2019-02-12 ENCOUNTER — PATIENT OUTREACH (OUTPATIENT)
Dept: GERIATRIC MEDICINE | Facility: CLINIC | Age: 84
End: 2019-02-12

## 2019-02-12 DIAGNOSIS — C18.9 COLON ADENOCARCINOMA (H): Primary | ICD-10-CM

## 2019-02-12 NOTE — PROGRESS NOTES
Transition Communication Hand-off for Care Transitions to Next Level of Care Provider    Name: Barrie Woods  : 1930  MRN #: [id number]  Primary Care Provider: Yonatan Hurtado     Primary Clinic: [id number] [place] [id number]     Reason for Hospitalization:  enlarged prostate  Enlarged prostate  Admit Date/Time: 2019 11:17 AM  Discharge Date: 19  Payor Source: Payor: MEDICA / Plan: MEDICA [employer] [facility] / Product Type: HMO /     Readmission Assessment Measure (CRISTO) Risk Score/category: Elevated          Reason for Communication Hand-off Referral: Fragility  Difficulty understanding plan of care    Discharge Plan: [facility]       Concern for non-adherence with plan of care:   No  Discharge Needs Assessment:  Needs      Most Recent Value   # of Referrals Placed by [facility]  Post Acute Facilities, Transportation   Skilled Nursing Facility  [facility] [phone], Fax: [phone]          Already enrolled in Tele-monitoring program and name of program:  NA  Follow-up specialty is recommended: Yes    Follow-up plan:    Future Appointments   Date Time Provider Department Center   3/4/2019 11:00 AM [facility] NURSE [facility]       Any outstanding tests or procedures:    Key Recommendations:  CTS identifies pt as high risk due to Elevated CRISTO. Patient was hospitalized at [facility] from -19 with sepsis and UTI. Patient was discharged to [facility] at that time. Patient was admitted to [facility] on 19 with BRBPR. Patient was admitted to [facility] on 19 with a scheduled TURP. Patient is Chinese speaking requiring an . Patient has a history of dementia, anemia, possible renal cell carcinoma, Depression, CVA, CHF, and A fib not on anticoagulation. Patient has BPH with a chronic sarmiento catheter.   Recommendations at discharge are for patient to follow up with colorectal surgery.   Patient will need follow up with urology with plan for  catheter removal on 2/11/19. Patient and family will benefit from continued discussion for treatment plan and goals of care.     Shelly Painter    AVS/Discharge Summary is the source of truth; this is a helpful guide for improved communication of patient story

## 2019-02-12 NOTE — PROGRESS NOTES
Patient's son was called after receiving a referral from Dr. Jaramillo's office to see the patient for colon cancer found in the prostate. Patient's chart was reviewed by Dr. Benjamin and Dr. Byrd who recommend the patient get a CT scan and a colonoscopy. Patient will receive a call to discuss these needed appointments from the appropriate scheduling departments. Patient's son is in agreement with this plan of care. His questions and concerns were addressed to his stated satisfaction. He will callback directly with further questions or concerns.

## 2019-02-12 NOTE — PROGRESS NOTES
[facility] Care Coordination Contact    CC received notification of admission to [facility] 2/6/19 - 2/8/19 for LORENAP.  Client is now back at [facility] in the memory care LTC unit.  CC reviewed Epic and noted there was mention of family wanting him at facility closer to [place] if client was going to stay in LTC.  CC called and left message for SNF SWer Adriana Cope, [phone], requesting update on client.  CC also left message for clients son, Jose, who is out of hospital now too. Client due for 6 month f/u.  Transition log updated.   PCP notified of transition to TCU via EMR.    MORELIA Blum  [facility] Care Coordinator  [phone]

## 2019-02-14 ENCOUNTER — HOSPITAL ENCOUNTER (OUTPATIENT)
Dept: CT IMAGING | Facility: CLINIC | Age: 84
Discharge: HOME | End: 2019-02-14
Attending: COLON & RECTAL SURGERY | Admitting: COLON & RECTAL SURGERY
Payer: COMMERCIAL

## 2019-02-14 DIAGNOSIS — C18.9 COLON ADENOCARCINOMA (H): ICD-10-CM

## 2019-02-14 PROCEDURE — 71260 CT THORAX DX C+: CPT

## 2019-02-14 PROCEDURE — 25000128 H RX IP 250 OP 636: Performed by: COLON & RECTAL SURGERY

## 2019-02-14 PROCEDURE — 74177 CT ABD & PELVIS W/CONTRAST: CPT

## 2019-02-14 PROCEDURE — 25000125 ZZHC RX 250: Performed by: COLON & RECTAL SURGERY

## 2019-02-14 RX ORDER — IOPAMIDOL 755 MG/ML
76 INJECTION, SOLUTION INTRAVASCULAR ONCE
Status: COMPLETED | OUTPATIENT
Start: 2019-02-14 | End: 2019-02-14

## 2019-02-14 RX ADMIN — SODIUM CHLORIDE 62 ML: 9 INJECTION, SOLUTION INTRAVENOUS at 17:43

## 2019-02-14 RX ADMIN — IOPAMIDOL 75 ML: 755 INJECTION, SOLUTION INTRAVENOUS at 17:43

## 2019-02-15 NOTE — PROGRESS NOTES
[facility] Care Coordination Contact      [facility] Six-Month Telephone Assessment    6 month telephone assessment completed on 2/15/19. TransPerfect # [id number]    ER visits: 9/13/18  [facility] - catheter problem  Hospitalizations: 1/9-1/27/2019 at [facility] severe sepsis, 1/30/19-2/1/19 at  [facility] for dysuria, 2/6/19-2/819 [facility] for TURP  TCU stays: [facility] (Current)  Significant health status changes: severe sepsis  Falls/Injuries: No  ADL/IADL changes: Yes: much weaker than previous. Will remain in LTC until can walk and then Jose will consider bringing him back home.  Jose recently hospitalized as well and needs time to recover.    Changes in services: No    Caregiver Assessment follow up:  n/a    Goals: See POC in chart for goal progress documentation. Client was previously unable to reach. Jose plans to re-assess client status about going home at end of Feb.  CC explained that after client is home I can come out and do LTCC assessment to get some help for them at home if they would like. We agreed to touch base when client discharge from SNF.       Will see member in 6 months for an annual health risk assessment.   Encouraged member to call CC with any questions or concerns in the meantime.     MORELIA Blum  [facility] Care Coordinator  [phone]

## 2019-02-19 ENCOUNTER — TELEPHONE (OUTPATIENT)
Dept: ONCOLOGY | Facility: CLINIC | Age: 84
End: 2019-02-19

## 2019-02-19 NOTE — TELEPHONE ENCOUNTER
ONCOLOGY INTAKE: Records Information      APPT INFORMATION: 03/14 w/Dr. Simon at  1045  [facility]  Referring provider s clinic:  NA  Reason for visit/diagnosis:  : Colon Cancer    Were the records received with the referral (via [employer])? Complete     Has patient been seen for any external appt for this diagnosis (enter clinic/location)? No    Dx: Colon Cancer: Caller Mar[facility] [phone]: Ref by:[facility] in [facility]

## 2019-02-20 ENCOUNTER — HOSPITAL ENCOUNTER (EMERGENCY)
Facility: CLINIC | Age: 84
Discharge: HOME | End: 2019-02-20
Attending: EMERGENCY MEDICINE | Admitting: EMERGENCY MEDICINE
Payer: COMMERCIAL

## 2019-02-20 VITALS
HEART RATE: 77 BPM | WEIGHT: 143 LBS | RESPIRATION RATE: 20 BRPM | DIASTOLIC BLOOD PRESSURE: 60 MMHG | TEMPERATURE: 98.4 F | OXYGEN SATURATION: 96 % | BODY MASS INDEX: 22.98 KG/M2 | HEIGHT: 66 IN | SYSTOLIC BLOOD PRESSURE: 132 MMHG

## 2019-02-20 DIAGNOSIS — R13.10 DYSPHAGIA, UNSPECIFIED TYPE: ICD-10-CM

## 2019-02-20 DIAGNOSIS — R31.9 URINARY TRACT INFECTION WITH HEMATURIA, SITE UNSPECIFIED: ICD-10-CM

## 2019-02-20 DIAGNOSIS — N39.0 URINARY TRACT INFECTION WITH HEMATURIA, SITE UNSPECIFIED: ICD-10-CM

## 2019-02-20 LAB
ALBUMIN SERPL-MCNC: 2.2 G/DL (ref 3.4–5)
ALBUMIN UR-MCNC: 100 MG/DL
ALP SERPL-CCNC: 200 U/L (ref 40–150)
ALT SERPL W P-5'-P-CCNC: 15 U/L (ref 0–70)
ANION GAP SERPL CALCULATED.3IONS-SCNC: 5 MMOL/L (ref 3–14)
APPEARANCE UR: ABNORMAL
AST SERPL W P-5'-P-CCNC: 35 U/L (ref 0–45)
BASOPHILS # BLD AUTO: 0 10E9/L (ref 0–0.2)
BASOPHILS NFR BLD AUTO: 0.2 %
BILIRUB SERPL-MCNC: 0.9 MG/DL (ref 0.2–1.3)
BILIRUB UR QL STRIP: NEGATIVE
BUN SERPL-MCNC: 43 MG/DL (ref 7–30)
CALCIUM SERPL-MCNC: 8.7 MG/DL (ref 8.5–10.1)
CHLORIDE SERPL-SCNC: 116 MMOL/L (ref 94–109)
CO2 SERPL-SCNC: 26 MMOL/L (ref 20–32)
COLOR UR AUTO: YELLOW
CREAT SERPL-MCNC: 1.82 MG/DL (ref 0.66–1.25)
DIFFERENTIAL METHOD BLD: ABNORMAL
EOSINOPHIL # BLD AUTO: 0.9 10E9/L (ref 0–0.7)
EOSINOPHIL NFR BLD AUTO: 6.9 %
ERYTHROCYTE [DISTWIDTH] IN BLOOD BY AUTOMATED COUNT: 16 % (ref 10–15)
GFR SERPL CREATININE-BSD FRML MDRD: 32 ML/MIN/{1.73_M2}
GLUCOSE SERPL-MCNC: 97 MG/DL (ref 70–99)
GLUCOSE UR STRIP-MCNC: NEGATIVE MG/DL
HCT VFR BLD AUTO: 34.6 % (ref 40–53)
HGB BLD-MCNC: 10.7 G/DL (ref 13.3–17.7)
HGB UR QL STRIP: ABNORMAL
IMM GRANULOCYTES # BLD: 0.1 10E9/L (ref 0–0.4)
IMM GRANULOCYTES NFR BLD: 0.5 %
KETONES UR STRIP-MCNC: NEGATIVE MG/DL
LEUKOCYTE ESTERASE UR QL STRIP: ABNORMAL
LYMPHOCYTES # BLD AUTO: 1.4 10E9/L (ref 0.8–5.3)
LYMPHOCYTES NFR BLD AUTO: 10.2 %
MCH RBC QN AUTO: 27.1 PG (ref 26.5–33)
MCHC RBC AUTO-ENTMCNC: 30.9 G/DL (ref 31.5–36.5)
MCV RBC AUTO: 88 FL (ref 78–100)
MONOCYTES # BLD AUTO: 0.7 10E9/L (ref 0–1.3)
MONOCYTES NFR BLD AUTO: 5.3 %
NEUTROPHILS # BLD AUTO: 10.3 10E9/L (ref 1.6–8.3)
NEUTROPHILS NFR BLD AUTO: 76.9 %
NITRATE UR QL: NEGATIVE
NRBC # BLD AUTO: 0 10*3/UL
NRBC BLD AUTO-RTO: 0 /100
PH UR STRIP: 7 PH (ref 5–7)
PLATELET # BLD AUTO: 245 10E9/L (ref 150–450)
POTASSIUM SERPL-SCNC: 4.5 MMOL/L (ref 3.4–5.3)
PROT SERPL-MCNC: 7 G/DL (ref 6.8–8.8)
RBC # BLD AUTO: 3.95 10E12/L (ref 4.4–5.9)
RBC #/AREA URNS AUTO: >182 /HPF (ref 0–2)
SODIUM SERPL-SCNC: 147 MMOL/L (ref 133–144)
SOURCE: ABNORMAL
SP GR UR STRIP: 1.02 (ref 1–1.03)
UROBILINOGEN UR STRIP-MCNC: NORMAL MG/DL (ref 0–2)
WBC # BLD AUTO: 13.3 10E9/L (ref 4–11)
WBC #/AREA URNS AUTO: >182 /HPF (ref 0–5)

## 2019-02-20 PROCEDURE — 85025 COMPLETE CBC W/AUTO DIFF WBC: CPT | Performed by: EMERGENCY MEDICINE

## 2019-02-20 PROCEDURE — 99284 EMERGENCY DEPT VISIT MOD MDM: CPT | Mod: 25

## 2019-02-20 PROCEDURE — 87040 BLOOD CULTURE FOR BACTERIA: CPT | Mod: 91 | Performed by: EMERGENCY MEDICINE

## 2019-02-20 PROCEDURE — 51798 US URINE CAPACITY MEASURE: CPT

## 2019-02-20 PROCEDURE — 80053 COMPREHEN METABOLIC PANEL: CPT | Performed by: EMERGENCY MEDICINE

## 2019-02-20 PROCEDURE — 81001 URINALYSIS AUTO W/SCOPE: CPT | Performed by: EMERGENCY MEDICINE

## 2019-02-20 PROCEDURE — 96365 THER/PROPH/DIAG IV INF INIT: CPT

## 2019-02-20 PROCEDURE — 36415 COLL VENOUS BLD VENIPUNCTURE: CPT

## 2019-02-20 PROCEDURE — 25000128 H RX IP 250 OP 636: Performed by: EMERGENCY MEDICINE

## 2019-02-20 PROCEDURE — 96361 HYDRATE IV INFUSION ADD-ON: CPT

## 2019-02-20 RX ORDER — CIPROFLOXACIN 2 MG/ML
400 INJECTION, SOLUTION INTRAVENOUS EVERY 24 HOURS
Qty: 2000 ML | Refills: 0 | Status: SHIPPED | OUTPATIENT
Start: 2019-02-20 | End: 2019-03-04

## 2019-02-20 RX ORDER — CIPROFLOXACIN 2 MG/ML
400 INJECTION, SOLUTION INTRAVENOUS ONCE
Status: COMPLETED | OUTPATIENT
Start: 2019-02-20 | End: 2019-02-20

## 2019-02-20 RX ADMIN — SODIUM CHLORIDE 1000 ML: 9 INJECTION, SOLUTION INTRAVENOUS at 09:38

## 2019-02-20 RX ADMIN — CIPROFLOXACIN 400 MG: 2 INJECTION, SOLUTION INTRAVENOUS at 09:40

## 2019-02-20 RX ADMIN — SODIUM CHLORIDE 1000 ML: 9 INJECTION, SOLUTION INTRAVENOUS at 08:22

## 2019-02-20 ASSESSMENT — MIFFLIN-ST. JEOR: SCORE: 1256.39

## 2019-02-20 NOTE — ED PROVIDER NOTES
History     Chief Complaint:  UTI and weakness    The history is limited by the condition of the patient.      Barrie Woods is a 89 year old male with a history of cerebrovascular disease, arrhythmia, atrial fibrillation, hyperlipidemia, hypertension, renal cell carcinoma, and dementia who presents to the emergency department via for evaluation of UTI and weakness. Patient is unable to provide any history. Per EMS, his nursing home sent him here for IV fluid and antibiotics after a UTI diagnosis yesterday. He has also had reported weakness for the past few days.     Allergies:  No Known Drug Allergies     Medications:    Tylenol  Coreg  Celexa  Voltaren  Lisinopril  Magnesium oxide  Lyrica  Zantac      Past Medical History:    Cerebrovascular disease  Alcohol abuse  Anemia  Arrhythmia   Atrial fibrillation  Balanitis   Cachexia  Cardiomyopathy  CHF  Chronic pain  Chronic infection  Dementia  Depressive disorder  Diaphragmatic hernia  Diverticulosis  Dysphagia   GERD  GI bleed  Hearing loss  Hematuria  Hyperlipidemia   Hypertension  Hypertrophy of prostate  Idiopathic peripheral neuropathy  Presbycusis of both ears  Indwelling urethral catheter  Renal cell carcinoma  Sepsis  TB lung, latent  Tobacco use disorder  Urinary incontinence     Past Surgical History:    Cystoscopy, transurethral resection prostate   Cataract bilateral     Family History:    History reviewed. No pertinent family history.    Social History:  Former smoker  Negative for alcohol use.  Marital Status:        Review of Systems   Unable to perform ROS: Dementia         Physical Exam     Patient Vitals for the past 24 hrs:   BP Temp Temp src Pulse Heart Rate Resp SpO2 Height Weight   02/20/19 1015 135/67 -- -- 81 -- -- 98 % -- --   02/20/19 1000 139/58 -- -- 79 -- -- 99 % -- --   02/20/19 0945 136/81 -- -- 80 -- -- 96 % -- --   02/20/19 0930 156/74 -- -- 82 -- -- 95 % -- --   02/20/19 0915 149/77 -- -- 82 -- -- 97 % -- --   02/20/19  "0900 157/77 -- -- 78 -- -- 98 % -- --   02/20/19 0845 147/71 -- -- 81 -- -- 96 % -- --   02/20/19 0830 144/63 -- -- 70 -- -- 98 % -- --   02/20/19 0815 115/61 -- -- 79 -- -- 96 % -- --   02/20/19 0803 133/68 98.4  F (36.9  C) Oral -- 81 20 95 % 1.676 m (5' 6\") 64.9 kg (143 lb)   02/20/19 0800 127/60 -- -- 74 -- -- 96 % -- --       Physical Exam  Vitals: reviewed by me  General: Pt seen on hospital [facility], cooperative, and alert to conversation  Eyes: Tracking well, clear conjunctiva BL  ENT: MMM, midline trachea.   Lungs:   No tachypnea, no accessory muscle use. No respiratory distress.   CV: Rate as above, regular rhythm.    Abd: Soft, non tender, no guarding, no rebound. Non distended  MSK: no peripheral edema or joint effusion.  No evidence of trauma  Skin: No rash, normal turgor and temperature  Neuro: Clear speech when he does speak and no facial droop.  Psych: Not RIS, no e/o /      Emergency Department Course   Laboratory:  CBC: WBC: 13.3 (H), HGB: 10.7 (L), PLT: 245  CMP: Sodium: 147 (H), Chloride: 116 (H), Urea Nitrogen: 43 (H), Creatinine: 1.82 (H), GFR: 32 (L), Albumin: 2.2 (L), ALKPHOS: 200 (H), o/w WNL     UA: Slightly cloudy yellow urine, blood: large, protein albumin: 100, leukocyte esterase: large, WBC: 182 (H), RBC: 182 (H), otherwise WNL    0804 Blood Culture: In process  0939 Blood Culture: In process    Interventions:  0822 0.9% Sodium Chloride BOLUS 1166.67 mLs IV   0938 0.9% Sodium Chloride BOLUS 1266.67 mLs IV   0940 Cipro 400 mg IV    Emergency Department Course:  0807 Nursing notes and vitals reviewed. I performed an exam of the patient as documented above.     IV inserted. Medicine administered as documented above. Blood drawn. This was sent to the lab for further testing, results above.    The patient provided a urine sample here in the emergency department. This was sent for laboratory testing, findings above.     1010 I rechecked the patient and discussed the results of his " workup thus far.     Findings and plan explained to the Patient and group home. Patient discharged home with instructions regarding supportive care, medications, and reasons to return. The importance of close follow-up was reviewed.     Impression & Plan    Medical Decision Making:  Barrie Woods is a 89 year old male who presents to the ED with generalized weakness, appears to have a UTI. Our urine here does show significant pyuria, as well has hematuria. The hematuria is likely due to cathing, as he has an abnormal  tract, and a history of penile surgery. He does have a low grade white count, and although he appears well here, per the nursing home he does have a slight level of altered mental status, and again, for this reason I do like to treat. He does have a recent urine culture that grew out cephalosporin resistant pathogens, and for this reason, as he is not on any hypoglycemics or steroids, I did elect to do Cipro IV. Patient will be discharged back to his care home. I am told that he can receive IV antibiotics and IV fluids there, and I do think he will do better in this monitored facility as his treatment is relatively straight forward as he is just being treated for a UTI, so I would prefer not to expose him to any types of hospital acquired infections here in the hospital. He is afebrile, normal vitals, I do think he will do well back at his care facility, and we have gone over dosing of the Cipro with ED pharmacy, and this has been conveyed clearly to the RNs at his care facility. Will discharge as above.     Diagnosis:    ICD-10-CM    1. Dysphagia, unspecified type R13.10 Blood culture     Blood culture     XR Video Swallow w/o Esophagram     XR Video Swallow w/o Esophagram   2. Urinary tract infection with hematuria, site unspecified N39.0     R31.9        Disposition:  Discharged to home.     Discharge Medications:  New Prescriptions    CIPROFLOXACIN (CIPRO) 400 MG IN DEXTROSE 5% 200 ML  INTERMITTENT INFUSION    Inject 200 mLs (400 mg) into the vein every 24 hours for 10 days        Scribe Disclosure:  I, Meliton Diaz, am serving as a scribe on 2/20/2019 at 8:07 AM to personally document services performed by Dr. Pacheco MD based on my observations and the provider's statements to me.     Meliton Diaz  2/20/2019    EMERGENCY DEPARTMENT       Max Bergman MD  02/20/19 9485

## 2019-02-20 NOTE — DISCHARGE INSTRUCTIONS
Cipro 400mg IV QD for 10 days.  Repeat, per ER pharmacy, with his kidney function, he only needs one (1) dose of 400mg Cipro per day.  Needs a Creatinine recheck in 24 hours.  This was discussed with nursing home in RN to RN report

## 2019-02-20 NOTE — ED NOTES
Bed: ED24  Expected date:   Expected time:   Means of arrival:   Comments:  Mariella - 89 M weakness eta [id number]

## 2019-02-21 ENCOUNTER — PATIENT OUTREACH (OUTPATIENT)
Dept: GERIATRIC MEDICINE | Facility: CLINIC | Age: 84
End: 2019-02-21

## 2019-02-21 NOTE — PROGRESS NOTES
[facility] Care Coordination Contact  CC received notification of Emergency Room visit.  ER visit occurred on 2/20/19 at [facility] with Dx of dysphagia and UTI.    CC contacted SNF [facility] and left a message requesting a return call.  Member has a follow-up appointment with PCP: Yes: scheduled on will see at faiclity  Member has had a change in condition: No  New referrals placed: No  Home Visit Needed: No - client still in SNF  Care plan reviewed and updated.  PCP notified of ED visit via EMR.    MORELIA Blum  [facility] Care Coordinator  [phone]

## 2019-02-26 ENCOUNTER — TELEPHONE (OUTPATIENT)
Dept: EMERGENCY MEDICINE | Facility: CLINIC | Age: 84
End: 2019-02-26

## 2019-02-26 LAB
BACTERIA SPEC CULT: NO GROWTH
BACTERIA SPEC CULT: NO GROWTH
Lab: NORMAL
Lab: NORMAL
SPECIMEN SOURCE: NORMAL
SPECIMEN SOURCE: NORMAL

## 2019-02-26 NOTE — TELEPHONE ENCOUNTER
[facility] Emergency Department Lab result notification     Patient/parent Name  Eldon RN    Reason for call  2PM: Patient's LTC facility nurse Eldon,  requesting urine culture results from 2/20/19 ED visit.   Advised Eldon RN that there is no urine culture from that ED visit, only a UA.     2:20PM:Called Eldon RN back and verified that I could not find a urine culture result from the ED visit or the day prior.   Eldon has no further questions.     [RN Name]  Taylor Vega RN  [facility] RN  Lung Nodule and ED Lab Result RN  Epic pool (ED late result f/u RN): [id number]  [facility] INCIDENTAL RADIOLOGY F/U NURSES: [id number]  # [phone]

## 2019-02-28 ENCOUNTER — HOSPITAL ENCOUNTER (OUTPATIENT)
Dept: SPEECH THERAPY | Facility: CLINIC | Age: 84
End: 2019-02-28
Attending: FAMILY MEDICINE
Payer: COMMERCIAL

## 2019-02-28 ENCOUNTER — HOSPITAL ENCOUNTER (OUTPATIENT)
Dept: GENERAL RADIOLOGY | Facility: CLINIC | Age: 84
Discharge: HOME | End: 2019-02-28
Admitting: FAMILY MEDICINE
Payer: COMMERCIAL

## 2019-02-28 PROCEDURE — 92611 MOTION FLUOROSCOPY/SWALLOW: CPT | Mod: GN

## 2019-02-28 PROCEDURE — 92526 ORAL FUNCTION THERAPY: CPT | Mod: GN

## 2019-02-28 PROCEDURE — 74230 X-RAY XM SWLNG FUNCJ C+: CPT

## 2019-02-28 PROCEDURE — T1013 SIGN LANG/ORAL INTERPRETER: HCPCS | Mod: U3

## 2019-02-28 NOTE — PROGRESS NOTES
"Video Swallow Evaluation:      02/28/19 1330       Present Yes   Language Malaysian   Comments in person    General Information   Type Of Visit Initial   Start Of Care Date 02/28/19   Referring Physician Meagan Payton DO    Orders Evaluate And Treat   Orders Comment Clinical swallow, VFSS and treatment as indicated.    Medical Diagnosis aspiration   Onset Of Illness/injury Or Date Of Surgery 02/19/19  (date test was ordered by DO)   General Information Comments Video swallow study orders recieved for \"dysphagia\" and \"aspiration.\" Pt well known to SLP services from prior admissions, including VFSS/treatments in 2015. Per Son, he was recently living at home with him until he had to have surgery - pt is now in nursing home until son can fully take care of pt again, goal is to eventually get pt back home with son. Son reports pt has been on thickened liquids (not sure re: nectar or honey) and chopped/diced food.    VFSS Eval: Thin Liquid Texture Trial   Mode of Presentation, Thin Liquid spoon;cup;fed by clinician;self-fed   Order of Presentation 5, 6   Preparatory Phase Poor bolus control   Oral Phase, Thin Liquid Poor AP movement;Premature pharyngeal entry   Pharyngeal Phase, Thin Liquid Delayed swallow reflex;Residue in valleculae;Residue in pyriform sinus   Rosenbek's Penetration Aspiration Scale: Thin Liquid Trial Results 7 - contrast passes glottis, visible subglottic residue remains despite patient's response (aspiration)   Response to Aspiration unproductive reflexive involuntary cough/throat clear   VFSS Eval: Nectar Thick Liquid Texture Trial   Mode of Presentation, Nectar spoon;cup;self-fed;fed by clinician   Order of Presentation 1, 2, 3, 4, 8   Preparatory Phase Poor bolus control   Oral Phase, Nectar Premature pharyngeal entry   Pharyngeal Phase, Nectar Delayed swallow reflex;Residue in valleculae;Residue in pyriform sinus   Rosenbek's Penetration Aspiration Scale: " Nectar-Thick Liquid Trial Results 7 - contrast passes glottis, visible subglottic residue remains despite patient's response (aspiration)   Response to Aspiration, Nectar unproductive reflexive involuntary cough/throat clear   VFSS Eval: Puree Solid Texture Trial   Mode of Presentation, Puree spoon;fed by clinician   Order of Presentation 7   Preparatory Phase Poor bolus control   Oral Phase, Puree Poor AP movement;Premature pharyngeal entry   Pharyngeal Phase, Puree Delayed swallow reflex;Residue in valleculae;Residue in pyriform sinus   Rosenbek's Penetration Aspiration Scale: Puree Food Trial Results 1 - no aspiration, contrast does not enter airway   VFSS Eval: Semisolid Texture Trial   Mode of Presentation, Semisolid spoon;fed by clinician   Order of Presentation not saved on recording   Diagnostic Statement appeared that mastication was slow/incomplete, pharyngeal residuals.    General Therapy Interventions   Planned Therapy Interventions Dysphagia Treatment   Dysphagia treatment Modified diet education;Instruction of safe swallow strategies   Swallow Eval: Clinical Impressions   Skilled Criteria for Therapy Intervention Skilled criteria met.  Treatment indicated.   Functional Assessment Scale (FAS) 2   Dysphagia Outcome Severity Scale (NATALI) Level 2 - NATALI   Treatment Diagnosis mod-severe oropharyngeal dysphagia   Diet texture recommendations Dysphagia diet level 2;Nectar thick liquids  (tsp or small controlled cup sips with hand-over-hand assist)   Recommended Feeding/Eating Techniques alternate between small bites and sips of food/liquid;hard swallow w/ each bite or sip;maintain upright posture during/after eating for 30 mins;no straws;small sips/bites  (2-3 extra swallows as able)   Therapy Frequency (x1 f/u s/p VFSS)   Risks and Benefits of Treatment have been explained. Yes   Patient, family and/or staff in agreement with Plan of Care Yes   Clinical Impression Comments Video swallow study completed with  pt, pt's son and in person  present. Pt assessed with thin liquids, nectar thick liquids, puree solids, semis-solids. Honey thick liquids were not trialed d/t level of pharyngeal residuals observed, general solids were not completed d/t deficits in mastication observed. Pt currently presents with mod-severe oropharyngeal dysphagia. Oral phase notable for reduced oral control, incomplete mastication, and premature spillage to the pyriform sinuses with all liquids, to the valleculae with solids. Moderately reduced base of tongue strength/retraction and general pharyngeal weakness both resulted in mod-severe vallecular and pyriform sinus residuals, worsening with thicker viscosities. Given pt's cognitive deficits, pt only responded to cues to double swallow ~ 50% of the time. Double swallows did reduced amount of residuals, but did not fully clear them. Hyolaryngeal elevation appeared moderately reduced, excursion was intact. Epiglottic inversion was severely reduced, incomplete laryngeal closure noted. Given decreased bolus control with larger sips, premature spillage to the pyriform sinuses with liquids and reduced laryngeal closure pt had nakia aspiration x1 with thin liquids and x1 with nectar thick liquids. Very weak cough occurred which did not clear barium from trachea. Controlled bolus size appears to reduced risk of aspiration, pt benefitted from hand-over-hand cup drinking to facilitate small single sips. No penetration or aspiration noted with solids, however flash penetration of pyriform sinus residuals occasionally noted across all. Overall, pt is a high risk for aspiration however should be able to tolerate DD2 solids, nectar thick liquids with STRICT use of swallow strategies: 1:1 assist for all feeding, hand-over-hand cup drinking to facilitate small/controlled sips (if coughing noted, defer to tsp), cue to completed 2-3 swallows as able, slow pace, small single bites/sips, alternate between  solids/liquids, add extra solids, must be upright for all PO/30-60 minutes post. This appears close to his baseline diet and per son has not had any recent occurences of PNA.     Swallow treatment completed directly after video swallow study. Pt and son educated on results of video swallow study s/p evaluation including video review, deficits re: oropharyngeal swallow, risk for aspiration, strategies to facilitate safe swallowing. Son reports pt is very impulsive when eating and concerned re: nursing staff being able to assist. Written handout provided to son re: diet recommendations and swallow strategies to reinforce with staff. Pt would benefit from SLP f/u at nursing home to educate staff, train pt in swallow strategies as able, monitor clinical tolerance. He may benefit from oropharyngeal exercises pending ability to participate given cognition.      Swallow Goal 1   Goal Identifier short term goal 1   Goal Description Pt and family will understand results and recommendations of VFSS.   Target Date 02/28/19   Date Met 02/28/19   Total Session Time   SLP Eval: VideoFluoroscopic Swallow function Minutes (44625) 40   Total Evaluation Time 40

## 2019-03-04 ENCOUNTER — PATIENT OUTREACH (OUTPATIENT)
Dept: GERIATRIC MEDICINE | Facility: CLINIC | Age: 84
End: 2019-03-04

## 2019-03-04 ENCOUNTER — HOSPITAL ENCOUNTER (INPATIENT)
Facility: CLINIC | Age: 84
LOS: 4 days | Discharge: HOME HEALTH | DRG: 698 | End: 2019-03-08
Attending: EMERGENCY MEDICINE | Admitting: HOSPITALIST
Payer: COMMERCIAL

## 2019-03-04 DIAGNOSIS — A41.9 SEVERE SEPSIS (H): ICD-10-CM

## 2019-03-04 DIAGNOSIS — R31.9 URINARY TRACT INFECTION WITH HEMATURIA, SITE UNSPECIFIED: ICD-10-CM

## 2019-03-04 DIAGNOSIS — R65.20 SEVERE SEPSIS (H): ICD-10-CM

## 2019-03-04 DIAGNOSIS — F03.90 DEMENTIA WITHOUT BEHAVIORAL DISTURBANCE, UNSPECIFIED DEMENTIA TYPE: ICD-10-CM

## 2019-03-04 DIAGNOSIS — N39.0 URINARY TRACT INFECTION WITH HEMATURIA, SITE UNSPECIFIED: ICD-10-CM

## 2019-03-04 DIAGNOSIS — I10 HTN, GOAL BELOW 140/90: ICD-10-CM

## 2019-03-04 DIAGNOSIS — B99.9 INFECTIOUS ENCEPHALOPATHY: ICD-10-CM

## 2019-03-04 DIAGNOSIS — A41.9 SEPSIS, DUE TO UNSPECIFIED ORGANISM: Primary | ICD-10-CM

## 2019-03-04 DIAGNOSIS — Z86.59 HISTORY OF DEMENTIA: ICD-10-CM

## 2019-03-04 DIAGNOSIS — G93.49 INFECTIOUS ENCEPHALOPATHY: ICD-10-CM

## 2019-03-04 LAB
ALBUMIN UR-MCNC: 300 MG/DL
ANION GAP SERPL CALCULATED.3IONS-SCNC: 6 MMOL/L (ref 3–14)
APPEARANCE UR: ABNORMAL
BACTERIA #/AREA URNS HPF: ABNORMAL /HPF
BASOPHILS # BLD AUTO: 0 10E9/L (ref 0–0.2)
BASOPHILS NFR BLD AUTO: 0.2 %
BILIRUB UR QL STRIP: NEGATIVE
BUN SERPL-MCNC: 35 MG/DL (ref 7–30)
CALCIUM SERPL-MCNC: 8.3 MG/DL (ref 8.5–10.1)
CHLORIDE SERPL-SCNC: 109 MMOL/L (ref 94–109)
CO2 SERPL-SCNC: 27 MMOL/L (ref 20–32)
COLOR UR AUTO: YELLOW
CREAT SERPL-MCNC: 2.2 MG/DL (ref 0.66–1.25)
DIFFERENTIAL METHOD BLD: ABNORMAL
EOSINOPHIL # BLD AUTO: 0.5 10E9/L (ref 0–0.7)
EOSINOPHIL NFR BLD AUTO: 4.4 %
ERYTHROCYTE [DISTWIDTH] IN BLOOD BY AUTOMATED COUNT: 16.3 % (ref 10–15)
GFR SERPL CREATININE-BSD FRML MDRD: 26 ML/MIN/{1.73_M2}
GLUCOSE SERPL-MCNC: 123 MG/DL (ref 70–99)
GLUCOSE UR STRIP-MCNC: NEGATIVE MG/DL
HCT VFR BLD AUTO: 32.1 % (ref 40–53)
HGB BLD-MCNC: 10 G/DL (ref 13.3–17.7)
HGB UR QL STRIP: ABNORMAL
IMM GRANULOCYTES # BLD: 0 10E9/L (ref 0–0.4)
IMM GRANULOCYTES NFR BLD: 0.1 %
KETONES UR STRIP-MCNC: NEGATIVE MG/DL
LACTATE BLD-SCNC: 1.1 MMOL/L (ref 0.7–2)
LACTATE BLD-SCNC: 2.3 MMOL/L (ref 0.7–2)
LEUKOCYTE ESTERASE UR QL STRIP: ABNORMAL
LYMPHOCYTES # BLD AUTO: 1.4 10E9/L (ref 0.8–5.3)
LYMPHOCYTES NFR BLD AUTO: 13 %
MCH RBC QN AUTO: 26.8 PG (ref 26.5–33)
MCHC RBC AUTO-ENTMCNC: 31.2 G/DL (ref 31.5–36.5)
MCV RBC AUTO: 86 FL (ref 78–100)
MONOCYTES # BLD AUTO: 1 10E9/L (ref 0–1.3)
MONOCYTES NFR BLD AUTO: 9.4 %
NEUTROPHILS # BLD AUTO: 7.6 10E9/L (ref 1.6–8.3)
NEUTROPHILS NFR BLD AUTO: 72.9 %
NITRATE UR QL: NEGATIVE
NRBC # BLD AUTO: 0 10*3/UL
NRBC BLD AUTO-RTO: 0 /100
PH UR STRIP: 6.5 PH (ref 5–7)
PLATELET # BLD AUTO: 218 10E9/L (ref 150–450)
POTASSIUM SERPL-SCNC: 4.7 MMOL/L (ref 3.4–5.3)
RBC # BLD AUTO: 3.73 10E12/L (ref 4.4–5.9)
RBC #/AREA URNS AUTO: >182 /HPF (ref 0–2)
SODIUM SERPL-SCNC: 142 MMOL/L (ref 133–144)
SOURCE: ABNORMAL
SP GR UR STRIP: 1.01 (ref 1–1.03)
TROPONIN I SERPL-MCNC: <0.015 UG/L (ref 0–0.04)
UROBILINOGEN UR STRIP-MCNC: 2 MG/DL (ref 0–2)
WBC # BLD AUTO: 10.4 10E9/L (ref 4–11)
WBC #/AREA URNS AUTO: >182 /HPF (ref 0–5)
WBC CLUMPS #/AREA URNS HPF: PRESENT /HPF

## 2019-03-04 PROCEDURE — 83605 ASSAY OF LACTIC ACID: CPT | Performed by: EMERGENCY MEDICINE

## 2019-03-04 PROCEDURE — 84484 ASSAY OF TROPONIN QUANT: CPT | Performed by: EMERGENCY MEDICINE

## 2019-03-04 PROCEDURE — 87088 URINE BACTERIA CULTURE: CPT | Performed by: EMERGENCY MEDICINE

## 2019-03-04 PROCEDURE — 85025 COMPLETE CBC W/AUTO DIFF WBC: CPT | Performed by: EMERGENCY MEDICINE

## 2019-03-04 PROCEDURE — 99223 1ST HOSP IP/OBS HIGH 75: CPT | Mod: AI | Performed by: HOSPITALIST

## 2019-03-04 PROCEDURE — 12000000 ZZH R&B MED SURG/OB

## 2019-03-04 PROCEDURE — 87186 SC STD MICRODIL/AGAR DIL: CPT | Performed by: EMERGENCY MEDICINE

## 2019-03-04 PROCEDURE — 25000128 H RX IP 250 OP 636: Performed by: EMERGENCY MEDICINE

## 2019-03-04 PROCEDURE — 96365 THER/PROPH/DIAG IV INF INIT: CPT

## 2019-03-04 PROCEDURE — 25000125 ZZHC RX 250: Performed by: EMERGENCY MEDICINE

## 2019-03-04 PROCEDURE — 99285 EMERGENCY DEPT VISIT HI MDM: CPT | Mod: 25

## 2019-03-04 PROCEDURE — 25800030 ZZH RX IP 258 OP 636: Performed by: EMERGENCY MEDICINE

## 2019-03-04 PROCEDURE — 25000128 H RX IP 250 OP 636: Performed by: HOSPITALIST

## 2019-03-04 PROCEDURE — 81001 URINALYSIS AUTO W/SCOPE: CPT | Performed by: EMERGENCY MEDICINE

## 2019-03-04 PROCEDURE — 25800030 ZZH RX IP 258 OP 636: Performed by: HOSPITALIST

## 2019-03-04 PROCEDURE — 96361 HYDRATE IV INFUSION ADD-ON: CPT

## 2019-03-04 PROCEDURE — 87086 URINE CULTURE/COLONY COUNT: CPT | Performed by: EMERGENCY MEDICINE

## 2019-03-04 PROCEDURE — 87040 BLOOD CULTURE FOR BACTERIA: CPT | Performed by: EMERGENCY MEDICINE

## 2019-03-04 PROCEDURE — 36415 COLL VENOUS BLD VENIPUNCTURE: CPT

## 2019-03-04 PROCEDURE — 80048 BASIC METABOLIC PNL TOTAL CA: CPT | Performed by: EMERGENCY MEDICINE

## 2019-03-04 RX ORDER — SODIUM CHLORIDE 9 MG/ML
INJECTION, SOLUTION INTRAVENOUS CONTINUOUS
Status: DISCONTINUED | OUTPATIENT
Start: 2019-03-04 | End: 2019-03-05

## 2019-03-04 RX ORDER — LIDOCAINE 40 MG/G
CREAM TOPICAL
Status: DISCONTINUED | OUTPATIENT
Start: 2019-03-04 | End: 2019-03-08 | Stop reason: HOSPADM

## 2019-03-04 RX ORDER — BISACODYL 10 MG
10 SUPPOSITORY, RECTAL RECTAL DAILY PRN
Status: DISCONTINUED | OUTPATIENT
Start: 2019-03-04 | End: 2019-03-08 | Stop reason: HOSPADM

## 2019-03-04 RX ORDER — PREGABALIN 100 MG/1
100 CAPSULE ORAL 2 TIMES DAILY
COMMUNITY
End: 2019-03-19

## 2019-03-04 RX ORDER — CITALOPRAM HYDROBROMIDE 10 MG/1
10 TABLET ORAL DAILY
Status: ON HOLD | COMMUNITY
End: 2019-03-08

## 2019-03-04 RX ORDER — ONDANSETRON 2 MG/ML
4 INJECTION INTRAMUSCULAR; INTRAVENOUS EVERY 6 HOURS PRN
Status: DISCONTINUED | OUTPATIENT
Start: 2019-03-04 | End: 2019-03-08 | Stop reason: HOSPADM

## 2019-03-04 RX ORDER — NALOXONE HYDROCHLORIDE 0.4 MG/ML
.1-.4 INJECTION, SOLUTION INTRAMUSCULAR; INTRAVENOUS; SUBCUTANEOUS
Status: DISCONTINUED | OUTPATIENT
Start: 2019-03-04 | End: 2019-03-08 | Stop reason: HOSPADM

## 2019-03-04 RX ORDER — ACETAMINOPHEN 325 MG/1
650 TABLET ORAL EVERY 4 HOURS PRN
COMMUNITY

## 2019-03-04 RX ORDER — POLYETHYLENE GLYCOL 3350 17 G/17G
17 POWDER, FOR SOLUTION ORAL DAILY PRN
Status: DISCONTINUED | OUTPATIENT
Start: 2019-03-04 | End: 2019-03-08 | Stop reason: HOSPADM

## 2019-03-04 RX ORDER — AMOXICILLIN 250 MG
2 CAPSULE ORAL 2 TIMES DAILY PRN
Status: DISCONTINUED | OUTPATIENT
Start: 2019-03-04 | End: 2019-03-08 | Stop reason: HOSPADM

## 2019-03-04 RX ORDER — ACETAMINOPHEN 500 MG
1000 TABLET ORAL EVERY 6 HOURS PRN
COMMUNITY
End: 2019-03-19

## 2019-03-04 RX ORDER — AMOXICILLIN 250 MG
1 CAPSULE ORAL 2 TIMES DAILY PRN
Status: DISCONTINUED | OUTPATIENT
Start: 2019-03-04 | End: 2019-03-08 | Stop reason: HOSPADM

## 2019-03-04 RX ORDER — ACETAMINOPHEN 650 MG/1
650 SUPPOSITORY RECTAL EVERY 4 HOURS PRN
Status: DISCONTINUED | OUTPATIENT
Start: 2019-03-04 | End: 2019-03-08 | Stop reason: HOSPADM

## 2019-03-04 RX ORDER — ONDANSETRON 4 MG/1
4 TABLET, ORALLY DISINTEGRATING ORAL EVERY 6 HOURS PRN
Status: DISCONTINUED | OUTPATIENT
Start: 2019-03-04 | End: 2019-03-08 | Stop reason: HOSPADM

## 2019-03-04 RX ORDER — ACETAMINOPHEN 325 MG/1
650 TABLET ORAL EVERY 4 HOURS PRN
Status: DISCONTINUED | OUTPATIENT
Start: 2019-03-04 | End: 2019-03-08 | Stop reason: HOSPADM

## 2019-03-04 RX ORDER — PIPERACILLIN SODIUM, TAZOBACTAM SODIUM 3; .375 G/15ML; G/15ML
3.38 INJECTION, POWDER, LYOPHILIZED, FOR SOLUTION INTRAVENOUS EVERY 6 HOURS
Status: DISCONTINUED | OUTPATIENT
Start: 2019-03-04 | End: 2019-03-07

## 2019-03-04 RX ORDER — PIPERACILLIN SODIUM, TAZOBACTAM SODIUM 3; .375 G/15ML; G/15ML
3.38 INJECTION, POWDER, LYOPHILIZED, FOR SOLUTION INTRAVENOUS ONCE
Status: COMPLETED | OUTPATIENT
Start: 2019-03-04 | End: 2019-03-04

## 2019-03-04 RX ORDER — SODIUM CHLORIDE 9 MG/ML
1000 INJECTION, SOLUTION INTRAVENOUS CONTINUOUS
Status: DISCONTINUED | OUTPATIENT
Start: 2019-03-04 | End: 2019-03-04

## 2019-03-04 RX ORDER — LISINOPRIL 20 MG/1
20 TABLET ORAL DAILY
Status: ON HOLD | COMMUNITY
End: 2019-03-08

## 2019-03-04 RX ADMIN — SODIUM CHLORIDE 1000 ML: 9 INJECTION, SOLUTION INTRAVENOUS at 16:25

## 2019-03-04 RX ADMIN — LIDOCAINE HYDROCHLORIDE 10 ML: 20 JELLY TOPICAL at 16:29

## 2019-03-04 RX ADMIN — PIPERACILLIN SODIUM,TAZOBACTAM SODIUM 3.38 G: 3; .375 INJECTION, POWDER, FOR SOLUTION INTRAVENOUS at 21:50

## 2019-03-04 RX ADMIN — SODIUM CHLORIDE: 9 INJECTION, SOLUTION INTRAVENOUS at 18:30

## 2019-03-04 RX ADMIN — PIPERACILLIN SODIUM,TAZOBACTAM SODIUM 3.38 G: 3; .375 INJECTION, POWDER, FOR SOLUTION INTRAVENOUS at 16:40

## 2019-03-04 RX ADMIN — SODIUM CHLORIDE 1000 ML: 9 INJECTION, SOLUTION INTRAVENOUS at 15:00

## 2019-03-04 ASSESSMENT — MIFFLIN-ST. JEOR: SCORE: 1256.39

## 2019-03-04 ASSESSMENT — ACTIVITIES OF DAILY LIVING (ADL): ADLS_ACUITY_SCORE: 33

## 2019-03-04 NOTE — ED PROVIDER NOTES
"  History     Chief Complaint:  Altered mental status    HPI     History limited by patient and provided primarily by patient's son secondary to baseline as well as increased confusion.    Barrie Woods is a 89 year old male who presents with sons for evaluation confusion. The patient currently resides in a skilled nursing facility and has baseline confusion. However, the patient also has a history of frequent UTIs and will commonly become more confused and weak past baseline. He was seen two weeks ago more recently on 2/20 for increased confusion and weakness, was found to have a UTI, and was treated here as well as through his care facility. More recently, over the past week, the patient has been noticeably more weak again, pocketing food in his mouth, and requiring more assistance in transferring (he can usually go from bed to wheelchair by himself). Staff and his sons noted that he was far more confused past his baseline and was referred here for evaluation. The sons note that the patient was complaining of left arm pain but was unable to specify further.    Allergies:  No known drug allergies     Medications:    Tylenol  Coreg  Celexa  Lisinopril  Lyrica     Past Medical History:    Balantis  Cachexia  Dementia  Community acquired pneumonia  Atrial fibrillation  CHF  Chronic pain  Atypical chest pain  Renal cell carcinoma  Anemia  TB   Hyperlipidemia  Hypertension  GERD  Cerebrovascular disease  Cardiomyopathy    Past Surgical History:    TURP  Cataract surgery     Family History:    History reviewed. No pertinent family history.      Social History:  Smoking Status: Former Smoker  Alcohol Use: No  Patient presents with sons.      Review of Systems   Unable to perform ROS: Mental status change     Physical Exam   First Vitals:  BP: 117/58  Pulse: 67  Heart Rate: 73  Temp: 100.1  F (37.8  C)  Resp: 18  Height: 167.6 cm (5' 6\")  Weight: 64.9 kg (143 lb)  SpO2: 97 %      Physical Exam   General: Cachectic, " elderly man. Awake but minimally responsive to verbal questioning.     Eye:  Pupils are equal, round, and reactive.  Extraocular movements intact.    ENT:  No rhinorrhea.  Moist mucus membranes.  Normal tongue and tonsil.    Cardiac:  Regular rate and rhythm.  No murmurs, gallops, or rubs.    Pulmonary:  Clear to auscultation bilaterally.  No wheezes, rales, or rhonchi.    Abdomen:  Positive bowel sounds.  Abdomen is soft and non-distended, without focal tenderness.    Musculoskeletal:  Normal movement of all extremities without evidence for deficit. no tenderness or deficit of motion to the left upper arm.     Skin:  Warm and dry without rashes.    Neurologic:  Patient struggles to fully participate though shows no obvious cranial nerve or strength deficit  Psychiatric:  Family notes confused compared to baseline. He provides no history.    Emergency Department Course     Laboratory:  1441 - Troponin: <0.015   CBC: HGB 10.0 (L), otherwise WNL (WBC 10.4, )   BMP: Glucose 123 (H), BUN 35 (H), GFR 26 (L), Ca 8.3 (L), Creatinine 2.20 (H), o/w WNL  1441 - Lactic: 2.3 (H)  Repeat Lactic Acid:  pending  UA: Spec gravity >1.035 (H), moderate blood, Albumin 300, leukocyte esterase large, WBC >182 (H), RBC >182 (H), WBC Clumps present, moderate bacteria  Blood Culture x2: Pending     Urine culture: Pending    Interventions:  Medications   sodium chloride (PF) 0.9% PF flush 3 mL (not administered)   sodium chloride (PF) 0.9% PF flush 3 mL (not administered)   0.9% sodium chloride BOLUS (1,000 mLs Intravenous New Bag 3/4/19 1500)     Followed by   sodium chloride 0.9% infusion (1,000 mLs Intravenous New Bag 3/4/19 1625)   piperacillin-tazobactam (ZOSYN) 3.375 g vial to attach to  mL bag (3.375 g Intravenous Started 3/4/19 1640)   lidocaine 2 % (URO-JET) jelly 10 mL (10 mLs Urethral Given 3/4/19 1629)   1500 - NS 1L IV Bolus   1625 - NS 1L IV Bolus   1640 - Zosyn 3.375 g IVPB      Emergency Department Course:  The  patient arrived in the emergency department via EMS.   Past medical records, nursing notes, and vitals reviewed.  1444: I performed an exam of the patient and obtained history, as documented above.     IV inserted and blood drawn.     The patient was placed on cardiac monitoring.      1508: Sepsis confirmed via elevated lactate.    1614: Nursing was able to obtain a catheter and urine specimen after multiple failed attempts here.     1638: Zosyn medication was not given due to conflicts over appropriate dosing.    1654: Findings and plan explained to the Patient who consents to admission.     1703: Discussed the patient with Dr. Jones, who will admit the patient to an inpatient bed for further monitoring, evaluation, and treatment.      Impression & Plan      CMS Diagnoses: The patient has signs of Severe Sepsis as evidenced by:    1. 2 SIRS criteria, AND  2. Suspected infection, AND   3. Organ dysfunction: Lactic Acid > 1.9    Time severe sepsis diagnosis confirmed = 1508 as this was the time when Lactate resulted, and the level was > 1.9    3 Hour Severe Sepsis Bundle Completion:  1. Initial Lactic Acid Result:   Recent Labs   Lab Test 03/04/19  1441 04/26/17  1358 04/26/17  1025   LACT 2.3* 1.1 2.5*     2. Blood Cultures before Antibiotics: Yes  3. Broad Spectrum Antibiotics Administered: Yes     Anti-infectives (From now, onward)    Start     Dose/Rate Route Frequency Ordered Stop    03/04/19 1511  piperacillin-tazobactam (ZOSYN) 3.375 g vial to attach to  mL bag      3.375 g  over 1 Hours Intravenous ONCE 03/04/19 1510          4. 30 mL/kg ml of IV fluids.  Ideal body weight: 63.8 kg (140 lb 10.5 oz)  Adjusted ideal body weight: 64.2 kg (141 lb 9.5 oz)    Severe Sepsis reassessment:  1. Repeat Lactic Acid Level: pending  2. MAP>65 after initial IVF bolus, will continue to monitor fluid status and vital signs  I attest to having performed a repeat sepsis exam and assessment of perfusion and the results  demonstrate improved perfusion.      Medical Decision Making:  This unfortunate elderly man with advanced dementia presents from his nursing home with concerns of having altered mental status and fever.  He has a history of urinary tract infections and I believe that this is the source of his infection.  He was treated early in his course with antibiotics once it was determined that he has severe sepsis with an elevated lactate.  He was appropriately fluid resuscitated and does not show any evidence of shock.    At this point, I spoke with family and they concur with the plan for admission and for aggressive treatment of his sepsis.  I spoke with the admitting team who agrees to accept care of the patient.  Considering his history of infectious encephalopathy along with fever and a clear source of infection, I do not believe he requires a head CT or further workup for altered mental status at this juncture.    Diagnosis:    ICD-10-CM   1. Severe sepsis (H) A41.9    R65.20   2. Urinary tract infection with hematuria, site unspecified N39.0    R31.9   3. Infectious encephalopathy G93.49    B99.9   4. History of dementia Z86.59     Atif Sanchez  3/4/2019    EMERGENCY DEPARTMENT  I, Atif Sanchez, am serving as a scribe at 2:44 PM on 3/4/2019 to document services personally performed by Trierweiler, Chad A, MD based on my observations and the provider's statements to me.       Trierweiler, Chad A, MD  03/05/19 2783

## 2019-03-04 NOTE — H&P
[facility]    History and Physical  Hospitalist       Date of Admission:  3/4/2019  Date of Service (when I saw the patient): 03/04/19    Assessment & Plan   Barrie Woods is a 89 year old male who presents with altered mentation. Admitted for further evaluation and treatment.     Acute metabolic encephalopathy, in the setting of sepsis and suspected urinary source: Urinalysis obtained in ED with large LE and moderate blood. Initial lactate at 2.3, improved to 1.1 on recheck.   - Follow cultures.   - IV Zosyn.   - Lactate normalized.   - Palliative consultation.     MALGORZATA on CKD stage III  Baseline creatinine 1.0-1.1.  Admission creatinine 2.20.  Likely secondary to poor p.o. Intake as well as urinary tract infection.   - Youssef catheter inserted in ED.   - IVF.  - Avoid nephrotoxins.     Chronic anemia  Admission hemoglobin 11.1.    - Monitor.      BPH with TURP on 2/6/19.   Recently hospitalized with sepsis secondary to CAUTI and completed antibiotic prior to discharge.  - Follows with Urologic physicians, consider consultation during inpatient stay as clinically indicated.     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 CT completed on 2/14/19 with multiple bilateral pulmonary nodules compatible with metastatic disease, liver lesions, mass to left inferior kidney.     - Palliative consulted.      Dysphagia: On mechanical soft diet with nectar thick liquids prior to admission.  - Mech soft, nectar thick.   - Consider ST eval.     Hypertension  Chronic systolic CHF  Atrial fibrillation  Most recent TTE from April 2017 shows ejection fraction of 50-55% (history of EF of 30-35%) with wall motion abnormalities.  He is not on anticoagulation for A. Fib. Troponin negative on admission.   - Monitor.      History of left MCA CVA  Dementia  Depression  Dysphasia  Longstanding history of dementia with  intermittent history of delirium.   - Sitter prn for delirium.   - Delirium precautions.   - Seroquel prn.     DVT Prophylaxis: Pneumatic Compression Devices  Code Status: Full Code    Disposition: Inpatient status, no family present on admission. Palliative consulted.    Dr. Atif Jones D.O.  [facility] Hospitalist  Pager [phone]    Primary Care Physician   Yonatan Hurtado    Chief Complaint   Altered mental status.     History is obtained from the patient and medical records.     History of Present Illness   Barrie Woods is a 89 year old male who presents with altered mentation. Admitted for further evaluation and treatment. Acute metabolic encephalopathy, in the setting of sepsis and suspected urinary source: Urinalysis obtained in ED with large LE and moderate blood. Initial lactate at 2.3, improved to 1.1 on recheck.  Patient is primarily Occitan-speaking without family present on admission, with a history of dementia, therefore history of present illness and review of systems is incomplete.    Past Medical History    I have reviewed this patient's medical history and updated it with pertinent information if needed.   Past Medical History:   Diagnosis Date     Acute, but ill-defined, cerebrovascular disease 1/04    left middle cerebral artery infarct     Alcohol abuse      Anemia 7/14/2014     Arrhythmia     a fib     Atrial fibrillation (H)     CVA occurred off anticoagulation     Balanitis 6/4/2018     Cachexia (H) 1/9/2018     Cardiomyopathy (H)     stress tests 6/04 and 10/04 negative for ischemia, EF 36-40%     CHF (congestive heart failure) (H) 12/24/2009     Chronic infection      Chronic pain 10/12/2015     Dementia without behavioral disturbance, unspecified dementia type 5/9/2017     Dementia, without behavioral disturbance 6/29/2015     Depressive disorder      Diaphragmatic hernia without mention of obstruction or gangrene     left sided chest pain, hiatal hernia/gastritis      Diverticulosis of colon (without mention of hemorrhage)     sigmoid     Dysphagia 7/27/2015     Elevated blood sugar 1/25/2016     Esophageal reflux      Hearing loss      Hematuria     negative cystoscopy 3/04     Hereditary and idiopathic peripheral neuropathy 2006    EMG-peripheral polyneuropathy, B12 and VitD deficient, ? alcohol related     Hx of congestive heart failure 1/12/2016     Hypertension      Hypertrophy of prostate 6/17/2015     Nausea with vomiting 12/8/2014     Presbycusis of both ears 7/17/2017     Presence of indwelling urethral catheter 8/17/2018     PSA elevation 5/30/2014     Recurrent falls 7/28/2015     Renal cell carcinoma (H) 8/25/2014     Sepsis (H) 1/7/2015     TB lung, latent 5/6/2014     Urinary incontinence 7/28/2015       Past Surgical History   I have reviewed this patient's surgical history and updated it with pertinent information if needed.  Past Surgical History:   Procedure Laterality Date     CYSTOSCOPY, TRANSURETHRAL RESECTION (TUR) PROSTATE, COMBINED N/A 2/6/2019    Procedure: Cystoscopy, bipolar transurethral resection of prostate;  Surgeon: Justin Scott MD;  Location: [facility] OR     EYE SURGERY      cataract surgery bilat       Prior to Admission Medications   Prior to Admission Medications   Prescriptions Last Dose Informant Patient Reported? Taking?   Multiple Vitamins-Minerals (MULTILEX) TABS 3/4/2019 at am Nursing Home No Yes   Sig: TAKE ONE TABLET BY MOUTH ONCE DAILY   acetaminophen (TYLENOL) 325 MG tablet  at prn Nursing Home Yes Yes   Sig: Take 650 mg by mouth every 4 hours as needed for mild pain   acetaminophen (TYLENOL) 500 MG tablet  at prn Nursing Home Yes Yes   Sig: Take 1,000 mg by mouth every 6 hours as needed for mild pain   carvedilol (COREG) 12.5 MG tablet 3/4/2019 at am Nursing Home No Yes   Sig: Take 1 tablet (12.5 mg) by mouth 2 times daily (with meals)   citalopram (CELEXA) 10 MG tablet 3/4/2019 at am Nursing Home Yes Yes   Sig: Take 10 mg by  mouth daily   diclofenac (VOLTAREN) 1 % GEL topical gel 3/4/2019 at Unknown time skilled nursing No Yes   Sig: Apply 4 gm to knees qid   lisinopril (PRINIVIL/ZESTRIL) 20 MG tablet 3/4/2019 at am Nursing Home Yes Yes   Sig: Take 20 mg by mouth daily   magnesium oxide (MAG-OX) 400 (241.3 MG) MG tablet 3/4/2019 at am Nursing Home No Yes   Sig: TAKE 1 TABLET BY MOUTH ONCE DAILY   order for DME  Nursing Home No No   Sig: Equipment being ordered: walker with slides   pregabalin (LYRICA) 100 MG capsule 3/4/2019 at am Nursing Home Yes Yes   Sig: Take 100 mg by mouth 2 times daily   ranitidine (ZANTAC) 150 MG tablet 3/4/2019 at am Nursing Home Yes Yes   Sig: Take 150 mg by mouth daily      Facility-Administered Medications: None     Allergies   Allergies   Allergen Reactions     Nkda [No Known Drug Allergies]        Social History   I have reviewed this patient's social history and updated it with pertinent information if needed. Barrie Woods  reports that he has quit smoking. His smoking use included dip, chew, snus or snuff. His smokeless tobacco use includes chew. He reports that he does not drink alcohol or use drugs.    Family History   I have reviewed this patient's family history and updated it with pertinent information if needed.   Family History   Problem Relation Age of Onset     C.A.D. No family hx of      Diabetes No family hx of        Review of Systems   The 10 point Review of Systems is negative other than noted in the HPI or here. Patient is primarily Sinhala-speaking without family present on admission, with a history of dementia, therefore history of present illness and review of systems is incomplete.    Physical Exam   Temp: 100.1  F (37.8  C) Temp src: Rectal BP: 109/55 Pulse: 67 Heart Rate: 73 Resp: 13 SpO2: 97 % O2 Device: None (Room air)    Vital Signs with Ranges  Temp:  [100.1  F (37.8  C)] 100.1  F (37.8  C)  Pulse:  [67] 67  Heart Rate:  [73] 73  Resp:  [13-18] 13  BP: (109-117)/(55-58)  109/55  SpO2:  [97 %] 97 %  143 lbs 0 oz    GENERAL: Alert. NAD.  Resting comfortably.  HEENT: Normocephalic. No icterus or injection. Nares normal.   LUNGS: Clear to auscultation, decrement to bases. No dyspnea at rest.   HEART: Irregular rate. Extremities perfused.   ABDOMEN: Soft, nontender, and nondistended. Positive bowel sounds.  Youssef present.   EXTREMITIES: No LE edema noted.   NEUROLOGIC: Moves extremities x4.  Does not follow commands.    Data   Data reviewed today:  I personally reviewed both laboratory and imaging data.   Recent Labs   Lab 03/04/19  1441   WBC 10.4   HGB 10.0*   MCV 86         POTASSIUM 4.7   CHLORIDE 109   CO2 27   BUN 35*   CR 2.20*   ANIONGAP 6   JOSE 8.3*   *   TROPI <0.015       No results found for this or any previous visit (from the past 24 hour(s)).

## 2019-03-04 NOTE — ED NOTES
Bed: ED13  Expected date: 3/4/19  Expected time: 2:14 PM  Means of arrival: Ambulance  Comments:  [place] 88m  Confusion  ETA 1413

## 2019-03-04 NOTE — LETTER
Transition Communication Hand-off for Care Transitions to Next Level of Care Provider    Name: Barrie Woods  : 1930  MRN #: [id number]  Primary Care Provider: Yonatan Hurtado  Primary Care MD Name: Yonatan Hurtado  Primary Clinic: [address] [facility] [address]     Reason for Hospitalization:  Severe sepsis (H) [A41.9, R65.20]  History of dementia [Z86.59]  Infectious encephalopathy [G93.49, B99.9]  Urinary tract infection with hematuria, site unspecified [N39.0, R31.9]  Admit Date/Time: 3/4/2019  2:24 PM  Discharge Date: 3/8/19  Payor Source: Payor: MEDICA / Plan: MEDICA [employer] / Product Type: HMO /     Readmission Assessment Measure (CRISTO) Risk Score/category: elevated           Reason for Communication Hand-off Referral: Fragility  Difficulty understanding plan of care  Multiple providers/specialties    Discharge Plan: patient admitted from his respite care facility [facility] 3/4 for weakness and sepsis with UTI.  Recent diagnosis of cancer colorectal primary with mets.  PMH of dementia.  Patients son Jose was given options for palliative chemo, Palliative therapy with goals of care or hospice.  Jose at this time has chosen to discharge the patient to his home with homecare RN/SW/HHA at discharge.  Family is ultimately planning on taking their father to [facility] at the end of the month and will most likely continue to re admit the patient for any declines.  Patient's son Jose stated that the patient would have family support of four people at home and had equipment wheelchair, BSC and are asking for a hospital bed with their waiver services.         Concern for non-adherence with plan of care:   Y/N n  Discharge Needs Assessment:  Needs      Most Recent Value   # of Referrals Placed by [facility]  External Care Coordination, Post Acute Facilities, Community Resources, Financial Services [appeal rights possible transportation]          Already enrolled in Tele-monitoring  program and name of program:  n/a  Follow-up specialty is recommended: No    Follow-up plan:    Future Appointments   Date Time Provider Department Center   3/14/2019 10:45 AM Winter Simon MD [facility]       Any outstanding tests or procedures:        Referrals     Future Labs/Procedures    Home care nursing referral     Comments:    Aide and RN skilled nursing visit. RN to assess home safety, hydration status. Start of care 3/9/19  [facility] [phone]  Your provider has ordered home care nursing services. If you have not been contacted within 2 days of your discharge please call the inpatient department phone number at [phone] .    Home Care Social Service Referral for Hospital Discharge     Comments:     to evaluate home safety.    Your provider has ordered home care - . If you have not been contacted within 2 days of your discharge please call the department phone number listed on the top of this document.            Key Recommendations:   Discharge to home with family assist and homecare Rn/PT/SW/HHA.     Rocio Salomon    AVS/Discharge Summary is the source of truth; this is a helpful guide for improved communication of patient story

## 2019-03-04 NOTE — ED NOTES
"[facility]  ED Nurse Handoff Report    ED Chief complaint: Altered Mental Status (from tcu. increased confusion, similiar to previous symptoms of UTI. at tcu s/p UTI. hypotensive for EMS )      ED Diagnosis:   Final diagnoses:   Severe sepsis (H)   Urinary tract infection with hematuria, site unspecified   Infectious encephalopathy   History of dementia       Code Status: Full Code    Allergies:   Allergies   Allergen Reactions     Nkda [No Known Drug Allergies]        Activity level - Baseline/Home:  Stand with Assist of 2    Activity Level - Current:   Stand with Assist of 2     Needed?: Yes    Isolation: No  Infection: Not Applicable  Other   Bariatric?: No    Vital Signs:   Vitals:    03/04/19 1430 03/04/19 1544   BP: 117/58 109/55   Pulse: 67    Resp: 18 13   Temp: 100.1  F (37.8  C)    TempSrc: Rectal    SpO2: 97% 97%   Weight: 64.9 kg (143 lb)    Height: 1.676 m (5' 6\")        Cardiac Rhythm: ,   Cardiac  Cardiac Rhythm: Atrial fibrillation    Pain level:      Is this patient confused?: Yes   Does this patient have a guardian?  Yes         If yes, is there guardianship documents in the Epic \"Code/ACP\" activity?  N/A         Guardian Notified?  N/A  [place] - Suicide Severity Rating Scale Completed?  Yes  If yes, what color did the patient score?  White    Patient Report:   Barrie Woods is a 89 year old male who presents with sons for evaluation confusion. The patient currently resides in a skilled nursing facility and has baseline confusion. However, the patient also has a history of frequent UTIs and will commonly become more confused and weak past baseline. He was seen two weeks ago more recently on 2/20 for increased confusion and weakness, was found to have a UTI, and was treated here as well as through his care facility. More recently, over the past week, the patient has been noticeably more weak again, pocketing food in his mouth, and requiring more assistance in " transferring (he can usually go from bed to wheelchair by himself). Staff and his sons noted that he was far more confused past his baseline and was referred here for evaluation. The sons note that the patient was complaining of left arm pain but was unable to specify further.  Focused Assessment: gentleman who is confused at baseline.  He makes eye contact but not answering questions. He only spoke 1 word when cath was placed causing discomfort.   Tests Performed: Labs, urine   Abnormal Results:   Results for orders placed or performed during the hospital encounter of 03/04/19   CBC with platelets differential   Result Value Ref Range    WBC 10.4 4.0 - 11.0 10e9/L    RBC Count 3.73 (L) 4.4 - 5.9 10e12/L    Hemoglobin 10.0 (L) 13.3 - 17.7 g/dL    Hematocrit 32.1 (L) 40.0 - 53.0 %    MCV 86 78 - 100 fl    MCH 26.8 26.5 - 33.0 pg    MCHC 31.2 (L) 31.5 - 36.5 g/dL    RDW 16.3 (H) 10.0 - 15.0 %    Platelet Count 218 150 - 450 10e9/L    Diff Method Automated Method     % Neutrophils 72.9 %    % Lymphocytes 13.0 %    % Monocytes 9.4 %    % Eosinophils 4.4 %    % Basophils 0.2 %    % Immature Granulocytes 0.1 %    Nucleated RBCs 0 0 /100    Absolute Neutrophil 7.6 1.6 - 8.3 10e9/L    Absolute Lymphocytes 1.4 0.8 - 5.3 10e9/L    Absolute Monocytes 1.0 0.0 - 1.3 10e9/L    Absolute Eosinophils 0.5 0.0 - 0.7 10e9/L    Absolute Basophils 0.0 0.0 - 0.2 10e9/L    Abs Immature Granulocytes 0.0 0 - 0.4 10e9/L    Absolute Nucleated RBC 0.0    Basic metabolic panel   Result Value Ref Range    Sodium 142 133 - 144 mmol/L    Potassium 4.7 3.4 - 5.3 mmol/L    Chloride 109 94 - 109 mmol/L    Carbon Dioxide 27 20 - 32 mmol/L    Anion Gap 6 3 - 14 mmol/L    Glucose 123 (H) 70 - 99 mg/dL    Urea Nitrogen 35 (H) 7 - 30 mg/dL    Creatinine 2.20 (H) 0.66 - 1.25 mg/dL    GFR Estimate 26 (L) >60 mL/min/[1.73_m2]    GFR Estimate If Black 30 (L) >60 mL/min/[1.73_m2]    Calcium 8.3 (L) 8.5 - 10.1 mg/dL   Lactic acid whole blood   Result Value Ref  Range    Lactic Acid 2.3 (H) 0.7 - 2.0 mmol/L   UA with Microscopic   Result Value Ref Range    Color Urine Yellow     Appearance Urine Cloudy     Glucose Urine Negative NEG^Negative mg/dL    Bilirubin Urine Negative NEG^Negative    Ketones Urine Negative NEG^Negative mg/dL    Specific Gravity Urine 1.015 1.003 - 1.035    Blood Urine Moderate (A) NEG^Negative    pH Urine 6.5 5.0 - 7.0 pH    Protein Albumin Urine 300 (A) NEG^Negative mg/dL    Urobilinogen mg/dL 2.0 0.0 - 2.0 mg/dL    Nitrite Urine Negative NEG^Negative    Leukocyte Esterase Urine Large (A) NEG^Negative    Source Catheterized Urine     WBC Urine >182 (H) 0 - 5 /HPF    RBC Urine >182 (H) 0 - 2 /HPF    WBC Clumps Present (A) NEG^Negative /HPF    Bacteria Urine Moderate (A) NEG^Negative /HPF   Troponin I   Result Value Ref Range    Troponin I ES <0.015 0.000 - 0.045 ug/L     *Note: Due to a large number of results and/or encounters for the requested time period, some results have not been displayed. A complete set of results can be found in Results Review.       Treatments provided: family education    Family Comments: family can be reached at [phone] (Jose, pt's son)    OBS brochure/video discussed/provided to patient/family: N/A              Name of person given brochure if not patient:               Relationship to patient:     ED Medications:   Medications   sodium chloride (PF) 0.9% PF flush 3 mL (not administered)   sodium chloride (PF) 0.9% PF flush 3 mL (not administered)   0.9% sodium chloride BOLUS (1,000 mLs Intravenous New Bag 3/4/19 1500)     Followed by   sodium chloride 0.9% infusion (1,000 mLs Intravenous New Bag 3/4/19 1625)   piperacillin-tazobactam (ZOSYN) 3.375 g vial to attach to  mL bag (3.375 g Intravenous Started 3/4/19 1640)   lidocaine 2 % (URO-JET) jelly 10 mL (10 mLs Urethral Given 3/4/19 1629)       Drips infusing?:  Yes    For the majority of the shift this patient was Green.   Interventions performed were  none.    Severe Sepsis OR Septic Shock Diagnosis Present: No    To be done/followed up on inpatient unit:      ED NURSE PHONE NUMBER: [phone]

## 2019-03-04 NOTE — PROGRESS NOTES
[facility] Care Coordination Contact    CC left message for Ulysses Fountain at SNF, asking her to call me back with update on client and if he is going to stay LTC or discharge back home with family.    MORELIA Blum  [facility] Care Coordinator  [phone]

## 2019-03-04 NOTE — PROGRESS NOTES
RECEIVING UNIT ED HANDOFF REVIEW    ED Nurse Handoff Report was reviewed by: Cherelle Mcmhaan on March 4, 2019 at 5:28 PM

## 2019-03-04 NOTE — PHARMACY-ADMISSION MEDICATION HISTORY
Admission medication history interview status for the 3/4/2019  admission is complete. See EPIC admission navigator for prior to admission medications     Medication history source reliability:Good    Actions taken by pharmacist (provider contacted, etc):  Reviewed medication list sent with patient from [facility].  Spoke w/ staff regarding last doses.      Additional medication history information not noted on PTA med list :None    Medication reconciliation/reorder completed by provider prior to medication history? No    Time spent in this activity: 15 minutes    Prior to Admission medications    Medication Sig Last Dose Taking? Auth Provider   acetaminophen (TYLENOL) 325 MG tablet Take 650 mg by mouth every 4 hours as needed for mild pain  at prn Yes Unknown, Entered By History   acetaminophen (TYLENOL) 500 MG tablet Take 1,000 mg by mouth every 6 hours as needed for mild pain  at prn Yes Unknown, Entered By History   carvedilol (COREG) 12.5 MG tablet Take 1 tablet (12.5 mg) by mouth 2 times daily (with meals) 3/4/2019 at am Yes Yonatan Hurtado MD   citalopram (CELEXA) 10 MG tablet Take 10 mg by mouth daily 3/4/2019 at am Yes Unknown, Entered By History   diclofenac (VOLTAREN) 1 % GEL topical gel Apply 4 gm to knees qid 3/4/2019 at Unknown time Yes Yonatan Hurtado MD   lisinopril (PRINIVIL/ZESTRIL) 20 MG tablet Take 20 mg by mouth daily 3/4/2019 at am Yes Unknown, Entered By History   magnesium oxide (MAG-OX) 400 (241.3 MG) MG tablet TAKE 1 TABLET BY MOUTH ONCE DAILY 3/4/2019 at am Yes Yonatan Hurtado MD   Multiple Vitamins-Minerals (MULTILEX) TABS TAKE ONE TABLET BY MOUTH ONCE DAILY 3/4/2019 at am Yes Yonatan Hurtado MD   pregabalin (LYRICA) 100 MG capsule Take 100 mg by mouth 2 times daily 3/4/2019 at am Yes Unknown, Entered By History   ranitidine (ZANTAC) 150 MG tablet Take 150 mg by mouth daily 3/4/2019 at am Yes Unknown, Entered By History   order for DME Equipment being ordered: walker with slides    Yonatan Hurtado MD Carolyn Dahlman, PharmD, BCPS

## 2019-03-05 ENCOUNTER — PATIENT OUTREACH (OUTPATIENT)
Dept: GERIATRIC MEDICINE | Facility: CLINIC | Age: 84
End: 2019-03-05

## 2019-03-05 LAB
ALBUMIN SERPL-MCNC: 1.9 G/DL (ref 3.4–5)
ALP SERPL-CCNC: 170 U/L (ref 40–150)
ALT SERPL W P-5'-P-CCNC: 11 U/L (ref 0–70)
ANION GAP SERPL CALCULATED.3IONS-SCNC: 6 MMOL/L (ref 3–14)
AST SERPL W P-5'-P-CCNC: 41 U/L (ref 0–45)
BILIRUB SERPL-MCNC: 0.7 MG/DL (ref 0.2–1.3)
BUN SERPL-MCNC: 31 MG/DL (ref 7–30)
CALCIUM SERPL-MCNC: 7.8 MG/DL (ref 8.5–10.1)
CHLORIDE SERPL-SCNC: 115 MMOL/L (ref 94–109)
CO2 SERPL-SCNC: 23 MMOL/L (ref 20–32)
CREAT SERPL-MCNC: 2.03 MG/DL (ref 0.66–1.25)
ERYTHROCYTE [DISTWIDTH] IN BLOOD BY AUTOMATED COUNT: 16.6 % (ref 10–15)
GFR SERPL CREATININE-BSD FRML MDRD: 28 ML/MIN/{1.73_M2}
GLUCOSE SERPL-MCNC: 88 MG/DL (ref 70–99)
HCT VFR BLD AUTO: 32 % (ref 40–53)
HGB BLD-MCNC: 10 G/DL (ref 13.3–17.7)
MCH RBC QN AUTO: 27 PG (ref 26.5–33)
MCHC RBC AUTO-ENTMCNC: 31.3 G/DL (ref 31.5–36.5)
MCV RBC AUTO: 86 FL (ref 78–100)
PLATELET # BLD AUTO: 171 10E9/L (ref 150–450)
POTASSIUM SERPL-SCNC: 4.8 MMOL/L (ref 3.4–5.3)
PROT SERPL-MCNC: 5.7 G/DL (ref 6.8–8.8)
RBC # BLD AUTO: 3.71 10E12/L (ref 4.4–5.9)
SODIUM SERPL-SCNC: 144 MMOL/L (ref 133–144)
WBC # BLD AUTO: 10.6 10E9/L (ref 4–11)

## 2019-03-05 PROCEDURE — 25800030 ZZH RX IP 258 OP 636: Performed by: HOSPITALIST

## 2019-03-05 PROCEDURE — 12000000 ZZH R&B MED SURG/OB

## 2019-03-05 PROCEDURE — 99232 SBSQ HOSP IP/OBS MODERATE 35: CPT | Performed by: NURSE PRACTITIONER

## 2019-03-05 PROCEDURE — 99233 SBSQ HOSP IP/OBS HIGH 50: CPT | Performed by: INTERNAL MEDICINE

## 2019-03-05 PROCEDURE — 25000128 H RX IP 250 OP 636: Performed by: HOSPITALIST

## 2019-03-05 PROCEDURE — 93005 ELECTROCARDIOGRAM TRACING: CPT

## 2019-03-05 PROCEDURE — 99223 1ST HOSP IP/OBS HIGH 75: CPT | Performed by: INTERNAL MEDICINE

## 2019-03-05 PROCEDURE — 85027 COMPLETE CBC AUTOMATED: CPT | Performed by: HOSPITALIST

## 2019-03-05 PROCEDURE — 36415 COLL VENOUS BLD VENIPUNCTURE: CPT | Performed by: HOSPITALIST

## 2019-03-05 PROCEDURE — 25000132 ZZH RX MED GY IP 250 OP 250 PS 637: Performed by: HOSPITALIST

## 2019-03-05 PROCEDURE — 80053 COMPREHEN METABOLIC PANEL: CPT | Performed by: HOSPITALIST

## 2019-03-05 PROCEDURE — 93010 ELECTROCARDIOGRAM REPORT: CPT | Performed by: INTERNAL MEDICINE

## 2019-03-05 PROCEDURE — 25800030 ZZH RX IP 258 OP 636: Performed by: INTERNAL MEDICINE

## 2019-03-05 RX ORDER — SODIUM CHLORIDE 9 MG/ML
INJECTION, SOLUTION INTRAVENOUS CONTINUOUS
Status: DISCONTINUED | OUTPATIENT
Start: 2019-03-05 | End: 2019-03-07

## 2019-03-05 RX ORDER — SODIUM CHLORIDE 9 MG/ML
INJECTION, SOLUTION INTRAVENOUS CONTINUOUS
Status: DISCONTINUED | OUTPATIENT
Start: 2019-03-05 | End: 2019-03-05

## 2019-03-05 RX ADMIN — PIPERACILLIN SODIUM,TAZOBACTAM SODIUM 3.38 G: 3; .375 INJECTION, POWDER, FOR SOLUTION INTRAVENOUS at 15:56

## 2019-03-05 RX ADMIN — SODIUM CHLORIDE: 9 INJECTION, SOLUTION INTRAVENOUS at 02:38

## 2019-03-05 RX ADMIN — PIPERACILLIN SODIUM,TAZOBACTAM SODIUM 3.38 G: 3; .375 INJECTION, POWDER, FOR SOLUTION INTRAVENOUS at 04:29

## 2019-03-05 RX ADMIN — PIPERACILLIN SODIUM,TAZOBACTAM SODIUM 3.38 G: 3; .375 INJECTION, POWDER, FOR SOLUTION INTRAVENOUS at 21:44

## 2019-03-05 RX ADMIN — Medication 1 MG: at 22:46

## 2019-03-05 RX ADMIN — SODIUM CHLORIDE: 9 INJECTION, SOLUTION INTRAVENOUS at 09:25

## 2019-03-05 RX ADMIN — PIPERACILLIN SODIUM,TAZOBACTAM SODIUM 3.38 G: 3; .375 INJECTION, POWDER, FOR SOLUTION INTRAVENOUS at 09:25

## 2019-03-05 RX ADMIN — SODIUM CHLORIDE: 9 INJECTION, SOLUTION INTRAVENOUS at 21:44

## 2019-03-05 ASSESSMENT — ACTIVITIES OF DAILY LIVING (ADL)
ADLS_ACUITY_SCORE: 33

## 2019-03-05 NOTE — PLAN OF CARE
VSS on RA. Tele - A. Fib w/ CVR.  ALVIN orientation, baseline confused. Turkish speaking, does not talk much. Does not appear to be in pain, slept majority of the evening. Regular diet, dental soft. Youssef patent, output yellow/cloudy. Had medium loose incontinent stool, blood tinged. NS infusing at 100 ml/hr, intermittent abx. A2, sitter at bedside. Will continue to monitor.

## 2019-03-05 NOTE — PROGRESS NOTES
[facility]    Medicine Progress Note - Hospitalist Service        Date of Admission:  3/2/2019    Assessment & Plan:   Barrie Woods is a 89 year old Dominican speaking male with past history of dementia, CVA, hypertension, hyperlipidemia, CKD, A. fib, CHF, BPH with indwelling Youssef, probable renal cell carcinoma, chronic anemia, dysphasia who presented with change in mental state and is admitted with suspected UTI.     Acute metabolic encephalopathy  Catheter associated UTI.  -Urinalysis obtained in ED with large LE and moderate blood. Initial lactate at 2.3, improved to 1.1   -Continue IV Zosyn.   -Await urine culture  -Palliative consultation.      MALGORZATA on CKD stage III  Baseline creatinine 1.0-1.1.  Admission creatinine 2.20.  Likely secondary to poor p.o. Intake as well as urinary tract infection.   -Continue IVF.  -Recheck BMP in the morning  -Avoid nephrotoxins.     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     -Patient scheduled for outpatient oncology evaluation on 3/14, will consult [facility] in the hospital as this likely will have implications on further decision making for goals and objectives of care.  -Palliative consulted.      Chronic anemia  -Baseline hemoglobin appears to be around 10, at baseline     BPH with TURP on 2/6/19.   -Recently hospitalized with sepsis secondary to CAUTI and completed antibiotic prior to discharge.  -Underwent cystoscopy, transurethral resection of the prostate on 2/6/19     Dysphagia:   -On mechanical soft diet with nectar thick liquids prior to admission.  -Continue the  "same.    Hypertension  Chronic systolic CHF  Atrial fibrillation  Most recent TTE from April 2017 shows ejection fraction of 50-55% (history of EF of 30-35%) with wall motion abnormalities.    -He is not on anticoagulation for A. Fib.  -Hold prior to admission Coreg and lisinopril(due to initial hypotension and acute kidney injury)  -Restart as clinically indicated.     History of left MCA CVA  Dementia  Depression  Dysphasia  Longstanding history of dementia with intermittent history of delirium.   -Sitter prn for delirium.   -Delirium precautions.   -Seroquel prn.     Diet: Combination Diet Mechanical/Dental Soft Diet     DVT Prophylaxis: Pneumatic Compression Devices   Youssef Catheter: in place, indication:  chronic indwelling Youssef  Code Status: Full Code     Disposition Plan    Expected discharge: 2 - 3 days, recommended to prior living arrangement once antibiotic plan established and palliative care evaluation completed.     Entered: Adebayo Stein MD 03/05/2019, 8:27 AM        The patient's care was discussed with the Bedside Nurse.    Adebayo Stein MD  Hospitalist Service  [facility]    ______________________________________________________________________    Interval History   Blood pressure improved with hydration.  Continues to be confused and disoriented.  Low-grade fever last night.  Afebrile this morning.    Data reviewed today: I reviewed all medications, new labs and imaging results over the last 24 hours. I personally reviewed no images or EKG's today.    Physical Exam   Vital signs:  Temp: 96.2  F (35.7  C) Temp src: Axillary BP: 120/52 Pulse: 67 Heart Rate: 70 Resp: 16 SpO2: 97 % O2 Device: None (Room air)   Height: 167.6 cm (5' 6\") Weight: 64.9 kg (143 lb)  Estimated body mass index is 23.08 kg/m  as calculated from the following:    Height as of this encounter: 1.676 m (5' 6\").    Weight as of this encounter: 64.9 kg (143 lb).      Wt Readings from Last 2 Encounters: "   03/04/19 64.9 kg (143 lb)   02/20/19 64.9 kg (143 lb)       Gen: Awake, disoriented  HEENT: Supple neck, moist oral mucosa, no pallor  Resp: CTA B/L, normal WOB, no crackles, no wheezes  CVS: RRR, no murmur  Abd/GI: Soft, non-tender. BS- normoactive.  No G/R/R  Skin: Warm, dry no rashes  MSK: No joint deformities, no pedal edema  Neuro- CN- intact. No focal deficits.        Data   Recent Labs   Lab 03/05/19  0655 03/04/19  1441   WBC 10.6 10.4   HGB 10.0* 10.0*   MCV 86 86    218    142   POTASSIUM 4.8 4.7   CHLORIDE 115* 109   CO2 23 27   BUN 31* 35*   CR 2.03* 2.20*   ANIONGAP 6 6   JOSE 7.8* 8.3*   GLC 88 123*   ALBUMIN 1.9*  --    PROTTOTAL 5.7*  --    BILITOTAL 0.7  --    ALKPHOS 170*  --    ALT 11  --    AST 41  --    TROPI  --  <0.015       No results found for this or any previous visit (from the past 24 hour(s)).  Medications       piperacillin-tazobactam  3.375 g Intravenous Q6H     sodium chloride (PF)  3 mL Intracatheter Q8H

## 2019-03-05 NOTE — PLAN OF CARE
Pt is confused, non english speaking. Vitals have been stable, afebrile, urine output adequate and yellow in appearance. RRT initiated for chest pain. Provider to speak with family regarding goals of care.

## 2019-03-05 NOTE — PROGRESS NOTES
SPIRITUAL HEALTH SERVICES Progress Note  [facility]    Note that pt is Baptism.  Left referral for Fr. Nunes to visit and anoint pt, which seems in keeping with prior chart note from another hospital stay.   team available for pt/family support per need or request.                                                                                                                                                 Cindy Caputo M.A.  Staff   Pager [phone]  Phone [phone]          Addendum:    Pt's family met Fr. Nunes.  They clarified that they are not [employer]ism but rather [employer].  Changed this in the chart.  No needs at present, but  team is available if needs arise.

## 2019-03-05 NOTE — PROGRESS NOTES
TRANSITIONS OF CARE (HOWARD) LOG   HOWARD tasks should be completed by the CC within one (1) business day of notification of each transition. Follow up contact with member is required after return to their usual care setting.  Note:  If CC finds out about the transitions fifteen (15) days or more after the member has returned to their usual care setting, no HOWARD log is needed. However, the CC should check in with the member to discuss the transition process, any changes needed to the care plan and document it in a case note.    Member Name:  Barrie MCKEON Woods [facility] Name:  [employer] Member ID#:    [facility] Care Coordinator Contact:  Shelly Alanis [facility] Agency/County/Care System: [place]   Transition Communication Actions from Care Management Contact   Transition #1   Notification Date: 03/05/19 Transition Date:   [facility]     Is this the member s usual care setting?               no Transition To: Hospital, [facility]   Transition Type:  Unplanned  Reason for Admission/Comments:  Client admitted with sepsis to [facility]. CC called and LM for Ulysses Johnson [id number]-, sharing care plan and recent history.  Asked Elizabeth to call me with questions, updates.  CC will follow via Epic.      Shared CC contact info, care plan/services with receiving setting--Date completed: 3/5/19   Notified PCP of transition--Date completed:  3/4/19    via  EMR   Transition #2   Transition #3  (if applicable)   Notification Date: 3/8/19     Transition To:  Home  Transition Date: 3/8/19     Transition Type:    Planned  Notified PCP -- Date completed: 3/8/19              Shared CC contact info, care plan/services with receiving setting or, if applicable, home care agency--Date completed:  3/8/19  *Complete additional tasks below, if this transition is a return to usual care setting.      Comments: 3/8/19 client will discharge home with family. They do not want hospice care.  Plan is for family to take client back to [facility] to be with family at end of life. Per [facility] [address] [facility], Josué, they would like hospital bed. CC made referral to Xiomara at [facility].  She is faxing paperwork to Josué for hospitalist to complete today. Xiomara will arrange delivery with clients son, Jose, after paperwork received.    Notification Date:       Transition To:    Transition Date:              Transition Type:   Notified PCP--Date completed:          Shared CC contact info, care plan/services with receiving setting or, if applicable, home care agency--Date completed:       *Complete additional tasks below, if this transition is a return to usual care setting.      Comments:       *Complete tasks below when the member is discharging TO their usual care setting within one (1) business day of notification.  For situations where the Care Coordinator is notified of the discharge prior to the date of discharge, the Care Coordinator must follow up with the member or designated representative to confirm that discharge actually occurred and discuss required HOWARD tasks as outlined in the HOWARD Instructions.  (This includes situations where it may be a  new  usual care setting for the member. (i.e., a community member who decides upon permanent nursing home placement following hospitalization and rehab).    Date completed: 3/8/19 LM for Jose Communicated with member or their designated representative about the following:  care transition process; about changes to the member s health status; plan of care updates; education about transitions and how to prevent unplanned transitions/readmissions  Four Pillars for Optimal Transition:    Check  Yes  - if the member, family member and/or SNF/facility staff manages the following:    If  No  provide explanation in the comments section.          []  Yes     [x]  No     Does the member have a follow-up appointment scheduled with primary care or specialist? (Mental health  hospitalizations--the appt. should be w/in 7 days)   [x]  Yes     []  No     Can the member manage their medications or is there a system in place to manage medications (e.g. home care set-up)?         []  Yes     [x]  No     Can the member verbalize warning signs and symptoms to watch for and how to respond?         [x]  Yes     []  No     Does the member use a Personal Health Care Record?  Check  Yes  if visit summary, discharge summary, and/or healthcare summary are being used as a PHR.                                                                                                                                                                                    [x] Yes      [] No      Have you updated the member s care plan?  If  No  provide explanation in comments.   Comments:  Family to be with client 24/7

## 2019-03-05 NOTE — CONSULTS
Consult Date:  03/05/2019      This consult has been requested by Dr. Stein for metastatic carcinoma.      Patient does not speak English.  Son was present, who speaks a little English.  History obtained from the patient and son with the help of  through the blue phone line.      Mr. Woods is an 89-year-old gentleman who has not been doing well for last few months.  He has had multiple hospitalizations in the last few months.  He was admitted early 01/2019 with sepsis secondary to catheter associated UTI.  Patient has been seeing a urologist.  Investigations are reviewed and summarized below.   -CT abdomen and pelvis on 07/25/2018 had revealed an enlarged prostate.  It also revealed a left renal mass, which has enlarged since 2014.   -Ultrasound on 01/11/2019 did not reveal hydronephrosis.  It again revealed a mass at the lower pole of the left kidney.   -Patient had transurethral resection of the prostate on 02/06/2019.  Pathology reveals moderately differentiated adenocarcinoma consistent with colorectal primary. Negative for prostate adenocarcinoma.   -CT chest, abdomen and pelvis on 02/14/2019 reveals metastatic disease.  There are multiple bilateral pulmonary nodules compatible with metastatic disease.  There are several ill-defined liver lesions concerning for metastatic disease.  There is a left renal mass measuring 9.1 cm.        Patient was brought to the ER on 03/04/2019 because of altered mental status.  Multiple investigations were done.  CBC reveals mild anemia with hemoglobin of 10.0.  Normal WBC and platelet.  Chemistry panel reveals elevated creatinine of 2.20.  UA is abnormal suggestive of UTI.  Cultures are negative so far.      Patient does not remember when the last colonoscopy was. One listed in Epic was in 2009.      REVIEW OF SYSTEMS:    Patient has had rectal bleed.  He was evaluated in the ER on 01/30/2019 because of rectal bleed. Patient is weak.  He gets some abdominal  discomfort.  His appetite has been fair.      No headache.  No chest pain.  No shortness of breath at rest.  No recurrent vomiting.  No high-grade fever or chills.  All other review of systems negative.      ALLERGIES:  REVIEWED.      MEDICATIONS:  Reviewed.      PAST MEDICAL HISTORY:   1.  CVA.    2.  Atrial fibrillation.   3.  Alcohol abuse.   4.  Cardiomyopathy.   5.  CHF.   6.  Dementia.   7.  Depression.   8.  Esophageal reflux.   9.  Hematuria.    10.  Neuropathy.    11.  BPH.     12.  Renal mass, clinically consistent with renal cell carcinoma.   13.  Sepsis.   14.  TURP.   15.  Cataract surgery.      SOCIAL HISTORY:    -He is a former smoker; quit many years ago.    -No alcohol use.      PHYSICAL EXAMINATION:   GENERAL:  He was alert.  Not in any distress.   VITAL SIGNS:  Reviewed.   Rest of the systems not examined.      LABORATORY DATA:  Reviewed.      ASSESSMENT:  An 89-year-old gentleman with:     1.  Metastatic colorectal adenocarcinoma.  Prostate biopsy is positive for colorectal adenocarcinoma.   2.  Acute on chronic renal failure.   3.  Normocytic anemia.   4.  Recent hospitalization for urosepsis.   5.  Multiple other medical problems, including stroke, atrial fibrillation, hypertension and dementia.      RECOMMENDATIONS:    1. I had a long discussion with the patient and family through the  phone line. Patient has metastatic colorectal cancer.  As prostate biopsy is positive for colorectal adenocarcinoma, more than likely it is rectal cancer invading into the prostate.  CT scan reveals metastatic disease, including lung and liver metastasis.  I explained to them that the patient has stage IV colorectal cancer.      We discussed regarding metastatic colorectal adenocarcinoma.  I explained to them that this is an incurable situation.  Treatment will help to control disease, palliate symptoms and prolong life.  Discussed regarding treatment options.  For metastatic colorectal  adenocarcinoma, main treatment is palliative chemotherapy.  Chemotherapy can be oral Xeloda or combination chemotherapy like FOLFOX.  Son had questions regarding side effects of chemotherapy, which were briefly reviewed.  Patient is elderly.  He has multiple medical problems.  He is at high risk of complications from treatment.  After discussion, patient and son made a decision of not doing anything for the cancer. Patient does not want chemotherapy.  He mentions that he would let God take care of the cancer.  As per the wishes of patient and son, no treatment will be planned.  As no treatment is being planned, I would not recommend doing any further imaging studies or labs for colorectal cancer.  I would recommend hospice if the patient and family are agreeable.  Palliative Care will be seeing the patient later.        2. Son had a few questions, which were all answered.  We will see him intermittently in the hospital.  Recommendations discussed with Dr. Stein.      TOTAL TIME SPENT:  80 minutes, more than 50% of the time was spent in counseling and coordination of care.         BECCA NORWOOD MD             D: 2019   T: 2019   MT: KOSTAS      Name:     PREM ANNA   MRN:      [id number]        Account:       [id number]   :      1930           Consult Date:  2019      Document: [id number]       cc: Yonatan Hurtado MD

## 2019-03-05 NOTE — CODE/RAPID RESPONSE
[facility]    RRT Note  3/5/2019   Time Called: 1341    RRT called for: Chest Pain    Assessment & Plan     Nonspecific chest pain in the setting of metastatic colorectal cancer  Low suspicion for ACS     Barrie Woods is a 89 year old male with a past medical history significant for metastatic colorectal cancer, recent TURP, recurrent UTI, cachexia, CHF, and atrial fibrillation who was admitted on 3/4/2019 for sepsis suspected urinary source and acute metabolic encephalopathy. Mr. Woods is primarily Georgian-speaking,  and family beside. Mr. Woods is a very poor historian, majority of information obtained from patient's son.    Per Mr. Woods's son, he started complaining of new onset chest pain for approximately 30 minutes. Yesterday, the patient complained of left arm pain but this is not present now. He is consistently complaining and pointing to his left lower jaw causing him discomfort. Mr. Sosa appears very confused on assessment, not forthcoming with any information. Grimacing noted on palpation across chest and increased grimacing with abdominal palpation. Unable to obtain any descriptive information about the chest pain from Mr. Sosa. Patient's son denies that Mr. Woods has had any recent nausea, vomiting, or shortness of breath.    EKG completed, no acute changes noted. Patient's son states that he wants his dad to be comfortable but they have questions about ongoing treatment. Dr. Stein bedside to answer family questions and develop a plan of care ongoing.    INTERVENTIONS:  - EKG    Discussed with and defer further cares to Dr. Stein.    Interval History     Barrie Woods is a 89 year old male who was admitted on 3/4/2019 for sepsis suspected urinary source and acute metabolic encephalopathy.    Medical history significant for: Metastatic colon adenocarcinoma, CHF, cardiomyopathy, Atrial fibrillation, HTN, recurrent UTI, recent TURP,  Cachexia    Code Status: Full Code    Rhonda ANGELITAJuanita Stafford, student APRN    I was present during above assessment and plan with above named student and am in agreement.     Mr. Woods has advanced dementia and reportedly endorsed chest pain approximately 30- minutes ago. Chest wall is tender to palpation. He endorses mouth pain, but has no obvious mouth sores or lesions. I discussed drawing a troponin with Mr. Woods's son, but at this time will defer to goals of care discussion as son voices he would like comfort care only.     Exam:  General: Elderly male laying in bed without obvious acute distress.  Lungs: No obvious respiratory distress. He is not hypoxic.   CV: Appears well perfused.   GI: Non-distended.  Neuro: No obvious new focal deficit. Confused per baseline.  Psych: Calm, cooperative.     Allergies   Allergies   Allergen Reactions     Nkda [No Known Drug Allergies]        Physical Exam   Vital Signs with Ranges:  Temp:  [96.2  F (35.7  C)-100.1  F (37.8  C)] 97.5  F (36.4  C)  Pulse:  [67] 67  Heart Rate:  [70-95] 72  Resp:  [13-18] 16  BP: ()/(36-72) 121/50  SpO2:  [96 %-99 %] 99 %  I/O last 3 completed shifts:  In: -   Out: 1075 [Urine:1075]    Physical Exam   Constitutional: He appears cachectic. No distress.   HENT:   Head: Normocephalic and atraumatic.   Eyes: Pupils are equal, round, and reactive to light.   Neck: Normal range of motion. Neck supple. No JVD present. No tracheal deviation present.   Cardiovascular: Normal heart sounds. An irregular rhythm present. Exam reveals no gallop and no friction rub.   No murmur heard.  Pulmonary/Chest: Effort normal and breath sounds normal. No respiratory distress.   Abdominal: He exhibits mass. There is tenderness. There is guarding.   Musculoskeletal: Normal range of motion.   Neurological: He is alert. He is disoriented. He displays atrophy.   Skin: Skin is warm and dry.   Psychiatric: He is slowed. Cognition and memory are impaired. He is  inattentive.         Data     EKG:  Interpreted by Rhonda Stafford  Time reviewed: 1352  Symptoms at time of EKG: chest pain   Rhythm: atrial fibrillation - controlled  Rate: Normal  Axis: Normal  Ectopy: none  Conduction: normal  ST Segments/ T Waves: No ST-T wave changes  Q Waves: none  Comparison to prior: Unchanged from 12/28/2018    Clinical Impression: atrial fibrillation (chronic) with controlled rate      Troponin:    Recent Labs   Lab Test 03/04/19  1441  04/26/17  1027   TROPI <0.015   < >  --    TROPONIN  --   --  0.05    < > = values in this interval not displayed.       IMAGING: (X-ray/CT/MRI)   No results found for this or any previous visit (from the past 24 hour(s)).    CBC with Diff:  Recent Labs   Lab Test 03/05/19  0655  01/30/19  1710   WBC 10.6   < > 10.6   HGB 10.0*   < > 11.1*   MCV 86   < > 88      < > 225   INR  --   --  1.10    < > = values in this interval not displayed.        Lactic Acid:    Lab Results   Component Value Date    LACT 1.1 03/04/2019           Comprehensive Metabolic Panel:  Recent Labs   Lab 03/05/19  0655      POTASSIUM 4.8   CHLORIDE 115*   CO2 23   ANIONGAP 6   GLC 88   BUN 31*   CR 2.03*   GFRESTIMATED 28*   GFRESTBLACK 33*   JOSE 7.8*   PROTTOTAL 5.7*   ALBUMIN 1.9*   BILITOTAL 0.7   ALKPHOS 170*   AST 41   ALT 11       INR:    Recent Labs   Lab Test 01/30/19  1710   INR 1.10         UA:  Recent Labs   Lab 03/04/19  1614   COLOR Yellow   APPEARANCE Cloudy   URINEGLC Negative   URINEBILI Negative   URINEKETONE Negative   SG 1.015   UBLD Moderate*   URINEPH 6.5   PROTEIN 300*   NITRITE Negative   LEUKEST Large*   RBCU >182*   WBCU >182*       Time Spent on this Encounter   I spent 30 minutes on the unit/floor managing the care of Barrie MCKEON Woods. Over 50% of my time was spent counseling the patient and/or coordinating care regarding services listed in this note.

## 2019-03-05 NOTE — PROVIDER NOTIFICATION
MD Notification    Notified Person: MD Dr. Elliott    Notified Person Name: Dr. Elliott    Notification Date/Time: 3/5/19    Notification Interaction: Telephone    Purpose of Notification:Low bp in the 80's    Orders Received:300/hr for 2 hours, then back to 100/hr    Comments:

## 2019-03-05 NOTE — PROGRESS NOTES
Consult dictated.    89-year-old gentleman with multiple medical problem has metastatic colorectal adenocarcinoma.  Patient had TURP for BPH.  Pathology reveals adenocarcinoma consistent with colorectal primary.  No prostate carcinoma.  CT chest, abdomen and pelvis reveals liver and lung metastasis.    Plan:  -Discussed with them regarding palliative chemotherapy.  Patient (son) has made informed decision not to take any chemotherapy or any other treatment for cancer.  I would recommend hospice care.

## 2019-03-05 NOTE — PLAN OF CARE
ALVIN pt's orientation. VSS ex low bp in the 80's, increased rate to 300ml/hr over 2hrs and back to 100ml/hr. BP now is 120/52.  Libyan speaking. Pt nonverbal this whole shift. Long arm at all times. Youssef in place, orange color urine. Up with Assist of 2. Regular diet, dental soft. Nursing will continue to monitor.

## 2019-03-06 LAB
ANION GAP SERPL CALCULATED.3IONS-SCNC: 3 MMOL/L (ref 3–14)
BUN SERPL-MCNC: 24 MG/DL (ref 7–30)
CALCIUM SERPL-MCNC: 8 MG/DL (ref 8.5–10.1)
CHLORIDE SERPL-SCNC: 116 MMOL/L (ref 94–109)
CO2 SERPL-SCNC: 24 MMOL/L (ref 20–32)
CREAT SERPL-MCNC: 1.58 MG/DL (ref 0.66–1.25)
GFR SERPL CREATININE-BSD FRML MDRD: 38 ML/MIN/{1.73_M2}
GLUCOSE SERPL-MCNC: 111 MG/DL (ref 70–99)
POTASSIUM SERPL-SCNC: 4.3 MMOL/L (ref 3.4–5.3)
SODIUM SERPL-SCNC: 143 MMOL/L (ref 133–144)

## 2019-03-06 PROCEDURE — 12000000 ZZH R&B MED SURG/OB

## 2019-03-06 PROCEDURE — 25000128 H RX IP 250 OP 636: Performed by: HOSPITALIST

## 2019-03-06 PROCEDURE — 36415 COLL VENOUS BLD VENIPUNCTURE: CPT | Performed by: INTERNAL MEDICINE

## 2019-03-06 PROCEDURE — 80048 BASIC METABOLIC PNL TOTAL CA: CPT | Performed by: INTERNAL MEDICINE

## 2019-03-06 PROCEDURE — 99222 1ST HOSP IP/OBS MODERATE 55: CPT | Performed by: NURSE PRACTITIONER

## 2019-03-06 PROCEDURE — 99233 SBSQ HOSP IP/OBS HIGH 50: CPT | Performed by: INTERNAL MEDICINE

## 2019-03-06 PROCEDURE — 25000132 ZZH RX MED GY IP 250 OP 250 PS 637: Performed by: INTERNAL MEDICINE

## 2019-03-06 PROCEDURE — 25000132 ZZH RX MED GY IP 250 OP 250 PS 637: Performed by: HOSPITALIST

## 2019-03-06 PROCEDURE — 99232 SBSQ HOSP IP/OBS MODERATE 35: CPT | Performed by: INTERNAL MEDICINE

## 2019-03-06 PROCEDURE — 25800030 ZZH RX IP 258 OP 636: Performed by: INTERNAL MEDICINE

## 2019-03-06 RX ORDER — CARVEDILOL 12.5 MG/1
12.5 TABLET ORAL 2 TIMES DAILY WITH MEALS
Status: DISCONTINUED | OUTPATIENT
Start: 2019-03-06 | End: 2019-03-08 | Stop reason: HOSPADM

## 2019-03-06 RX ADMIN — PIPERACILLIN SODIUM,TAZOBACTAM SODIUM 3.38 G: 3; .375 INJECTION, POWDER, FOR SOLUTION INTRAVENOUS at 04:48

## 2019-03-06 RX ADMIN — ACETAMINOPHEN 650 MG: 325 TABLET, FILM COATED ORAL at 14:48

## 2019-03-06 RX ADMIN — PIPERACILLIN SODIUM,TAZOBACTAM SODIUM 3.38 G: 3; .375 INJECTION, POWDER, FOR SOLUTION INTRAVENOUS at 16:03

## 2019-03-06 RX ADMIN — CARVEDILOL 12.5 MG: 12.5 TABLET, FILM COATED ORAL at 17:54

## 2019-03-06 RX ADMIN — PIPERACILLIN SODIUM,TAZOBACTAM SODIUM 3.38 G: 3; .375 INJECTION, POWDER, FOR SOLUTION INTRAVENOUS at 09:56

## 2019-03-06 RX ADMIN — SODIUM CHLORIDE: 9 INJECTION, SOLUTION INTRAVENOUS at 16:03

## 2019-03-06 RX ADMIN — PIPERACILLIN SODIUM,TAZOBACTAM SODIUM 3.38 G: 3; .375 INJECTION, POWDER, FOR SOLUTION INTRAVENOUS at 22:09

## 2019-03-06 ASSESSMENT — ACTIVITIES OF DAILY LIVING (ADL)
ADLS_ACUITY_SCORE: 35

## 2019-03-06 ASSESSMENT — MIFFLIN-ST. JEOR: SCORE: 1317.18

## 2019-03-06 NOTE — PROGRESS NOTES
Patient is Danish speaking,  and family utilized throughout the day to communicate. VSS on room air ex T max: 100.2. Does not show any indication for being in pain. Tele: A fib with CVR. Mechanical diet, dental soft. Poor appetite and needs assistance with eating. Youssef patent, output clear yellow. Normal Saline infusing at 100mL/hr in R forearm. Urine culture positive, MD aware. Up with assist x 2. Turn and reposition Q2H. Palliative meeting this afternoon. Plan: Family does not want hospice care. Discharge to home with son may be possible within 2 days. Family would like to take patient back with them to [facility].

## 2019-03-06 NOTE — PLAN OF CARE
ALVIN orientation. Kiswahili speaking, speaks minimal words. VSS on RA.  Patient looks comfortable, no indicators of pain. Regular/dental soft diet. Youssef patent. NS infusing at 100 ml/hr, intermittent abx. Assist of 2. Palliative meeting tomorrow. Will continue to monitor.

## 2019-03-06 NOTE — PROGRESS NOTES
i-Pad was used for interpretation Service.     Mr. Woods is an 89-year-old gentleman with a newly diagnosed metastatic colorectal carcinoma.  He had TURP done in 02/2019.  Pathology revealed adenocarcinoma consistent with colorectal primary.  CT scan in mid 2/2019 reveals liver and pulmonary metastases.      I had met with the patient and family yesterday and discussed regarding metastatic colorectal cancer.  We had discussed regarding palliative chemotherapy.  Patient and family had decided against any chemotherapy.  Patient does not want any treatment for cancer.     I met with the patient. Two sons and daughter-in-law were present.  I again discussed with them regarding metastatic colorectal cancer. They had questions regarding prognosis, which were discussed.  I explained to them that without treatment,  general survival is about 6 months.  With treatment, people can live about 2 years. Patient is elderly and has multiple medical problems.  His overall status is poor.  Even with treatment, his overall survival will be closer to 1 year.      Sons mentioned that they have discussed about treatment among themselves and also with the father. Patient does not want any chemotherapy or any treatment for cancer.      I discussed with them regarding hospice care. Family does not want any hospice.  Family mentioned that they will take care of the patient at home. They are  from [facility].  Family mentioned that they are planning to take him back to [facility].      They had questions regarding pain.  At this time, patient does not have any pain.  In future, he can have pain.  I told them that pain medication will be given as needed to control the pain.  If they are in [facility], local doctor will prescribe the pain medications.      As per the family, patient's overall condition in the hospital has improved with IV hydration and antibiotics.  They are hoping that his condition will continue to improve and he will  be strong enough to travel to [facility].      LABORATORY DATA:  Reviewed.      ASSESSMENT:   1.  An 89-year-old gentleman with metastatic colorectal adenocarcinoma with lung and liver metastasis.   2.  Renal failure, improving.   3.  Normocytic anemia.      PLAN:   1.  As per patient and family wishes, no treatment for metastatic colorectal cancer.  Patient and family understand that life expectancy is limited to less than 6 months.   2.  They do not want hospice care.  Family is planning to take patient back to [facility].  Advised them to follow up with the local doctor there.     3. Case discussed with Lucy from Palliative Care. We will sign off.  Please call us with any questions.      Total time spent 40 minutes, most of time spent in counseling and coordination of care.         BECCA NORWOOD MD             D: 2019   T: 2019   MT: AS      Name:     PREM ANNA   MRN:      [id number]        Account:      [id number]   :      1930           Service Date: 2019      Document: [id number]

## 2019-03-06 NOTE — PROGRESS NOTES
[facility]    Medicine Progress Note - Hospitalist Service        Date of Admission:  3/2/2019  Evaluated with the presence of Zimbabwean-speaking .  Assessment & Plan:   Barrie Woods is a 89 year old Zimbabwean speaking male with past history of dementia, CVA, hypertension, hyperlipidemia, CKD, A. fib, CHF, BPH with indwelling Youssef, probable renal cell carcinoma, chronic anemia, dysphasia who presented with change in mental state and is admitted with suspected UTI.     Acute metabolic encephalopathy  Catheter associated UTI.  -Urinalysis obtained in ED with large LE and moderate blood. Initial lactate at 2.3, improved to 1.1   -Wound culture growing Enterococcus faecium  -Continue IV Zosyn while awaiting susceptibilities  -Palliative care consultation as described below.      MALGORZATA on CKD stage III  Baseline creatinine 1.0-1.1.  Admission creatinine 2.20.  Likely secondary to poor p.o. Intake as well as urinary tract infection.   -Continue IVF.  Renal function improving  -Recheck BMP in the morning  -Avoid nephrotoxins.     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     -Evaluated by Dr. Gray from [facility], son inform Dr. Gray that they would not be interested in palliative chemotherapy.  Dr. Gray recommended hospice care.  Briefly discussed with the sons at the bedside today, they were overwhelmed by deluge of information  -Formal palliative care consult today     Chronic anemia  -Baseline hemoglobin appears to be around 10, at baseline     BPH with TURP on 2/6/19.   -Recently hospitalized with sepsis secondary to CAUTI and  completed antibiotic prior to discharge.  -Underwent cystoscopy, transurethral resection of the prostate on 2/6/19     Dysphagia:   -continue mechanical soft diet with nectar thick liquids prior to admission.    Hypertension  Chronic systolic CHF  Atrial fibrillation  Most recent TTE from April 2017 shows ejection fraction of 50-55% (history of EF of 30-35%) with wall motion abnormalities.    -He is not on anticoagulation for A. Fib.  -Hold prior to lisinopril(due to acute kidney injury), resume Coreg  -Had an episode of chest pain yesterday, EKG was nonischemic, no further workup pursued after discussion with 2 sons at the bedside at they mention clearly that they would not be interested in any type of intervention like coronary angiogram.     History of left MCA CVA  Dementia  Depression  Dysphasia  Longstanding history of dementia with intermittent history of delirium.   -Sitter prn for delirium.   -Delirium precautions.   -Seroquel prn.     Diet: Combination Diet Mechanical/Dental Soft Diet     DVT Prophylaxis: Pneumatic Compression Devices   Youssef Catheter: in place, indication:  chronic indwelling Youssef  Code Status: Full Code     Disposition Plan    Expected discharge: Tomorrow or 3/8, likely home with son.  Eventual plan is to attempt to take the patient back to [facility] once travel logistics figured out by the family.  Sons did not want the patient to go back to his previous long-term care facility.  They also did not want to pursue hospice care at this time.     Entered: Adebayo Stein MD 03/06/2019, 12:29 PM        The patient's care was discussed with the Bedside Nurse.    Adebayo Stein MD  Hospitalist Service  [facility]    ______________________________________________________________________    Interval History     Blood pressure is much improved.  Afebrile.  No new nursing concerns.    Data reviewed today: I reviewed all medications, new labs and imaging results over the  "last 24 hours. I personally reviewed no images or EKG's today.    Physical Exam   Vital signs:  Temp: 97.1  F (36.2  C) Temp src: Axillary BP: 141/65 Pulse: 93 Heart Rate: 83 Resp: 18 SpO2: 98 % O2 Device: None (Room air)   Height: 167.6 cm (5' 6\") Weight: 70.9 kg (156 lb 6.4 oz)  Estimated body mass index is 25.24 kg/m  as calculated from the following:    Height as of this encounter: 1.676 m (5' 6\").    Weight as of this encounter: 70.9 kg (156 lb 6.4 oz).      Wt Readings from Last 2 Encounters:   03/06/19 70.9 kg (156 lb 6.4 oz)   02/20/19 64.9 kg (143 lb)       Gen: Awake, disoriented, verbalizes small sentences that are difficult to understand  HEENT: Supple neck, moist oral mucosa, no pallor  Resp: CTA B/L, normal WOB, no crackles, no wheezes  CVS: RRR, no murmur  Abd/GI: Soft, non-tender. BS- normoactive.   Skin: Warm, dry no rashes  MSK: No joint deformities, no pedal edema  Neuro- CN- intact. No focal deficits.        Data   Recent Labs   Lab 03/06/19  0738 03/05/19  0655 03/04/19  1441   WBC  --  10.6 10.4   HGB  --  10.0* 10.0*   MCV  --  86 86   PLT  --  171 218    144 142   POTASSIUM 4.3 4.8 4.7   CHLORIDE 116* 115* 109   CO2 24 23 27   BUN 24 31* 35*   CR 1.58* 2.03* 2.20*   ANIONGAP 3 6 6   JOSE 8.0* 7.8* 8.3*   * 88 123*   ALBUMIN  --  1.9*  --    PROTTOTAL  --  5.7*  --    BILITOTAL  --  0.7  --    ALKPHOS  --  170*  --    ALT  --  11  --    AST  --  41  --    TROPI  --   --  <0.015       No results found for this or any previous visit (from the past 24 hour(s)).  Medications     sodium chloride 100 mL/hr at 03/05/19 2144       piperacillin-tazobactam  3.375 g Intravenous Q6H     sodium chloride (PF)  3 mL Intracatheter Q8H         "

## 2019-03-06 NOTE — CONSULTS
SW Consult:    Care Transition Initial Assessment - SW     Met with: Patient and Family ( Isabelle)   Active Problems:    Sepsis (H)       DATA  Lives With: child(eden), adult      Quality of Family Relationships: helpful, involved, supportive  Description of Support System: Supportive, Involved  Who is your support system?: Children  Support Assessment: Adequate family and caregiver support, Adequate social supports.   Identified issues/concerns regarding health management: Per social service protocol for discharge planning, patient was admitted on 3/4/19 with a primary diagnosis of sepsis.  Reviewed chart and spoke with patient's son IWONA Garcia and another family member.  Per son, patient was residing at [facility], but they do not want patient to return and would rather patient go home with them.  Jose confirmed that there are 4 adults that live in their home that are able to provide care for patient.  Jose asked if patient would be able to stay in the hospital until they are able to arrange for a flight back to [facility].  LORRAINE and CC discussed with family that physician will determine when patient is medically ready for discharge and we would then work towards a safe discharge plan.  LORRAINE discussed with family that we would be able to arrange for stretcher transport, home care services and that I would call to patient's [employer]  to see if he would be able to have any equipment provided at home.  SW left  for Shelly Alanis ([employer]) [phone].  Family asked if home care nurse would be able to provide 24 hour care and SW discussed that home care would be intermittent services approximately 1-2 days/week, 45 minutes-1 hour depending on patient's plan of care with home care.         Quality of Family Relationships: helpful, involved, supportive     ASSESSMENT  Cognitive Status:  awake and alert  Concerns to be addressed: Discharge planning.     PLAN  Financial  costs for the patient includes: N/A  Patient given options and choices for discharge: Yes  Patient/family is agreeable to the plan?  TBD  Patient Goals and Preferences: Remain in hospital until they are able to take patient back to [facility].   Patient anticipates discharging to: home with family and home care.    LV Raymond, LGSW

## 2019-03-06 NOTE — PLAN OF CARE
Pt is Cameroonian speaking, speech unclear at times. ALVIN orientation. VSS on RA. Denies pain when asked. Tele A fib CVR. Youssef patent with clear yellow output. Soft incontinent BM overnight. Dental soft diet. New R PIV inf NS @ 100 + int abx. MD paged for preliminary positive UC results. A/2 turn/repo q2hr. Plan for Palliative meeting with family today and discharge pending. Will continue to monitor.

## 2019-03-06 NOTE — CONSULTS
[facility]    Palliative Care Consultation     Barrie Woods  MRN# [id number]  Date of Admission:  3/4/2019  Date of Service (when I saw the patient): 03/06/19  Reason for consult: Consulted by Dr. Jones for Goals of care    Assessment & Plan   Barrie Woods is a 89 year old male with PMH significant for advanced dementia, CKD, and newly diagnosed metastatic colorectal CA, who presents with AMS. He is found to have MALGORZATA and UTI. We are consulted for goals of care.     Met with 2 adult sons, and another woman family member, along with a professional Jamaican interpretor this afternoon. Introduced the scope of our practice to family. Discussed our potential roles for symptom management, support/coping, and decisional support (aka goals of care).    Symptoms/Recommendations   -Family does understand that pt does have stage 4 cancer and they are not pursuing chemotherapy   -Family is requesting, and demanding, to remain in the hospital for the next 20 days until they can get their affairs in order to make it down to [facility]. When I shared with them how our health care system works regarding meeting acute medical guidelines for admission, they said they no longer wanted to visit with me, and otherwise requested to visit with the physician and   -Family was in no position to talk and better understand what Barrie's condition will look like overtime (increased sleeping, increased weakness, reduced PO intake, and the natural dying process). I cannot safely say that they understand his disease is terminal. We were not able to further discuss goals of care, hospice, no rehospitalization, IVF, or code status, as family requested the meeting be stopped   -Would recommend focusing on medical stabilization and discharge planning to long term care facility or home. Unit care coordinator is looking into pt/family's appeal rights     Support/Coping  -2 adult sons present     Decisional Support,  Goals of Care, Counseling & Coordination  Decisional Capacity Intact?  -No  Health Care Directive on File?  -No  Code Status/Resuscitation Preferences?  -Full    Case reviewed extensively with bedside RN Blank, Dr. Stein, and Dr. Gray.     Thank you for involving us in the care of this patient and family. We will sign off at this time. Please do not hesitate to contact me with questions or concerns or the on-call provider for our team if evening or weekend.    Kenna SIEGEL, [facility]  Palliative Medicine   Pager [phone]    Attestation:  Total time on the floor involved in the patient's care: 60 minutes  Total time spent in counseling/care coordination: >50%    Chief Complaint   AMS    History is obtained from the patient, staff, family and extensive chart review.     Past Medical History    I have reviewed this patient's medical history and updated it with pertinent information if needed.   Past Medical History:   Diagnosis Date     Acute, but ill-defined, cerebrovascular disease 1/04    left middle cerebral artery infarct     Alcohol abuse      Anemia 7/14/2014     Arrhythmia     a fib     Atrial fibrillation (H)     CVA occurred off anticoagulation     Balanitis 6/4/2018     Cachexia (H) 1/9/2018     Cardiomyopathy (H)     stress tests 6/04 and 10/04 negative for ischemia, EF 36-40%     CHF (congestive heart failure) (H) 12/24/2009     Chronic infection      Chronic pain 10/12/2015     Dementia without behavioral disturbance, unspecified dementia type 5/9/2017     Dementia, without behavioral disturbance 6/29/2015     Depressive disorder      Diaphragmatic hernia without mention of obstruction or gangrene     left sided chest pain, hiatal hernia/gastritis     Diverticulosis of colon (without mention of hemorrhage)     sigmoid     Dysphagia 7/27/2015     Elevated blood sugar 1/25/2016     Esophageal reflux      Hearing loss      Hematuria     negative cystoscopy 3/04     Hereditary and idiopathic peripheral  neuropathy 2006    EMG-peripheral polyneuropathy, B12 and VitD deficient, ? alcohol related     Hx of congestive heart failure 1/12/2016     Hypertension      Hypertrophy of prostate 6/17/2015     Nausea with vomiting 12/8/2014     Presbycusis of both ears 7/17/2017     Presence of indwelling urethral catheter 8/17/2018     PSA elevation 5/30/2014     Recurrent falls 7/28/2015     Renal cell carcinoma (H) 8/25/2014     Sepsis (H) 1/7/2015     TB lung, latent 5/6/2014     Urinary incontinence 7/28/2015       Past Surgical History   I have reviewed this patient's surgical history and updated it with pertinent information if needed.  Past Surgical History:   Procedure Laterality Date     CYSTOSCOPY, TRANSURETHRAL RESECTION (TUR) PROSTATE, COMBINED N/A 2/6/2019    Procedure: Cystoscopy, bipolar transurethral resection of prostate;  Surgeon: Justin Scott MD;  Location: [facility] OR     EYE SURGERY      cataract surgery bilat       Social History   Living situation: Recently has been in respite care at a nursing facility     Family system: 2 adult sons, additional family and support in [facility]     Family History   I have reviewed this patient's family history and updated it with pertinent information if needed.   Family History   Problem Relation Age of Onset     C.A.D. No family hx of      Diabetes No family hx of        Allergies   Allergies   Allergen Reactions     Nkda [No Known Drug Allergies]        Medications   Current Facility-Administered Medications Ordered in Epic   Medication Dose Route Frequency Last Rate Last Dose     acetaminophen (TYLENOL) Suppository 650 mg  650 mg Rectal Q4H PRN         acetaminophen (TYLENOL) tablet 650 mg  650 mg Oral Q4H PRN         bisacodyl (DULCOLAX) Suppository 10 mg  10 mg Rectal Daily PRN         lidocaine (LMX4) cream   Topical Q1H PRN         lidocaine 1 % 0.1-1 mL  0.1-1 mL Other Q1H PRN         melatonin tablet 1 mg  1 mg Oral At Bedtime PRN   1 mg at 03/05/19 5747      naloxone (NARCAN) injection 0.1-0.4 mg  0.1-0.4 mg Intravenous Q2 Min PRN         ondansetron (ZOFRAN-ODT) ODT tab 4 mg  4 mg Oral Q6H PRN        Or     ondansetron (ZOFRAN) injection 4 mg  4 mg Intravenous Q6H PRN         piperacillin-tazobactam (ZOSYN) 3.375 g vial to attach to  mL bag  3.375 g Intravenous Q6H   3.375 g at 03/06/19 0956     polyethylene glycol (MIRALAX/GLYCOLAX) Packet 17 g  17 g Oral Daily PRN         QUEtiapine (SEROquel) half-tab 12.5-25 mg  12.5-25 mg Oral BID PRN         senna-docusate (SENOKOT-S/PERICOLACE) 8.6-50 MG per tablet 1 tablet  1 tablet Oral BID PRN        Or     senna-docusate (SENOKOT-S/PERICOLACE) 8.6-50 MG per tablet 2 tablet  2 tablet Oral BID PRN         sodium chloride (PF) 0.9% PF flush 3 mL  3 mL Intracatheter q1 min prn         sodium chloride (PF) 0.9% PF flush 3 mL  3 mL Intracatheter Q8H   3 mL at 03/05/19 0924     sodium chloride 0.9% infusion   Intravenous Continuous 50 mL/hr at 03/06/19 1258       No current [facility]-ordered outpatient medications on file.       Review of Systems   The comprehensive review of systems is not completed at this time.     Physical Exam   Temp: 97.1  F (36.2  C) Temp src: Axillary BP: 141/65 Pulse: 93 Heart Rate: 83 Resp: 18 SpO2: 98 % O2 Device: None (Room air)    Vitals:    03/04/19 1430 03/06/19 0144   Weight: 64.9 kg (143 lb) 70.9 kg (156 lb 6.4 oz)     CONSTITUTIONAL: Chronically ill and cachectic man seen sitting up in bed in NAD, alert yet disoriented, Swedish speaking. He appears pleasant, calm and comfortable. Family and professional Swedish interpretor present at bedside   HEENT: NCAT  RESPIRATORY: NL respiratory effort on RA    Data   Results for orders placed or performed during the hospital encounter of 03/04/19 (from the past 24 hour(s))   Basic metabolic panel   Result Value Ref Range    Sodium 143 133 - 144 mmol/L    Potassium 4.3 3.4 - 5.3 mmol/L    Chloride 116 (H) 94 - 109 mmol/L    Carbon Dioxide 24 20 - 32  mmol/L    Anion Gap 3 3 - 14 mmol/L    Glucose 111 (H) 70 - 99 mg/dL    Urea Nitrogen 24 7 - 30 mg/dL    Creatinine 1.58 (H) 0.66 - 1.25 mg/dL    GFR Estimate 38 (L) >60 mL/min/[1.73_m2]    GFR Estimate If Black 44 (L) >60 mL/min/[1.73_m2]    Calcium 8.0 (L) 8.5 - 10.1 mg/dL     *Note: Due to a large number of results and/or encounters for the requested time period, some results have not been displayed. A complete set of results can be found in Results Review.

## 2019-03-06 NOTE — PROVIDER NOTIFICATION
MD Notification    Person notified: NP    Person Name: Shirin (Palliative)    Date/Time: 3/6/19@0080    Interaction: Called    Purpose of Notification: Spoke with Shirin regarding discussion that Dr. Gray had with family. Dr. Gray asked if Shirin would be able to call him to update her on the conversation.    Orders Received: N/A. Shirin planning to call Dr. Gray before meeting with the patient and his family at 1300.

## 2019-03-06 NOTE — CONSULTS
Care Transition Initial Assessment - RN        Met with: Family. Son Jose on phone 3/5 [phone]  DATA   Active Problems:    Sepsis (H)       Cognitive Status: disoriented.     Primary Care MD Name: BryantLiviaYonatan  Contact information and PCP information verified: Yes  Insurance concerns: Underinsured/limits on insurance, Self-pay, Other and Insurance questions referred to Financial Services  ASSESSMENT  Patient currently receives the following services:  Patient resides at [facility] bed in the Memory care unit. Writer confirmed that patient has an 18 day bed hold. PMH of dementia CVA Afib BPH Anemia cath associated UTI. Patient admitted for weakness and UTI. Patient also has a new diagnosis of moderately differentiated adenocarcinoma primary colorectal.  [facility] discussed with Jose regarding palliative chemotherapy.  Per notes Jose elected to not pursue further chemotherapy and recommendation for hospice was made.  Patient is currently receiving IVF and IV antibiotics.  Palliative was consulted today regarding goals of care. Per Palliative family would like the patient to remain in the hospital until they are able to transport him home to [facility] and would like the assistance of CC/SW to answer additional questions.  Writer called the patients insurance [employer] plan and the patient does not have [employer] transportation coverage.  Asked provider services if there is appeals for discharge process if family refuses discharge when patient is deemed medically stable and we have a safe discharge plan in place.    Per Provider services line the family can call member services at [phone] to request a review, however the plan will not cover for any days past a discharge order and would become private pay.    Plan would be for the patient to discharge back to LTC or home with additional support when medically stable and family would be able to bring him to [facility] when their  Scheduled 30 minute appointment on Tuesday, 1/31 to discuss neuropsychology testing results.   arrangements have been finalized.    SW and CC to meet with family later today to finalize a safe discharge plan and answer additional questions.        Identified issues/concerns regarding health management: safe discharge plan to either LTC or home with 24/7 assist and any identified equipment family states that they have wheelchair BSC they are inquiring about a hospital bed SW was going to discuss further with the community SW.  PLAN  Financial costs for the patient include n/a .  Patient given options and choices for discharge yes .  Patient/family is agreeable to the plan?  Yes: per conversation today   Patient anticipates discharging to home with family and additional support of Homecare and DME if needed .        Patient anticipates needs for home equipment: Yes  Plan/Disposition: Home   Appointments:     Care  (CTS) will continue to follow as needed.

## 2019-03-06 NOTE — PROVIDER NOTIFICATION
MD Notification    Notified Person: MD    Notified Person Name: Robert     Notification Date/Time: 3/6/19 06:23    Notification Interaction: Phone answering service     Purpose of Notification: Preliminary urine culture back growing gram positive cocci. Currently on zosyn     Orders Received: Awaiting call back     Comments:

## 2019-03-07 LAB
BACTERIA SPEC CULT: ABNORMAL
BACTERIA SPEC CULT: ABNORMAL
INTERPRETATION ECG - MUSE: NORMAL
Lab: ABNORMAL
SPECIMEN SOURCE: ABNORMAL

## 2019-03-07 PROCEDURE — 25000128 H RX IP 250 OP 636: Performed by: HOSPITALIST

## 2019-03-07 PROCEDURE — 99232 SBSQ HOSP IP/OBS MODERATE 35: CPT | Performed by: INTERNAL MEDICINE

## 2019-03-07 PROCEDURE — 25000132 ZZH RX MED GY IP 250 OP 250 PS 637: Performed by: INTERNAL MEDICINE

## 2019-03-07 PROCEDURE — 12000000 ZZH R&B MED SURG/OB

## 2019-03-07 PROCEDURE — 25000132 ZZH RX MED GY IP 250 OP 250 PS 637: Performed by: HOSPITALIST

## 2019-03-07 RX ORDER — AMLODIPINE BESYLATE 5 MG/1
5 TABLET ORAL DAILY
Status: DISCONTINUED | OUTPATIENT
Start: 2019-03-07 | End: 2019-03-08 | Stop reason: HOSPADM

## 2019-03-07 RX ORDER — LINEZOLID 600 MG/1
600 TABLET, FILM COATED ORAL EVERY 12 HOURS SCHEDULED
Status: DISCONTINUED | OUTPATIENT
Start: 2019-03-07 | End: 2019-03-08 | Stop reason: HOSPADM

## 2019-03-07 RX ORDER — VANCOMYCIN HYDROCHLORIDE 1 G/200ML
1000 INJECTION, SOLUTION INTRAVENOUS
Status: DISCONTINUED | OUTPATIENT
Start: 2019-03-07 | End: 2019-03-07

## 2019-03-07 RX ADMIN — CARVEDILOL 12.5 MG: 12.5 TABLET, FILM COATED ORAL at 17:09

## 2019-03-07 RX ADMIN — AMLODIPINE BESYLATE 5 MG: 5 TABLET ORAL at 17:09

## 2019-03-07 RX ADMIN — CARVEDILOL 12.5 MG: 12.5 TABLET, FILM COATED ORAL at 09:08

## 2019-03-07 RX ADMIN — ACETAMINOPHEN 650 MG: 325 TABLET, FILM COATED ORAL at 01:04

## 2019-03-07 RX ADMIN — LINEZOLID 600 MG: 600 TABLET, FILM COATED ORAL at 20:28

## 2019-03-07 RX ADMIN — VANCOMYCIN HYDROCHLORIDE 1000 MG: 1 INJECTION, SOLUTION INTRAVENOUS at 09:22

## 2019-03-07 RX ADMIN — ACETAMINOPHEN 650 MG: 325 TABLET, FILM COATED ORAL at 10:47

## 2019-03-07 RX ADMIN — PIPERACILLIN SODIUM,TAZOBACTAM SODIUM 3.38 G: 3; .375 INJECTION, POWDER, FOR SOLUTION INTRAVENOUS at 04:19

## 2019-03-07 ASSESSMENT — ACTIVITIES OF DAILY LIVING (ADL)
ADLS_ACUITY_SCORE: 35

## 2019-03-07 ASSESSMENT — MIFFLIN-ST. JEOR: SCORE: 1264.56

## 2019-03-07 NOTE — PLAN OF CARE
"ALVIN orientation d/t Tajik speaking and confusion, attempted to use blue phone but pt not responding to questions. Hypertensive at times, other VSS on RA. Tele afib with CVR. Pt c/o pain \"all over\"- particularly to L rib area with Tajik speaking CNA, tylenol given x1 with little effect, declined call to MD for additional pain medications at this time, left sticky note to address in AM with family/. Mechanical soft/nectar liquid diet, pills crushed in applesauce, no cough or s/s of aspiration. LS clear. BS active, incontinent of BM. Youssef patent with pale yellow UOP. PIV infusing NS at 50cc/hr with zosyn q6hrs. Turn/repo q2hrs. Will continue to monitor.   "

## 2019-03-07 NOTE — PHARMACY-VANCOMYCIN DOSING SERVICE
Pharmacy Vancomycin Initial Note  Date of Service 2019  Patient's  1930  89 year old, male    Indication: Urinary Tract Infection    Current estimated CrCl = Estimated Creatinine Clearance: 29.5 mL/min (A) (based on SCr of 1.58 mg/dL (H)).    Creatinine for last 3 days  3/4/2019:  2:41 PM Creatinine 2.20 mg/dL  3/5/2019:  6:55 AM Creatinine 2.03 mg/dL  3/6/2019:  7:38 AM Creatinine 1.58 mg/dL    Recent Vancomycin Level(s) for last 3 days  No results found for requested labs within last 72 hours.      Vancomycin IV Administrations (past 72 hours)      No vancomycin orders with administrations in past 72 hours.                Nephrotoxins and other renal medications (From now, onward)    Start     Dose/Rate Route Frequency Ordered Stop    19 0900  vancomycin (VANCOCIN) 1000 mg in dextrose 5% 200 mL PREMIX      1,000 mg  200 mL/hr over 1 Hours Intravenous EVERY 48 HOURS 19 0817            Contrast Orders - past 72 hours (72h ago, onward)    None                Plan:  1.  Start vancomycin  1000 mg IV q48h.   2.  Goal Trough Level: 10-15 mg/L   3.  Pharmacy will check trough levels as appropriate in 5-7 Days.    4. Serum creatinine levels will be ordered a minimum of twice weekly.    5. [place] method utilized to dose vancomycin therapy: Method 1    Becky Quiñones (Kelly)

## 2019-03-07 NOTE — PROGRESS NOTES
[facility]    Medicine Progress Note - Hospitalist Service        Date of Admission:  3/2/2019    Assessment & Plan:   Barrie Woods is a 89 year old Ukrainian speaking male with past history of dementia, CVA, hypertension, hyperlipidemia, CKD, A. fib, CHF, BPH with indwelling Youssef, probable renal cell carcinoma, chronic anemia, dysphasia who presented with change in mental state and is admitted with suspected UTI.     Acute metabolic encephalopathy  Catheter associated UTI.  -Urinalysis obtained in ED with large LE and moderate blood. Initial lactate at 2.3, improved to 1.1   -Wound culture growing resistant Enterococcus faecium  -ID consulted, now on Zyvox.     MALGORZATA on CKD stage III  Baseline creatinine 1.0-1.1.  Admission creatinine 2.20.  Likely secondary to poor p.o. Intake as well as urinary tract infection.   -Renal function improving, discontinue IV fluids, encourage generous oral intake.  -Recheck BMP in the morning  -Avoid nephrotoxins.     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     -Appreciate oncology inputs, family does not want to pursue chemotherapy  -Evaluated by palliative care yesterday, sons do not want to pursue hospice at this time.    Chronic anemia  -Baseline hemoglobin appears to be around 10, at baseline     BPH with TURP on 2/6/19.   -Recently hospitalized with sepsis secondary to CAUTI and completed antibiotic prior to discharge.  -Underwent cystoscopy, transurethral resection of the prostate on 2/6/19     Dysphagia:   -continue mechanical soft diet with nectar thick liquids prior to  "admission.    Hypertension  Chronic systolic CHF  Atrial fibrillation  Most recent TTE from April 2017 shows ejection fraction of 50-55% (history of EF of 30-35%) with wall motion abnormalities.    -He is not on anticoagulation for A. Fib.  -Hold prior to lisinopril(due to acute kidney injury), continue Coreg  -Add amlodipine as blood pressure now persistently on the higher side.     History of left MCA CVA  Dementia  Depression  Dysphasia  Longstanding history of dementia with intermittent history of delirium.   -Sitter prn for delirium.   -Delirium precautions.   -Seroquel prn.     Diet: Combination Diet Mechanical/Dental Soft Diet  Room Service     DVT Prophylaxis: Pneumatic Compression Devices   Youssef Catheter: in place, indication: Retentionchronic indwelling Youssef  Code Status: Full Code     Disposition Plan    3/8, home with family.    Entered: Adebayo Stein MD 03/07/2019, 2:07 PM        The patient's care was discussed with the Bedside Nurse.    Adebayo Stein MD  Hospitalist Service  [facility]    ______________________________________________________________________    Interval History     No new nursing concerns.  Afebrile.  Blood pressure now on the higher side.    Data reviewed today: I reviewed all medications, new labs and imaging results over the last 24 hours. I personally reviewed no images or EKG's today.    Physical Exam   Vital signs:  Temp: 95.9  F (35.5  C) Temp src: Oral BP: 151/64 Pulse: 85 Heart Rate: 84 Resp: 16 SpO2: 98 % O2 Device: None (Room air)   Height: 167.6 cm (5' 6\") Weight: 65.7 kg (144 lb 12.8 oz)  Estimated body mass index is 23.37 kg/m  as calculated from the following:    Height as of this encounter: 1.676 m (5' 6\").    Weight as of this encounter: 65.7 kg (144 lb 12.8 oz).      Wt Readings from Last 2 Encounters:   03/07/19 65.7 kg (144 lb 12.8 oz)   02/20/19 64.9 kg (143 lb)       Gen: Awake, disoriented, follows 1 or 2 simple commands.  Resp: CTA " B/L, normal WOB, no crackles, no wheezes  CVS: RRR, no murmur  Abd/GI: Soft, non-tender. BS- normoactive.   Skin: Warm, dry no rashes  MSK: No joint deformities, no pedal edema  Neuro- CN- intact. No focal deficits.        Data   Recent Labs   Lab 03/06/19  0738 03/05/19  0655 03/04/19  1441   WBC  --  10.6 10.4   HGB  --  10.0* 10.0*   MCV  --  86 86   PLT  --  171 218    144 142   POTASSIUM 4.3 4.8 4.7   CHLORIDE 116* 115* 109   CO2 24 23 27   BUN 24 31* 35*   CR 1.58* 2.03* 2.20*   ANIONGAP 3 6 6   JOSE 8.0* 7.8* 8.3*   * 88 123*   ALBUMIN  --  1.9*  --    PROTTOTAL  --  5.7*  --    BILITOTAL  --  0.7  --    ALKPHOS  --  170*  --    ALT  --  11  --    AST  --  41  --    TROPI  --   --  <0.015       No results found for this or any previous visit (from the past 24 hour(s)).  Medications       carvedilol  12.5 mg Oral BID w/meals     linezolid  600 mg Oral Q12H DERRICK     sodium chloride (PF)  3 mL Intracatheter Q8H

## 2019-03-07 NOTE — CONSULTS
[facility]    Infectious Disease Consultation     Date of Admission:  3/4/2019  Date of Consult (When I saw the patient): 03/07/19    Assessment & Plan   Barrie Woods is a 89 year old male who was admitted on 3/4/2019.     Impression:  1. 89 y.o demented patient.   2. History of CVA.   3. Metastatic colorectal carcinoma.    4. He had TURP done in 02/2019.  Pathology revealed adenocarcinoma consistent with colorectal primary.    5. liver and pulmonary metastases.   6. Admitted with concerns of altered mental status. Low PO intake.   7. Indwelling catheter with positive UA and UC with resistant to ampicillin E faecium.   8. CKD with creat clearance of 29.5.     Recommendations:   Indwelling catheter, LGF, Normal WBC, positive UA and UC could be colonized. Hard to get any history. Hard to know of the change in mental status was a sign of urine infection. Got a dose of vancomycin today.   Will do 6 more days of linezolid for possible urine infection.   .         Flor Chung MD    Reason for Consult   Reason for consult: I was asked by Dr. Stein  to evaluate this patient for UTI.    Primary Care Physician   Yonatan Hurtado    Chief Complaint   Altered mental status     History is obtained from the patient and medical records    History of Present Illness   Barrie Woods is a 89 year old male  Telugu speaking male with past history of dementia, CVA, hypertension, hyperlipidemia, CKD, A. fib, CHF, BPH with indwelling Youssef, probable renal cell carcinoma, chronic anemia, dysphasia who presented with change in mental state and is admitted with suspected UTI.         Past Medical History   I have reviewed this patient's medical history and updated it with pertinent information if needed.   Past Medical History:   Diagnosis Date     Acute, but ill-defined, cerebrovascular disease 1/04    left middle cerebral artery infarct     Alcohol abuse      Anemia 7/14/2014     Arrhythmia     a fib     Atrial  fibrillation (H)     CVA occurred off anticoagulation     Balanitis 6/4/2018     Cachexia (H) 1/9/2018     Cardiomyopathy (H)     stress tests 6/04 and 10/04 negative for ischemia, EF 36-40%     CHF (congestive heart failure) (H) 12/24/2009     Chronic infection      Chronic pain 10/12/2015     Dementia without behavioral disturbance, unspecified dementia type 5/9/2017     Dementia, without behavioral disturbance 6/29/2015     Depressive disorder      Diaphragmatic hernia without mention of obstruction or gangrene     left sided chest pain, hiatal hernia/gastritis     Diverticulosis of colon (without mention of hemorrhage)     sigmoid     Dysphagia 7/27/2015     Elevated blood sugar 1/25/2016     Esophageal reflux      Hearing loss      Hematuria     negative cystoscopy 3/04     Hereditary and idiopathic peripheral neuropathy 2006    EMG-peripheral polyneuropathy, B12 and VitD deficient, ? alcohol related     Hx of congestive heart failure 1/12/2016     Hypertension      Hypertrophy of prostate 6/17/2015     Nausea with vomiting 12/8/2014     Presbycusis of both ears 7/17/2017     Presence of indwelling urethral catheter 8/17/2018     PSA elevation 5/30/2014     Recurrent falls 7/28/2015     Renal cell carcinoma (H) 8/25/2014     Sepsis (H) 1/7/2015     TB lung, latent 5/6/2014     Urinary incontinence 7/28/2015       Past Surgical History   I have reviewed this patient's surgical history and updated it with pertinent information if needed.  Past Surgical History:   Procedure Laterality Date     CYSTOSCOPY, TRANSURETHRAL RESECTION (TUR) PROSTATE, COMBINED N/A 2/6/2019    Procedure: Cystoscopy, bipolar transurethral resection of prostate;  Surgeon: Justin Scott MD;  Location: [facility] OR     EYE SURGERY      cataract surgery bilat       Prior to Admission Medications   Prior to Admission Medications   Prescriptions Last Dose Informant Patient Reported? Taking?   Multiple Vitamins-Minerals (MULTILEX) TABS  3/4/2019 at am Nursing Home No Yes   Sig: TAKE ONE TABLET BY MOUTH ONCE DAILY   acetaminophen (TYLENOL) 325 MG tablet  at prn Nursing Home Yes Yes   Sig: Take 650 mg by mouth every 4 hours as needed for mild pain   acetaminophen (TYLENOL) 500 MG tablet  at prn Nursing Home Yes Yes   Sig: Take 1,000 mg by mouth every 6 hours as needed for mild pain   carvedilol (COREG) 12.5 MG tablet 3/4/2019 at am Nursing Home No Yes   Sig: Take 1 tablet (12.5 mg) by mouth 2 times daily (with meals)   citalopram (CELEXA) 10 MG tablet 3/4/2019 at am Nursing Home Yes Yes   Sig: Take 10 mg by mouth daily   diclofenac (VOLTAREN) 1 % GEL topical gel 3/4/2019 at Unknown time group home No Yes   Sig: Apply 4 gm to knees qid   lisinopril (PRINIVIL/ZESTRIL) 20 MG tablet 3/4/2019 at am Nursing Home Yes Yes   Sig: Take 20 mg by mouth daily   magnesium oxide (MAG-OX) 400 (241.3 MG) MG tablet 3/4/2019 at am Nursing Home No Yes   Sig: TAKE 1 TABLET BY MOUTH ONCE DAILY   order for DME  Nursing Home No No   Sig: Equipment being ordered: walker with slides   pregabalin (LYRICA) 100 MG capsule 3/4/2019 at am Nursing Home Yes Yes   Sig: Take 100 mg by mouth 2 times daily   ranitidine (ZANTAC) 150 MG tablet 3/4/2019 at am Nursing Home Yes Yes   Sig: Take 150 mg by mouth daily      Facility-Administered Medications: None     Allergies   Allergies   Allergen Reactions     Nkda [No Known Drug Allergies]        Immunization History   Immunization History   Administered Date(s) Administered     Influenza (H1N1) 12/22/2009     Influenza (High Dose) 3 valent vaccine 10/27/2014, 10/05/2015, 09/25/2017, 01/24/2019     Influenza (IIV3) PF 10/11/2005, 10/24/2006, 11/15/2007, 11/10/2008, 09/14/2009, 11/22/2010, 09/28/2012     Influenza Vaccine IM Ages 6-35 Months 4 Valent (PF) 11/01/2013     Pneumo Conj 13-V (2010&after) 09/25/2017     Pneumococcal 23 valent 09/14/2009, 10/05/2015     TD (ADULT, 7+) 09/14/2009       Social History   I have reviewed this  patient's social history and updated it with pertinent information if needed. Barrie Woods  reports that he has quit smoking. His smoking use included dip, chew, snus or snuff. His smokeless tobacco use includes chew. He reports that he does not drink alcohol or use drugs.    Family History   I have reviewed this patient's family history and updated it with pertinent information if needed.   Family History   Problem Relation Age of Onset     C.A.D. No family hx of      Diabetes No family hx of        Review of Systems   The 10 point Review of Systems is negative other than noted in the HPI or here.     Physical Exam   Temp: 95.9  F (35.5  C) Temp src: Oral BP: 151/64 Pulse: 85 Heart Rate: 84 Resp: 16 SpO2: 98 % O2 Device: None (Room air)    Vital Signs with Ranges  Temp:  [95.9  F (35.5  C)-100.2  F (37.9  C)] 95.9  F (35.5  C)  Pulse:  [78-86] 85  Heart Rate:  [82-84] 84  Resp:  [16-18] 16  BP: (136-160)/(55-68) 151/64  SpO2:  [98 %-99 %] 98 %  144 lbs 12.8 oz  Body mass index is 23.37 kg/m .    GENERAL APPEARANCE:  Frail appearing elderly male  EYES: Eyes grossly normal to inspection, PERRL and conjunctivae and sclerae normal  HENT: ear canals and TM's normal and nose and mouth without ulcers or lesions  NECK: no adenopathy, no asymmetry, masses, or scars and thyroid normal to palpation  RESP: lungs clear to auscultation - no rales, rhonchi or wheezes  CV: regular rates and rhythm, normal S1 S2, no S3 or S4 and no murmur, click or rub  LYMPHATICS: normal ant/post cervical and supraclavicular nodes  ABDOMEN: soft, nontender, without hepatosplenomegaly or masses and bowel sounds normal  MS: extremities normal- no gross deformities noted  SKIN: no suspicious lesions or rashes      Data   Lab Results   Component Value Date    WBC 10.6 03/05/2019    HGB 10.0 (L) 03/05/2019    HCT 32.0 (L) 03/05/2019     03/05/2019     03/06/2019    POTASSIUM 4.3 03/06/2019    CHLORIDE 116 (H) 03/06/2019    CO2 24  03/06/2019    BUN 24 03/06/2019    CR 1.58 (H) 03/06/2019     (H) 03/06/2019    SED 4 05/19/2006    NTBNPI 3,003 (H) 04/28/2017    TROPONIN 0.05 04/26/2017    TROPI <0.015 03/04/2019    AST 41 03/05/2019    ALT 11 03/05/2019    ALKPHOS 170 (H) 03/05/2019    BILITOTAL 0.7 03/05/2019    INR 1.10 01/30/2019     Recent Labs   Lab 03/04/19  1614 03/04/19  1455 03/04/19  1441   CULT >100,000 colonies/mL  Enterococcus faecium  *  <1000 colonies/mL  urogenital colby  Susceptibility testing not routinely done   No growth after 3 days No growth after 3 days     Recent Labs   Lab Test 03/04/19  1614 03/04/19  1455 03/04/19  1441 02/20/19  0939 02/20/19  0804 01/30/19  1902 01/09/19  1815 01/09/19  1800 07/25/18  1032   CULT >100,000 colonies/mL  Enterococcus faecium  *  <1000 colonies/mL  urogenital colby  Susceptibility testing not routinely done   No growth after 3 days No growth after 3 days No growth No growth 50,000 to 100,000 colonies/mL  Candida parapsilosis complex  *  Susceptibility testing not routinely done No growth  >100,000 colonies/mL  Pseudomonas aeruginosa  *  10,000 to 50,000 colonies/mL  Klebsiella (Enterobacter) aerogenes  * No growth >100,000 colonies/mL  Enterobacter cloacae complex  *

## 2019-03-08 VITALS
HEART RATE: 83 BPM | DIASTOLIC BLOOD PRESSURE: 67 MMHG | WEIGHT: 144.8 LBS | TEMPERATURE: 97 F | HEIGHT: 66 IN | SYSTOLIC BLOOD PRESSURE: 164 MMHG | BODY MASS INDEX: 23.27 KG/M2 | RESPIRATION RATE: 16 BRPM | OXYGEN SATURATION: 98 %

## 2019-03-08 LAB
ANION GAP SERPL CALCULATED.3IONS-SCNC: 5 MMOL/L (ref 3–14)
BUN SERPL-MCNC: 15 MG/DL (ref 7–30)
CALCIUM SERPL-MCNC: 8 MG/DL (ref 8.5–10.1)
CHLORIDE SERPL-SCNC: 112 MMOL/L (ref 94–109)
CO2 SERPL-SCNC: 24 MMOL/L (ref 20–32)
CREAT SERPL-MCNC: 1.08 MG/DL (ref 0.66–1.25)
GFR SERPL CREATININE-BSD FRML MDRD: 60 ML/MIN/{1.73_M2}
GLUCOSE SERPL-MCNC: 96 MG/DL (ref 70–99)
POTASSIUM SERPL-SCNC: 3.9 MMOL/L (ref 3.4–5.3)
SODIUM SERPL-SCNC: 141 MMOL/L (ref 133–144)

## 2019-03-08 PROCEDURE — 99239 HOSP IP/OBS DSCHRG MGMT >30: CPT | Performed by: INTERNAL MEDICINE

## 2019-03-08 PROCEDURE — 25000132 ZZH RX MED GY IP 250 OP 250 PS 637: Performed by: INTERNAL MEDICINE

## 2019-03-08 PROCEDURE — 36415 COLL VENOUS BLD VENIPUNCTURE: CPT | Performed by: INTERNAL MEDICINE

## 2019-03-08 PROCEDURE — 25000132 ZZH RX MED GY IP 250 OP 250 PS 637: Performed by: HOSPITALIST

## 2019-03-08 PROCEDURE — 80048 BASIC METABOLIC PNL TOTAL CA: CPT | Performed by: INTERNAL MEDICINE

## 2019-03-08 RX ORDER — LINEZOLID 600 MG/1
600 TABLET, FILM COATED ORAL EVERY 12 HOURS
Qty: 10 TABLET | Refills: 0 | Status: SHIPPED | OUTPATIENT
Start: 2019-03-08 | End: 2019-03-19

## 2019-03-08 RX ORDER — CITALOPRAM HYDROBROMIDE 10 MG/1
10 TABLET ORAL DAILY
Start: 2019-03-14 | End: 2019-03-19

## 2019-03-08 RX ORDER — AMLODIPINE BESYLATE 5 MG/1
5 TABLET ORAL DAILY
Qty: 30 TABLET | Refills: 1 | Status: SHIPPED | OUTPATIENT
Start: 2019-03-09 | End: 2019-03-19

## 2019-03-08 RX ADMIN — ACETAMINOPHEN 650 MG: 325 TABLET, FILM COATED ORAL at 00:46

## 2019-03-08 RX ADMIN — CARVEDILOL 12.5 MG: 12.5 TABLET, FILM COATED ORAL at 09:36

## 2019-03-08 RX ADMIN — ACETAMINOPHEN 650 MG: 325 TABLET, FILM COATED ORAL at 09:41

## 2019-03-08 RX ADMIN — AMLODIPINE BESYLATE 5 MG: 5 TABLET ORAL at 09:36

## 2019-03-08 RX ADMIN — LINEZOLID 600 MG: 600 TABLET, FILM COATED ORAL at 09:36

## 2019-03-08 ASSESSMENT — ACTIVITIES OF DAILY LIVING (ADL)
ADLS_ACUITY_SCORE: 37
ADLS_ACUITY_SCORE: 35
ADLS_ACUITY_SCORE: 35

## 2019-03-08 NOTE — PLAN OF CARE
Adult Inpatient Plan of Care  Readiness for Transition of Care  3/7/2019 1836 - Improving by Morena Vázquez, RN   Some HTN, all other VS WDL.  Alert, unable to appropriately assess orientation, is mostly nonverbal.  Tylenol given for HA with relief, has otherwise denied pain.  Mechanical soft, nectar thick diet with fair appetite, is a total feed.  Incontinent of bowel, sarmiento patent with good UOP.  Tele afib CVR.  Plan to discharge tomorrow, pending arrangement of home care services.

## 2019-03-08 NOTE — PLAN OF CARE
ALVIN orientation due to dementia and Micronesian speaking.  IV saline locked.  Fair appetite with total feeding assistance.  Incontinent of loose, brown stool.  Youssef with good urine output.  Plan to discharge to home at 1700 via stretcher.  Nursing will continue to monitor.

## 2019-03-08 NOTE — PLAN OF CARE
ALVIN orientation as pt mostly non-verbal. Some HTN, tmax 99.7 axillary, tylenol given x1 for fever and apparent pain. Started on Zyvox tonight. Tele afib with CVR. Domonique patent. BS active, incontinent of stool. Mechanical soft diet with nectar liquids, takes pills crushed with applesauce/pudding, total feed. PIV SL. A2 with turn/repo q2hrs. Possible discharge home today with services. Will continue to monitor.

## 2019-03-08 NOTE — PROGRESS NOTES
Patient discharged at 5:24 PM to Discharged to home  IV was discontinued. Pain at time of discharge was 0/10. Belongings returned to patient.  Discharge instructions and medications reviewed with patient.  Patient verbalized understanding and all questions were answered.  Prescriptions given to patient.  At time of discharge, patient condition was stable and left the unit via stretcher escorted by [facility] transport .

## 2019-03-08 NOTE — PROGRESS NOTES
LORRAINE Note:    D/I:  Plan is for patient to discharge to home today with family providing 24/7 care for patient.  Son Jose states that there are four children that live in the home that will be able to provide care for patient. Patient is in need of stretcher transportation to home secondary to cognitive impairment and being bed bound.  LORRAINE placed call to [facility] to arrange for ride at 5:00 for today.  Updated Home Care Liaison of patient's discharge plan today.  Updated mary Garcia and in agreement of discharge plan and home care.     ADDENDUM: PCS form completed, faxed the [facility] with Facesheet and provided to [facility].  Received email from [facility] Care manager and patient will need to work with PCP to get hospital bed.  Shelly will fax needed paperwork to PCP office.    LV Raymond, LGSW

## 2019-03-08 NOTE — DISCHARGE SUMMARY
[facility]    Discharge Summary  Hospitalist    Date of Admission:  3/4/2019  Date of Discharge:  3/8/2019  Discharging Provider: Adebayo Stein MD    Discharge Diagnoses      Acute metabolic encephalopathy  Catheter associated UTI.  MALGORZATA on CKD stage III  Recent diagnosis of metastatic moderately differentiated adenocarcinoma consistent with colorectal primary   Chronic anemia  BPH with TURP on 2/6/19.   Dysphagia   Hypertension  Chronic systolic CHF  Atrial fibrillation  History of left MCA CVA  Dementia  Depression  Dysphasia      Hospital Course   Assessment & Plan:   Barrie Woods is a 89 year old [place]ambican speaking male with past history of dementia, CVA, hypertension, hyperlipidemia, CKD, A. fib, CHF, BPH with indwelling Youssef, probable renal cell carcinoma, chronic anemia, dysphasia who presented with change in mental state and is admitted with suspected UTI.     Acute metabolic encephalopathy  Catheter associated UTI.  -Urinalysis obtained in ED with large LE and moderate blood. Initial lactate at 2.3, improved to 1.1   -urine culture growing resistant Enterococcus faecium  -ID consulted, difficult to interpret situation given patient's history of chronic indwelling Youssef however given the new clinical changes they recommended continuing Zyvox for another 5 days.    -With IV hydration and antibiotics, patient improved and back to baseline.     MALGORZATA on CKD stage III  Baseline creatinine 1.0-1.1.  Admission creatinine 2.20.  Likely secondary to poor p.o. Intake as well as urinary tract infection.   -Renal function improving, off IV fluids, encourage generous oral intake in the outpatient setting, explained to the sons that they will have to very closely monitor and make sure that he is receiving adequate oral fluid intake at home.  -Creatinine improved with hydration.     Recent diagnosis of metastatic moderately differentiated adenocarcinoma consistent with colorectal primary   -Prostate  chips during TURP on 2/6/19 was positive for moderately differentiated adenocarcinoma consistent with colorectal primary  -CT completed on 2/14/19 with multiple bilateral pulmonary nodules compatible with metastatic disease, also several liver lesions, mass to left inferior kidney.     -Appreciate oncology inputs, family does not want to pursue chemotherapy  -Evaluated by palliative care, sons do not want to pursue hospice at this time.  -There eventual wish is to take him back to [facility] from where he originally is.     Chronic anemia  -Baseline hemoglobin appears to be around 10, at baseline     BPH with TURP on 2/6/19.   -Recently hospitalized with sepsis secondary to CAUTI and completed antibiotic prior to discharge.  -Underwent cystoscopy, transurethral resection of the prostate on 2/6/19     Dysphagia:   -continue mechanical soft diet with nectar thick liquids prior to admission.     Hypertension  Chronic systolic CHF  Atrial fibrillation  Most recent TTE from April 2017 shows ejection fraction of 50-55% (history of EF of 30-35%) with wall motion abnormalities.    -He is not on anticoagulation for A. Fib.  -Lisinopril discontinued for now due to renal dysfunction, amlodipine added, continue Coreg  -Patient had an episode of chest pain during the hospital stay but sons did not want any further invasive workup so other than EKG no further workup was pursued.    History of left MCA CVA  Dementia  Depression  Dysphasia  Longstanding history of dementia with intermittent history of delirium.   The sons did not want him returning back to his long-term facility.  He wanted to take him home where he would take care himself.  Home RN and aide will be arranged..    Adebayo Stein MD    Significant Results and Procedures   See below    Pending Results     Unresulted Labs Ordered in the Past 30 Days of this Admission     Date and Time Order Name Status Description    3/4/2019 1434 Blood culture Preliminary      3/4/2019 1434 Blood culture Preliminary           Code Status   Full Code       Primary Care Physician   Yonatan Hurtado    Physical Exam   Temp: 97  F (36.1  C) Temp src: Oral BP: 139/71 Pulse: 83 Heart Rate: 88 Resp: 16 SpO2: 97 % O2 Device: None (Room air)      Constitutional: Awake, confused and disoriented  Respiratory: CTA B/L, Normal WOB  Cardiovascular: RRR, No murmur  GI: Soft, Non- tender, BS- normoactive  Neuro: CN- grossly intact    Discharge Disposition   Discharged to home  Condition at discharge: Stable    Consultations This Hospital Stay   PALLIATIVE CARE ADULT IP CONSULT  HEMATOLOGY & ONCOLOGY IP CONSULT  CARE TRANSITION RN/SW IP CONSULT  SOCIAL WORK IP CONSULT  PHARMACY TO DOSE VANCO  INFECTIOUS DISEASES IP CONSULT    Time Spent on this Encounter   I, Adebayo Stein, personally saw the patient today and spent greater than 30 minutes discharging this patient.    Discharge Orders      Home care nursing referral      Home Care Social Service Referral for Hospital Discharge      Follow-up and recommended labs and tests    Follow up with primary care provider, Yonatan Hurtado, within 7 days for hospital follow- up.     Activity    Your activity upon discharge: activity as tolerated     MD face to face encounter    Documentation of Face to Face and Certification for Home Health Services    I certify that patient: Barrie Woods is under my care and that I, or a nurse practitioner or physician's assistant working with me, had a face-to-face encounter that meets the physician face-to-face encounter requirements with this patient on: 3/8/2019.    This encounter with the patient was in whole, or in part, for the following medical condition, which is the primary reason for home health care: Deconditioning.    I certify that, based on my findings, the following services are medically necessary home health services: Nursing.    My clinical findings support the need for the above services because: Nurse is  needed: To provide assessment and oversight required in the home to assure adherence to the medical plan due to: Deconditioning.    Further, I certify that my clinical findings support that this patient is homebound (i.e. absences from home require considerable and taxing effort and are for medical reasons or Hoah[employer] services or infrequently or of short duration when for other reasons) because: Requires assistance of another person or specialized equipment to access medical services because patient:  Deconditioning.    Based on the above findings. I certify that this patient is confined to the home and needs intermittent skilled nursing care, physical therapy and/or speech therapy.  The patient is under my care, and I have initiated the establishment of the plan of care.  This patient will be followed by a physician who will periodically review the plan of care.  Physician/Provider to provide follow up care: Yonatan Hurtado    Attending hospital physician (the Medicare certified PECOS provider): Adebayo Stein  Physician Signature: See electronic signature associated with these discharge orders.  Date: 3/8/2019     Diet    Follow this diet upon discharge: Orders Placed This Encounter      Room Service      Combination Diet Mechanical/Dental Soft Diet       Discharge Medications   Current Discharge Medication List      START taking these medications    Details   amLODIPine (NORVASC) 5 MG tablet Take 1 tablet (5 mg) by mouth daily  Qty: 30 tablet, Refills: 1    Comments: Future refills by PCP Dr. Yonatan Hurtaod with phone number [phone].  Associated Diagnoses: HTN, goal below 140/90      linezolid (ZYVOX) 600 MG tablet Take 1 tablet (600 mg) by mouth every 12 hours  Qty: 10 tablet, Refills: 0    Associated Diagnoses: Sepsis, due to unspecified organism (H)         CONTINUE these medications which have CHANGED    Details   citalopram (CELEXA) 10 MG tablet Take 1 tablet (10 mg) by mouth daily    Comments: Resume  after dose of Zyvox is completed.  Associated Diagnoses: Dementia without behavioral disturbance, unspecified dementia type         CONTINUE these medications which have NOT CHANGED    Details   !! acetaminophen (TYLENOL) 325 MG tablet Take 650 mg by mouth every 4 hours as needed for mild pain      !! acetaminophen (TYLENOL) 500 MG tablet Take 1,000 mg by mouth every 6 hours as needed for mild pain      carvedilol (COREG) 12.5 MG tablet Take 1 tablet (12.5 mg) by mouth 2 times daily (with meals)  Qty: 60 tablet, Refills: 5    Comments: profile  Associated Diagnoses: Chronic combined systolic and diastolic congestive heart failure (H); HTN, goal below 140/90      diclofenac (VOLTAREN) 1 % GEL topical gel Apply 4 gm to knees qid  Qty: 100 g, Refills: 11    Associated Diagnoses: Primary osteoarthritis of both knees      magnesium oxide (MAG-OX) 400 (241.3 MG) MG tablet TAKE 1 TABLET BY MOUTH ONCE DAILY  Qty: 30 tablet, Refills: 5    Associated Diagnoses: Routine general medical examination at a health care facility      Multiple Vitamins-Minerals (MULTILEX) TABS TAKE ONE TABLET BY MOUTH ONCE DAILY  Qty: 100 tablet, Refills: 3    Associated Diagnoses: Routine general medical examination at a health care facility      pregabalin (LYRICA) 100 MG capsule Take 100 mg by mouth 2 times daily      ranitidine (ZANTAC) 150 MG tablet Take 150 mg by mouth daily      order for DME Equipment being ordered: walker with slides  Qty: 1 Device, Refills: 0    Associated Diagnoses: Recurrent falls       !! - Potential duplicate medications found. Please discuss with provider.      STOP taking these medications       lisinopril (PRINIVIL/ZESTRIL) 20 MG tablet Comments:   Reason for Stopping:             Allergies   Allergies   Allergen Reactions     Nkda [No Known Drug Allergies]      Data   Most Recent 3 CBC's:  Recent Labs   Lab Test 03/05/19  0655 03/04/19  1441 02/20/19  0804   WBC 10.6 10.4 13.3*   HGB 10.0* 10.0* 10.7*   MCV 86 86 88     218 245      Most Recent 3 BMP's:  Recent Labs   Lab Test 03/08/19  0704 03/06/19  0738 03/05/19  0655    143 144   POTASSIUM 3.9 4.3 4.8   CHLORIDE 112* 116* 115*   CO2 24 24 23   BUN 15 24 31*   CR 1.08 1.58* 2.03*   ANIONGAP 5 3 6   JOSE 8.0* 8.0* 7.8*   GLC 96 111* 88     Most Recent 2 LFT's:  Recent Labs   Lab Test 03/05/19  0655 02/20/19  0804   AST 41 35   ALT 11 15   ALKPHOS 170* 200*   BILITOTAL 0.7 0.9     Most Recent INR's and Anticoagulation Dosing History:  Anticoagulation Dose History     Recent Dosing and Labs Latest Ref Rng & Units 6/12/2015 6/26/2015 7/9/2015 7/11/2015 7/12/2015 1/9/2019 1/30/2019    INR 0.86 - 1.14 - - 1.92(H) 1.90(H) 1.48(H) 1.19(H) 1.10    INR 0.86 - 1.14 1.9(A) 1.6(A) - - - - -        Most Recent 3 Troponin's:  Recent Labs   Lab Test 03/04/19  1441 04/26/17 2005 04/26/17  1358 04/26/17  1027  03/30/15  1545 03/30/15  1413   TROPI <0.015 0.042 0.051*  --    < >  --   --    TROPONIN  --   --   --  0.05  --  0.01 0.01    < > = values in this interval not displayed.     Most Recent Cholesterol Panel:  Recent Labs   Lab Test 09/25/17  1847   CHOL 156   LDL 93   HDL 31*   TRIG 162*     Most Recent 6 Bacteria Isolates From Any Culture (See EPIC Reports for Culture Details):  Recent Labs   Lab Test 03/04/19  1614 03/04/19  1455 03/04/19  1441 02/20/19  0939 02/20/19  0804 01/30/19  1902   CULT >100,000 colonies/mL  Enterococcus faecium  *  <1000 colonies/mL  urogenital colby  Susceptibility testing not routinely done   No growth after 4 days No growth after 4 days No growth No growth 50,000 to 100,000 colonies/mL  Candida parapsilosis complex  *  Susceptibility testing not routinely done     Most Recent TSH, T4 and A1c Labs:  Recent Labs   Lab Test 07/09/16  1210   TSH 1.02   A1C 5.7       Results for orders placed or performed during the hospital encounter of 02/20/19   XR Video Swallow w/o Esophagram    Narrative    VIDEO SWALLOW WITHOUT ESOPHAGRAM    2/28/2019 2:33  PM     HISTORY: Dysphagia.    COMPARISON: None.    TECHNIQUE: Fluoroscopic assistance was provided to speech pathology  for evaluation of the patient's swallowing mechanism. Small amounts of  thin, nectar,  pudding, and semisolid consistencies of barium were  administered during real-time fluoroscopy in the lateral projection. A  total of 2.9 minutes of fluoroscopy time was utilized for the exam. 8  fluoroscopic cine clips were obtained.     FINDINGS: Musa tracheal aspiration was observed when the patient  swallowed thin and nectar consistencies of barium from a cup. A weak  resulting cough reflex was observed after these episodes of  aspiration. Prominent spillage was observed when the patient swallowed  thin and nectar consistencies of barium. Prominent residual barium was  noted within the vallecula and piriform sinuses with all  consistencies. Note that only the cervical esophagus was evaluated.       Impression    IMPRESSION:   1. Musa tracheal aspiration with a weak resulting cough reflex was  observed when the patient swallowed thin and nectar consistencies of  barium from a cup.  2. Prominent residual barium within the vallecula and piriform sinuses  was noted with all consistencies.    Please see further details in report by speech pathology.     This study includes only the cervical esophagus.    RICKI GOMEZ MD     *Note: Due to a large number of results and/or encounters for the requested time period, some results have not been displayed. A complete set of results can be found in Results Review.

## 2019-03-09 ENCOUNTER — TRANSFERRED RECORDS (OUTPATIENT)
Dept: HEALTH INFORMATION MANAGEMENT | Facility: CLINIC | Age: 84
End: 2019-03-09

## 2019-03-09 ENCOUNTER — DOCUMENTATION ONLY (OUTPATIENT)
Dept: FAMILY MEDICINE | Facility: CLINIC | Age: 84
End: 2019-03-09

## 2019-03-10 NOTE — PROGRESS NOTES
[facility] now requests orders and shares plan of care/discharge summaries for some patients through [employer].  Please REPLY TO THIS MESSAGE OR ROUTE BACK TO THE AUTHOR in order to give authorization for orders when needed.  This is considered a verbal order, you will still receive a faxed copy of orders for signature.  Thank you for your assistance in improving collaboration for our patients.    START OF HOME CARE ORDER  SN  1 week 1, 2 week 3, 3 as needed      To assess/educate on symptom/disease management, pain/med/urinary catheter management, nutrition/hydration/comfort measures, fall/safety measures, and coordinate transfer back home to [facility] within the next month.    SW 1 day 1     To assess/assist with community resources and assist with coordination of airline/transfer coordination.    OT 1 day 1     To evaluate/educate on strength/endurance/stretching exercises and equipment needs.     PT 1 day 1     To evaluate/educate on home exercise program to improve ROM, decrease body stiffness, and provide home safety measures recommendations.      Guerita Farah RN   [phone]  [email or web address]

## 2019-03-11 ENCOUNTER — PATIENT OUTREACH (OUTPATIENT)
Dept: GERIATRIC MEDICINE | Facility: CLINIC | Age: 84
End: 2019-03-11

## 2019-03-11 NOTE — TELEPHONE ENCOUNTER
RECORDS STATUS - ALL OTHER DIAGNOSIS      RECORDS RECEIVED FROM: [facility]   DATE RECEIVED: 3/11/19   NOTES STATUS DETAILS   OFFICE NOTE from referring provider  Epic   OFFICE NOTE from medical oncologist NA    DISCHARGE SUMMARY from hospital  Epic   DISCHARGE REPORT from the ER  [facility]   OPERATIVE REPORT  Epic   MEDICATION LIST  [facility]   CLINICAL TRIAL TREATMENTS TO DATE     LABS     PATHOLOGY REPORTS 2/6/19 Complete/Epic   ANYTHING RELATED TO DIAGNOSIS  Epic   GENONOMIC TESTING     TYPE:     IMAGING (NEED IMAGES & REPORT)     CT SCANS  PACS   MRI NA    MAMMO NA    ULTRASOUND  PACS   PET NA    X Ray        PACS

## 2019-03-11 NOTE — PROGRESS NOTES
[facility] Care Coordination Contact    Per Josué, [facility] [address] [facility], hospitalist did not complete paperwork for the hospital bed for client.  CC notified Xiomara at [facility] and asked that she send paperwork to clients PCP, Dr. Hurtado, to complete and return to [facility] so bed can be delivered to client at home.  CC will route this to PCP as a heads up.    MORELIA Blum   [facility] Care Coordinator  [phone]

## 2019-03-13 ENCOUNTER — PATIENT OUTREACH (OUTPATIENT)
Dept: GERIATRIC MEDICINE | Facility: CLINIC | Age: 84
End: 2019-03-13

## 2019-03-13 NOTE — PROGRESS NOTES
[facility] Care Coordination Contact    CC notified that Dr Hurtado is out of office so Dr. An will be completing the paperwork for hospital bed. [facility] updated.  CC tried to contact edwin son, Jose, via [employer] # [phone] to see if they have a date that client will be flying to [facility] and to update that we are still working on getting hospital bed approved.   left this info on Jose SPEAR and asked him to call me back.  Per Xiomara, hospital bed was delivered 3/8/19 and they will get paperwork done.    MORELIA Blum   [facility] Care Coordinator  [phone]

## 2019-03-14 ENCOUNTER — PRE VISIT (OUTPATIENT)
Dept: ONCOLOGY | Facility: CLINIC | Age: 84
End: 2019-03-14

## 2019-03-14 ENCOUNTER — TELEPHONE (OUTPATIENT)
Dept: FAMILY MEDICINE | Facility: CLINIC | Age: 84
End: 2019-03-14

## 2019-03-14 ENCOUNTER — ONCOLOGY VISIT (OUTPATIENT)
Dept: ONCOLOGY | Facility: CLINIC | Age: 84
End: 2019-03-14
Attending: INTERNAL MEDICINE
Payer: COMMERCIAL

## 2019-03-14 VITALS
DIASTOLIC BLOOD PRESSURE: 70 MMHG | SYSTOLIC BLOOD PRESSURE: 131 MMHG | RESPIRATION RATE: 16 BRPM | HEIGHT: 66 IN | HEART RATE: 75 BPM | BODY MASS INDEX: 23.37 KG/M2 | TEMPERATURE: 96.6 F | OXYGEN SATURATION: 99 %

## 2019-03-14 DIAGNOSIS — C18.8 OVERLAPPING MALIGNANT NEOPLASM OF COLON (H): ICD-10-CM

## 2019-03-14 DIAGNOSIS — M54.50 MIDLINE LOW BACK PAIN WITHOUT SCIATICA, UNSPECIFIED CHRONICITY: ICD-10-CM

## 2019-03-14 PROCEDURE — T1013 SIGN LANG/ORAL INTERPRETER: HCPCS | Mod: U3

## 2019-03-14 PROCEDURE — G0463 HOSPITAL OUTPT CLINIC VISIT: HCPCS

## 2019-03-14 PROCEDURE — 99215 OFFICE O/P EST HI 40 MIN: CPT | Performed by: INTERNAL MEDICINE

## 2019-03-14 RX ORDER — HYDROCODONE BITARTRATE AND ACETAMINOPHEN 5; 325 MG/1; MG/1
1 TABLET ORAL EVERY 6 HOURS PRN
Qty: 30 TABLET | Refills: 0 | Status: SHIPPED | OUTPATIENT
Start: 2019-03-14 | End: 2019-03-19

## 2019-03-14 ASSESSMENT — PAIN SCALES - GENERAL: PAINLEVEL: NO PAIN (0)

## 2019-03-14 NOTE — TELEPHONE ENCOUNTER
Received a 16 page fax from [facility]. [facility] Bed- Face to Face. Dr. Uriostegui filled out form on 03/12/19, now received form requesting additional information. Fax to Dr. Hurtado. Ruth Behrens.

## 2019-03-14 NOTE — NURSING NOTE
Pt's son Jose signed IVET signed and completed at clinic visit today.  Medical records request per family as pt traveling back to [facility].  Request faxed to [phone].  Son Jose provided with [facility] contact number for further information and noted to mail records to pt.  Reyna Ayala RN, BSN, OCN

## 2019-03-14 NOTE — LETTER
March 14, 2019    RE:  Barrie Woods                              [id number]  [facility] [address]      To Whom It May Concern:    This letter is regarding my patient Barrie Woods who is under my care for the treatment of metastatic colon cancer.  He will be traveling to [facility] and will need to travel with his medications as listed.    Please let us know if you have further questions.    Sincerely,      Winter Simon  Department of Medical Oncology & Hematology  [facility]

## 2019-03-14 NOTE — LETTER
3/14/2019         RE: Barrie Woods  [address]  [facility] [address]        Dear Colleague,    Thank you for referring your patient, Barrie Woods, to the [facility]. Please see a copy of my visit note below.    [facility]    Hematology/Oncology New Patient Note      Today's Date: 03/14/19    Reason for Consult: metastatic colorectal cancer      HISTORY OF PRESENT ILLNESS: Barrie Woods is a 89 year old male with PMHx of dementia, CVA, HTN, HLD, CKD, atrial fibrillation, CHF, BPH with indwelling Youssef, probable renal cell carcinoma, chronic anemia, dysphasia, who presents with metastatic colorectal cancer.   He had a TURP done in 02/2019.  Pathology revealed adenocarcinoma consistent with colorectal primary.  CT scan done on 2/14/19 showed multiple liver and lung metastases.  He met with Dr. Gray in the hospital and discussed the diagnosis of metastatic colorectal cancer.  Palliative chemotherapy was discussed.  Patient and family decided against any chemotherapy.  Patient does not want treatment for the cancer.  Patient has poor functional status and has multiple co-morbidities.       Patient is here with his 2 sons today.  He is unable to name them due to dementia.  His son is planning to take him to [facility] on 4/1/19.        REVIEW OF SYSTEMS:   14 point ROS was reviewed and is negative other than as noted above in HPI.       HOME MEDICATIONS:  Current Outpatient Medications   Medication Sig Dispense Refill     acetaminophen (TYLENOL) 325 MG tablet Take 650 mg by mouth every 4 hours as needed for mild pain       acetaminophen (TYLENOL) 500 MG tablet Take 1,000 mg by mouth every 6 hours as needed for mild pain       amLODIPine (NORVASC) 5 MG tablet Take 1 tablet (5 mg) by mouth daily 30 tablet 1     carvedilol (COREG) 12.5 MG tablet Take 1 tablet (12.5 mg) by mouth 2 times daily (with meals) 60 tablet 5     citalopram (CELEXA) 10 MG tablet  Take 1 tablet (10 mg) by mouth daily       diclofenac (VOLTAREN) 1 % GEL topical gel Apply 4 gm to knees qid 100 g 11     HYDROcodone-acetaminophen (NORCO) 5-325 MG tablet Take 1 tablet by mouth every 6 hours as needed for moderate to severe pain 30 tablet 0     linezolid (ZYVOX) 600 MG tablet Take 1 tablet (600 mg) by mouth every 12 hours 10 tablet 0     magnesium oxide (MAG-OX) 400 (241.3 MG) MG tablet TAKE 1 TABLET BY MOUTH ONCE DAILY 30 tablet 5     Multiple Vitamins-Minerals (MULTILEX) TABS TAKE ONE TABLET BY MOUTH ONCE DAILY 100 tablet 3     order for DME Equipment being ordered: walker with slides 1 Device 0     pregabalin (LYRICA) 100 MG capsule Take 100 mg by mouth 2 times daily       ranitidine (ZANTAC) 150 MG tablet Take 150 mg by mouth daily           ALLERGIES:  Allergies   Allergen Reactions     Nkda [No Known Drug Allergies]          PAST MEDICAL HISTORY:  Past Medical History:   Diagnosis Date     Acute, but ill-defined, cerebrovascular disease 1/04    left middle cerebral artery infarct     Alcohol abuse      Anemia 7/14/2014     Arrhythmia     a fib     Atrial fibrillation (H)     CVA occurred off anticoagulation     Balanitis 6/4/2018     Cachexia (H) 1/9/2018     Cardiomyopathy (H)     stress tests 6/04 and 10/04 negative for ischemia, EF 36-40%     CHF (congestive heart failure) (H) 12/24/2009     Chronic infection      Chronic pain 10/12/2015     Dementia without behavioral disturbance, unspecified dementia type 5/9/2017     Dementia, without behavioral disturbance 6/29/2015     Depressive disorder      Diaphragmatic hernia without mention of obstruction or gangrene     left sided chest pain, hiatal hernia/gastritis     Diverticulosis of colon (without mention of hemorrhage)     sigmoid     Dysphagia 7/27/2015     Elevated blood sugar 1/25/2016     Esophageal reflux      Hearing loss      Hematuria     negative cystoscopy 3/04     Hereditary and idiopathic peripheral neuropathy 2006     EMG-peripheral polyneuropathy, B12 and VitD deficient, ? alcohol related     Hx of congestive heart failure 1/12/2016     Hypertension      Hypertrophy of prostate 6/17/2015     Nausea with vomiting 12/8/2014     Presbycusis of both ears 7/17/2017     Presence of indwelling urethral catheter 8/17/2018     PSA elevation 5/30/2014     Recurrent falls 7/28/2015     Renal cell carcinoma (H) 8/25/2014     Sepsis (H) 1/7/2015     TB lung, latent 5/6/2014     Urinary incontinence 7/28/2015         PAST SURGICAL HISTORY:  Past Surgical History:   Procedure Laterality Date     CYSTOSCOPY, TRANSURETHRAL RESECTION (TUR) PROSTATE, COMBINED N/A 2/6/2019    Procedure: Cystoscopy, bipolar transurethral resection of prostate;  Surgeon: Justin Scott MD;  Location: [facility] OR     EYE SURGERY      cataract surgery bilat         SOCIAL HISTORY:  Social History     Socioeconomic History     Marital status:      Spouse name: Eliceo Wright     Number of children: 6     Years of education: Not on file     Highest education level: Not on file   Occupational History     Occupation: Retired   Social Needs     Financial resource strain: Not on file     Food insecurity:     Worry: Not on file     Inability: Not on file     Transportation needs:     Medical: Not on file     Non-medical: Not on file   Tobacco Use     Smoking status: Former Smoker     Types: Dip, chew, snus or snuff     Smokeless tobacco: Current User     Types: Chew     Tobacco comment: pt chews tobacco   Substance and Sexual Activity     Alcohol use: No     Drug use: No     Sexual activity: Not Currently   Lifestyle     Physical activity:     Days per week: Not on file     Minutes per session: Not on file     Stress: Not on file   Relationships     Social connections:     Talks on phone: Not on file     Gets together: Not on file     Attends Hoahaoism service: Not on file     Active member of club or organization: Not on file     Attends meetings of clubs or  "organizations: Not on file     Relationship status: Not on file     Intimate partner violence:     Fear of current or ex partner: Not on file     Emotionally abused: Not on file     Physically abused: Not on file     Forced sexual activity: Not on file   Other Topics Concern     Parent/sibling w/ CABG, MI or angioplasty before 65F 55M? No   Social History Narrative     Not on file         FAMILY HISTORY:  Family History   Problem Relation Age of Onset     C.A.D. No family hx of      Diabetes No family hx of          PHYSICAL EXAM:  Vital signs:  /70   Pulse 75   Temp 96.6  F (35.9  C) (Tympanic)   Resp 16   Ht 1.676 m (5' 6\")   SpO2 99%   BMI 23.37 kg/m      ECOG: 3  GENERAL/CONSTITUTIONAL: No acute distress. Accompanied by 2 sons.    EYES: No scleral icterus.  NEUROLOGIC: Alert, chronic dementia.    INTEGUMENTARY: No jaundice.  : Youssef catheter in place.  GAIT: In wheelchair.      LABS:  CBC RESULTS:   Recent Labs   Lab Test 03/05/19  0655   WBC 10.6   RBC 3.71*   HGB 10.0*   HCT 32.0*   MCV 86   MCH 27.0   MCHC 31.3*   RDW 16.6*          Recent Labs   Lab Test 03/08/19  0704 03/06/19  0738    143   POTASSIUM 3.9 4.3   CHLORIDE 112* 116*   CO2 24 24   ANIONGAP 5 3   GLC 96 111*   BUN 15 24   CR 1.08 1.58*   JOSE 8.0* 8.0*         PATHOLOGY:  2/6/19:  FINAL DIAGNOSIS:   Prostate, TUR.   - Moderately differentiated adenocarcinoma consistent with colorectal   primary present.   - Negative for prostatic adenocarcinoma.   - Prostatic hyperplasia.   - See microscopic description.       IMAGING:  CT c/a/p 2/1419:  1. Multiple bilateral pulmonary nodules compatible with metastatic  disease measuring up to 2.8 cm.  2. Several ill-defined liver lesions measuring up to 3 cm concerning  for metastatic disease.  3. Probably no significant change in mass in the inferior left kidney  since the comparison study.         ASSESSMENT/PLAN:  Barrie Woods is a 89 year old male with:    Stage IV colorectal " cancer: with liver and lung metastases.  We reviewed the pathology results.  Patient has dementia and multiple co-morbidities, with poor functional status.  Patient would not be a good candidate for chemotherapy, and his family has opted against it as well.  They decline hospice cares at this time.  His son is planning to take him to [facility] on 4/1/19.  They had many questions of what to expect as the cancer continues to grow.  We also spent some time reviewing the previous hospital course and how this diagnosis was established with biopsy and the imaging results.  They asked about pain medications if he gets more pain.  He was on hydrocodone-acetaminophen previously, and I did renew a prescription for them today for 30 pills.  I did advise them to establish care with a physician once they get to [facility], for any needs they might have there.  I also recommended enrolling in hospice care.  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  They will also be directed the medical records department to get a copy of the patient's medical records to take back to [facility].        I spent a total of 60 minutes with the patient, with over >50% of the time in counseling and/or coordination of care.       Winter Simon MD  Hematology/Oncology  [facility]      Again, thank you for allowing me to participate in the care of your patient.        Sincerely,        Winter Simon MD

## 2019-03-14 NOTE — PROGRESS NOTES
[facility]    Hematology/Oncology New Patient Note      Today's Date: 03/14/19    Reason for Consult: metastatic colorectal cancer      HISTORY OF PRESENT ILLNESS: Barrie Woods is a 89 year old male with PMHx of dementia, CVA, HTN, HLD, CKD, atrial fibrillation, CHF, BPH with indwelling Youssef, probable renal cell carcinoma, chronic anemia, dysphasia, who presents with metastatic colorectal cancer.   He had a TURP done in 02/2019.  Pathology revealed adenocarcinoma consistent with colorectal primary.  CT scan done on 2/14/19 showed multiple liver and lung metastases.  He met with Dr. Gray in the hospital and discussed the diagnosis of metastatic colorectal cancer.  Palliative chemotherapy was discussed.  Patient and family decided against any chemotherapy.  Patient does not want treatment for the cancer.  Patient has poor functional status and has multiple co-morbidities.       Patient is here with his 2 sons today.  He is unable to name them due to dementia.  His son is planning to take him to [facility] on 4/1/19.        REVIEW OF SYSTEMS:   14 point ROS was reviewed and is negative other than as noted above in HPI.       HOME MEDICATIONS:  Current Outpatient Medications   Medication Sig Dispense Refill     acetaminophen (TYLENOL) 325 MG tablet Take 650 mg by mouth every 4 hours as needed for mild pain       acetaminophen (TYLENOL) 500 MG tablet Take 1,000 mg by mouth every 6 hours as needed for mild pain       amLODIPine (NORVASC) 5 MG tablet Take 1 tablet (5 mg) by mouth daily 30 tablet 1     carvedilol (COREG) 12.5 MG tablet Take 1 tablet (12.5 mg) by mouth 2 times daily (with meals) 60 tablet 5     citalopram (CELEXA) 10 MG tablet Take 1 tablet (10 mg) by mouth daily       diclofenac (VOLTAREN) 1 % GEL topical gel Apply 4 gm to knees qid 100 g 11     HYDROcodone-acetaminophen (NORCO) 5-325 MG tablet Take 1 tablet by mouth every 6 hours as needed for moderate to severe pain 30 tablet  0     linezolid (ZYVOX) 600 MG tablet Take 1 tablet (600 mg) by mouth every 12 hours 10 tablet 0     magnesium oxide (MAG-OX) 400 (241.3 MG) MG tablet TAKE 1 TABLET BY MOUTH ONCE DAILY 30 tablet 5     Multiple Vitamins-Minerals (MULTILEX) TABS TAKE ONE TABLET BY MOUTH ONCE DAILY 100 tablet 3     order for DME Equipment being ordered: walker with slides 1 Device 0     pregabalin (LYRICA) 100 MG capsule Take 100 mg by mouth 2 times daily       ranitidine (ZANTAC) 150 MG tablet Take 150 mg by mouth daily           ALLERGIES:  Allergies   Allergen Reactions     Nkda [No Known Drug Allergies]          PAST MEDICAL HISTORY:  Past Medical History:   Diagnosis Date     Acute, but ill-defined, cerebrovascular disease 1/04    left middle cerebral artery infarct     Alcohol abuse      Anemia 7/14/2014     Arrhythmia     a fib     Atrial fibrillation (H)     CVA occurred off anticoagulation     Balanitis 6/4/2018     Cachexia (H) 1/9/2018     Cardiomyopathy (H)     stress tests 6/04 and 10/04 negative for ischemia, EF 36-40%     CHF (congestive heart failure) (H) 12/24/2009     Chronic infection      Chronic pain 10/12/2015     Dementia without behavioral disturbance, unspecified dementia type 5/9/2017     Dementia, without behavioral disturbance 6/29/2015     Depressive disorder      Diaphragmatic hernia without mention of obstruction or gangrene     left sided chest pain, hiatal hernia/gastritis     Diverticulosis of colon (without mention of hemorrhage)     sigmoid     Dysphagia 7/27/2015     Elevated blood sugar 1/25/2016     Esophageal reflux      Hearing loss      Hematuria     negative cystoscopy 3/04     Hereditary and idiopathic peripheral neuropathy 2006    EMG-peripheral polyneuropathy, B12 and VitD deficient, ? alcohol related     Hx of congestive heart failure 1/12/2016     Hypertension      Hypertrophy of prostate 6/17/2015     Nausea with vomiting 12/8/2014     Presbycusis of both ears 7/17/2017     Presence of  indwelling urethral catheter 8/17/2018     PSA elevation 5/30/2014     Recurrent falls 7/28/2015     Renal cell carcinoma (H) 8/25/2014     Sepsis (H) 1/7/2015     TB lung, latent 5/6/2014     Urinary incontinence 7/28/2015         PAST SURGICAL HISTORY:  Past Surgical History:   Procedure Laterality Date     CYSTOSCOPY, TRANSURETHRAL RESECTION (TUR) PROSTATE, COMBINED N/A 2/6/2019    Procedure: Cystoscopy, bipolar transurethral resection of prostate;  Surgeon: Justin Scott MD;  Location: [facility] OR     EYE SURGERY      cataract surgery bilat         SOCIAL HISTORY:  Social History     Socioeconomic History     Marital status:      Spouse name: Eliceo Wright     Number of children: 6     Years of education: Not on file     Highest education level: Not on file   Occupational History     Occupation: Retired   Social Needs     Financial resource strain: Not on file     Food insecurity:     Worry: Not on file     Inability: Not on file     Transportation needs:     Medical: Not on file     Non-medical: Not on file   Tobacco Use     Smoking status: Former Smoker     Types: Dip, chew, snus or snuff     Smokeless tobacco: Current User     Types: Chew     Tobacco comment: pt chews tobacco   Substance and Sexual Activity     Alcohol use: No     Drug use: No     Sexual activity: Not Currently   Lifestyle     Physical activity:     Days per week: Not on file     Minutes per session: Not on file     Stress: Not on file   Relationships     Social connections:     Talks on phone: Not on file     Gets together: Not on file     Attends Yarsani service: Not on file     Active member of club or organization: Not on file     Attends meetings of clubs or organizations: Not on file     Relationship status: Not on file     Intimate partner violence:     Fear of current or ex partner: Not on file     Emotionally abused: Not on file     Physically abused: Not on file     Forced sexual activity: Not on file   Other  "Topics Concern     Parent/sibling w/ CABG, MI or angioplasty before 65F 55M? No   Social History Narrative     Not on file         FAMILY HISTORY:  Family History   Problem Relation Age of Onset     C.A.D. No family hx of      Diabetes No family hx of          PHYSICAL EXAM:  Vital signs:  /70   Pulse 75   Temp 96.6  F (35.9  C) (Tympanic)   Resp 16   Ht 1.676 m (5' 6\")   SpO2 99%   BMI 23.37 kg/m     ECOG: 3  GENERAL/CONSTITUTIONAL: No acute distress. Accompanied by 2 sons.    EYES: No scleral icterus.  NEUROLOGIC: Alert, chronic dementia.    INTEGUMENTARY: No jaundice.  : Youssef catheter in place.  GAIT: In wheelchair.      LABS:  CBC RESULTS:   Recent Labs   Lab Test 03/05/19  0655   WBC 10.6   RBC 3.71*   HGB 10.0*   HCT 32.0*   MCV 86   MCH 27.0   MCHC 31.3*   RDW 16.6*          Recent Labs   Lab Test 03/08/19  0704 03/06/19  0738    143   POTASSIUM 3.9 4.3   CHLORIDE 112* 116*   CO2 24 24   ANIONGAP 5 3   GLC 96 111*   BUN 15 24   CR 1.08 1.58*   JOSE 8.0* 8.0*         PATHOLOGY:  2/6/19:  FINAL DIAGNOSIS:   Prostate, TUR.   - Moderately differentiated adenocarcinoma consistent with colorectal   primary present.   - Negative for prostatic adenocarcinoma.   - Prostatic hyperplasia.   - See microscopic description.       IMAGING:  CT c/a/p 2/1419:  1. Multiple bilateral pulmonary nodules compatible with metastatic  disease measuring up to 2.8 cm.  2. Several ill-defined liver lesions measuring up to 3 cm concerning  for metastatic disease.  3. Probably no significant change in mass in the inferior left kidney  since the comparison study.         ASSESSMENT/PLAN:  Barrie Woods is a 89 year old male with:    Stage IV colorectal cancer: with liver and lung metastases.  We reviewed the pathology results.  Patient has dementia and multiple co-morbidities, with poor functional status.  Patient would not be a good candidate for chemotherapy, and his family has opted against it as well.  They " decline hospice cares at this time.  His son is planning to take him to [facility] on 4/1/19.  They had many questions of what to expect as the cancer continues to grow.  We also spent some time reviewing the previous hospital course and how this diagnosis was established with biopsy and the imaging results.  They asked about pain medications if he gets more pain.  He was on hydrocodone-acetaminophen previously, and I did renew a prescription for them today for 30 pills.  I did advise them to establish care with a physician once they get to [facility], for any needs they might have there.  I also recommended enrolling in hospice care.  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  They will also be directed the medical records department to get a copy of the patient's medical records to take back to [facility].        I spent a total of 60 minutes with the patient, with over >50% of the time in counseling and/or coordination of care.       Winter Simon MD  Hematology/Oncology  [facility]

## 2019-03-14 NOTE — NURSING NOTE
"Oncology Rooming Note    March 14, 2019 10:59 AM   Barrie Woods is a 89 year old male who presents for:    Chief Complaint   Patient presents with     Oncology Clinic Visit     New Patient Consult     Initial Vitals: /70   Pulse 75   Temp 96.6  F (35.9  C) (Tympanic)   Resp 16   Ht 1.676 m (5' 6\")   SpO2 99%   BMI 23.37 kg/m   Estimated body mass index is 23.37 kg/m  as calculated from the following:    Height as of this encounter: 1.676 m (5' 6\").    Weight as of 3/7/19: 65.7 kg (144 lb 12.8 oz). Body surface area is 1.75 meters squared.  No Pain (0) Comment: Data Unavailable   No LMP for male patient.  Allergies reviewed: Yes  Medications reviewed: Yes    Medications: Medication refills not needed today.  Pharmacy name entered into [facility]: [facility] [place], [place] - [address]    Clinical concerns: Follow Up       Samantha Roberson CMA              "

## 2019-03-18 ENCOUNTER — TELEPHONE (OUTPATIENT)
Dept: FAMILY MEDICINE | Facility: CLINIC | Age: 84
End: 2019-03-18

## 2019-03-18 ENCOUNTER — MEDICAL CORRESPONDENCE (OUTPATIENT)
Dept: HEALTH INFORMATION MANAGEMENT | Facility: CLINIC | Age: 84
End: 2019-03-18

## 2019-03-18 PROBLEM — C18.8 OVERLAPPING MALIGNANT NEOPLASM OF COLON (H): Status: ACTIVE | Noted: 2019-03-18

## 2019-03-18 NOTE — TELEPHONE ENCOUNTER
Alysia Cole RN with [facility]. Pt is returning to [facility] April 1.  Requests Rxs but now sees pt is scheduled to see Dr. Hurtado tomorrow.   Disregard. Will discuss at visit.  uAgie Fabian RN

## 2019-03-19 ENCOUNTER — TELEPHONE (OUTPATIENT)
Dept: FAMILY MEDICINE | Facility: CLINIC | Age: 84
End: 2019-03-19

## 2019-03-19 ENCOUNTER — OFFICE VISIT (OUTPATIENT)
Dept: FAMILY MEDICINE | Facility: CLINIC | Age: 84
End: 2019-03-19
Payer: COMMERCIAL

## 2019-03-19 VITALS
HEIGHT: 66 IN | DIASTOLIC BLOOD PRESSURE: 71 MMHG | BODY MASS INDEX: 23.27 KG/M2 | WEIGHT: 144.8 LBS | RESPIRATION RATE: 12 BRPM | HEART RATE: 81 BPM | TEMPERATURE: 97 F | SYSTOLIC BLOOD PRESSURE: 131 MMHG

## 2019-03-19 DIAGNOSIS — I50.42 CHRONIC COMBINED SYSTOLIC AND DIASTOLIC CONGESTIVE HEART FAILURE (H): ICD-10-CM

## 2019-03-19 DIAGNOSIS — I10 HTN, GOAL BELOW 140/90: ICD-10-CM

## 2019-03-19 DIAGNOSIS — C18.8 OVERLAPPING MALIGNANT NEOPLASM OF COLON (H): Primary | ICD-10-CM

## 2019-03-19 DIAGNOSIS — M54.50 MIDLINE LOW BACK PAIN WITHOUT SCIATICA, UNSPECIFIED CHRONICITY: ICD-10-CM

## 2019-03-19 DIAGNOSIS — C64.9 RENAL CELL CARCINOMA, UNSPECIFIED LATERALITY (H): ICD-10-CM

## 2019-03-19 DIAGNOSIS — F03.90 DEMENTIA WITHOUT BEHAVIORAL DISTURBANCE, UNSPECIFIED DEMENTIA TYPE: ICD-10-CM

## 2019-03-19 DIAGNOSIS — Z00.00 ROUTINE GENERAL MEDICAL EXAMINATION AT A HEALTH CARE FACILITY: ICD-10-CM

## 2019-03-19 DIAGNOSIS — M17.0 PRIMARY OSTEOARTHRITIS OF BOTH KNEES: ICD-10-CM

## 2019-03-19 DIAGNOSIS — N18.30 CKD (CHRONIC KIDNEY DISEASE), STAGE III (H): ICD-10-CM

## 2019-03-19 PROCEDURE — 99214 OFFICE O/P EST MOD 30 MIN: CPT | Performed by: FAMILY MEDICINE

## 2019-03-19 RX ORDER — AMOXICILLIN 500 MG/1
500 CAPSULE ORAL 3 TIMES DAILY
Qty: 90 CAPSULE | Refills: 1 | Status: SHIPPED | OUTPATIENT
Start: 2019-03-19

## 2019-03-19 RX ORDER — PREGABALIN 100 MG/1
100 CAPSULE ORAL 2 TIMES DAILY
Qty: 180 CAPSULE | Refills: 3 | Status: SHIPPED | OUTPATIENT
Start: 2019-03-19

## 2019-03-19 RX ORDER — ACETAMINOPHEN 325 MG/1
650 TABLET ORAL EVERY 4 HOURS PRN
Status: CANCELLED | OUTPATIENT
Start: 2019-03-19

## 2019-03-19 RX ORDER — ACETAMINOPHEN 500 MG
1000 TABLET ORAL EVERY 6 HOURS PRN
Qty: 620 TABLET | Refills: 3 | Status: SHIPPED | OUTPATIENT
Start: 2019-03-19

## 2019-03-19 RX ORDER — CARVEDILOL 12.5 MG/1
12.5 TABLET ORAL 2 TIMES DAILY WITH MEALS
Qty: 180 TABLET | Refills: 3 | Status: SHIPPED | OUTPATIENT
Start: 2019-03-19

## 2019-03-19 RX ORDER — HYDROCODONE BITARTRATE AND ACETAMINOPHEN 5; 325 MG/1; MG/1
1 TABLET ORAL EVERY 6 HOURS PRN
Qty: 360 TABLET | Refills: 0 | Status: SHIPPED | OUTPATIENT
Start: 2019-03-19

## 2019-03-19 RX ORDER — AMLODIPINE BESYLATE 5 MG/1
5 TABLET ORAL DAILY
Qty: 90 TABLET | Refills: 3 | Status: SHIPPED | OUTPATIENT
Start: 2019-03-19

## 2019-03-19 RX ORDER — CITALOPRAM HYDROBROMIDE 10 MG/1
10 TABLET ORAL DAILY
Qty: 90 TABLET | Refills: 3 | Status: SHIPPED | OUTPATIENT
Start: 2019-03-19

## 2019-03-19 ASSESSMENT — MIFFLIN-ST. JEOR: SCORE: 1264.56

## 2019-03-19 NOTE — LETTER
[address] [id number] [phone]          March 19, 2019    Barrie Woods                                                                                                                     [id number]  [facility] [place] [address]            Whit Sood,  Mr Woods is travelling with medicines to treat his multiple illnesses.  Current Outpatient Medications   Medication     ACE/ARB/ARNI NOT PRESCRIBED (INTENTIONAL)     acetaminophen (TYLENOL) 325 MG tablet     acetaminophen (TYLENOL) 500 MG tablet     amLODIPine (NORVASC) 5 MG tablet     amoxicillin (AMOXIL) 500 MG capsule     carvedilol (COREG) 12.5 MG tablet     citalopram (CELEXA) 10 MG tablet     diclofenac (VOLTAREN) 1 % topical gel     HYDROcodone-acetaminophen (NORCO) 5-325 MG tablet     magnesium oxide (MAG-OX) 400 (241.3 Mg) MG tablet     Multiple Vitamins-Minerals (MULTILEX) TABS     order for DME     pregabalin (LYRICA) 100 MG capsule     ranitidine (ZANTAC) 150 MG tablet     No current facility-administered medications for this visit.      Please assist him in maintaining his therapies        Sincerely,         [employer] MD

## 2019-03-19 NOTE — TELEPHONE ENCOUNTER
Received a 2 page fax from [employer]. Urinary Leg Bag, Drain Bag. Fax to Dr. Hurtado. Ruth Behrens.

## 2019-03-19 NOTE — PROGRESS NOTES
SUBJECTIVE:   Barrie Woods is a 89 year old male who presents to clinic today for the following health issues:  Answers for HPI/ROS submitted by the patient on 3/19/2019   PHQ9 TOTAL SCORE: Incomplete    HPI      Hospital Follow-up Visit:    Hospital/Nursing Home/IP Rehab Facility: [facility]  Date of Admission: 3/4/19  Date of Discharge: 3/8/19  Reason(s) for Admission: urosepsis, metastatic colorectal CA            Problems taking medications regularly:  None       Medication changes since discharge: None       Problems adhering to non-medication therapy:  None    Summary of hospitalization:  [facility] discharge summary reviewed  Diagnostic Tests/Treatments reviewed.  Follow up needed: none  Other Healthcare Providers Involved in Patient s Care:         None  Update since discharge: stable.     Post Discharge Medication Reconciliation: discharge medications reconciled and changed, per note/orders (see AVS).  Plan of care communicated with patient and family     Coding guidelines for this visit:  Type of Medical   Decision Making Face-to-Face Visit       within 7 Days of discharge Face-to-Face Visit        within 14 days of discharge   Moderate Complexity [id number]   High Complexity [id number]            Problem list and histories reviewed & adjusted, as indicated.  Additional history:             Past Medical History:   Diagnosis Date     Acute, but ill-defined, cerebrovascular disease 1/04    left middle cerebral artery infarct     Alcohol abuse      Anemia 7/14/2014     Arrhythmia     a fib     Atrial fibrillation (H)     CVA occurred off anticoagulation     Balanitis 6/4/2018     Cachexia (H) 1/9/2018     Cardiomyopathy (H)     stress tests 6/04 and 10/04 negative for ischemia, EF 36-40%     CHF (congestive heart failure) (H) 12/24/2009     Chronic infection      Chronic pain 10/12/2015     Dementia without behavioral disturbance, unspecified dementia type 5/9/2017     Dementia,  without behavioral disturbance 6/29/2015     Depressive disorder      Diaphragmatic hernia without mention of obstruction or gangrene     left sided chest pain, hiatal hernia/gastritis     Diverticulosis of colon (without mention of hemorrhage)     sigmoid     Dysphagia 7/27/2015     Elevated blood sugar 1/25/2016     Esophageal reflux      Hearing loss      Hematuria     negative cystoscopy 3/04     Hereditary and idiopathic peripheral neuropathy 2006    EMG-peripheral polyneuropathy, B12 and VitD deficient, ? alcohol related     Hx of congestive heart failure 1/12/2016     Hypertension      Hypertrophy of prostate 6/17/2015     Nausea with vomiting 12/8/2014     Overlapping malignant neoplasm of colon (H) 3/18/2019     Presbycusis of both ears 7/17/2017     Presence of indwelling urethral catheter 8/17/2018     PSA elevation 5/30/2014     Recurrent falls 7/28/2015     Renal cell carcinoma (H) 8/25/2014     Sepsis (H) 1/7/2015     TB lung, latent 5/6/2014     Urinary incontinence 7/28/2015       Past Surgical History:   Procedure Laterality Date     CYSTOSCOPY, TRANSURETHRAL RESECTION (TUR) PROSTATE, COMBINED N/A 2/6/2019    Procedure: Cystoscopy, bipolar transurethral resection of prostate;  Surgeon: Justin Scott MD;  Location: [facility] OR     EYE SURGERY      cataract surgery bilat       Family History   Problem Relation Age of Onset     C.A.D. No family hx of      Diabetes No family hx of        Social History     Tobacco Use     Smoking status: Former Smoker     Types: Dip, chew, snus or snuff     Smokeless tobacco: Current User     Types: Chew     Tobacco comment: pt chews tobacco   Substance Use Topics     Alcohol use: No     Discharged to NH, he is now at home with family, planning to travle back to [facility] April 1 for his remaining time  Current Outpatient Medications   Medication     ACE/ARB/ARNI NOT PRESCRIBED (INTENTIONAL)     acetaminophen (TYLENOL) 325 MG tablet     acetaminophen (TYLENOL) 500  "MG tablet     amLODIPine (NORVASC) 5 MG tablet     amoxicillin (AMOXIL) 500 MG capsule     carvedilol (COREG) 12.5 MG tablet     citalopram (CELEXA) 10 MG tablet     diclofenac (VOLTAREN) 1 % topical gel     HYDROcodone-acetaminophen (NORCO) 5-325 MG tablet     magnesium oxide (MAG-OX) 400 (241.3 Mg) MG tablet     Multiple Vitamins-Minerals (MULTILEX) TABS     order for DME     pregabalin (LYRICA) 100 MG capsule     ranitidine (ZANTAC) 150 MG tablet     No current facility-administered medications for this visit.      He is completing a course of Zyvox.    ROS pain \"all over\" borborygmi. No falls dysuria, fever.     /71 (BP Location: Right arm, Patient Position: Chair, Cuff Size: Adult Large)   Pulse 81   Temp 97  F (36.1  C) (Tympanic)   Resp 12   Ht 1.676 m (5' 6\")   Wt 65.7 kg (144 lb 12.8 oz)   BMI 23.37 kg/m    Hydrated  RRR noedema  Affect flat  Gait broad based    ASSESSMENT / PLAN:  (C18.8) Metastatic malignant neoplasm of colon (H)  (primary encounter diagnosis)  Comment: adenocarcinoma  Plan: acetaminophen (TYLENOL) 500 MG tablet,         pregabalin (LYRICA) 100 MG capsule, ranitidine         (ZANTAC) 150 MG tablet        His borborygmi cannot be treated    (I50.42) Chronic combined systolic and diastolic congestive heart failure (H)  Comment:currently well compensated  Plan: carvedilol (COREG) 12.5 MG tablet            (I10) HTN, goal below 140/90  Comment: controlled  Plan: amLODIPine (NORVASC) 5 MG tablet, carvedilol         (COREG) 12.5 MG tablet, ACE/ARB/ARNI NOT         PRESCRIBED (INTENTIONAL)            (F03.90) Dementia without behavioral disturbance, unspecified dementia type  Comment: his amoxicillin is a long standing recurrent pt request w/o infection  Plan: citalopram (CELEXA) 10 MG tablet, amoxicillin         (AMOXIL) 500 MG capsule        Improves sense of well being, especially if parenteral    (M17.0) Primary osteoarthritis of both knees  Comment: he reported response in the " past  Plan: diclofenac (VOLTAREN) 1 % topical gel            (M54.5) Midline low back pain without sciatica, unspecified chronicity  Comment: multifactorial:urinary, CA contributions  Plan: HYDROcodone-acetaminophen (NORCO) 5-325 MG         tablet            (Z00.00) Routine general medical examination at a health care facility  Comment: continue  Plan: magnesium oxide (MAG-OX) 400 (241.3 Mg) MG         tablet            (C64.9) Renal cell carcinoma, unspecified laterality (H)  Comment: growing  Plan: no treatment    (N18.3) CKD (chronic kidney disease), stage III (H)  Comment:   GFR Estimate   Date Value Ref Range Status   03/08/2019 60 (L) >60 mL/min/[1.73_m2] Final     Comment:     Non  GFR Calc  Starting 12/18/2018, serum creatinine based estimated GFR (eGFR) will be   calculated using the Chronic Kidney Disease Epidemiology Collaboration   (CKD-EPI) equation.     03/06/2019 38 (L) >60 mL/min/[1.73_m2] Final     Comment:     Non  GFR Calc  Starting 12/18/2018, serum creatinine based estimated GFR (eGFR) will be   calculated using the Chronic Kidney Disease Epidemiology Collaboration   (CKD-EPI) equation.     03/05/2019 28 (L) >60 mL/min/[1.73_m2] Final     Comment:     Non  GFR Calc  Starting 12/18/2018, serum creatinine based estimated GFR (eGFR) will be   calculated using the Chronic Kidney Disease Epidemiology Collaboration   (CKD-EPI) equation.           Plan:no therapies    Family has long desired, and reiterated this hospitalization,that they desire no therapies for his cancers. He is going home to [facility] for palliative and hospice care          Yonatan Hurtado MD

## 2019-03-22 ENCOUNTER — PATIENT OUTREACH (OUTPATIENT)
Dept: GERIATRIC MEDICINE | Facility: CLINIC | Age: 84
End: 2019-03-22

## 2019-03-25 ENCOUNTER — DOCUMENTATION ONLY (OUTPATIENT)
Dept: CARE COORDINATION | Facility: CLINIC | Age: 84
End: 2019-03-25

## 2019-03-25 DIAGNOSIS — Z53.9 DIAGNOSIS NOT YET DEFINED: Primary | ICD-10-CM

## 2019-03-25 PROCEDURE — G0180 MD CERTIFICATION HHA PATIENT: HCPCS | Performed by: FAMILY MEDICINE

## 2019-03-25 NOTE — PROGRESS NOTES
..[facility] utilizes an encounter to take the place of a direct phone call to your office. Please take a moment to review the below request. Please reply or route message to author of this encounter.  Message will act as a verbal OK of orders requested below. Thank you.    ORDER Request:    SN to change indwelling sarmiento catheter 14fr 10cc monthly.    Thank you,    Kelsey Wang, RN  [phone]

## 2019-03-25 NOTE — PROGRESS NOTES
[facility] Care Coordination Contact    CC received call from [facility] LORRAINE, Jennie Lai [phone], wanting to update me that client has a flight booked for 4/1/19 to go back to [facility].  Jennie wondering if client owns the w/c he has.  To my knowledge, he does as I believe he has had it for many years and we did not order it for him so I am not sure of the history.  They plan to bring that on the plane.  CC has alerted [facility] of clients move date so they can coordinate  of the hospital bed with Barrie.  Will confirm clients move occurred next week and then disenroll client effective 4/30/19.    MORELIA Blum  [facility] Care Coordinator  [phone]

## 2019-04-01 ENCOUNTER — TELEPHONE (OUTPATIENT)
Dept: FAMILY MEDICINE | Facility: CLINIC | Age: 84
End: 2019-04-01

## 2019-04-01 NOTE — TELEPHONE ENCOUNTER
Received a 2 page fax from [facility]. Catheter Youssef Lubricath, Insertion Tray Sterile. Fax to Dr. Hurtado. Ruth Behrens.

## 2019-04-04 ENCOUNTER — PATIENT OUTREACH (OUTPATIENT)
Dept: GERIATRIC MEDICINE | Facility: CLINIC | Age: 84
End: 2019-04-04

## 2019-04-04 NOTE — PROGRESS NOTES
[facility] Care Coordination Contact    CC confirmed [place] did leave the [place] on 4/1/19. No longer active with [facility] community case management effective 4/30/19. Reason for community disenrollment: Other:  client moved to [facility] on 4/1/19 to be near family at end of life.  CC confirmed with [facility]le[facility] Farah EA worker, that clients MA will close due to move. Son, Jose, will re-apply for benefits if client were to return to [place].  APA updated that client did move on 4/1/19.    Disenrollment checklist complete and chart given to CMS.    MORELIA Blum   [facility] Care Coordinator  [phone]

## 2020-11-19 NOTE — CONSULTS
[facility]    Palliative Care Consultation   Text Page    Date of Admission:  1/9/2019    Assessment & Plan   Barrie Woods is a 88 year old male who was admitted on 1/9/2019. I was asked to see the patient for assist with goals of care.    Recommendations:  1.Decisional Capacity -  Unreliable. Patient does not have an advance directive on file. Per  informed consent policy next of kin should be involved if she becomes unable.  2. Pain- Chronic low back pain on Lyrica and Vicodin (#1 tab daily as prescribed) per Primary Dr. Hurtado. Currently mild lower abdominal pain, noted ileus on xray. Now NPO, avoid opioids as able, Tylenol PRN.   3. Insomnia? per patient as initial complaint in setting of dementia, nurse and nursing notes indicate restful night. Avoid  Sedatives, at risk for delirium.  4. Spiritual Care- Oriented to Spiritual Health as part of Palliative Care team.  Patient is listed as Oriental orthodox.   5. Care Planning- Appreciate SW support, planning for return to sons home upon discharge, SW following for possible new support as needed.     Goal of Care:Full Code,restorative this hospitalization.  Known evidence of renal cell CA has been addressed several times by my team during different hospitalizations, first in 2014.  A overall palliative approach to this without surgical biopsy or treatment has been planned.  Son confirmed this via phone today.  He has no other noted evidence of decline and was last hospitalized in 4/2017.      Disease Process/es & Symptoms:  Barrie Woods is a 88 year old patient was found to have evidence of severe sepsis r/t UTI with chronic indwelling catheter, when he presented for planned TURP. He was initially encephalopathic, appears to be clearing despite confused speech as son indicates this is his baseline with underlying dementia. He is also treated for acute on chronic renal failure, now resolved, and today with evidence of ileus on xray.  CT will be  considered tomorrow if no improvement.    This is in the setting of dementia, history of CVA, dementia, chronic kidney disease, A. fib, cardiomyopathy, congestive heart failure, benign prostatic hypertrophy and renal cell carcinoma. Cancer noted to be increased in size significantly since initially noted in 2014 on most rent CT 7/2018.  Renal unit(s)/s on 1/11 does not show enlargement, obstruction or abscess. Son Jose, notes no recent decline in function, appetite nor any weight loss.  Last hospitalization was in 4/2017 for CAP.    Findings & plan of care discussed with: nursing  Follow-up plan from palliative team: Call out to Dr. Hurtado patient's primary for any further background information regarding advanced care planning.   Thank you for involving us in the patient's care.     Jennie Partida APRN, CNS. CNP  Pain Management and Palliative Care  [facility]  Pgr: [phone]    Time Spent on this Encounter   I spent  95 minutes total, >50% in assessment of the patient, counseling and discussion with the patient and family as documented in this note as well as coordination of care and discussion with the health care team.    Reason for Consult   Reason for consult: I was asked by Dr. Barba to evaluate this patient for Goals of care.    Primary Care Physician   Yonatan Hurtado    Chief Complaint   insomnia    History is obtained from the patient, electronic health record and patient's son Noemi, via speaker phone with professional .    History of Present Illness   Barrie Woods is a 88 year old male who was found to have evidence of severe sepsis r/t UTI with chronic indwelling catheter, when he presented for planned TURP. He was initially encephalopathic, appears to be clearing despite confused speech as son indicates this is his baseline with underlying dementia. He is also treated for acute on chronic renal failure, now resolved, and today with evidence of ileus on xray.  CT  "will be considered tomorrow if no improvement.    This is in the setting of dementia, history of CVA, dementia, chronic kidney disease, A. fib, cardiomyopathy, congestive heart failure, benign prostatic hypertrophy and renal cell carcinoma. Cancer noted to be increased in size significantly since initially noted in 2014 on most rent CT 7/2018.  Renal unit(s)/s on 1/11 does not show enlargement, obstruction or abscess. Urology has also discussed this and \"If he would have bleeding in the future from his left renal mass he would have to have left renal artery embolization\" Dann Garcia, notes no recent decline in function, appetite nor any weight loss.  Last hospitalization was in 4/2017 for CAP.    The following symptoms are noted by patient as concerning to his quality of life.  Pain - noted chronic back pain which patient denies, lower abdominal and testicular pain, evidenceof ileus and scrotal swelling per nursing.   Constipation - denies, but noted ileus.  Insomnia - biggest concern, although noted restful noht per staff.     Decision-Making & Goals of Care:  Discussed on January 14, 2019 with Jennie Partida APRN, CNS, CNP:   Patient unable, nonsensical speech, unable to follow conversation per professional , spoke with patients dann Garcia via speaker phone with professional .  Son notes patient has been in good helth and function over the past few years since we have last spoken other than current issue with prostate. He eats 2 meals a day and has not lost weight.  He agrees that renal cell cancer is not a current concern and with previous decisions not to have it worked up.  We discussed patent's current ileus and NPO with plan for CT if does not improve.  Jose will have difficulty meeting duringthe day, but will attempt to come if we need to conference about patient's care. He will call with further questions or concerns.      Patient has decision-making capacity Unreliable  Patient " has no known legal document designating a decision maker. Per policy next of kin is the designated decision maker. See System Informed Consent policy for guidance.  There is no POLST (Physician orders for life-sustaining treatment) form on file for this patient  Code Status: Full code     Past Medical History   I have reviewed this patient's medical history and updated it with pertinent information if needed.   Past Medical History:   Diagnosis Date     Acute, but ill-defined, cerebrovascular disease 1/04    left middle cerebral artery infarct     Alcohol abuse      Anemia 7/14/2014     Atrial fibrillation (H)     CVA occurred off anticoagulation     Balanitis 6/4/2018     Cachexia (H) 1/9/2018     Cardiomyopathy (H)     stress tests 6/04 and 10/04 negative for ischemia, EF 36-40%     CHF (congestive heart failure) (H) 12/24/2009     Chronic infection      Chronic pain 10/12/2015     Dementia without behavioral disturbance, unspecified dementia type 5/9/2017     Dementia, without behavioral disturbance 6/29/2015     Depressive disorder      Diaphragmatic hernia without mention of obstruction or gangrene     left sided chest pain, hiatal hernia/gastritis     Diverticulosis of colon (without mention of hemorrhage)     sigmoid     Dysphagia 7/27/2015     Elevated blood sugar 1/25/2016     Esophageal reflux      Hearing loss      Hematuria     negative cystoscopy 3/04     Hereditary and idiopathic peripheral neuropathy 2006    EMG-peripheral polyneuropathy, B12 and VitD deficient, ? alcohol related     Hx of congestive heart failure 1/12/2016     Hypertrophy of prostate 6/17/2015     Nausea with vomiting 12/8/2014     Presbycusis of both ears 7/17/2017     Presence of indwelling urethral catheter 8/17/2018     PSA elevation 5/30/2014     Recurrent falls 7/28/2015     Renal cell carcinoma (H) 8/25/2014     Sepsis (H) 1/7/2015     TB lung, latent 5/6/2014     Urinary incontinence 7/28/2015       Past Surgical History   I  have reviewed this patient's surgical history and updated it with pertinent information if needed.  Past Surgical History:   Procedure Laterality Date     EYE SURGERY      cataract surgery bilat       Prior to Admission Medications   Prior to Admission Medications   Prescriptions Last Dose Informant Patient Reported? Taking?   HYDROcodone-acetaminophen (NORCO) 5-325 MG tablet   No Yes   Sig: Take 1 tablet by mouth every 6 hours as needed for moderate to severe pain   Multiple Vitamins-Minerals (MULTILEX) TABS   No Yes   Sig: TAKE ONE TABLET BY MOUTH ONCE DAILY   acetaminophen (TYLENOL) 500 MG tablet   No Yes   Sig: Take 2 tablets (1,000 mg) by mouth every 6 hours as needed for mild pain   amoxicillin-clavulanate (AUGMENTIN) 500-125 MG tablet   No Yes   Sig: TAKE ONE TABLET BY MOUTH TWICE A DAY   bumetanide (BUMEX) 1 MG tablet   No Yes   Sig: TAKE ONE TABLET BY MOUTH EVERY DAY   carvedilol (COREG) 12.5 MG tablet   No Yes   Sig: Take 1 tablet (12.5 mg) by mouth 2 times daily (with meals)   diclofenac (VOLTAREN) 1 % GEL topical gel   No Yes   Sig: Apply 4 gm to knees qid   finasteride (PROSCAR) 5 MG tablet   No Yes   Sig: TAKE ONE TABLET BY MOUTH EVERY DAY   lisinopril (PRINIVIL/ZESTRIL) 10 MG tablet   No Yes   Sig: TAKE ONE TABLET BY MOUTH EVERY DAY   magnesium oxide (MAG-OX) 400 (241.3 MG) MG tablet   No Yes   Sig: TAKE 1 TABLET BY MOUTH ONCE DAILY   order for DME   No No   Sig: Equipment being ordered: walker with slides   Patient not taking: Reported on 12/28/2018   pregabalin (LYRICA) 100 MG capsule   No Yes   Sig: Take 1 capsule (100 mg) by mouth 2 times daily      Facility-Administered Medications: None     Allergies   Allergies   Allergen Reactions     Nkda [No Known Drug Allergies]        Social History   I have updated and reviewed the following Social History Narrative:   Social History     Social History Narrative     Not on file      Living situation: home with family  Family system: Sonand multipe other  children wholive out of town.  Functional status (needs help with ADLs or IADLs): need assist and 24 hour supervision.  Employment/education: rtired  Use of community resources: no  Activities/interests: unknown  History of substance use/abuse: yes, ETOH  Scientology affiliation: Congregational  Involvement in ava community: unknown    Family History   I have reviewed this patient's family history and updated it with pertinent information if needed.   Family History   Problem Relation Age of Onset     C.A.D. No family hx of      Diabetes No family hx of        Review of Systems   Review of systems not obtained due to patient factors - unable to follow conversation answer most questions with unrelated conversation.    Palliative Symptom Review (0=no symptom/no concern, 1=mild, 2=moderate, 3=severe):      Pain: 1-mild      Fatigue:       Nausea:       Constipation: 0-none      Diarrhea:       Depressive Symptoms:       Anxiety:       Drowsiness:       Poor Appetite:       Shortness of Breath:       Insomnia: yes, biggest [facility]      Physical Exam   Temp:  [95.7  F (35.4  C)-98.7  F (37.1  C)] 95.7  F (35.4  C)  Heart Rate:  [] 92  Resp:  [20-22] 20  BP: (147-166)/(68-81) 166/72  SpO2:  [96 %] 96 %  164 lbs 14.4 oz  GEN:  Alert, oriented x 1,  Nonsensical speech, appears comfortable, NAD.  HEENT:  Normocephalic/atraumatic, no scleral icterus, no nasal discharge, mouth moist.  CV:  RRR, S1, S2; no murmurs or other irregularities noted.  +3 DP/PT pulses bilatererally;  edema BLE.  RESP:  Clear to auscultation bilaterally without rales/rhonchi/wheezing/retractions.  Symmetric chest rise on inhalation noted.  Normal respiratory effort.  ABD:  Rounded, soft, non-tender/non-distended.  Hypoactive BS  EXT:  Edema & pulses as noted above.  CMS intact x 4.       SKIN:  Dry to touch, no exanthems noted in the visualized areas.    NEURO: Symmetric strength +5/5.  Sensation to touch intact all extremities.   There is no area of  allodynia or hyperesthesia.  Psych:  Confused,Normal affect.  Calm, cooperative    Delirium Screen/CAM:  Delirium = (#1 and #2 = YES) + (#3 and/or #4) per son  1) Acute onset and fluctuating course:   No   (acute change in mental status from baseline over last 24 hours)  2) Inattention:   YES   (difficulty focusing, distractible, can't follow conversation)  3) Disorganized thinking:   YES   (score only if #1 and #2 are YES)  (rambling/irrelevant conversation, unclear/illogical thoughts, inconsistency)  4) Altered level of consciousness:   No   (score only if #1 and #2 are YES)  (other than alert, calm, cooperative)    Delirium/CAM score: 1/4  Interpretation:  1)  Delirium:  Absent - consistent with chronic dementia      Data   Results for orders placed or performed during the hospital encounter of 01/09/19 (from the past 24 hour(s))   Magnesium   Result Value Ref Range    Magnesium 2.2 1.6 - 2.3 mg/dL   Basic metabolic panel   Result Value Ref Range    Sodium 143 133 - 144 mmol/L    Potassium 4.0 3.4 - 5.3 mmol/L    Chloride 112 (H) 94 - 109 mmol/L    Carbon Dioxide 25 20 - 32 mmol/L    Anion Gap 6 3 - 14 mmol/L    Glucose 115 (H) 70 - 99 mg/dL    Urea Nitrogen 23 7 - 30 mg/dL    Creatinine 1.23 0.66 - 1.25 mg/dL    GFR Estimate 52 (L) >60 mL/min/[1.73_m2]    GFR Estimate If Black 60 (L) >60 mL/min/[1.73_m2]    Calcium 8.6 8.5 - 10.1 mg/dL   XR Abdomen 2 Views    Narrative    ABDOMEN TWO VIEWS 1/14/2019 2:18 PM    HISTORY:  88-year-old patient with abdominal distention and pain,  evaluate for ileus.      Impression    IMPRESSION: Since CT exam on July 25, 2018, there remains no  intraperitoneal air. No dilated air-filled loops of small bowel,  though scattered air-fluid levels noted throughout the abdomen.  Irregular calcification in the upper abdomen, at the pancreatic head  on previous CT exam. Given air-fluid levels, this may relate to ileus.    DYLLAN LEARY MD     *Note: Due to a large number of results  and/or encounters for the requested time period, some results have not been displayed. A complete set of results can be found in Results Review.      yes...

## 2022-02-08 NOTE — TELEPHONE ENCOUNTER
Attach 7/9/15 swallow study  Abstract form and send  Yonatan Hurtado     Okay with me.  Have Dorothy change the orders.

## 2023-10-11 NOTE — TELEPHONE ENCOUNTER
RX [place] ([place]) reviewed:  reviewed- no concerns    Last fills:     Norco: 2/6/2017, #30; 1/5/2017, #30  Lyrica: 2/3/2017, #60; 1/4/2017, #90    [place]:  [email or web address]    Katelynn Phillips RN, BSN, PHN     3

## 2023-12-13 NOTE — ED AVS SNAPSHOT
Emergency Department  [address] [facility] [address]  Phone:  [phone]  Fax:  [phone]                                    Barrie Woods   MRN: [id number]    Department:   Emergency Department   Date of Visit:  2/20/2019           After Visit Summary Signature Page    I have received my discharge instructions, and my questions have been answered. I have discussed any challenges I see with this plan with the nurse or doctor.    ..........................................................................................................................................  Patient/Patient Representative Signature      ..........................................................................................................................................  Patient Representative Print Name and Relationship to Patient    ..................................................               ................................................  Date                                   Time    ..........................................................................................................................................  Reviewed by Signature/Title    ...................................................              ..............................................  Date                                               Time          22EPIC Rev 08/18        Quality 431: Preventive Care And Screening: Unhealthy Alcohol Use - Screening: Patient not identified as an unhealthy alcohol user when screened for unhealthy alcohol use using a systematic screening method Quality 110: Preventive Care And Screening: Influenza Immunization: Influenza Immunization Administered during Influenza season Quality 226: Preventive Care And Screening: Tobacco Use: Screening And Cessation Intervention: Patient screened for tobacco use and is an ex/non-smoker Quality 130: Documentation Of Current Medications In The Medical Record: Current Medications Documented Quality 47: Advance Care Plan: Advance Care Planning discussed and documented; advance care plan or surrogate decision maker documented in the medical record. Detail Level: Detailed

## 2024-02-06 NOTE — TELEPHONE ENCOUNTER
Requested Prescriptions   Pending Prescriptions Disp Refills     amoxicillin-clavulanate (AUGMENTIN) 500-125 MG per tablet [Pharmacy Med Name: AMOXICILLIN-POT CLAVUL 500-125 TABS] 40 tablet 0     Sig: TAKE ONE TABLET BY MOUTH TWICE A DAY    There is no refill protocol information for this order            Last Written Prescription Date:  3/15/18  Last Fill Quantity: 40 tablet,   # refills: 0  Last Office Visit: 12/18/17 (Bryant)  Future Office visit:       Routing refill request to provider for review/approval because:  Drug not on the [facility], [facility] or [facility] refill protocol or controlled substance       rehabilitation therapy

## 2024-04-26 NOTE — ED PROVIDER NOTES
History     Chief Complaint:  Catheter Problem    The history is provided by a caregiver. A  was used.      Barrie Woods is a 88 year old male who presents to the emergency department today with catheter problem. The patient was supposed to have an appointment today for TUR surgery, but they were told there was no appointment when they came to the Hospital. Urologist is Dr. Currie. Patient came to the ED to get the catheter changed, because he has had it for 3 months and nothing has been done about it. No fever/chills or new abdominal pain.     Allergies:  Nkda [No Known Drug Allergies]    Medications:    Tylenol  Augmentin  Bumex  Coreg  Lotrimin   Voltaren   Proscar   Norco  Prinivil   Mag-ox   Multilex   Lyrica  Flomax   Effexor     Past Medical History:    Acute, but ill-defined, cerebrovascular disease  Advanced directives, counseling/discussion  Alcohol abuse  Anemia  Atrial fibrillation, unspecified  Atypical chest pain  Balanitis  Balanitis  CAP (community acquired pneumonia)  CHF (congestive heart failure) (H)  Cachexia (H)  Cardiomyopathy (H)  Chronic pain  Dementia without behavioral disturbance, unspecified dementia type  Depressive disorder  Diaphragmatic hernia without mention of obstruction or gangrene  Diverticulosis of colon (without mention of hemorrhage)  Dizziness  Dysphagia  Dysuria  Elevated blood sugar  Esophageal reflux  Esophageal reflux  HTN, goal below 140/90  Health Care Home  Hearing loss  Hematuria  Hereditary and idiopathic peripheral neuropathy  Hx of congestive heart failure  Hyperlipidemia LDL goal <100  Hypertrophy of prostate  Low back pain  Microscopic hematuria  Nausea with vomiting  PSA elevation  Presbycusis of both ears  Presence of indwelling urethral catheter  Recurrent falls  Renal cell carcinoma (H)  Right rib fracture  Sepsis (H)  TB lung, latent  Tobacco use disorder  Urinary incontinence    Past Surgical History:    Eye surgery    Family  "[facility]   Department of Infectious Disease  Consult Note        PATIENT NAME: Steff Johnson  MRN: [id number]  TODAY'S DATE: 04/26/2024  ADMIT DATE: 4/25/2024  LOS: 1 days    CHIEF COMPLAINT: GI Problem (Brown fluid being withdrawn from peg tube, vomiting brown fluid. Smells of feces. Since this morning. Pt is nonverbal)      PRINCIPLE PROBLEM: GI hemorrhage    REASON FOR CONSULT:     ASSESSMENT and PLAN      1. Catheter associated UTI.  She previously grew ESBL Proteus in December 2023.  Agree with meropenem.  Reassess with culture results.      2. Staphylococcus bacteremia. Most likely MSSE. This may be a contaminant.  Await ID for significance. Meropenem covers this too.    3. Positive C diff antigen with negative PCR and toxin assay.  This may not be toxigenic C diff infection.  No colitis on CT abdomen.  DC IV metronidazole.  We will most likely also DC oral vancomycin.    4. COURTNEY.  Management as per hospitalist.    5. Anemia with hematocrit drop. Plan for EGD to evaluate for upper GI bleed      RECOMMENDATIONS:    Continue Meropenem.    2.  DC IV metronidazole.    3.  DC vancomycin    Thank you for this consult.  ID service will follow with you.  Please call with questions. Please send Epic secure chat with any questions.    Antibiotics (From admission, onward)      Start     Stop Route Frequency Ordered    04/25/24 2349  vancomycin - pharmacy to dose  (vancomycin IVPB (PEDS and ADULTS))        Placed in "And" Linked Group    -- IV pharmacy to manage frequency 04/25/24 2249    04/25/24 2300  vancomycin 125 mg/5 mL oral solution 125 mg  (C. difficile Infection (CDI) Treatment Order Panel)         05/05/24 2359 PER G TUBE Every 6 hours 04/25/24 1831    04/25/24 2100  mupirocin 2 % ointment         04/30/24 2059 Nasl 2 times daily 04/25/24 1905    04/25/24 1945  meropenem 500 mg in sodium chloride 0.9 % 100 mL IVPB (ready to mix system)         -- IV Every 12 hours (non-standard times) 04/25/24 " 1834          Antifungals (From admission, onward)      None           Antivirals (From admission, onward)      None            HPI      Steff Johnson is a 66 y.o. 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  She has a PEG tube for feeding.  Also COPD, diabetes mellitus and hypertension.  She has a nursing home resident.  Brought to the ED for/25/24 with dark brown fluid around the PEG tube and also diarrhea.  She was febrile with T-max 102.3°, hypotensive hypoxic.  WBC 28 K, creatinine 2.5, lactic acid elevated.  Chest x-ray with no acute infiltrates.  UA abnormal with> 100 WBC.  Stool C diff antigen positive.  Stool C diff PCR and toxin assay negative.  She was admitted ICU, placed on IV fluids and antibiotics.  She had a drop in hematocrit and received PRBC x1. Current WBC 24 K, creatinine 3.1.    Antibiotics   Zosyn:  04/25/2024 x1 dose   Metronidazole:  04/25/2024   Oral vancomycin:  04/25/2024-  Meropenem:  04/25/2024-  IV vancomycin:  04/25/2024-    Microbiology   Blood culture 04/25/2024:  Staphylococcus epidermidis 1/4 bottles   Urine culture 04/25/2024:  GNR x2  Stool C diff assay for/25/24:  Antigen positive, PCR negative, toxin negative    Outdoor activities:  She is bed-bound  Travel:  No recent travel  Implants:  PG tube  Antibiotic history:  Reviewed    Social History  Marital Status: Single  Alcohol History:  reports no history of alcohol use.  Tobacco History:  reports that she has never smoked. She has never used smokeless tobacco.  Drug History:  reports no history of drug use.    Review of patient's allergies indicates:  No Known Allergies  Past Medical History:   Diagnosis Date    Arthritis     Complete tear of left rotator cuff 11/09/2017    COPD (chronic obstructive pulmonary disease)     COVID-19 infection 07/02/2021    Diabetes mellitus     H/o Cerebrovascular accident (CVA) of left pontine structure 12/12/2019    Hypertension     Personal history of colonic  History:    History reviewed. No pertinent family history.     Social History:  The patient was accompanied to the ED by son.  Smoking Status: Former  Smokeless Tobacco: Current user  Alcohol Use: No    Marital Status:      Review of Systems   Genitourinary: Positive for difficulty urinating.   All other systems reviewed and are negative.    Physical Exam     Patient Vitals for the past 24 hrs:   BP Temp Temp src Heart Rate Resp SpO2   09/13/18 1148 156/80 98.1  F (36.7  C) Oral 72 20 100 %      Physical Exam  General: Alert, interactive. GCS 15. Elderly male lying supine on the bed.   Head:  Scalp is atraumatic.  Eyes:  EOM intact. The pupils are equal, round, and reactive to light. No scleral icterus.  ENT:                                      Ears:  The external ears are normal.  Nose:  The external nose is normal.  Throat:  The oropharynx is normal. Mucus membranes are moist.                 Neck:  Normal range of motion. There is no rigidity.   CV:  Regular rate and rhythm. No murmur. 2+ radial pulses  Resp:  Breath sounds are clear bilaterally. Non-labored, no retractions or accessory muscle use.  GI:  Abdomen is soft, no distension, no abdominal tenderness.   :   Uncircumcised. Retraction of foreskin has diffuse erythema and inflammation. Urethral meatus is inflamed and bleeding. No surrounding rash.   MS:  Normal range of motion.   Skin:  Warm and dry.   Neuro:  Strength and sensation grossly intact.   Psych:  Awake. Alert.  Appropriate interactions.     Emergency Department Course     Emergency Department Course:  Nursing notes and vitals reviewed.  1214: I performed an exam of the patient as documented above.   1357: Findings and plan explained to the patient and son. Patient discharged home with instructions regarding supportive care, medications, and reasons to return. The importance of close follow-up was reviewed. The patient was prescribed Lotrimin.    I personally  answered all related  polyps     Stroke     Wears glasses      Past Surgical History:   Procedure Laterality Date    COLONOSCOPY N/A 4/11/2017    Procedure: COLONOSCOPY;  Surgeon: Jared Antonio MD;  Location: [facility] ENDO;  Service: Endoscopy;  Laterality: N/A;    CYSTOSCOPY W/ RETROGRADES N/A 12/14/2023    Procedure: CYSTOSCOPY, WITH RETROGRADE PYELOGRAM;  Surgeon: Sergio Soria MD;  Location: [facility] OR;  Service: Urology;  Laterality: N/A;    ECHOCARDIOGRAM,TRANSESOPHAGEAL N/A 12/12/2023    Procedure: Transesophageal echo (GT) intra-procedure log documentation;  Surgeon: Antoine Rivera MD;  Location: [facility] CATH/EP LAB;  Service: Cardiology;  Laterality: N/A;    ESOPHAGOGASTRODUODENOSCOPY N/A 2/25/2024    Procedure: EGD (ESOPHAGOGASTRODUODENOSCOPY);  Surgeon: Alexandru May MD;  Location: [facility] ENDO;  Service: Endoscopy;  Laterality: N/A;    HYSTERECTOMY      INSERTION OF CATHETER N/A 12/14/2023    Procedure: INSERTION, CATHETER;  Surgeon: Sergio Soria MD;  Location: [facility] OR;  Service: Urology;  Laterality: N/A;    OOPHORECTOMY      SHOULDER SURGERY      R    TRANSESOPHAGEAL ECHOCARDIOGRAM WITH POSSIBLE CARDIOVERSION (GT W/ POSS CARDIOVERSION) N/A 5/4/2023    Procedure: Transesophageal echo (GT) intra-procedure log documentation;  Surgeon: Blade Phillip MD;  Location: [facility] CATH/EP LAB;  Service: Cardiology;  Laterality: N/A;     Family History   Problem Relation Name Age of Onset    Diabetes Mother      Asthma Mother      Hypertension Mother      Diabetes Father      Diabetes Brother      Hypertension Brother      Anemia Daughter      Glaucoma Neg Hx      Macular degeneration Neg Hx      Retinal detachment Neg Hx         SUBJECTIVE     Review of systems: Unable to obtain      OBJECTIVE   Temp:  [98.4 °F (36.9 °C)-102.3 °F (39.1 °C)] 98.5 °F (36.9 °C)  Pulse:  [] 109  Resp:  [20-38] 33  SpO2:  [90 %-100 %] 100 %  BP: (101-151)/(51-74) 139/62  Temp:  [98.4 °F (36.9 °C)-102.3 °F (39.1 °C)]   Temp: 98.5 °F  questions prior to discharge.     Impression & Plan    Medical Decision Making:  Barrie Woods is a 88 year old male with a medical history including renal cell carcinoma and BPH with urinary retention presents for Youssef catheter change.  The patient's son reports that he brought his father to Dr. Currie's office of urology for a cystoscopy and TUR procedure today.  His office reported that there was no scheduled appointment today, so son brought his father to the emergency department to change his catheter as it has been in place for 3 weeks.      On exam, there is evidence of balanitis and inflammation of the penile meatus.  I discussed patient with Dr. Currie who reported the patient actually canceled the appointment a couple weeks ago.  Dr. Currie's office is planning on following up with the patient to discuss further. The patient is having no fevers/chills, or new abdominal pain to suspect infection and UA was not sent today. The catheter was replaced and prescribed Lotrimin cream for balanitis and instructions on keeping the area clean and dry were discussed.  Patient son plans to follow-up with urology next week.  A referral to a new urologist was given if needed.  All questions/concerns addressed prior to discharge home. Return precautions discussed.     Diagnosis:    ICD-10-CM    1. Urinary catheter (Youssef) change required Z46.6    2. Balanitis N48.1        Disposition:  discharged to home    Scribe Disclosure:  I, Daria Gonzalez, am serving as a scribe at 12:08 PM on 9/13/2018 to document services personally performed by Jennie Maradiaga PA-C based on my observations and the provider's statements to me.    9/13/2018   [facility] EMERGENCY DEPARTMENT       Jennie Maradiaga PA-C  09/13/18 [id number]     (36.9 °C) (04/26/24 0058)  Pulse: 109 (04/26/24 0545)  Resp: (!) 33 (04/26/24 0545)  BP: 139/62 (04/26/24 0545)  SpO2: 100 % (04/26/24 0545)    Intake/Output Summary (Last 24 hours) at 4/26/2024 1058  Last data filed at 4/26/2024 0308  Gross per 24 hour   Intake 3920.84 ml   Output 500 ml   Net 3420.84 ml       Physical Exam  General:  Elderly woman lying quietly in bed.  She is aphasic  HEENT:  No oral thrush    Neck: Supple, no tenderness to palpation.  Cardiovascular: Regular rate and rhythm, no murmurs, no edema.    Respiratory:  Clear to auscultation bilaterally, no tachypnea or increased work of breathing.  Gastrointestinal:  Soft with active bowel sounds, no tenderness to palpation, no distention.  Genitourinary:  No suprapubic tenderness.  Peg tube in place  Musculoskeletal:  Moves all extremities with good strength.    Skin:  Warm and dry, no obvious rashes.    Neuro:   Oriented, conversant, follows commands.  Psych: Good mood, normal affect.     VAD:  None  ISOLATION:  Contact isolation    Wounds:     Significant Labs: All pertinent labs within the past 24 hours have been reviewed.    CBC LAST 7  Recent Labs   Lab 04/25/24  1223 04/25/24  1701 04/25/24  1854 04/26/24  0255 04/26/24  0405 04/26/24  0608   WBC 28.38* 24.95*  --   --  24.03*  --    RBC 3.41* 2.76*  --   --  3.38*  --    HGB 8.8* 7.3* 6.2* 9.7* 9.4* 10.0*   HCT 27.0* 22.3*  --   --  27.9*  --    MCV 79* 81*  --   --  83  --    MCH 25.8* 26.4*  --   --  27.8  --    MCHC 32.6 32.7  --   --  33.7  --    RDW 18.8* 18.7*  --   --  16.6*  --    * 560*  --   --  482*  --    MPV 10.7 10.7  --   --  10.1  --    GRAN 55.0 83.0*  20.7*  --   --  86.8*  20.9*  --    LYMPH 8.0* 9.3*  2.3  --   --  5.7*  1.4  --    MONO 5.0 5.8  1.5*  --   --  4.7  1.1*  --    BASO  --  0.05  --   --  0.09  --    NRBC 0 0  --   --  0  --        CHEMISTRY LAST 7  Recent Labs   Lab 04/25/24  1223 04/25/24  2146 04/26/24  0405   * 135* 136   K 5.9* 4.1 4.3  "  CL 96 104 101   CO2 18* 11* 20*   ANIONGAP 19* 20* 15   BUN 93* 84* 96*   CREATININE 2.5* 2.7* 3.1*   * 302* 241*   CALCIUM 9.7 7.4* 8.3*   MG 2.2  --  2.0   ALBUMIN 3.3*  --  2.7*   PROT 8.0  --  6.6   ALKPHOS 89  --  68   ALT 15  --  10   AST 18  --  19   BILITOT 0.6  --  0.9       Estimated Creatinine Clearance: 13.8 mL/min (A) (based on SCr of 3.1 mg/dL (H)).    INFLAMMATORY/PROCAL  LAST 7  No results for input(s): "PROCAL", "ESR", "CRP" in the last 168 hours.  No results found for: "ESR"  CRP   Date Value Ref Range Status   12/11/2023 55.1 (H) 0.0 - 8.2 mg/L Final   12/09/2023 143.3 (H) 0.0 - 8.2 mg/L Final   12/06/2023 293.0 (H) 0.0 - 8.2 mg/L Final   10/25/2023 92.6 (H) 0.0 - 8.2 mg/L Final   06/15/2023 0.65 <0.76 mg/dL Final   11/23/2021 3.21 (H) <0.76 mg/dL Final   07/04/2021 0.34 <0.76 mg/dL Final       PRIOR  MICROBIOLOGY:  Reviewed    Susceptibility data from last 90 days.  Collected Specimen Info Organism   04/25/24 Urine Lactose Fermenting     Nonfermentative   02/23/24 Blood from Peripheral, Antecubital, Left No growth after 5 days.   02/23/24 Blood from Peripheral, Antecubital, Left No growth after 5 days.   02/23/24 Urine Candida tropicalis         LAST 7 DAYS MICROBIOLOGY   Microbiology Results (last 7 days)       Procedure Component Value Units Date/Time    Urine culture [[id number]]  (Abnormal) Collected: 04/25/24 1434    Order Status: Completed Specimen: Urine Updated: 04/26/24 0923     Urine Culture, Routine GRAM NEGATIVE MARLEEN, LACTOSE   >100,000 cfu/ml  Identification and susceptibility pending        GRAM NEGATIVE MARLEEN, NON-LACTOSE   > 100,000 cfu/ml  Identification and susceptibility pending      Narrative:      Specimen Source->Urine    Rapid Organism ID by PCR (from Blood culture) [[id number]]  (Abnormal) Collected: 04/25/24 1247    Order Status: Completed Updated: 04/26/24 0826     Enterococcus faecalis Not Detected     Enterococcus faecium Not Detected     " Listeria monocytogenes Not Detected     Staphylococcus spp. Detected     Staphylococcus aureus Not Detected     Staphylococcus epidermidis Not Detected     Staphylococcus lugdunensis Not Detected     Streptococcus species Not Detected     Streptococcus agalactiae Not Detected     Streptococcus pneumoniae Not Detected     Streptococcus pyogenes Not Detected     Acinetobacter calcoaceticus/baumannii complex Not Detected     Bacteroides fragilis Not Detected     Enterobacterales Not Detected     Enterobacter cloacae complex Not Detected     Escherichia coli Not Detected     Klebsiella aerogenes Not Detected     Klebsiella oxytoca Not Detected     Klebsiella pneumoniae group Not Detected     Proteus Not Detected     Salmonella sp Not Detected     Serratia marcescens Not Detected     Haemophilus influenzae Not Detected     Neisseria meningtidis Not Detected     Pseudomonas aeruginosa Not Detected     Stenotrophomonas maltophilia Not Detected     Candida albicans Not Detected     Candida auris Not Detected     Candida glabrata Not Detected     Candida krusei Not Detected     Candida parapsilosis Not Detected     Candida tropicalis Not Detected     Cryptococcus neoformans/gattii Not Detected     CTX-M (ESBL ) Test not applicable     IMP (Carbapenem resistant) Test not applicable     KPC resistance gene (Carbapenem resistant) Test not applicable     mcr-1  Test not applicable     mec A/C  Test not applicable     mec A/C and MREJ (MRSA) gene Test not applicable     NDM (Carbapenem resistant) Test not applicable     OXA-48-like (Carbapenem resistant) Test not applicable     van A/B (VRE gene) Test not applicable     VIM (Carbapenem resistant) Test not applicable    Narrative:      Aerobic and anaerobic    Blood culture x two cultures. Draw prior to antibiotics. [[id number]] Collected: 04/25/24 1223    Order Status: Completed Specimen: Blood Updated: 04/26/24 0756     Blood Culture, Routine Gram stain aer bottle: Gram  positive cocci      Results called to and read back by:Octavio Rodriguez RN-ED;  04/26/2024      07:56 CJD    Narrative:      Aerobic and anaerobic    Blood culture x two cultures. Draw prior to antibiotics. [[id number]] Collected: 04/25/24 1247    Order Status: Completed Specimen: Blood Updated: 04/26/24 0736     Blood Culture, Routine Gram stain aer bottle: Gram positive cocci      Results called to and read back by:Octavio Rodriguez RN-ED;  04/26/2024      07:35 CJD    Narrative:      Aerobic and anaerobic    C Diff Toxin by PCR [[id number]] Collected: 04/25/24 1526    Order Status: Completed Updated: 04/25/24 2104     C. diff PCR Negative    Clostridium difficile EIA [[id number]]  (Abnormal) Collected: 04/25/24 1526    Order Status: Completed Specimen: Stool Updated: 04/25/24 1624     C. diff Antigen Positive     C difficile Toxins A+B, EIA Negative     Comment: Testing not recommended for children <24 months old.       Stool culture **cannot be ordered stat** [[id number]] Collected: 04/25/24 1526    Order Status: Sent Specimen: Stool Updated: 04/25/24 1545          CURRENT/PREVIOUS VISIT EKG  Results for orders placed or performed during the hospital encounter of 04/25/24   EKG 12-lead    Collection Time: 04/25/24 12:25 PM   Result Value Ref Range    QRS Duration 70 ms    OHS QTC Calculation 454 ms    Narrative    Test Reason : A41.9,    Vent. Rate : 104 BPM     Atrial Rate : 104 BPM     P-R Int : 138 ms          QRS Dur : 070 ms      QT Int : 346 ms       P-R-T Axes : 056 029 078 degrees     QTc Int : 454 ms    Sinus tachycardia  Cannot rule out Anterior infarct ,age undetermined  Abnormal ECG  When compared with ECG of 23-FEB-2024 14:49,  Criteria for Inferior infarct are no longer Present  Confirmed by Marjan PALOMO, Blade NICHOLE ([id number]) on 4/25/2024 8:15:12 PM    Referred By: [email or web address]   SELF           Confirmed By:Blade Phillip MD     Significant Imaging: I have reviewed all relevant and available imaging  results/findings within the past 24 hours.    Abraham Resendez MD  Date of Service: 04/26/2024      This note was created using [employer] voice recognition software that occasionally misinterpreted phrases or words.

## 2024-09-19 NOTE — PLAN OF CARE
Pt  Denied new symptoms or concerns today  5FU completed in 46 hours via Elastomeric pump  Neulasta ONPRO placed on YENIFER  Pt  Understands process and will remove pump independently as ordered  Future appointments reviewed  AVS declined  Pt remains admitted for UTI  Alert, unable to assess orientation   No pain or nausea reported  Youssef in place, patent  BM x3, red streaks noted in stool   Scab on right buttocks, barrier cream applied  Up SBA  Regular diet with nectar thick liquids  Discharge pending placement  Will continue to monitor   does not want

## 2025-03-17 NOTE — TELEPHONE ENCOUNTER
[facility] Emergency Department Lab result notification:    [place] ED lab result protocol used  Urine Culture    Reason for call  Notify of lab results, assess symptoms,  review ED providers recommendations/discharge instructions (if necessary) and advise per ED lab result f/u protocol    Lab Result  Final Urine Culture Report on 7/28/18  Emergency Dept discharge antibiotic prescribed: Ciprofloxacin (Cipro) 500 mg tablet, 1 tablet (500 mg) by mouth 2 times daily for 7 days.  #1. Bacteria, >100,000 colonies/mL Enterobacter cloacae complex, is SUSCEPTIBLE to Antibiotic.    As per [place] ED Lab Result protocol, no change in antibiotic therapy.    Information table from ED Provider visit on 7/25/18  Symptoms reported at ED visit (Chief complaint, HPI) Barrie Woods is a 88 year old male with a history of dementia, hypertension, atrial fibrillation, and kidney stones who presents with urinary retention. Per report by bilingual family, the patient has had symptoms of urinary retention and penile pain for the past 3 days.  He has previously required catheter for retention and this seems similar.  Patient is describing that the pain also reminds him of kidney stones except the pain is in the penis, not the abdomen.  No known fevers or vomiting.  Did not see a Urologist after last catheter placement because he returned to the ED and had it removed.  On chart review this is confirmed; this occurred this spring.  This history is otherwise limited due to the patient's pain.    ED providers Impression and Plan (applicable information) This patient presents for evaluation of abdominal and penile pain with a history suspicious for recurrent retention. Youssef catheter was placed based on clinical suspicion but no urine output was obtained.  Blood noted from meatus after use of lidojet and before placement of catheter.  This passed with minimal resistance at the prostate per tech.  Bladder scan was checked  afterwards and does show some retention. Youssef catheter was adjusted at this point, but still not obvious draining. Work-up was expanded to evaluate for other intra-abdominal symptoms or pathology, or renal failure. Prior to CT being obtained, the Youssef did start draining over 500 mL of dark colored, port wine urine. CT was obtained to evaluate for the source of this bleeding (stone, mass, etc). This appears to be a renal mass, they will need close follow-up with urology. UA was grossly bloody, but there is the potential for infection based on the results. This was sent for culture and he will be on antibiotics. Family declined an  during our interaction, they do seem to understand the importance of the antibiotic and speciality clinic follow-up.    Miscellaneous information N/A     RN Assessment (Patient s current Symptoms), include time called.  [Insert Left message here if message left]  Son Jose states Barrie still has pain but has improved and is taking Cipro.  Has urology appt in [place] with Dr. Currie on 8/3.  Son states he wants to go to [place] instead; advised to contact urology clinic when open for possible [place] options.      Please Contact your PCP clinic or return to the Emergency department if your:    Symptoms return.    Symptoms do not resolve after completing antibiotic.    Symptoms worsen or other concerning symptom's.    PCP follow-up Questions asked: YES       Rain Ba RN    [facility] [employer] RN  Lung Nodule and ED Lab Results F/U RN  Epic pool (ED late result f/u RN) : [id number]   # [phone]    Copy of Lab result   Order   Urine Culture [[id number]] (Order [id number])   Exam Information   Exam Date Exam Time Accession # Results    7/25/18 10:32 AM     Component Results   Component Collected Lab   Specimen Description 07/25/2018 10:32    Catheterized Urine   Special Requests 07/25/2018 10:32 AM 75   Specimen received in preservative   Culture Micro  (Abnormal) 07/25/2018 10:32    >100,000 colonies/mL   Enterobacter cloacae complex      Culture & Susceptibility   ENTEROBACTER CLOACAE COMPLEX   Antibiotic Interpretation Sensitivity Unit Method Status   CEFAZOLIN Resistant >=64 ug/mL INEZ Final   Comment: Cefazolin INEZ breakpoints are for the treatment of uncomplicated urinary tract   infections.  For the treatment of systemic infections, please contact the   laboratory for additional testing.  Intrinsically Resistant   CEFEPIME Sensitive <=1.0 ug/mL INEZ Final   CEFOXITIN Resistant >=64 ug/mL INEZ Final   Comment: Intrinsically Resistant   CEFTAZIDIME Sensitive <=1.0 ug/mL INEZ Final   CEFTRIAXONE Intermediate 2.0 ug/mL INEZ Final   CIPROFLOXACIN Sensitive <=0.25 ug/mL INEZ Final   GENTAMICIN Sensitive <=1 ug/mL INEZ Final   LEVOFLOXACIN Sensitive <=0.12 ug/mL INEZ Final   NITROFURANTOIN Sensitive <=16 ug/mL INEZ Final   Piperacillin/Tazo Sensitive 8 ug/mL INEZ Final   TOBRAMYCIN Sensitive <=1 ug/mL INEZ Final   Trimethoprim/Sulfa Sensitive <=1/19 ug/mL INEZ Final             Detail Level: Detailed Quality 431: Preventive Care And Screening: Unhealthy Alcohol Use - Screening: Patient not identified as an unhealthy alcohol user when screened for unhealthy alcohol use using a systematic screening method Quality 130: Documentation Of Current Medications In The Medical Record: Current Medications Documented

## (undated) DEVICE — BAG URINARY DRAIN 4000ML LF 153509

## (undated) DEVICE — TUBING SET IRRIGATION 4 LEAD 90" DYND19124

## (undated) DEVICE — CATH FOLEY 3WAY 24FR 30ML LUBRICATH LATEX 0167L24

## (undated) DEVICE — COVER FOOTSWITCH W/CINCH 20X24" 923267

## (undated) DEVICE — GLOVE PROTEXIS POWDER FREE SMT 7.5  2D72PT75X

## (undated) DEVICE — SYR ELLIK EVACUATOR W/ADAPT LATEX

## (undated) DEVICE — ESU GROUND PAD ADULT W/CORD E7507

## (undated) DEVICE — PACK CYSTO CUSTOM RIDGES

## (undated) DEVICE — BAG CLEAR TRASH 1.3M 39X33" P4040C

## (undated) DEVICE — PAD CHUX UNDERPAD 30X36" P3036C

## (undated) DEVICE — SYR 50ML CATH TIP W/O NDL 309620

## (undated) DEVICE — LINEN HALF SHEET 5512

## (undated) DEVICE — CATH HOLDER STRAP 36600

## (undated) DEVICE — Device

## (undated) DEVICE — SOL NACL 0.9% IRRIG 1000ML BOTTLE 2F7124

## (undated) DEVICE — SOL NACL 0.9% IRRIG 3000ML BAG 2B7477

## (undated) DEVICE — LINEN FULL SHEET 5511

## (undated) DEVICE — PREP TECHNI-CARE CHLOROXYLENOL 3% 4OZ BOTTLE C222-4ZWO

## (undated) DEVICE — CATH PLUG W/CAP 000076

## (undated) DEVICE — BASIN SET MINOR DISP

## (undated) RX ORDER — GLYCOPYRROLATE 0.2 MG/ML
INJECTION INTRAMUSCULAR; INTRAVENOUS
Status: DISPENSED
Start: 2019-02-06

## (undated) RX ORDER — PROPOFOL 10 MG/ML
INJECTION, EMULSION INTRAVENOUS
Status: DISPENSED
Start: 2019-02-06

## (undated) RX ORDER — FENTANYL CITRATE 50 UG/ML
INJECTION, SOLUTION INTRAMUSCULAR; INTRAVENOUS
Status: DISPENSED
Start: 2019-01-09

## (undated) RX ORDER — PROPOFOL 10 MG/ML
INJECTION, EMULSION INTRAVENOUS
Status: DISPENSED
Start: 2019-01-09

## (undated) RX ORDER — ONDANSETRON 2 MG/ML
INJECTION INTRAMUSCULAR; INTRAVENOUS
Status: DISPENSED
Start: 2019-02-06

## (undated) RX ORDER — DEXAMETHASONE SODIUM PHOSPHATE 4 MG/ML
INJECTION, SOLUTION INTRA-ARTICULAR; INTRALESIONAL; INTRAMUSCULAR; INTRAVENOUS; SOFT TISSUE
Status: DISPENSED
Start: 2019-02-06

## (undated) RX ORDER — CEFTRIAXONE SODIUM 1 G
VIAL (EA) INJECTION
Status: DISPENSED
Start: 2019-01-09

## (undated) RX ORDER — EPHEDRINE SULFATE 50 MG/ML
INJECTION, SOLUTION INTRAMUSCULAR; INTRAVENOUS; SUBCUTANEOUS
Status: DISPENSED
Start: 2019-02-06

## (undated) RX ORDER — CEFAZOLIN SODIUM 2 G/100ML
INJECTION, SOLUTION INTRAVENOUS
Status: DISPENSED
Start: 2019-02-06

## (undated) RX ORDER — CEFAZOLIN SODIUM 2 G/100ML
INJECTION, SOLUTION INTRAVENOUS
Status: DISPENSED
Start: 2019-01-09

## (undated) RX ORDER — LIDOCAINE HYDROCHLORIDE 20 MG/ML
INJECTION, SOLUTION EPIDURAL; INFILTRATION; INTRACAUDAL; PERINEURAL
Status: DISPENSED
Start: 2019-02-06

## (undated) RX ORDER — FENTANYL CITRATE 50 UG/ML
INJECTION, SOLUTION INTRAMUSCULAR; INTRAVENOUS
Status: DISPENSED
Start: 2019-02-06